# Patient Record
Sex: FEMALE | Race: WHITE | NOT HISPANIC OR LATINO | Employment: OTHER
[De-identification: names, ages, dates, MRNs, and addresses within clinical notes are randomized per-mention and may not be internally consistent; named-entity substitution may affect disease eponyms.]

---

## 2017-01-04 ENCOUNTER — CHARTING TRANS (OUTPATIENT)
Dept: OTHER | Age: 82
End: 2017-01-04

## 2017-01-05 ENCOUNTER — CHARTING TRANS (OUTPATIENT)
Dept: OTHER | Age: 82
End: 2017-01-05

## 2017-01-11 ENCOUNTER — NURSE ONLY (OUTPATIENT)
Dept: INTERNAL MEDICINE CLINIC | Facility: CLINIC | Age: 82
End: 2017-01-11

## 2017-01-11 DIAGNOSIS — I48.20 CHRONIC ATRIAL FIBRILLATION (HCC): Primary | ICD-10-CM

## 2017-01-11 LAB — INR: 2.3 (ref 0.8–1.2)

## 2017-01-11 PROCEDURE — 85610 PROTHROMBIN TIME: CPT | Performed by: INTERNAL MEDICINE

## 2017-01-24 ENCOUNTER — PRIOR ORIGINAL RECORDS (OUTPATIENT)
Dept: OTHER | Age: 82
End: 2017-01-24

## 2017-01-31 ENCOUNTER — PRIOR ORIGINAL RECORDS (OUTPATIENT)
Dept: OTHER | Age: 82
End: 2017-01-31

## 2017-02-03 ENCOUNTER — PRIOR ORIGINAL RECORDS (OUTPATIENT)
Dept: OTHER | Age: 82
End: 2017-02-03

## 2017-02-17 ENCOUNTER — PRIOR ORIGINAL RECORDS (OUTPATIENT)
Dept: OTHER | Age: 82
End: 2017-02-17

## 2017-02-21 ENCOUNTER — PRIOR ORIGINAL RECORDS (OUTPATIENT)
Dept: OTHER | Age: 82
End: 2017-02-21

## 2017-02-21 ENCOUNTER — OFFICE VISIT (OUTPATIENT)
Dept: INTERNAL MEDICINE CLINIC | Facility: CLINIC | Age: 82
End: 2017-02-21

## 2017-02-21 VITALS
BODY MASS INDEX: 29.57 KG/M2 | RESPIRATION RATE: 18 BRPM | HEART RATE: 60 BPM | WEIGHT: 177.5 LBS | HEIGHT: 65 IN | OXYGEN SATURATION: 96 % | DIASTOLIC BLOOD PRESSURE: 54 MMHG | TEMPERATURE: 98 F | SYSTOLIC BLOOD PRESSURE: 120 MMHG

## 2017-02-21 DIAGNOSIS — Z98.890 S/P LAMINECTOMY: ICD-10-CM

## 2017-02-21 DIAGNOSIS — Z95.0 PACEMAKER: ICD-10-CM

## 2017-02-21 DIAGNOSIS — R73.01 IFG (IMPAIRED FASTING GLUCOSE): ICD-10-CM

## 2017-02-21 DIAGNOSIS — Z90.49 S/P COLON RESECTION: ICD-10-CM

## 2017-02-21 DIAGNOSIS — H35.30 MACULAR DEGENERATION: ICD-10-CM

## 2017-02-21 DIAGNOSIS — M54.16 LUMBAR RADICULAR PAIN: ICD-10-CM

## 2017-02-21 DIAGNOSIS — I48.20 CHRONIC ATRIAL FIBRILLATION (HCC): Primary | ICD-10-CM

## 2017-02-21 DIAGNOSIS — I10 ESSENTIAL HYPERTENSION WITH GOAL BLOOD PRESSURE LESS THAN 140/90: ICD-10-CM

## 2017-02-21 LAB
EST. AVERAGE GLUCOSE BLD GHB EST-MCNC: 126 MG/DL (ref 68–126)
HBA1C MFR BLD HPLC: 6 % (ref ?–5.7)

## 2017-02-21 PROCEDURE — 36415 COLL VENOUS BLD VENIPUNCTURE: CPT | Performed by: INTERNAL MEDICINE

## 2017-02-21 PROCEDURE — 96160 PT-FOCUSED HLTH RISK ASSMT: CPT | Performed by: INTERNAL MEDICINE

## 2017-02-21 PROCEDURE — 83036 HEMOGLOBIN GLYCOSYLATED A1C: CPT | Performed by: INTERNAL MEDICINE

## 2017-02-21 PROCEDURE — G0439 PPPS, SUBSEQ VISIT: HCPCS | Performed by: INTERNAL MEDICINE

## 2017-02-21 PROCEDURE — 99397 PER PM REEVAL EST PAT 65+ YR: CPT | Performed by: INTERNAL MEDICINE

## 2017-02-21 NOTE — PROGRESS NOTES
Dina Presley is a  female who presents for a MA Supervisit.       Patient Care Team: Patient Care Team:  Juan Luis Govea MD as PCP - General (Internal Medicine)  Kaylin Guzman MD as Consulting Physician (Anesthesiology)  SHANNEN Vinson OIL OR) 2 tabs 1 time daily Disp:  Rfl:    SOTALOL HCL 80 MG OR TABS 1/2 tablet 3 times daily Disp:  Rfl:    OCUVITE OR 2 times daily Disp:  Rfl:    CALCIUM 500 + D OR 2 tablets daily Disp:  Rfl:    amoxicillin 500 MG Oral Cap Take 1 capsule (500 mg total) been poor?: No    Type of Diet: Vegetarian    How does the patient maintain a good energy level?: Daily Walks    How would you describe your daily physical activity?: Light    How would you describe your current health state?: Good    How do you maintain p Do you have a healthcare power of ?: Yes    Do you have a living will?: Yes     Please go to \"Cognitive Assessment\" under Medicare Assessment section in Charting, test patient and document.     Then, refresh your progress note to see your input applicable    Chlamydia  Annually if high risk No results found for: CHLAMYDIA No flowsheet data found.     Screening Mammogram      Mammogram  Annually Mammogram,1 Yr due on 08/07/2013 Update Health Maintenance if applicable   Immunizations      Influenza for this or any previous visit. No flowsheet data found.         ALLERGIES:     Bacitracin                  Comment:Red and swelling  Bactrim                 Nausea only  Cephalexin              Diarrhea  Garlic                  Diarrhea  Levaquin Rfl: 0   [DISCONTINUED] HYDROcodone-acetaminophen (NORCO)  MG Oral Tab Take 1 tablet by mouth. Every 4 - 6 hours as needed.   Disp:  Rfl:       MEDICAL INFORMATION:   Past Medical History   Diagnosis Date   • Basal cell cancer    • Hypertension    • R °C) (Oral)  Resp 18  Ht 65\"  Wt 177 lb 8 oz  BMI 29.54 kg/m2  SpO2 96%     > BP Readings from Last 3 Encounters:  02/21/17 : 120/54  10/10/16 : 132/68  09/06/16 : 140/54      GENERAL: well developed, well nourished, in no apparent distress  SKIN: no rashe radicular pain. This is being evaluated at [facility] for some sort of stimulator. Problem #7 impaired fasting glucose will check A1c problem #8 hypertension well-controlled  The patient indicates understanding of these issues and agrees to the plan.   The pa

## 2017-02-21 NOTE — PATIENT INSTRUCTIONS
Ayse Maldonado's SCREENING SCHEDULE   Tests on this list are recommended by your physician but may not be covered, or covered at this frequency, by your insurer. Please check with your insurance carrier before scheduling to verify coverage.     PREVEN Immunizations      Influenza No orders found for this or any previous visit.  Update Immunization Activity if applicable    Pneumococcal   Orders placed or performed in visit on 05/23/16  -PNEUMOCOCCAL VACC, 13 REESE IM    Update Immunization Activity if ap

## 2017-02-24 ENCOUNTER — PRIOR ORIGINAL RECORDS (OUTPATIENT)
Dept: OTHER | Age: 82
End: 2017-02-24

## 2017-02-24 ENCOUNTER — MYAURORA ACCOUNT LINK (OUTPATIENT)
Dept: OTHER | Age: 82
End: 2017-02-24

## 2017-03-01 ENCOUNTER — NURSE ONLY (OUTPATIENT)
Dept: INTERNAL MEDICINE CLINIC | Facility: CLINIC | Age: 82
End: 2017-03-01

## 2017-03-01 DIAGNOSIS — I48.20 CHRONIC ATRIAL FIBRILLATION (HCC): Primary | ICD-10-CM

## 2017-03-01 LAB — INR: 1.4 (ref 0.8–1.2)

## 2017-03-01 PROCEDURE — 85610 PROTHROMBIN TIME: CPT | Performed by: INTERNAL MEDICINE

## 2017-03-08 ENCOUNTER — NURSE ONLY (OUTPATIENT)
Dept: INTERNAL MEDICINE CLINIC | Facility: CLINIC | Age: 82
End: 2017-03-08

## 2017-03-08 DIAGNOSIS — I48.20 CHRONIC ATRIAL FIBRILLATION (HCC): Primary | ICD-10-CM

## 2017-03-08 LAB — INR: 2 (ref 0.8–1.2)

## 2017-03-08 PROCEDURE — 85610 PROTHROMBIN TIME: CPT | Performed by: INTERNAL MEDICINE

## 2017-03-20 ENCOUNTER — NURSE ONLY (OUTPATIENT)
Dept: INTERNAL MEDICINE CLINIC | Facility: CLINIC | Age: 82
End: 2017-03-20

## 2017-03-20 DIAGNOSIS — I48.20 CHRONIC ATRIAL FIBRILLATION (HCC): Primary | ICD-10-CM

## 2017-03-20 LAB — INR: 1.9 (ref 0.8–1.2)

## 2017-03-20 PROCEDURE — 85610 PROTHROMBIN TIME: CPT | Performed by: INTERNAL MEDICINE

## 2017-04-04 ENCOUNTER — NURSE ONLY (OUTPATIENT)
Dept: INTERNAL MEDICINE CLINIC | Facility: CLINIC | Age: 82
End: 2017-04-04

## 2017-04-04 DIAGNOSIS — I48.20 CHRONIC ATRIAL FIBRILLATION (HCC): Primary | ICD-10-CM

## 2017-04-04 PROCEDURE — 85610 PROTHROMBIN TIME: CPT | Performed by: INTERNAL MEDICINE

## 2017-04-12 PROBLEM — J44.89 ASTHMA WITH COPD (CHRONIC OBSTRUCTIVE PULMONARY DISEASE) (HCC): Chronic | Status: ACTIVE | Noted: 2017-04-12

## 2017-04-12 PROBLEM — J44.9 ASTHMA WITH COPD (CHRONIC OBSTRUCTIVE PULMONARY DISEASE) (HCC): Chronic | Status: ACTIVE | Noted: 2017-04-12

## 2017-04-12 PROBLEM — J44.89 ASTHMA WITH COPD (CHRONIC OBSTRUCTIVE PULMONARY DISEASE): Chronic | Status: ACTIVE | Noted: 2017-04-12

## 2017-04-12 RX ORDER — TRAMADOL HYDROCHLORIDE 50 MG/1
TABLET ORAL
Qty: 120 TABLET | Refills: 0 | Status: SHIPPED | OUTPATIENT
Start: 2017-04-12 | End: 2017-06-19

## 2017-05-02 ENCOUNTER — NURSE ONLY (OUTPATIENT)
Dept: INTERNAL MEDICINE CLINIC | Facility: CLINIC | Age: 82
End: 2017-05-02

## 2017-05-02 DIAGNOSIS — I48.20 CHRONIC ATRIAL FIBRILLATION (HCC): Primary | ICD-10-CM

## 2017-05-02 PROCEDURE — 85610 PROTHROMBIN TIME: CPT | Performed by: INTERNAL MEDICINE

## 2017-05-10 ENCOUNTER — NURSE ONLY (OUTPATIENT)
Dept: INTERNAL MEDICINE CLINIC | Facility: CLINIC | Age: 82
End: 2017-05-10

## 2017-05-10 DIAGNOSIS — I48.20 CHRONIC ATRIAL FIBRILLATION (HCC): Primary | ICD-10-CM

## 2017-05-10 PROCEDURE — 85610 PROTHROMBIN TIME: CPT | Performed by: INTERNAL MEDICINE

## 2017-05-16 ENCOUNTER — OFFICE VISIT (OUTPATIENT)
Dept: INTERNAL MEDICINE CLINIC | Facility: CLINIC | Age: 82
End: 2017-05-16

## 2017-05-16 ENCOUNTER — PRIOR ORIGINAL RECORDS (OUTPATIENT)
Dept: OTHER | Age: 82
End: 2017-05-16

## 2017-05-16 VITALS
DIASTOLIC BLOOD PRESSURE: 58 MMHG | OXYGEN SATURATION: 97 % | HEART RATE: 62 BPM | RESPIRATION RATE: 16 BRPM | TEMPERATURE: 98 F | SYSTOLIC BLOOD PRESSURE: 120 MMHG

## 2017-05-16 DIAGNOSIS — R05.9 COUGH: Primary | ICD-10-CM

## 2017-05-16 DIAGNOSIS — I48.20 CHRONIC ATRIAL FIBRILLATION (HCC): ICD-10-CM

## 2017-05-16 PROCEDURE — 99213 OFFICE O/P EST LOW 20 MIN: CPT | Performed by: NURSE PRACTITIONER

## 2017-05-16 PROCEDURE — 85610 PROTHROMBIN TIME: CPT | Performed by: NURSE PRACTITIONER

## 2017-05-16 RX ORDER — GUAIFENESIN AND CODEINE PHOSPHATE 100; 10 MG/5ML; MG/5ML
SOLUTION ORAL
Qty: 120 ML | Refills: 0 | Status: SHIPPED | OUTPATIENT
Start: 2017-05-16 | End: 2017-10-02 | Stop reason: ALTCHOICE

## 2017-05-16 NOTE — PROGRESS NOTES
CHIEF COMPLAINT:   Patient presents with:  Cough: since last night with lightheadedness and tiredness      HPI:   Levar Casas is a 80year old female who presents for cough for 1 day    Cough began as a tickle in back of throat, was worse with laying lisinopril (PRINIVIL,ZESTRIL) 40 MG Oral Tab Take 40 mg by mouth 2 (two) times daily. Indications: 40 mg in the morning and 20 mg at night Disp:  Rfl:    gabapentin (NEURONTIN) 300 MG Oral Cap Take 300 mg by mouth 3 (three) times daily.  Disp:  Rfl:    Prob SKIN: no rashes, no suspicious lesions. Well perfused with brisk cap refill and turgor. Color and texture consistent throughout. HEENT: Atraumatic, normocephalic. Pupils PERRLA, conjunctiva clear. TM's pearly gray bilaterally; no erythema or effusion. The patient is asked to seek further care if symptoms persist or worsen. The patient indicates understanding of these issues and agrees to the plan.     Meds & Refills for this Visit:    Signed Prescriptions Disp Refills    Guaifenesin-Codeine 100-10 MG/5M Side effects that usually do not require medical attention (report to your doctor or health care professional if they continue or are bothersome):  · constipation  · dry mouth  · nausea, vomiting  · tiredness  What may interact with this medicine?   Do not Keep out of the reach of children. This medicine can be abused. Keep your medicine in a safe place to protect it from theft. Do not share this medicine with anyone. Selling or giving away this medicine is dangerous and against the law.   This medicine may c If you have been taking this medicine for a long time, do not suddenly stop taking it because you may develop a severe reaction. Your body becomes used to the medicine. This does NOT mean you are addicted.  Addiction is a behavior related to getting and usi NOTE:This sheet is a summary. It may not cover all possible information. If you have questions about this medicine, talk to your doctor, pharmacist, or health care provider.  Copyright© 2016 [employer]

## 2017-05-16 NOTE — PATIENT INSTRUCTIONS
Codeine; Guaifenesin oral solution or syrup  What is this medicine? CODEINE; GUAIFENESIN (CAMERONE adolfo; shay FEN e sin) is a cough suppressant and expectorant. It helps to stop or reduce coughing due to the common cold or inhaled irritants.  It will not manuel · certain medicines for anxiety or sleep  · certain medicines for depression like amitriptyline, fluoxetine, sertraline  · certain medicines for seizures like carbamazepine, phenobarbital, phenytoin, primidone  · general anesthetics like halothane, isoflur This medicine may cause accidental overdose and death if taken by other adults, children, or pets. Mix any unused medicine with a substance like cat littler or coffee grounds.  Then throw the medicine away in a sealed container like a sealed bag or a coffee There are different types of narcotic medicines (opiates). If you take more than one type at the same time or if you are taking another medicine that also causes drowsiness, you may have more side effects.  Give your health care provider a list of all medic

## 2017-05-23 ENCOUNTER — PRIOR ORIGINAL RECORDS (OUTPATIENT)
Dept: OTHER | Age: 82
End: 2017-05-23

## 2017-05-23 ENCOUNTER — OFFICE VISIT (OUTPATIENT)
Dept: INTERNAL MEDICINE CLINIC | Facility: CLINIC | Age: 82
End: 2017-05-23

## 2017-05-23 ENCOUNTER — HOSPITAL ENCOUNTER (OUTPATIENT)
Dept: GENERAL RADIOLOGY | Facility: HOSPITAL | Age: 82
Discharge: HOME | End: 2017-05-23
Attending: INTERNAL MEDICINE
Payer: MEDICARE

## 2017-05-23 VITALS
RESPIRATION RATE: 16 BRPM | OXYGEN SATURATION: 96 % | HEART RATE: 60 BPM | TEMPERATURE: 97 F | SYSTOLIC BLOOD PRESSURE: 120 MMHG | BODY MASS INDEX: 29 KG/M2 | WEIGHT: 172 LBS | DIASTOLIC BLOOD PRESSURE: 60 MMHG

## 2017-05-23 DIAGNOSIS — R05.9 COUGH: ICD-10-CM

## 2017-05-23 DIAGNOSIS — J44.9 ASTHMA WITH COPD (CHRONIC OBSTRUCTIVE PULMONARY DISEASE) (HCC): Chronic | ICD-10-CM

## 2017-05-23 PROCEDURE — 36415 COLL VENOUS BLD VENIPUNCTURE: CPT | Performed by: INTERNAL MEDICINE

## 2017-05-23 PROCEDURE — 85025 COMPLETE CBC W/AUTO DIFF WBC: CPT | Performed by: INTERNAL MEDICINE

## 2017-05-23 PROCEDURE — 99213 OFFICE O/P EST LOW 20 MIN: CPT | Performed by: INTERNAL MEDICINE

## 2017-05-23 PROCEDURE — 71020 XR CHEST PA + LAT CHEST (CPT=71020): CPT | Performed by: INTERNAL MEDICINE

## 2017-05-23 RX ORDER — POLYETHYLENE GLYCOL 3350 17 G/17G
POWDER, FOR SOLUTION ORAL
COMMUNITY
Start: 2017-05-17 | End: 2018-02-07

## 2017-05-23 RX ORDER — AZITHROMYCIN 250 MG/1
TABLET, FILM COATED ORAL
Qty: 6 TABLET | Refills: 0 | Status: SHIPPED | OUTPATIENT
Start: 2017-05-23 | End: 2017-10-02 | Stop reason: ALTCHOICE

## 2017-05-23 NOTE — PATIENT INSTRUCTIONS
Dionna Brain during the 5 days you are taking the antibiotic I want she did take half a Coumadin tablet–2.5 mg nightly. I do want to see you on May 30 to check your INR and for me to examine your lungs.

## 2017-05-23 NOTE — PROGRESS NOTES
Patient states she has had a cough for several weeks. Does have history of COPD. States feels tired and fatigued. Denies a sore throat earache no chest pain. No fever chills or night sweats. Physical examination patient is is alert ill-appearing.   Enoch Saleem

## 2017-05-30 ENCOUNTER — OFFICE VISIT (OUTPATIENT)
Dept: INTERNAL MEDICINE CLINIC | Facility: CLINIC | Age: 82
End: 2017-05-30

## 2017-05-30 VITALS
RESPIRATION RATE: 16 BRPM | DIASTOLIC BLOOD PRESSURE: 50 MMHG | HEART RATE: 60 BPM | SYSTOLIC BLOOD PRESSURE: 130 MMHG | WEIGHT: 172.63 LBS | BODY MASS INDEX: 29 KG/M2 | OXYGEN SATURATION: 95 % | TEMPERATURE: 100 F

## 2017-05-30 DIAGNOSIS — H61.21 IMPACTED CERUMEN OF RIGHT EAR: ICD-10-CM

## 2017-05-30 DIAGNOSIS — R05.9 COUGH: Primary | ICD-10-CM

## 2017-05-30 DIAGNOSIS — J40 BRONCHITIS: ICD-10-CM

## 2017-05-30 DIAGNOSIS — J44.9 ASTHMA WITH COPD (CHRONIC OBSTRUCTIVE PULMONARY DISEASE) (HCC): Chronic | ICD-10-CM

## 2017-05-30 DIAGNOSIS — I48.20 CHRONIC ATRIAL FIBRILLATION (HCC): ICD-10-CM

## 2017-05-30 PROCEDURE — 85610 PROTHROMBIN TIME: CPT | Performed by: INTERNAL MEDICINE

## 2017-05-30 PROCEDURE — 99214 OFFICE O/P EST MOD 30 MIN: CPT | Performed by: INTERNAL MEDICINE

## 2017-05-30 NOTE — PROGRESS NOTES
Problem #1 ear wax right ear excessive. This was irrigated patient tolerated procedure well post tympanic membranes clear right and left. Problem #2 cough due to COPD. Much improved with Flovent. She is to continue taking Flovent.   On examination lungs

## 2017-06-08 ENCOUNTER — TELEPHONE (OUTPATIENT)
Dept: INTERNAL MEDICINE CLINIC | Facility: CLINIC | Age: 82
End: 2017-06-08

## 2017-06-08 NOTE — TELEPHONE ENCOUNTER
Called patient and gave her the last INR result we have in the computer which was 1.3 on 05/30/2017.  Patient is to follow up with the coumadin clinic

## 2017-06-12 ENCOUNTER — NURSE ONLY (OUTPATIENT)
Dept: INTERNAL MEDICINE CLINIC | Facility: CLINIC | Age: 82
End: 2017-06-12

## 2017-06-12 DIAGNOSIS — I48.20 CHRONIC ATRIAL FIBRILLATION (HCC): Primary | ICD-10-CM

## 2017-06-12 PROCEDURE — 85610 PROTHROMBIN TIME: CPT | Performed by: INTERNAL MEDICINE

## 2017-06-13 ENCOUNTER — PRIOR ORIGINAL RECORDS (OUTPATIENT)
Dept: OTHER | Age: 82
End: 2017-06-13

## 2017-06-19 LAB
HEMATOCRIT: 36 %
HEMOGLOBIN A1C: 6 %
HEMOGLOBIN: 11.8 G/DL
PLATELETS: 262 K/UL
RED BLOOD COUNT: 3.82 X 10-6/U
WHITE BLOOD COUNT: 5.9 X 10-3/U

## 2017-06-19 RX ORDER — TRAMADOL HYDROCHLORIDE 50 MG/1
TABLET ORAL
Qty: 120 TABLET | Refills: 0 | Status: SHIPPED | OUTPATIENT
Start: 2017-06-19 | End: 2017-09-25

## 2017-07-03 ENCOUNTER — OFFICE VISIT (OUTPATIENT)
Dept: INTERNAL MEDICINE CLINIC | Facility: CLINIC | Age: 82
End: 2017-07-03

## 2017-07-03 VITALS
RESPIRATION RATE: 16 BRPM | DIASTOLIC BLOOD PRESSURE: 50 MMHG | WEIGHT: 176.19 LBS | SYSTOLIC BLOOD PRESSURE: 110 MMHG | HEART RATE: 60 BPM | OXYGEN SATURATION: 96 % | TEMPERATURE: 99 F | BODY MASS INDEX: 29 KG/M2

## 2017-07-03 DIAGNOSIS — J44.9 ASTHMA WITH COPD (CHRONIC OBSTRUCTIVE PULMONARY DISEASE) (HCC): Primary | Chronic | ICD-10-CM

## 2017-07-03 DIAGNOSIS — M54.16 LUMBAR RADICULAR PAIN: ICD-10-CM

## 2017-07-03 PROCEDURE — 99213 OFFICE O/P EST LOW 20 MIN: CPT | Performed by: INTERNAL MEDICINE

## 2017-07-03 RX ORDER — BUPROPION HYDROCHLORIDE 150 MG/1
TABLET ORAL
Qty: 10 TABLET | Refills: 0 | Status: SHIPPED | OUTPATIENT
Start: 2017-07-03 | End: 2017-10-02 | Stop reason: ALTCHOICE

## 2017-07-03 RX ORDER — GABAPENTIN 300 MG/1
300 CAPSULE ORAL 3 TIMES DAILY
Qty: 270 CAPSULE | Refills: 3 | Status: SHIPPED | OUTPATIENT
Start: 2017-07-03 | End: 2017-10-02

## 2017-07-03 RX ORDER — GABAPENTIN 300 MG/1
300 CAPSULE ORAL 3 TIMES DAILY
Qty: 270 CAPSULE | Refills: 3 | Status: SHIPPED | OUTPATIENT
Start: 2017-07-03 | End: 2019-06-11

## 2017-07-03 NOTE — PROGRESS NOTES
Problem 1 COPD patient denies any chest pain shortness of breath. No wheezing. Problem #2 chronic lumbar radiculopathy. She needs a refill on her gabapentin for this she is also on Bupropion. She states she does know whether she needs this or not.   Aster Lat

## 2017-07-03 NOTE — PATIENT INSTRUCTIONS
Lavon Pickering take the bupropion every second day for a week then every third day for a week then finished the medication every fourth day.

## 2017-07-07 ENCOUNTER — TELEPHONE (OUTPATIENT)
Dept: INTERNAL MEDICINE CLINIC | Facility: CLINIC | Age: 82
End: 2017-07-07

## 2017-07-10 NOTE — PROGRESS NOTES
Received a note from Ayse's pain specialist Dr. Estevan Gonsalez stating that he is prescribing the pain medication. His nurse  want to inform us that we have also been prescribing medication.   I will have my staff tell patient that we will no longer be prescribin

## 2017-07-10 NOTE — TELEPHONE ENCOUNTER
Informed Mrs. Maldonado that Dr. Edyta Santamaria will no longer be prescribing Tramadol medication since she is receiving this medication through Dr. Eryn Edmonds pain specialist.

## 2017-07-12 ENCOUNTER — NURSE ONLY (OUTPATIENT)
Dept: INTERNAL MEDICINE CLINIC | Facility: CLINIC | Age: 82
End: 2017-07-12

## 2017-07-12 DIAGNOSIS — I48.20 CHRONIC ATRIAL FIBRILLATION (HCC): Primary | ICD-10-CM

## 2017-07-12 LAB — INR: 2.4 (ref 0.8–1.2)

## 2017-07-12 PROCEDURE — 85610 PROTHROMBIN TIME: CPT | Performed by: INTERNAL MEDICINE

## 2017-08-11 RX ORDER — WARFARIN SODIUM 5 MG/1
TABLET ORAL
Qty: 90 TABLET | Refills: 0 | Status: SHIPPED | OUTPATIENT
Start: 2017-08-11 | End: 2017-12-04

## 2017-08-16 ENCOUNTER — NURSE ONLY (OUTPATIENT)
Dept: INTERNAL MEDICINE CLINIC | Facility: CLINIC | Age: 82
End: 2017-08-16

## 2017-08-16 DIAGNOSIS — I48.20 CHRONIC ATRIAL FIBRILLATION (HCC): Primary | ICD-10-CM

## 2017-08-16 LAB — INR: 2.6 (ref 0.8–1.2)

## 2017-08-16 PROCEDURE — 85610 PROTHROMBIN TIME: CPT | Performed by: INTERNAL MEDICINE

## 2017-09-06 ENCOUNTER — MYAURORA ACCOUNT LINK (OUTPATIENT)
Dept: OTHER | Age: 82
End: 2017-09-06

## 2017-09-19 ENCOUNTER — HOSPITAL ENCOUNTER (OUTPATIENT)
Dept: LAB | Facility: HOSPITAL | Age: 82
Discharge: HOME | End: 2017-09-19
Attending: INTERNAL MEDICINE
Payer: MEDICARE

## 2017-09-19 ENCOUNTER — PRIOR ORIGINAL RECORDS (OUTPATIENT)
Dept: OTHER | Age: 82
End: 2017-09-19

## 2017-09-19 DIAGNOSIS — I48.0 PAROXYSMAL ATRIAL FIBRILLATION (HCC): ICD-10-CM

## 2017-09-19 LAB
BASOPHILS # BLD AUTO: 0.06 X10(3) UL (ref 0–0.1)
BASOPHILS NFR BLD AUTO: 0.8 %
BETA NATRIURETIC PEPTIDE: 301 PG/ML (ref 2–99)
BUN BLD-MCNC: 13 MG/DL (ref 8–20)
CALCIUM BLD-MCNC: 9.1 MG/DL (ref 8.3–10.3)
CHLORIDE: 101 MMOL/L (ref 101–111)
CO2: 26 MMOL/L (ref 22–32)
CREAT BLD-MCNC: 0.82 MG/DL (ref 0.55–1.02)
EOSINOPHIL # BLD AUTO: 0.14 X10(3) UL (ref 0–0.3)
EOSINOPHIL NFR BLD AUTO: 2 %
ERYTHROCYTE [DISTWIDTH] IN BLOOD BY AUTOMATED COUNT: 12.4 % (ref 11.5–16)
GLUCOSE BLD-MCNC: 103 MG/DL (ref 70–99)
HCT VFR BLD AUTO: 33.2 % (ref 34–50)
HGB BLD-MCNC: 11.5 G/DL (ref 12–16)
IMMATURE GRANULOCYTE COUNT: 0.02 X10(3) UL (ref 0–1)
IMMATURE GRANULOCYTE RATIO %: 0.3 %
LYMPHOCYTES # BLD AUTO: 1.59 X10(3) UL (ref 0.9–4)
LYMPHOCYTES NFR BLD AUTO: 22.2 %
MCH RBC QN AUTO: 32.3 PG (ref 27–33.2)
MCHC RBC AUTO-ENTMCNC: 34.6 G/DL (ref 31–37)
MCV RBC AUTO: 93.3 FL (ref 81–100)
MONOCYTES # BLD AUTO: 0.94 X10(3) UL (ref 0.1–0.6)
MONOCYTES NFR BLD AUTO: 13.1 %
NEUTROPHIL ABS PRELIM: 4.4 X10 (3) UL (ref 1.3–6.7)
NEUTROPHILS # BLD AUTO: 4.4 X10(3) UL (ref 1.3–6.7)
NEUTROPHILS NFR BLD AUTO: 61.6 %
PLATELET # BLD AUTO: 245 10(3)UL (ref 150–450)
POC INR: 2.2 (ref 0.8–1.3)
POTASSIUM SERPL-SCNC: 4.2 MMOL/L (ref 3.6–5.1)
RBC # BLD AUTO: 3.56 X10(6)UL (ref 3.8–5.1)
RED CELL DISTRIBUTION WIDTH-SD: 42.7 FL (ref 35.1–46.3)
SODIUM SERPL-SCNC: 135 MMOL/L (ref 136–144)
WBC # BLD AUTO: 7.2 X10(3) UL (ref 4–13)

## 2017-09-19 PROCEDURE — 85025 COMPLETE CBC W/AUTO DIFF WBC: CPT | Performed by: INTERNAL MEDICINE

## 2017-09-19 PROCEDURE — 83880 ASSAY OF NATRIURETIC PEPTIDE: CPT | Performed by: INTERNAL MEDICINE

## 2017-09-19 PROCEDURE — 85610 PROTHROMBIN TIME: CPT

## 2017-09-19 PROCEDURE — 36415 COLL VENOUS BLD VENIPUNCTURE: CPT | Performed by: INTERNAL MEDICINE

## 2017-09-19 PROCEDURE — 80048 BASIC METABOLIC PNL TOTAL CA: CPT | Performed by: INTERNAL MEDICINE

## 2017-09-20 ENCOUNTER — OFFICE VISIT (OUTPATIENT)
Dept: INTERNAL MEDICINE CLINIC | Facility: CLINIC | Age: 82
End: 2017-09-20

## 2017-09-20 ENCOUNTER — PRIOR ORIGINAL RECORDS (OUTPATIENT)
Dept: OTHER | Age: 82
End: 2017-09-20

## 2017-09-20 VITALS
HEART RATE: 60 BPM | TEMPERATURE: 98 F | WEIGHT: 181 LBS | DIASTOLIC BLOOD PRESSURE: 44 MMHG | SYSTOLIC BLOOD PRESSURE: 104 MMHG | BODY MASS INDEX: 30 KG/M2 | OXYGEN SATURATION: 96 %

## 2017-09-20 DIAGNOSIS — J01.00 ACUTE NON-RECURRENT MAXILLARY SINUSITIS: Primary | ICD-10-CM

## 2017-09-20 PROCEDURE — 99213 OFFICE O/P EST LOW 20 MIN: CPT | Performed by: INTERNAL MEDICINE

## 2017-09-20 RX ORDER — FUROSEMIDE 40 MG/1
TABLET ORAL
COMMUNITY
Start: 2017-09-19 | End: 2017-11-01

## 2017-09-20 RX ORDER — AMOXICILLIN 500 MG/1
500 CAPSULE ORAL 2 TIMES DAILY
Qty: 20 CAPSULE | Refills: 0 | Status: SHIPPED | OUTPATIENT
Start: 2017-09-20 | End: 2017-09-30

## 2017-09-20 NOTE — PROGRESS NOTES
For last week patient has been complaining of discomfort around the left cheek and feeling tired and fatigued. She saw her dentist yesterday. Apparently at that time was told that this appear to be a dental problem most likely sinusitis.   Patient denies

## 2017-09-25 RX ORDER — TRAMADOL HYDROCHLORIDE 50 MG/1
TABLET ORAL
Qty: 120 TABLET | Refills: 5 | Status: SHIPPED | OUTPATIENT
Start: 2017-09-25 | End: 2017-11-01

## 2017-09-27 LAB
BUN: 13 MG/DL
CALCIUM: 9.1 MG/DL
CHLORIDE: 101 MEQ/L
CREATININE, SERUM: 0.82 MG/DL
GLUCOSE: 103 MG/DL
HEMATOCRIT: 33.2 %
HEMOGLOBIN: 11.5 G/DL
PLATELETS: 245 K/UL
POTASSIUM, SERUM: 4.2 MEQ/L
RED BLOOD COUNT: 3.56 X 10-6/U
SODIUM: 135 MEQ/L
WHITE BLOOD COUNT: 7.2 X 10-3/U

## 2017-10-02 ENCOUNTER — OFFICE VISIT (OUTPATIENT)
Dept: INTERNAL MEDICINE CLINIC | Facility: CLINIC | Age: 82
End: 2017-10-02

## 2017-10-02 VITALS
HEART RATE: 60 BPM | SYSTOLIC BLOOD PRESSURE: 130 MMHG | DIASTOLIC BLOOD PRESSURE: 56 MMHG | RESPIRATION RATE: 16 BRPM | OXYGEN SATURATION: 96 %

## 2017-10-02 DIAGNOSIS — J32.9 SINUSITIS, UNSPECIFIED CHRONICITY, UNSPECIFIED LOCATION: Primary | ICD-10-CM

## 2017-10-02 PROCEDURE — 99213 OFFICE O/P EST LOW 20 MIN: CPT | Performed by: INTERNAL MEDICINE

## 2017-10-02 NOTE — PROGRESS NOTES
Patient is here for follow-up on her sinusitis she states that symptoms have resolved with medication. I informed her I received a fax from her [facility].   That she is receiving tramadol from several different physicians somewhat different area

## 2017-10-12 ENCOUNTER — HOSPITAL ENCOUNTER (OUTPATIENT)
Dept: CV DIAGNOSTICS | Facility: HOSPITAL | Age: 82
Discharge: HOME | End: 2017-10-12
Attending: INTERNAL MEDICINE

## 2017-10-12 ENCOUNTER — MYAURORA ACCOUNT LINK (OUTPATIENT)
Dept: OTHER | Age: 82
End: 2017-10-12

## 2017-10-12 DIAGNOSIS — I48.0 AF (PAROXYSMAL ATRIAL FIBRILLATION) (HCC): ICD-10-CM

## 2017-10-12 DIAGNOSIS — I10 BENIGN HYPERTENSION: ICD-10-CM

## 2017-10-12 DIAGNOSIS — R42 DIZZINESS: ICD-10-CM

## 2017-10-13 ENCOUNTER — IMMUNIZATION (OUTPATIENT)
Dept: INTERNAL MEDICINE CLINIC | Facility: CLINIC | Age: 82
End: 2017-10-13

## 2017-10-13 ENCOUNTER — MED REC SCAN ONLY (OUTPATIENT)
Dept: INTERNAL MEDICINE CLINIC | Facility: CLINIC | Age: 82
End: 2017-10-13

## 2017-10-13 PROCEDURE — 90653 IIV ADJUVANT VACCINE IM: CPT | Performed by: INTERNAL MEDICINE

## 2017-10-13 PROCEDURE — G0008 ADMIN INFLUENZA VIRUS VAC: HCPCS | Performed by: INTERNAL MEDICINE

## 2017-10-16 ENCOUNTER — HOSPITAL ENCOUNTER (OUTPATIENT)
Dept: LAB | Facility: HOSPITAL | Age: 82
Discharge: HOME | End: 2017-10-16
Attending: INTERNAL MEDICINE
Payer: MEDICARE

## 2017-10-16 ENCOUNTER — PRIOR ORIGINAL RECORDS (OUTPATIENT)
Dept: OTHER | Age: 82
End: 2017-10-16

## 2017-10-16 DIAGNOSIS — I48.0 PAROXYSMAL ATRIAL FIBRILLATION (HCC): ICD-10-CM

## 2017-10-16 PROCEDURE — 85610 PROTHROMBIN TIME: CPT

## 2017-10-27 ENCOUNTER — APPOINTMENT (OUTPATIENT)
Dept: CT IMAGING | Facility: HOSPITAL | Age: 82
DRG: 493 | End: 2017-10-27
Payer: MEDICARE

## 2017-10-27 ENCOUNTER — APPOINTMENT (OUTPATIENT)
Dept: GENERAL RADIOLOGY | Facility: HOSPITAL | Age: 82
DRG: 493 | End: 2017-10-27
Payer: MEDICARE

## 2017-10-27 ENCOUNTER — HOSPITAL ENCOUNTER (INPATIENT)
Facility: HOSPITAL | Age: 82
LOS: 4 days | Discharge: NURSING, REHAB OR LONG-TERM CARE | DRG: 493 | End: 2017-11-01
Admitting: HOSPITALIST
Payer: MEDICARE

## 2017-10-27 DIAGNOSIS — S72.001A CLOSED FRACTURE OF RIGHT HIP, INITIAL ENCOUNTER (HCC): Primary | ICD-10-CM

## 2017-10-27 DIAGNOSIS — S42.401A CLOSED FRACTURE OF DISTAL END OF RIGHT HUMERUS, UNSPECIFIED FRACTURE MORPHOLOGY, INITIAL ENCOUNTER: ICD-10-CM

## 2017-10-27 DIAGNOSIS — S32.9XXA CLOSED DISPLACED FRACTURE OF PELVIS, UNSPECIFIED PART OF PELVIS, INITIAL ENCOUNTER (HCC): ICD-10-CM

## 2017-10-27 PROCEDURE — 71010 XR CHEST AP PORTABLE  (CPT=71010): CPT

## 2017-10-27 PROCEDURE — 70450 CT HEAD/BRAIN W/O DYE: CPT

## 2017-10-27 PROCEDURE — 73030 X-RAY EXAM OF SHOULDER: CPT

## 2017-10-27 PROCEDURE — 99223 1ST HOSP IP/OBS HIGH 75: CPT | Performed by: HOSPITALIST

## 2017-10-27 PROCEDURE — 73070 X-RAY EXAM OF ELBOW: CPT

## 2017-10-27 PROCEDURE — 73502 X-RAY EXAM HIP UNI 2-3 VIEWS: CPT

## 2017-10-27 RX ORDER — HYDROMORPHONE HYDROCHLORIDE 1 MG/ML
0.5 INJECTION, SOLUTION INTRAMUSCULAR; INTRAVENOUS; SUBCUTANEOUS ONCE
Status: COMPLETED | OUTPATIENT
Start: 2017-10-27 | End: 2017-10-27

## 2017-10-27 RX ORDER — ONDANSETRON 2 MG/ML
4 INJECTION INTRAMUSCULAR; INTRAVENOUS ONCE
Status: COMPLETED | OUTPATIENT
Start: 2017-10-27 | End: 2017-10-27

## 2017-10-27 RX ORDER — HYDROMORPHONE HYDROCHLORIDE 1 MG/ML
INJECTION, SOLUTION INTRAMUSCULAR; INTRAVENOUS; SUBCUTANEOUS
Status: DISPENSED
Start: 2017-10-27 | End: 2017-10-28

## 2017-10-27 RX ORDER — ONDANSETRON 2 MG/ML
INJECTION INTRAMUSCULAR; INTRAVENOUS
Status: COMPLETED
Start: 2017-10-27 | End: 2017-10-27

## 2017-10-27 RX ORDER — HYDROMORPHONE HYDROCHLORIDE 1 MG/ML
0.5 INJECTION, SOLUTION INTRAMUSCULAR; INTRAVENOUS; SUBCUTANEOUS ONCE
Status: DISCONTINUED | OUTPATIENT
Start: 2017-10-27 | End: 2017-10-28

## 2017-10-28 ENCOUNTER — ANESTHESIA EVENT (OUTPATIENT)
Dept: SURGERY | Facility: HOSPITAL | Age: 82
End: 2017-10-28

## 2017-10-28 PROBLEM — S42.401A CLOSED FRACTURE OF RIGHT DISTAL HUMERUS: Status: ACTIVE | Noted: 2017-10-28

## 2017-10-28 PROBLEM — S32.9XXA CLOSED DISPLACED FRACTURE OF PELVIS, UNSPECIFIED PART OF PELVIS, INITIAL ENCOUNTER (HCC): Status: ACTIVE | Noted: 2017-10-28

## 2017-10-28 PROBLEM — Z99.89 OSA ON CPAP: Status: ACTIVE | Noted: 2017-10-28

## 2017-10-28 PROBLEM — G47.33 OSA ON CPAP: Status: ACTIVE | Noted: 2017-10-28

## 2017-10-28 PROBLEM — S42.401A CLOSED FRACTURE OF DISTAL END OF RIGHT HUMERUS, UNSPECIFIED FRACTURE MORPHOLOGY, INITIAL ENCOUNTER: Status: ACTIVE | Noted: 2017-10-28

## 2017-10-28 PROBLEM — S72.001A CLOSED FRACTURE OF RIGHT HIP, INITIAL ENCOUNTER (HCC): Status: ACTIVE | Noted: 2017-10-28

## 2017-10-28 PROCEDURE — 99232 SBSQ HOSP IP/OBS MODERATE 35: CPT | Performed by: HOSPITALIST

## 2017-10-28 RX ORDER — LISINOPRIL 40 MG/1
40 TABLET ORAL DAILY
Status: DISCONTINUED | OUTPATIENT
Start: 2017-10-28 | End: 2017-11-01

## 2017-10-28 RX ORDER — HYDROMORPHONE HYDROCHLORIDE 1 MG/ML
0.4 INJECTION, SOLUTION INTRAMUSCULAR; INTRAVENOUS; SUBCUTANEOUS EVERY 2 HOUR PRN
Status: DISCONTINUED | OUTPATIENT
Start: 2017-10-28 | End: 2017-10-28

## 2017-10-28 RX ORDER — HYDROMORPHONE HYDROCHLORIDE 1 MG/ML
0.8 INJECTION, SOLUTION INTRAMUSCULAR; INTRAVENOUS; SUBCUTANEOUS EVERY 2 HOUR PRN
Status: DISCONTINUED | OUTPATIENT
Start: 2017-10-28 | End: 2017-10-28

## 2017-10-28 RX ORDER — HYDROCODONE BITARTRATE AND ACETAMINOPHEN 5; 325 MG/1; MG/1
2 TABLET ORAL EVERY 4 HOURS PRN
Status: DISCONTINUED | OUTPATIENT
Start: 2017-10-28 | End: 2017-10-30

## 2017-10-28 RX ORDER — SODIUM PHOSPHATE, DIBASIC AND SODIUM PHOSPHATE, MONOBASIC 7; 19 G/133ML; G/133ML
1 ENEMA RECTAL ONCE AS NEEDED
Status: DISCONTINUED | OUTPATIENT
Start: 2017-10-28 | End: 2017-11-01

## 2017-10-28 RX ORDER — HYDROMORPHONE HYDROCHLORIDE 1 MG/ML
0.2 INJECTION, SOLUTION INTRAMUSCULAR; INTRAVENOUS; SUBCUTANEOUS EVERY 2 HOUR PRN
Status: DISCONTINUED | OUTPATIENT
Start: 2017-10-28 | End: 2017-10-28

## 2017-10-28 RX ORDER — ONDANSETRON 2 MG/ML
4 INJECTION INTRAMUSCULAR; INTRAVENOUS EVERY 6 HOURS PRN
Status: DISCONTINUED | OUTPATIENT
Start: 2017-10-28 | End: 2017-10-28

## 2017-10-28 RX ORDER — CLINDAMYCIN PHOSPHATE 900 MG/50ML
900 INJECTION INTRAVENOUS ONCE
Status: COMPLETED | OUTPATIENT
Start: 2017-10-29 | End: 2017-10-29

## 2017-10-28 RX ORDER — HYDROCODONE BITARTRATE AND ACETAMINOPHEN 5; 325 MG/1; MG/1
1 TABLET ORAL EVERY 4 HOURS PRN
Status: DISCONTINUED | OUTPATIENT
Start: 2017-10-28 | End: 2017-10-30

## 2017-10-28 RX ORDER — NALBUPHINE HCL 10 MG/ML
2.5 AMPUL (ML) INJECTION EVERY 4 HOURS PRN
Status: DISCONTINUED | OUTPATIENT
Start: 2017-10-28 | End: 2017-10-30

## 2017-10-28 RX ORDER — BISACODYL 10 MG
10 SUPPOSITORY, RECTAL RECTAL
Status: DISCONTINUED | OUTPATIENT
Start: 2017-10-28 | End: 2017-10-29

## 2017-10-28 RX ORDER — ACETAMINOPHEN 325 MG/1
650 TABLET ORAL EVERY 6 HOURS PRN
Status: DISCONTINUED | OUTPATIENT
Start: 2017-10-28 | End: 2017-10-30

## 2017-10-28 RX ORDER — SODIUM CHLORIDE 9 MG/ML
INJECTION, SOLUTION INTRAVENOUS CONTINUOUS
Status: DISCONTINUED | OUTPATIENT
Start: 2017-10-28 | End: 2017-11-01

## 2017-10-28 RX ORDER — ONDANSETRON 2 MG/ML
4 INJECTION INTRAMUSCULAR; INTRAVENOUS EVERY 4 HOURS PRN
Status: DISCONTINUED | OUTPATIENT
Start: 2017-10-28 | End: 2017-10-28

## 2017-10-28 RX ORDER — ONDANSETRON 2 MG/ML
4 INJECTION INTRAMUSCULAR; INTRAVENOUS EVERY 6 HOURS PRN
Status: DISCONTINUED | OUTPATIENT
Start: 2017-10-28 | End: 2017-10-29

## 2017-10-28 RX ORDER — DOCUSATE SODIUM 100 MG/1
100 CAPSULE, LIQUID FILLED ORAL 2 TIMES DAILY
Status: DISCONTINUED | OUTPATIENT
Start: 2017-10-28 | End: 2017-10-29

## 2017-10-28 RX ORDER — SOTALOL HYDROCHLORIDE 80 MG/1
40 TABLET ORAL
Status: DISCONTINUED | OUTPATIENT
Start: 2017-10-28 | End: 2017-11-01

## 2017-10-28 RX ORDER — HYDROMORPHONE HYDROCHLORIDE 1 MG/ML
0.5 INJECTION, SOLUTION INTRAMUSCULAR; INTRAVENOUS; SUBCUTANEOUS EVERY 30 MIN PRN
Status: ACTIVE | OUTPATIENT
Start: 2017-10-28 | End: 2017-10-28

## 2017-10-28 RX ORDER — AMLODIPINE BESYLATE 5 MG/1
5 TABLET ORAL DAILY
Status: DISCONTINUED | OUTPATIENT
Start: 2017-10-28 | End: 2017-11-01

## 2017-10-28 RX ORDER — DIPHENHYDRAMINE HYDROCHLORIDE 50 MG/ML
12.5 INJECTION INTRAMUSCULAR; INTRAVENOUS EVERY 4 HOURS PRN
Status: DISCONTINUED | OUTPATIENT
Start: 2017-10-28 | End: 2017-10-30

## 2017-10-28 RX ORDER — POLYETHYLENE GLYCOL 3350 17 G/17G
17 POWDER, FOR SOLUTION ORAL DAILY PRN
Status: DISCONTINUED | OUTPATIENT
Start: 2017-10-28 | End: 2017-11-01

## 2017-10-28 RX ORDER — HYDROMORPHONE HYDROCHLORIDE 1 MG/ML
0.4 INJECTION, SOLUTION INTRAMUSCULAR; INTRAVENOUS; SUBCUTANEOUS EVERY 30 MIN PRN
Status: DISCONTINUED | OUTPATIENT
Start: 2017-10-28 | End: 2017-10-29

## 2017-10-28 RX ORDER — GABAPENTIN 300 MG/1
300 CAPSULE ORAL 3 TIMES DAILY
Status: DISCONTINUED | OUTPATIENT
Start: 2017-10-28 | End: 2017-11-01

## 2017-10-28 RX ORDER — NALOXONE HYDROCHLORIDE 0.4 MG/ML
0.08 INJECTION, SOLUTION INTRAMUSCULAR; INTRAVENOUS; SUBCUTANEOUS
Status: DISCONTINUED | OUTPATIENT
Start: 2017-10-28 | End: 2017-10-30

## 2017-10-28 RX ORDER — SODIUM CHLORIDE 9 MG/ML
INJECTION, SOLUTION INTRAVENOUS CONTINUOUS
Status: DISCONTINUED | OUTPATIENT
Start: 2017-10-28 | End: 2017-10-28

## 2017-10-28 NOTE — ICD/PM
No change to left sided [employer] pacemaker for ORIF right humerus. Grounding pad on right side if able. Routine outpatient followup [facility].   Jared Levin NP

## 2017-10-28 NOTE — PROGRESS NOTES
[facility]  Report of Consultation    Bianca Dumont Patient Status:  Inpatient    10/25/1927 MRN [id number]   [facility] 3SW-A Attending Varsha Curran[address] Day # 0 PCP Baljinder Taylor MD     Date of Admission:  8 Nausea only    Medications:    Current Facility-Administered Medications:   •  ondansetron HCl (ZOFRAN) injection 4 mg, 4 mg, Intravenous, Q4H PRN  •  HYDROmorphone HCl PF (DILAUDID) 1 MG/ML injection 0.5 mg, 0.5 mg, Intravenous, Q30 Min PRN  • Systems:  Pertinent items are noted in HPI. Physical Exam:  Blood pressure 159/62, pulse 85, temperature 98 °F (36.7 °C), temperature source Oral, resp. rate 18, weight 87.2 kg (192 lb 3.2 oz), SpO2 94 %.     Laboratory Data:    Lab Results  Component Va

## 2017-10-28 NOTE — PAYOR COMM NOTE
--------------  ADMISSION REVIEW     Payor: [address] [place], [place] [address] #:  [id number]      Admit date: 10/28/17  Admit time: 4783       Admitting Physician: Juan Ku MD  Attending Physician:  Santos Adhikari*  Primary Care Physician Date   • Arrhythmia     atrial fib for past 8-10 years   • Basal cell cancer    • Hypertension    • Reflux    • Sleep apnea     cpap       Past Surgical History:  3/28/2014: ADJ TISS Delia Green LID,NOS,EAR <10SQCM      Comment: Procedure: DEBULKING OF NASOLABIAL conjunctiva pink and moist.   PERRL, EOMI      Mucus membranes pink and moist,   Neck: Supple with normal range of motion. No midline cervical thoracic or lumbosacral spine tenderness. No step-offs. Rib cage nontender.   Chest: clear breath sounds withou 65- 104 seconds. The therapeutic range has been validated against 0.3-0.7 heparin anti-Xa units/mL.      CBC W/ DIFFERENTIAL - Abnormal; Notable for the following:     RBC 3.56 (*)     HGB 11.4 (*)     HCT 33.4 (*)     Neutrophil Absolute Prelim 6.82 (*) Course  ------------------------------------------------------------[MS. 2]   Xr Shoulder, Complete (min 2 Views), Right (cpt=73030)    Result Date: 10/27/2017  PROCEDURE:  XR SHOULDER, COMPLETE (MIN 2 VIEWS), RIGHT (CPT=73030)     TECHNIQUE:  Multiple view frontal lobes. SINUSES:           There is a soft tissue density mass filling the left maxillary sinus. There is wall thickening there are some dystrophic calcifications. There is extension of the soft tissue into the left ethmoid sinuses.  Findings would b Nicole Berg MD            Xr Chest Ap Portable  (cpt=71010)    Result Date: 10/27/2017  PROCEDURE:  XR CHEST AP PORTABLE (CPT=71010)  TECHNIQUE:  AP chest radiograph was obtained.   COMPARISON:  [facility] XR CHEST PA + LAT CHEST (CPT=71020), 5/23/2017 discussion with patient and her family about the results. I did speak with the [facility], Dr. Milli Nava, for admission and he has already seen the patient here for admission.   The case is also discussed with the orthopedist on-call, Dr. Shaggy Kearney for Illness: Luis Cabrales is a 80year old female with past medical history of hypertension, atrial fibrillation presents to emergency department after a fall. Patient was getting out of the car, subsequently fell landing on her right hip and right arm. AS NEEDED FOR PAIN Disp: 120 tablet Rfl: 5   furosemide 40 MG Oral Tab  Disp:  Rfl:    WARFARIN SODIUM 5 MG Oral Tab TAKE ONE AND A HALF (1.5) TABLET BY MOUTH DAILY Disp: 90 tablet Rfl: 0   gabapentin (NEURONTIN) 300 MG Oral Cap Take 1 capsule (300 mg tota murmurs, rubs or gallops. Equal pulses. Chest and Back: No tenderness or deformity. Abdomen: Soft, nontender, nondistended. Positive bowel sounds. No rebound, guarding or organomegaly. Neurologic: No focal neurological deficits.  CNII-XII grossly intac 5-325 MG per tab 1 tablet     Date Action Dose Route User    10/28/2017 1416 Given 1 tablet Oral Adolfo Morales RN      HYDROmorphone (DILAUDID) 0.2 mg/ml PCA infusion     Date Action Dose Route User    10/28/2017 0316 New Bag 20 mg Intravenous BALJINDER Robertson COMMENTS:   Meets guidelines for inpatient criteria fall need for emergency planned IP surgerys with + fracture to right hip, pelvis, and right elbow, ORIF planned for hip and elbow, receiving IVF, IV Zofran, IV dilaudid

## 2017-10-28 NOTE — PLAN OF CARE
Assumed care @1500. Alert/oriented x4. Hard of hearing with bilateral hearing aides. IV K infused as ordered. Using PCA as needed, Zofran given for intermittent nausea. States would prefer to use Norco if able. Consent signed on chart.  Planning OR tomorrow

## 2017-10-28 NOTE — PROGRESS NOTES
Acute Pain Service    PCA Follow-up Note    Indication: Other: multiple fractures; left-side pelvic fx and right distal humerus fx.       SUBJECTIVE:  Pt states pain is \"better\"      OBJECTIVE:     Pain Score:    5/ at rest    7/ with movement    Patie

## 2017-10-28 NOTE — PROGRESS NOTES
10/28/17 4650   Clinical Encounter Type   Visited With Patient   Routine Visit ( responded to request for spiritual care.)   Patient's Supportive Strategies/Resources Pts huge family is her support. pt loves to [place] and loves her family.   Pt

## 2017-10-28 NOTE — PLAN OF CARE
HEMATOLOGIC - ADULT    • Free from bleeding injury Progressing        Impaired Activities of Daily Living    • Achieve highest/safest level of independence in self care Progressing        Impaired Functional Mobility    • Achieve highest/safest level of mo

## 2017-10-28 NOTE — PLAN OF CARE
Maintain proper alignment of affected body part Progressing    RUE with post mold and ACE wrap, radial pulses palpable, +CMS.   Verbalizes/displays adequate comfort level or patient's stated pain goal Progressing    Pt c/o pain to RUE, pain meds adjusted pe

## 2017-10-28 NOTE — CONSULTS
[facility]  Report of Consultation    Bakari Montez Patient Status:  Inpatient    10/25/1927 MRN [id number]   [facility] 3SW-A Attending Yasmine Lux [facility] Day # 0 PCP Azeem Evans MD     Reason for [facility] Comment:Red and swelling  Bactrim                 Nausea only  Cephalexin              Diarrhea  Garlic                  Diarrhea  Levaquin                Nausea only  Lexapro                 Nausea only    Medications:    Current Facility-Administered Med icterus. Mucous membranes are moist. Pupils are equal and round, reactive to light and accommodate. Pupils are approximately 3mm and react to 2mm with reaction to light. Oropharynx is clear. Normocephalic, atraumatic.   Neck: No tenderness to palpitatio humerus, unspecified fracture morphology, initial encounter     Closed displaced fracture of pelvis, unspecified part of pelvis, initial encounter ([facility] [address]      L sided pelvic fx    Will not require surgery  BEN appear to be intact  Mobilize as tolerated with

## 2017-10-28 NOTE — PROGRESS NOTES
10/28/17 0731   Clinical Encounter Type   Visited With Patient   Routine Visit ( responded to request for spiritual care.)   Patient's Supportive Strategies/Resources Pts huge family is her support. pt loves to [employer] and loves her logan salas.   Pt

## 2017-10-28 NOTE — CONSULTS
[facility]  [facility] Cardiology Consult Note    Belen Maldonado Patient Status:  Emergency    10/25/1927 MRN [id number]   Location [address] [facility] Attending Cameron Canavan, MD   Hosp Day # 0 PCP Margaux Bonds MD     80 y [address]  No date: CHOLECYSTECTOMY  left 3/06 right 10/06: HIP REPLACEMENT SURGERY  10-10-11: OTHER SURGICAL HISTORY      Comment: cysto-  10/16: OTHER SURGICAL HISTORY      Comment: back surgery  No date: PACEMAKER      Comment: [employer] pacema

## 2017-10-28 NOTE — HISTORICAL OFFICE NOTE
Poornima Miahde  : 10/25/1927  ACCOUNT:  [id number]  [phone]  PCP: Dr. Jena Landaverde: 2016  DICTATED BY:  Addison Martini MD]    CHIEF COMPLAINT: [Followup of Paroxysmal atrial fibrillation.]    HPI:    [On 2016, Leonardo Ainsley ALLERGIES: Bactrim - Oral, Vomiting, Catapres - Oral, Rash and Thiazide-Type Diuretics - CLASS    MEDICATIONS: Selected prescriptions see below    DECISION MAKING: Brenda Coronel has a dual-chamber pacemaker, presently maintaining sinus rhythm with normal pacema

## 2017-10-28 NOTE — ANESTHESIA PREPROCEDURE EVALUATION
PRE-OP EVALUATION    Patient Name: Rayna Maldonado    Pre-op Diagnosis: Humerus distal fracture [S42.409A]    Procedure(s):  Open reduction internal fixation right dital humerus    Surgeon(s) and Role:     Marc Staples MD - Primary    Pre-op vitals r HYDROcodone-acetaminophen (NORCO) 5-325 MG per tab 2 tablet 2 tablet Oral Q4H PRN   [COMPLETED] phytonadione (AQUA-MEPHYTON) 10 mg in sodium chloride 0.9 % 50 mL IVPB 10 mg Intravenous Once   [COMPLETED] ondansetron HCl (ZOFRAN) injection 4 mg 4 mg Intra (chronic obstructive pulmonary disease) (HCC)     Closed fracture of right hip (HCC)     Closed fracture of right hip, initial encounter ([facility])     Closed fracture of right distal humerus     Closed displaced fracture of pelvis, unspecified part of pelvis, Cardiovascular      Rhythm: regular  Rate: normal  (-) murmur   Dental    No notable dental history.          Pulmonary    Pulmonary exam normal.                 Other findings            ASA: 3   Plan: general  NPO status verified and   Patient has taken b

## 2017-10-28 NOTE — PROGRESS NOTES
Cardiology:    Received Vitamin K for INR 3.3 today. Plans for surgery tomorrow pending INR.   Will see postop    Dennie Escort, NP

## 2017-10-28 NOTE — RESPIRATORY THERAPY NOTE
JAZZ : EQUIPMENT USE: DAILY SUMMARY                                            SET MODE: AUTO CPAP WITH CFLEX                                          USAGE IN HOURS: 2:37                                          90

## 2017-10-28 NOTE — PROGRESS NOTES
[facility] Pharmacy Note:  Pain Consult    Libertad Keenan is a 80year old female started on Dilaudid PCA by Dr. Deneen Herron. Pharmacy was consulted to review medication profile and to discontinue previously ordered narcotics and sedatives.     Medication profile

## 2017-10-28 NOTE — PROGRESS NOTES
[facility]PITAL[facility]  Progress Note     Lacy Slight Patient Status:  Inpatient    10/25/1927 MRN [id number]   [facility] 3SW-A Attending [facility] Day # 0 PCP Margaux Bonds MD     Chief Complaint: Rt arm gladys • Sotalol HCl  40 mg Oral TID Beta Blocker/Cardiac   • gabapentin  300 mg Oral TID   • docusate sodium  100 mg Oral BID   • [START ON 10/29/2017] clindamycin  900 mg Intravenous Once       ASSESSMENT / PLAN:     1.  Right humerus fracture: Orthopedic surg

## 2017-10-28 NOTE — H&P
[facility]  History and Physical     [facility] Sanjay Joel Patient Status:  Emergency    10/25/1927 MRN [id number]   Location [address] [facility] Attending Natasha Walker MD   Hosp Day # 0 PCP Brittany Colbert MD     Chief Com Diarrhea  Levaquin                Nausea only  Lexapro                 Nausea only    Medications:    No current facility-administered medications on file prior to encounter.    Current Outpatient Prescriptions on File Prior to Encounter:  TRAMADOL HCL 50 M acute distress. Alert and oriented x 3. HEENT: Normocephalic atraumatic. Moist mucous membranes. EOM-I. PERRLA. Anicteric. Neck: No lymphadenopathy. No JVD. No carotid bruits. Respiratory: Clear to auscultation bilaterally. No wheezes. No rhonchi.   Card

## 2017-10-28 NOTE — ED PROVIDER NOTES
Patient Seen in: [facility] Emergency Department    History   Patient presents with:  Fall (musculoskeletal, neurologic)    Stated Complaint: Fall     HPI    Pleasant elderly female presenting to the emergency department after a mechanical fall.   Pa Comment: BCC nod, inf to left nasal tip / MMS done  10/31/16: SKIN SURGERY      Comment: MMS for BCC-nod to the R upper cut lip-AB    Family History   Problem Relation Age of Onset   • Cancer Father        Smoking status: Former Smoker flexion of the right elbow. Pain both hips right greater than left with any range of motion whatsoever at the hips. Minimal discomfort diffusely throughout the knees and lower legs bilaterally without any obvious deformity or swelling.   Calves are symmet DIFFERENTIAL[[id number]]          Abnormal            Final result                 Please view results for these tests on the individual orders. TYPE AND SCREEN    Narrative: The following orders were created for panel order TYPE AND SCREEN.   Procedur dislocation.     Dictated by: Foster Saenz MD on 10/27/2017 at 23:18     Approved by: Foster Saenz MD            Ct Brain Or Head (80393)    Result Date: 10/27/2017  PROCEDURE:  CT BRAIN OR HEAD (71057)  COMPARISON:  [facility] BRAIN W/O CONTRAST, disease within the left ethmoid and left frontal sinuses without air-fluid levels      Dictated by: Octavio Mcadams MD on 10/27/2017 at 22:43     Approved by: Octavio Mcadams MD            Xr Elbow, (2 Views), Right (cpt=73070)    Result Date: 10/27/ acute disease.     Dictated by: Lynn Abreu MD on 10/27/2017 at 23:14     Approved by: Lynn Abreu MD            Xr Hip W Or Wo Pelvis 2 Or 3 Views, Right (cpt=73502)    Result Date: 10/27/2017  PROCEDURE:  XR HIP W OR WO PELVIS 2 OR 3 VIEWS, orthopedist on-call, Dr. Joanna Kang for consultation, and with the cardiologist, Dr. Elia Bullor covering for Dr. Annika De La Paz, for consultation. Patient has also been seen already by the cardiologist Dr. Alessia Boswell at the bedside. A right arm long arm post mold is placed.

## 2017-10-29 ENCOUNTER — APPOINTMENT (OUTPATIENT)
Dept: GENERAL RADIOLOGY | Facility: HOSPITAL | Age: 82
DRG: 493 | End: 2017-10-29
Attending: ORTHOPAEDIC SURGERY
Payer: MEDICARE

## 2017-10-29 ENCOUNTER — SURGERY (OUTPATIENT)
Age: 82
End: 2017-10-29

## 2017-10-29 ENCOUNTER — ANESTHESIA (OUTPATIENT)
Dept: SURGERY | Facility: HOSPITAL | Age: 82
End: 2017-10-29

## 2017-10-29 PROCEDURE — 99232 SBSQ HOSP IP/OBS MODERATE 35: CPT | Performed by: HOSPITALIST

## 2017-10-29 PROCEDURE — 76001 XR FLUOROSCOPE EXAM >1 HR EXTENSIVE (CPT=76001): CPT | Performed by: ORTHOPAEDIC SURGERY

## 2017-10-29 PROCEDURE — 0PSF04Z REPOSITION RIGHT HUMERAL SHAFT WITH INTERNAL FIXATION DEVICE, OPEN APPROACH: ICD-10-PCS | Performed by: ORTHOPAEDIC SURGERY

## 2017-10-29 DEVICE — PLATE TACK
Type: IMPLANTABLE DEVICE | Site: HUMERUS | Status: FUNCTIONAL
Brand: ACUMED

## 2017-10-29 RX ORDER — SODIUM CHLORIDE 9 MG/ML
INJECTION, SOLUTION INTRAVENOUS ONCE
Status: COMPLETED | OUTPATIENT
Start: 2017-10-29 | End: 2017-10-29

## 2017-10-29 RX ORDER — ONDANSETRON 2 MG/ML
INJECTION INTRAMUSCULAR; INTRAVENOUS
Status: COMPLETED
Start: 2017-10-29 | End: 2017-10-29

## 2017-10-29 RX ORDER — ALBUTEROL SULFATE 2.5 MG/3ML
2.5 SOLUTION RESPIRATORY (INHALATION) AS NEEDED
Status: DISCONTINUED | OUTPATIENT
Start: 2017-10-29 | End: 2017-11-01

## 2017-10-29 RX ORDER — ENOXAPARIN SODIUM 100 MG/ML
40 INJECTION SUBCUTANEOUS NIGHTLY
Status: DISCONTINUED | OUTPATIENT
Start: 2017-10-29 | End: 2017-11-01

## 2017-10-29 RX ORDER — MIDAZOLAM HYDROCHLORIDE 1 MG/ML
1 INJECTION INTRAMUSCULAR; INTRAVENOUS EVERY 5 MIN PRN
Status: DISCONTINUED | OUTPATIENT
Start: 2017-10-29 | End: 2017-10-29 | Stop reason: HOSPADM

## 2017-10-29 RX ORDER — HYDROCODONE BITARTRATE AND ACETAMINOPHEN 5; 325 MG/1; MG/1
1 TABLET ORAL EVERY 4 HOURS PRN
Status: DISCONTINUED | OUTPATIENT
Start: 2017-10-29 | End: 2017-10-29

## 2017-10-29 RX ORDER — ONDANSETRON 2 MG/ML
4 INJECTION INTRAMUSCULAR; INTRAVENOUS EVERY 6 HOURS PRN
Status: DISCONTINUED | OUTPATIENT
Start: 2017-10-29 | End: 2017-11-01

## 2017-10-29 RX ORDER — HYDROMORPHONE HYDROCHLORIDE 1 MG/ML
0.4 INJECTION, SOLUTION INTRAMUSCULAR; INTRAVENOUS; SUBCUTANEOUS
Status: DISCONTINUED | OUTPATIENT
Start: 2017-10-29 | End: 2017-10-30

## 2017-10-29 RX ORDER — ONDANSETRON 2 MG/ML
4 INJECTION INTRAMUSCULAR; INTRAVENOUS AS NEEDED
Status: DISCONTINUED | OUTPATIENT
Start: 2017-10-29 | End: 2017-10-29 | Stop reason: HOSPADM

## 2017-10-29 RX ORDER — SODIUM CHLORIDE, SODIUM LACTATE, POTASSIUM CHLORIDE, CALCIUM CHLORIDE 600; 310; 30; 20 MG/100ML; MG/100ML; MG/100ML; MG/100ML
INJECTION, SOLUTION INTRAVENOUS CONTINUOUS
Status: DISCONTINUED | OUTPATIENT
Start: 2017-10-29 | End: 2017-11-01

## 2017-10-29 RX ORDER — BISACODYL 10 MG
10 SUPPOSITORY, RECTAL RECTAL
Status: DISCONTINUED | OUTPATIENT
Start: 2017-10-29 | End: 2017-11-01

## 2017-10-29 RX ORDER — CEFAZOLIN SODIUM 1 G/3ML
INJECTION, POWDER, FOR SOLUTION INTRAMUSCULAR; INTRAVENOUS
Status: DISCONTINUED | OUTPATIENT
Start: 2017-10-29 | End: 2017-10-29

## 2017-10-29 RX ORDER — CEFAZOLIN SODIUM 1 G/3ML
INJECTION, POWDER, FOR SOLUTION INTRAMUSCULAR; INTRAVENOUS
Status: DISCONTINUED | OUTPATIENT
Start: 2017-10-29 | End: 2017-10-29 | Stop reason: HOSPADM

## 2017-10-29 RX ORDER — HYDROMORPHONE HYDROCHLORIDE 1 MG/ML
0.4 INJECTION, SOLUTION INTRAMUSCULAR; INTRAVENOUS; SUBCUTANEOUS EVERY 5 MIN PRN
Status: DISCONTINUED | OUTPATIENT
Start: 2017-10-29 | End: 2017-10-29 | Stop reason: HOSPADM

## 2017-10-29 RX ORDER — DEXTROSE MONOHYDRATE 25 G/50ML
50 INJECTION, SOLUTION INTRAVENOUS
Status: DISCONTINUED | OUTPATIENT
Start: 2017-10-29 | End: 2017-10-29 | Stop reason: HOSPADM

## 2017-10-29 RX ORDER — HYDROMORPHONE HYDROCHLORIDE 1 MG/ML
INJECTION, SOLUTION INTRAMUSCULAR; INTRAVENOUS; SUBCUTANEOUS
Status: COMPLETED
Start: 2017-10-29 | End: 2017-10-29

## 2017-10-29 RX ORDER — DOCUSATE SODIUM 100 MG/1
100 CAPSULE, LIQUID FILLED ORAL 2 TIMES DAILY
Status: DISCONTINUED | OUTPATIENT
Start: 2017-10-29 | End: 2017-11-01

## 2017-10-29 RX ORDER — ENOXAPARIN SODIUM 100 MG/ML
40 INJECTION SUBCUTANEOUS DAILY
Status: DISCONTINUED | OUTPATIENT
Start: 2017-10-29 | End: 2017-10-29

## 2017-10-29 RX ORDER — ALBUTEROL SULFATE 2.5 MG/3ML
SOLUTION RESPIRATORY (INHALATION)
Status: COMPLETED
Start: 2017-10-29 | End: 2017-10-29

## 2017-10-29 RX ORDER — NALOXONE HYDROCHLORIDE 0.4 MG/ML
80 INJECTION, SOLUTION INTRAMUSCULAR; INTRAVENOUS; SUBCUTANEOUS AS NEEDED
Status: DISCONTINUED | OUTPATIENT
Start: 2017-10-29 | End: 2017-10-29 | Stop reason: HOSPADM

## 2017-10-29 RX ORDER — INSULIN ASPART 100 [IU]/ML
INJECTION, SOLUTION INTRAVENOUS; SUBCUTANEOUS ONCE
Status: DISCONTINUED | OUTPATIENT
Start: 2017-10-29 | End: 2017-10-29 | Stop reason: HOSPADM

## 2017-10-29 RX ORDER — SODIUM CHLORIDE, SODIUM LACTATE, POTASSIUM CHLORIDE, CALCIUM CHLORIDE 600; 310; 30; 20 MG/100ML; MG/100ML; MG/100ML; MG/100ML
INJECTION, SOLUTION INTRAVENOUS CONTINUOUS
Status: DISCONTINUED | OUTPATIENT
Start: 2017-10-29 | End: 2017-10-29

## 2017-10-29 RX ORDER — HYDROCODONE BITARTRATE AND ACETAMINOPHEN 10; 325 MG/1; MG/1
1 TABLET ORAL EVERY 4 HOURS PRN
Status: DISCONTINUED | OUTPATIENT
Start: 2017-10-29 | End: 2017-10-29

## 2017-10-29 RX ORDER — LABETALOL HYDROCHLORIDE 5 MG/ML
5 INJECTION, SOLUTION INTRAVENOUS ONCE
Status: COMPLETED | OUTPATIENT
Start: 2017-10-29 | End: 2017-10-29

## 2017-10-29 RX ORDER — LABETALOL HYDROCHLORIDE 5 MG/ML
INJECTION, SOLUTION INTRAVENOUS
Status: COMPLETED
Start: 2017-10-29 | End: 2017-10-29

## 2017-10-29 RX ORDER — CLINDAMYCIN PHOSPHATE 900 MG/50ML
900 INJECTION INTRAVENOUS EVERY 8 HOURS
Status: COMPLETED | OUTPATIENT
Start: 2017-10-29 | End: 2017-10-30

## 2017-10-29 RX ORDER — MIDAZOLAM HYDROCHLORIDE 1 MG/ML
INJECTION INTRAMUSCULAR; INTRAVENOUS
Status: COMPLETED
Start: 2017-10-29 | End: 2017-10-29

## 2017-10-29 RX ORDER — CLINDAMYCIN PHOSPHATE 900 MG/50ML
900 INJECTION INTRAVENOUS ONCE
Status: DISCONTINUED | OUTPATIENT
Start: 2017-10-29 | End: 2017-10-29 | Stop reason: HOSPADM

## 2017-10-29 NOTE — RESPIRATORY THERAPY NOTE
JAZZ Equipment Usage Summary :            Set Mode :AUTO CPAP W FLEX          Usage in Hours:6;12          90% Pressure (EPAP) : 6.7           90% Insp Pressure (IPAP);           AHI : 5.5          Supplemental Oxygen :   4   LPM

## 2017-10-29 NOTE — PROGRESS NOTES
Acute Pain Service    Attempted to see pt; in OR. Will follow tomorrow/POD1. Please page x [phone] with any concerns or questions. Aaron Sutherland RN/Dr. Leah Roper    -----------  Agree with above.   In OR

## 2017-10-29 NOTE — ANESTHESIA POSTPROCEDURE EVALUATION
[address] [place] [address] Main Patient Status:  Inpatient   Age/Gender 80year old female MRN [id number]   Location [address] [facility] Attending [facility] Day # 1 PCP MD Marisol Laird

## 2017-10-29 NOTE — PROGRESS NOTES
[facility]  Progress Note     Claudia Cameron Patient Status:  Inpatient    10/25/1927 MRN [id number]   [facility] 3SW-A Attending [facility] Day # 1 PCP Josefina Salgado MD     Chief Complaint: Rt arm gladys Imaging: Imaging data reviewed in Epic.     Medications:   • [MAR Hold] clindamycin  900 mg Intravenous Once   • AmLODIPine Besylate  5 mg Oral Daily   • lisinopril  40 mg Oral Daily   • [MAR Hold] Sotalol HCl  40 mg Oral TID Beta Blocker/Cardiac   • [M

## 2017-10-29 NOTE — PLAN OF CARE
Received call from Internal med regarding INR at 1.7, placed order for FFP. Paged Ortho for call back regarding INR results, as Pt is scheduled for surgery today. Ortho called back and asked for the FFP stat.  Ortho would like the INR at 1.2

## 2017-10-29 NOTE — BRIEF OP NOTE
Pre-Operative Diagnosis: Humerus distal fracture [S42.409A]     Post-Operative Diagnosis: Humerus distal fracture [S42.409A]     Procedure Performed:   Procedure(s):  Open reduction internal fixation right dital humerus    Surgeon(s) and Role:     * Mur

## 2017-10-29 NOTE — PLAN OF CARE
Dr. Iqbal Like here, states is ok with INR of 1.7. Informed that FFP currently infusing. Completed @ 4405. Transport here to take patient at 0830. Surgery called to notify of infusing FFP and lab to recheck.  States will still transport patient to preop and if

## 2017-10-30 PROCEDURE — 99232 SBSQ HOSP IP/OBS MODERATE 35: CPT | Performed by: HOSPITALIST

## 2017-10-30 RX ORDER — ACETAMINOPHEN 325 MG/1
650 TABLET ORAL EVERY 4 HOURS PRN
Status: DISCONTINUED | OUTPATIENT
Start: 2017-10-30 | End: 2017-11-01

## 2017-10-30 RX ORDER — TRAMADOL HYDROCHLORIDE 50 MG/1
50 TABLET ORAL EVERY 6 HOURS PRN
Status: DISCONTINUED | OUTPATIENT
Start: 2017-10-30 | End: 2017-11-01

## 2017-10-30 RX ORDER — TRAMADOL HYDROCHLORIDE 50 MG/1
100 TABLET ORAL EVERY 6 HOURS PRN
Status: DISCONTINUED | OUTPATIENT
Start: 2017-10-30 | End: 2017-11-01

## 2017-10-30 RX ORDER — WARFARIN SODIUM 5 MG/1
5 TABLET ORAL NIGHTLY
Status: DISCONTINUED | OUTPATIENT
Start: 2017-10-30 | End: 2017-11-01

## 2017-10-30 NOTE — PHYSICAL THERAPY NOTE
PHYSICAL THERAPY EVALUATION - INPATIENT     Room Number: 375/375-A  Evaluation Date: 10/30/2017  Type of Evaluation: Initial  Physician Order: PT Eval and Treat    Presenting Problem: s/p right distal humerus ORIF, left pelvic fracture  Reason for Ther BCC nod, inf to left nasal tip / MMS done  10/31/16: SKIN SURGERY      Comment: MMS for BCC-nod to the R upper cut lip-AB  No date: TOTAL HIP REPLACEMENT    HOME SITUATION  Type of Home: Independent living facility [facility]   Home Layout: [facility] pain  Right Dorsiflexion to neutral only  Left Dorsiflexion to neutral only    Lower extremity strength is within functional limits except for the following:    Right Hip flexion  1/5  Left Hip flexion  1/5  Right Knee extension  2+/5  Left Knee extension ankle pumps, NWB right ue with WBAT LE per md orders. Pt right hand edematous, worked with OT on ROM. Performed p/aarom bilat le in supine, pt unable to actively move LE's due to pain, allowed very little prom. Pt performing ankle pumps with <full rom. indicating patient is dependent in terms of safe mobility. Results of the AM-PAC \"6 clicks\" Inpatient Daily Mobility Short Form for the patient is 87% degree of basic mobility impairment.   Research supports that patients with this level of impairment of

## 2017-10-30 NOTE — PROGRESS NOTES
[facility]  Cardiology Progress Note    Emir Casey Thealuis alfredoon Patient Status:  Inpatient    10/25/1927 MRN [id number]   [facility] 3SW-A Attending keven [facility] Day # 2 PCP Jay Pack MD     Subjective:  C/o arm and plan. Stable from cardiac standpoint. Coumadin resumed by ortho and continue baseline cardiac meds, follow-up with Dr. Almas Owens and/or SHANNEN upon discharge. Will sign off but call with further questions or concerns anytime.     Sandrita Kellogg MD

## 2017-10-30 NOTE — PLAN OF CARE
Patient complain of feeling too drowsy, patient easily to arouse and oriented X4. States that pain level is moderate and declines any pain medication due to feeling \"too sleepy and out of it\".    Discussed pain management and pain medication side effects

## 2017-10-30 NOTE — CM/SW NOTE
10/30/17 1200   CM/SW Referral Data   Referral Source Physician;Social Work (self-referral)   Reason for Referral Discharge planning   Informant Patient; Children   Pertinent Medical Hx   Primary Care Physician Name [facility]   Patient Info   Patient's Ment

## 2017-10-30 NOTE — PROGRESS NOTES
[facility]  Progress Note     Westley Etienne Patient Status:  Inpatient    10/25/1927 MRN [id number]   [facility] 3SW-A Attending [address]  [place] Day # 2 PCP Amanda Clemente MD     Chief Complaint: Rt arm gladys Imaging: Imaging data reviewed in Epic.     Medications:   • docusate sodium  100 mg Oral BID   • enoxaparin  40 mg Subcutaneous Nightly   • AmLODIPine Besylate  5 mg Oral Daily   • lisinopril  40 mg Oral Daily   • Sotalol HCl  40 mg Oral TID Beta Block

## 2017-10-30 NOTE — PLAN OF CARE
Per telephone communication with Dr. Iqbal Like ok from ortho standpoint to resume coumadin at anytime when hospitalist/cardiology feel medically appropriate. No ortho objections to coumadin/lovenox. Dr. Felipe Saunders informed.

## 2017-10-30 NOTE — OCCUPATIONAL THERAPY NOTE
OCCUPATIONAL THERAPY EVALUATION - INPATIENT     Room Number: 375/375-A  Evaluation Date: 10/30/2017  Type of Evaluation: Initial  Presenting Problem: s/p R distal humerus ORIF 10/29/17, L pelvic fracture    Physician Order: IP Consult to Occupational Thera HISTORY      Comment: back surgery  No date: PACEMAKER      Comment: [employer] pacemaker  3-6-14: SKIN SURGERY      Comment: BCC nod, inf to left nasal tip / MMS done  10/31/16: SKIN SURGERY      Comment: MMS for BCC-nod to the R upper cut lip-AB  No date: T flexion limited due to pain  Right Elbow flexion limited due to bandage  Right Elbow extension limited due to bandage  Right Wrist flexion limited due to bandage  Right Wrist extension limited due to bandage  Right  limited due to edema    Upper extrem increased pain with movement > sitting EOB approximately 15 minutes with initially poor plus balance improving to fair minus and then fair balance, with intermittent cues for upright posture; patient able to perform feeding task using L hand with close SBA days. After this period of rehabilitation patient should achieve supervision to Mod I level in all BADLs and functional transfers.     The patient is functioning below her previous functional level and would benefit from skilled inpatient OT to address the

## 2017-10-30 NOTE — OPERATIVE REPORT
[facility]    PATIENT'S NAME: Liz Carrera   ATTENDING PHYSICIAN: Singh Simpson D.O.   OPERATING PHYSICIAN: Blanka Perry M.D.    PATIENT ACCOUNT#:   [de-identified]    LOCATION:  [address] [place] [address]  MEDICAL RECORD #:   [id number]       DATE OF BIRTH stiffness, chronic pain, and infection. They showed good understanding. She does wish to proceed with surgery. PROCEDURE:  On the date of operation, I saw the patient in the holding room and initialed the surgical site.   The patient was taken the oper column. There was also a small superior posterior portion of the trochlea which had broken free as well. The distal fragment was then reduced and then this was reduced against the medial epicondyle. This was provisionally held in place with K-wires.   Victorina Cohen for the nerve. This was sutured in place with a 2-0 Vicryl suture taking care to avoid any compression on the ulnar nerve. The triceps was then repaired both medially and laterally at that the distal soft tissue expansions from the olecranon.   The skin f

## 2017-10-30 NOTE — PHYSICAL THERAPY NOTE
PT evaluation orders recd. At this time per rn, Fernando Guardado, pt is lethargic and not appropriate for PT at this time, will re attempt later.

## 2017-10-30 NOTE — PROGRESS NOTES
Pain Service  POD1 s/p ORIF right humerus  Patient is slightly drowsy - arouses easily. Localizes pain to right elbow but states she feels loopy from pain medication.   PCA dcd by RN due to slight confusion and drowsiness  Son at bedside - explained the malaika

## 2017-10-30 NOTE — PROGRESS NOTES
[facility]  Progress Note    Deniece Halsted Critton Patient Status:  Inpatient    10/25/1927 MRN [id number]   [facility] 3SW-A Attending [facility] Day # 2 PCP Mauro Murray MD     Subjective:   Yordan Cui

## 2017-10-31 PROCEDURE — 99232 SBSQ HOSP IP/OBS MODERATE 35: CPT | Performed by: HOSPITALIST

## 2017-10-31 RX ORDER — TRAMADOL HYDROCHLORIDE 50 MG/1
50 TABLET ORAL EVERY 6 HOURS PRN
Qty: 30 TABLET | Refills: 1 | Status: SHIPPED | OUTPATIENT
Start: 2017-10-31 | End: 2017-11-10

## 2017-10-31 NOTE — RESPIRATORY THERAPY NOTE
JAZZ - Equipment Use Daily Summary:                  . Set Mode: AUTO CPAP WITH C-FLEX                . Usage in hours: 1:10                . 90% Pressure (EPAP) level: 6.0                . 90% Insp. Pressure (IPAP): [place]  AHI: 1.8

## 2017-10-31 NOTE — PHYSICAL THERAPY NOTE
PHYSICAL THERAPY TREATMENT NOTE - INPATIENT    Room Number: 375/375-A     Session: 1   Number of Visits to Meet Established Goals: 6    Presenting Problem: s/p right distal humerus ORIF, left pelvic fracture  History related to current admission: pt admit BCC-nod to the R upper cut lip-AB  No date: TOTAL HIP REPLACEMENT    SUBJECTIVE  \" I am very sensitive on my legs so don't touch my legs. \"    Patient’s self-stated goal is to walk.     OBJECTIVE  Precautions: None    WEIGHT BEARING RESTRICTION  Weight Torire right hand and fingers, pt was educated on frequent  and wrist movements, and elevated positioning. Pt needs cues to perform hand close/open, forgetful.  Pt performed le ex with assist, pt needs extra time to tolerate any minimal movement, assist requir hand ex and yesi BLE strengthening: S/P right distal humerus ORIF, left pelvic fracture. Results of the AM-PAC \"6 clicks\" Inpatient Daily Mobility Short Form for the patient is 72.57% degree of basic mobility impairment.   The patient is below their base

## 2017-10-31 NOTE — CM/SW NOTE
Spoke with Olivier Barger with [facility] [address] [phone]. Facility needs PT/OT notes. Sent via [employer]. Requested that facility obtain ins auth.

## 2017-10-31 NOTE — PROGRESS NOTES
[facility]  Progress Note    Valerie Maldonado Patient Status:  Inpatient    10/25/1927 MRN [id number]   [facility] 3SW-A [facility] Clark Melendrez MD     Subjective:   Shola Ramirez Freeman December  10/31/2017  12:44 PM

## 2017-10-31 NOTE — PROGRESS NOTES
[facility]  Progress Note     Alina Nicole Patient Status:  Inpatient    10/25/1927 MRN [id number]   [facility] PCP Homer Gay MD     Chief Complaint: Rt arm gladys 300 mg Oral TID       ASSESSMENT / PLAN:     1. Right humerus fracture: Cleared by cards and going for surgery today. 2. Pelvic fractures: Orthopedic eval, hold Coumadin for now. 3. Hypertension  4.  Paroxysmal atrial fibrillation- continue sotolol and co

## 2017-10-31 NOTE — PLAN OF CARE
Telephone communication with Dr. Iqbal Like as patient with strike through bloody drainage to acewrap/surgical dressing at elbow with scant amount to chux underneath. This is new from yesterday.     Also discussed with MD that patient continues to present with

## 2017-11-01 VITALS
BODY MASS INDEX: 32 KG/M2 | SYSTOLIC BLOOD PRESSURE: 136 MMHG | DIASTOLIC BLOOD PRESSURE: 52 MMHG | TEMPERATURE: 98 F | OXYGEN SATURATION: 96 % | WEIGHT: 192.19 LBS | HEART RATE: 74 BPM | RESPIRATION RATE: 17 BRPM

## 2017-11-01 PROCEDURE — 99239 HOSP IP/OBS DSCHRG MGMT >30: CPT | Performed by: HOSPITALIST

## 2017-11-01 NOTE — PROGRESS NOTES
[facility]  Progress Note     Amiel Nageotte Patient Status:  Inpatient    10/25/1927 MRN [id number]   [facility] 3SW-A Attending [facility] Day # 4 PCP Tevin Zambrano MD     Chief Complaint: Rt arm gladys TID       ASSESSMENT / PLAN:     1. Right humerus fracture, s/p ORIF, per ortho. 2. Pelvic fractures, cont pain control, ok to mobilize with PT  3. Hypertension, BP controlled  4. Paroxysmal atrial fibrillation, HR controlled on BB, coumadin resumed  5.  H

## 2017-11-01 NOTE — CM/SW NOTE
Spoke with pt re: likely d/c today to [facility] [phone]. She voices concerns about facility response time. SW discussed talking with facility about being closer to the nurse's station, which reassured her.     JESIKA did speak with Erika with above SA

## 2017-11-01 NOTE — CM/SW NOTE
Informed by RN that pt is medically cleared for d/c today. Discussed 4PM d/c time. [facility] accepted transport for 4PM to [employer] at [facility]. Pt will go rm 138 per Erika with [facility]. JESIKA updated pt and family in room.   They agree w

## 2017-11-01 NOTE — OCCUPATIONAL THERAPY NOTE
OCCUPATIONAL THERAPY TREATMENT NOTE - INPATIENT     Room Number: 375/375-A  Session: 1/5  Number of Visits to Meet Established Goals: 5    Presenting Problem: s/p R distal humerus ORIF 10/29/17, L pelvic fracture    History related to current admission: right 10/06: HIP REPLACEMENT SURGERY  10-10-11: OTHER SURGICAL HISTORY      Comment: cysto-  10/16: OTHER SURGICAL HISTORY      Comment: back surgery  No date: PACEMAKER      Comment: [employer] pacemaker  3-6-14: SKIN SURGERY      Comment: BCC nod, inf to le suport to RUE; pt takes long tome to extend spine to stand up; tolerated approx 1 minute in standing; sat on bed. After rest, pt transferred toward L side into chair with max A of two for squat pivot tfr. Pt made comfortable with elevation to RUE.  Nursing

## 2017-11-01 NOTE — PLAN OF CARE
HEMATOLOGIC - ADULT    • Maintains hematologic stability Progressing    • Free from bleeding injury Progressing        Impaired Activities of Daily Living    • Achieve highest/safest level of independence in self care Progressing        Impaired Functional check. Plan to DC to [facility] once cleared. PO food and fluids encouraged. BLE pain at baseline for patient.

## 2017-11-01 NOTE — CM/SW NOTE
11/01/17 1600   Discharge disposition   Discharged to: Skilled Nurs   Name of [address] [facility]   Patient is Discharged to a [address] [place] [address]   Discharge transportation [employer]   d/c today

## 2017-11-01 NOTE — RESPIRATORY THERAPY NOTE
JAZZ Equipment Usage Summary :            Set Mode :AUTO CPAP W FLEX          Usage in Hours:2;31          90% Pressure (EPAP) : 5.4           90% Insp Pressure (IPAP);           AHI : 2.1          Supplemental Oxygen :   3   LPM

## 2017-11-01 NOTE — PROGRESS NOTES
[facility]  Progress Note    Rj Maldonado Patient Status:  Inpatient    10/25/1927 MRN [id number]   [facility] 3SW-A Attending Luanne Mcfadden MD   Hosp Day # 4 PCP Helena Carrillo MD     Subjective:   Clayton Garland is a(n) with PT  D/C planning, ok to d/c from ortho standpoint    Time spent on counseling/coordination of care:  15 Minutes    Total time spent with patient:  15 Minutes    Bing Neri  11/1/2017  6:03 AM

## 2017-11-01 NOTE — PHYSICAL THERAPY NOTE
PHYSICAL THERAPY TREATMENT NOTE - INPATIENT    Room Number: 375/375-A     Session: 2   Number of Visits to Meet Established Goals: 6    Presenting Problem: s/p right distal humerus ORIF, left pelvic fracture     History related to current admission: pt ad done  10/31/16: SKIN SURGERY      Comment: MMS for BCC-nod to the R upper cut lip-AB  No date: TOTAL HIP REPLACEMENT    SUBJECTIVE  \" I am very sensitive on my legs so don't touch my legs. \"    Patient’s self-stated goal is to walk.     OBJECTIVE  Precauti after discussing risk factors associated with prolong immobility. Noted to have increased swelling on right hand and fingers, pt was educated on frequent  and wrist movements, and elevated positioning.  Pt needs cues to perform hand close/open, forgetfu for the patient is 72.57% degree of basic mobility impairment. The patient is below their baseline and would benefit from continued skilled PT to address the activity limitations identified by the AM-PAC \"6 clicks\".      At this time, Pt. presents with d

## 2017-11-02 ENCOUNTER — SNF ADMIT/H&P (OUTPATIENT)
Dept: INTERNAL MEDICINE CLINIC | Facility: CLINIC | Age: 82
End: 2017-11-02

## 2017-11-02 DIAGNOSIS — S42.401A CLOSED FRACTURE OF DISTAL END OF RIGHT HUMERUS, UNSPECIFIED FRACTURE MORPHOLOGY, INITIAL ENCOUNTER: ICD-10-CM

## 2017-11-02 DIAGNOSIS — K59.03 DRUG-INDUCED CONSTIPATION: ICD-10-CM

## 2017-11-02 DIAGNOSIS — S32.502A CLOSED DISPLACED FRACTURE OF LEFT PUBIS, INITIAL ENCOUNTER (HCC): Primary | ICD-10-CM

## 2017-11-02 DIAGNOSIS — R73.01 IFG (IMPAIRED FASTING GLUCOSE): ICD-10-CM

## 2017-11-02 DIAGNOSIS — J44.9 ASTHMA WITH COPD (CHRONIC OBSTRUCTIVE PULMONARY DISEASE) (HCC): Chronic | ICD-10-CM

## 2017-11-02 DIAGNOSIS — I48.20 CHRONIC ATRIAL FIBRILLATION (HCC): ICD-10-CM

## 2017-11-02 PROCEDURE — 99305 1ST NF CARE MODERATE MDM 35: CPT | Performed by: INTERNAL MEDICINE

## 2017-11-02 NOTE — PROGRESS NOTES
Patient was discharged from [facility] to the [facility] unit. Reason for admission was a fall with fracture of the right humerus and pelvis. Patient underwent open reduction internal fixation right humerus.   During hospital stay pain was adequately contr Family also wants SCDs. #4 impaired fasting glucose check A1c #5 COPD stable #6 constipation MiraLAX available patient on stool softener.

## 2017-11-02 NOTE — DISCHARGE SUMMARY
[facility]  DISCHARGE SUMMARY     Deniece Halsted Critton Patient Status:  Inpatient    10/25/1927 MRN [id number]   [facility] 3SW-A Attending No att. providers found   [facility] Day # 4 PCP Mauro Murray MD     Date of Admission: 10/27/ underwent ORIF. She tolerated procedure well. Her pelvic fractures were nonsurgical and she was advised to continue activity with weight bearing as tolerated. She was seen by PT and subacute rehab was recommended.   She was also on Lasix [facility] [place] [facility] [address] Caps      Take  by mouth.  1 capsule daily   Refills:  0     Polyethylene Glycol 3350 Pack  Commonly known as:  MIRALAX       Refills:  0     Sotalol HCl 80 MG Tabs  Commonly known as:  BETAPACE      1/2 tablet 3 times daily   Refill

## 2017-11-03 ENCOUNTER — SNF VISIT (OUTPATIENT)
Dept: INTERNAL MEDICINE CLINIC | Facility: CLINIC | Age: 82
End: 2017-11-03

## 2017-11-03 DIAGNOSIS — D50.0 IRON DEFICIENCY ANEMIA DUE TO CHRONIC BLOOD LOSS: ICD-10-CM

## 2017-11-03 DIAGNOSIS — R52 PAIN: Primary | ICD-10-CM

## 2017-11-03 NOTE — PROGRESS NOTES
Received a phone call last night from [facility]. Son wanted the tramadol given on the q. 6 hour basis. Is currently written for as needed. Every 6 hours. I spoke with patient. She states she was not given every 6 hours when asked.   I did rewrite the

## 2017-11-03 NOTE — PROGRESS NOTES
CBC reported today hemoglobin 8.5. Will check stool for occult blood ×3 and start her on iron and recheck hemoglobin in 1 week. Gave verbal order to nurse blood loop JUSTYN.   Prescription for [facility] will be faxed

## 2017-11-06 ENCOUNTER — SNF VISIT (OUTPATIENT)
Dept: INTERNAL MEDICINE CLINIC | Facility: CLINIC | Age: 82
End: 2017-11-06

## 2017-11-06 ENCOUNTER — NURSE ONLY (OUTPATIENT)
Dept: INTERNAL MEDICINE CLINIC | Facility: CLINIC | Age: 82
End: 2017-11-06

## 2017-11-06 DIAGNOSIS — J44.9 ASTHMA WITH COPD (CHRONIC OBSTRUCTIVE PULMONARY DISEASE) (HCC): Chronic | ICD-10-CM

## 2017-11-06 DIAGNOSIS — I48.20 CHRONIC ATRIAL FIBRILLATION (HCC): Primary | ICD-10-CM

## 2017-11-06 DIAGNOSIS — I48.20 CHRONIC ATRIAL FIBRILLATION (HCC): ICD-10-CM

## 2017-11-06 DIAGNOSIS — S32.502A CLOSED DISPLACED FRACTURE OF LEFT PUBIS, INITIAL ENCOUNTER (HCC): Primary | ICD-10-CM

## 2017-11-06 DIAGNOSIS — S42.401A CLOSED FRACTURE OF DISTAL END OF RIGHT HUMERUS, UNSPECIFIED FRACTURE MORPHOLOGY, INITIAL ENCOUNTER: ICD-10-CM

## 2017-11-06 PROBLEM — S32.9XXA CLOSED DISPLACED FRACTURE OF PELVIS, UNSPECIFIED PART OF PELVIS, INITIAL ENCOUNTER (HCC): Status: RESOLVED | Noted: 2017-10-28 | Resolved: 2017-11-06

## 2017-11-06 PROCEDURE — 99310 SBSQ NF CARE HIGH MDM 45: CPT | Performed by: INTERNAL MEDICINE

## 2017-11-06 PROCEDURE — 85610 PROTHROMBIN TIME: CPT | Performed by: INTERNAL MEDICINE

## 2017-11-06 NOTE — PROGRESS NOTES
Patient states pain is controlled, however staff is slowing given her medications. She does have a history of anemia and we are trying to collect stools for occult blood. Staff reports she has not had a bowel movement but patient states she has had one.

## 2017-11-07 ENCOUNTER — TELEPHONE (OUTPATIENT)
Dept: INTERNAL MEDICINE CLINIC | Facility: CLINIC | Age: 82
End: 2017-11-07

## 2017-11-07 ENCOUNTER — LAB ENCOUNTER (OUTPATIENT)
Dept: LAB | Age: 82
End: 2017-11-07
Attending: INTERNAL MEDICINE
Payer: MEDICARE

## 2017-11-07 DIAGNOSIS — R69 DIAGNOSIS UNKNOWN: Primary | ICD-10-CM

## 2017-11-07 PROCEDURE — 80053 COMPREHEN METABOLIC PANEL: CPT

## 2017-11-09 ENCOUNTER — LAB ENCOUNTER (OUTPATIENT)
Dept: LAB | Age: 82
End: 2017-11-09
Attending: INTERNAL MEDICINE
Payer: MEDICARE

## 2017-11-09 DIAGNOSIS — I48.91 A-FIB (HCC): Primary | ICD-10-CM

## 2017-11-09 PROBLEM — S42.491A CLOSED BICONDYLAR FRACTURE OF DISTAL END OF RIGHT HUMERUS: Status: ACTIVE | Noted: 2017-11-09

## 2017-11-09 PROCEDURE — 85610 PROTHROMBIN TIME: CPT

## 2017-11-10 ENCOUNTER — TELEPHONE (OUTPATIENT)
Dept: INTERNAL MEDICINE CLINIC | Facility: CLINIC | Age: 82
End: 2017-11-10

## 2017-11-13 ENCOUNTER — SNF VISIT (OUTPATIENT)
Dept: INTERNAL MEDICINE CLINIC | Facility: CLINIC | Age: 82
End: 2017-11-13

## 2017-11-13 DIAGNOSIS — S42.401A CLOSED FRACTURE OF DISTAL END OF RIGHT HUMERUS, UNSPECIFIED FRACTURE MORPHOLOGY, INITIAL ENCOUNTER: Primary | ICD-10-CM

## 2017-11-13 DIAGNOSIS — S32.502A CLOSED DISPLACED FRACTURE OF LEFT PUBIS, INITIAL ENCOUNTER (HCC): ICD-10-CM

## 2017-11-13 PROCEDURE — 99307 SBSQ NF CARE SF MDM 10: CPT | Performed by: INTERNAL MEDICINE

## 2017-11-13 NOTE — PROGRESS NOTES
Patient has no complaints. Pain is adequately controlled. Staff does not report any incidents. Appetite is good moving her bowels. No polyuria dysuria. Physical examination pleasant individual in no acute distress. Afebrile VSS.   Normocephalic ganesh

## 2017-11-14 ENCOUNTER — LAB ENCOUNTER (OUTPATIENT)
Dept: LAB | Age: 82
End: 2017-11-14
Attending: NURSE PRACTITIONER
Payer: MEDICARE

## 2017-11-14 DIAGNOSIS — R69 DIAGNOSIS UNKNOWN: Primary | ICD-10-CM

## 2017-11-14 PROCEDURE — 85610 PROTHROMBIN TIME: CPT

## 2017-11-16 ENCOUNTER — TELEPHONE (OUTPATIENT)
Dept: INTERNAL MEDICINE CLINIC | Facility: CLINIC | Age: 82
End: 2017-11-16

## 2017-11-16 NOTE — TELEPHONE ENCOUNTER
Nurse states patient had seen orthopedic yesterday and questions tramadol order. Previous order was tramadol 50mg every 6 hours as needed for pain and new order states 100mg daily.   V/O per Dr Casey Elizabeth - keep Tramadol at 50mg every 6 hours    Patient has be

## 2017-11-21 ENCOUNTER — LAB ENCOUNTER (OUTPATIENT)
Dept: LAB | Age: 82
End: 2017-11-21
Attending: INTERNAL MEDICINE
Payer: MEDICARE

## 2017-11-21 ENCOUNTER — SNF VISIT (OUTPATIENT)
Dept: INTERNAL MEDICINE CLINIC | Facility: CLINIC | Age: 82
End: 2017-11-21

## 2017-11-21 DIAGNOSIS — S32.502A CLOSED DISPLACED FRACTURE OF LEFT PUBIS, INITIAL ENCOUNTER (HCC): ICD-10-CM

## 2017-11-21 DIAGNOSIS — S42.401A CLOSED FRACTURE OF DISTAL END OF RIGHT HUMERUS, UNSPECIFIED FRACTURE MORPHOLOGY, INITIAL ENCOUNTER: Primary | ICD-10-CM

## 2017-11-21 DIAGNOSIS — I48.20 CHRONIC ATRIAL FIBRILLATION (HCC): Primary | ICD-10-CM

## 2017-11-21 PROCEDURE — 99307 SBSQ NF CARE SF MDM 10: CPT | Performed by: INTERNAL MEDICINE

## 2017-11-21 PROCEDURE — 85610 PROTHROMBIN TIME: CPT

## 2017-11-21 NOTE — PROGRESS NOTES
Patient is alert well orientated. Nurse Darrel Morris was in the room with me. She reports no problems with patient. Patient states she has a lot of concerns with development taken care of. She also states she has not see me recently.   I did inform her that

## 2017-11-28 ENCOUNTER — NURSE ONLY (OUTPATIENT)
Dept: LAB | Age: 82
End: 2017-11-28
Attending: INTERNAL MEDICINE
Payer: MEDICARE

## 2017-11-28 ENCOUNTER — TELEPHONE (OUTPATIENT)
Dept: INTERNAL MEDICINE CLINIC | Facility: CLINIC | Age: 82
End: 2017-11-28

## 2017-11-28 ENCOUNTER — SNF VISIT (OUTPATIENT)
Dept: INTERNAL MEDICINE CLINIC | Facility: CLINIC | Age: 82
End: 2017-11-28

## 2017-11-28 DIAGNOSIS — S42.491A CLOSED BICONDYLAR FRACTURE OF DISTAL END OF RIGHT HUMERUS, INITIAL ENCOUNTER: Primary | ICD-10-CM

## 2017-11-28 DIAGNOSIS — S32.502A CLOSED DISPLACED FRACTURE OF LEFT PUBIS, INITIAL ENCOUNTER (HCC): ICD-10-CM

## 2017-11-28 DIAGNOSIS — I48.91 A-FIB (HCC): Primary | ICD-10-CM

## 2017-11-28 PROCEDURE — 85610 PROTHROMBIN TIME: CPT

## 2017-11-28 PROCEDURE — 99307 SBSQ NF CARE SF MDM 10: CPT | Performed by: INTERNAL MEDICINE

## 2017-11-28 NOTE — PROGRESS NOTES
Patient has no new complaints. Pain adequately controlled she states if she use her medications on time. Pleasant alert well orientated no acute distress afebrile VSS normocephalic nonicteric moves all 4 extremities. Skin warm and dry.     Impression f

## 2017-12-02 ENCOUNTER — LAB REQUISITION (OUTPATIENT)
Dept: LAB | Facility: HOSPITAL | Age: 82
End: 2017-12-02
Attending: INTERNAL MEDICINE
Payer: MEDICARE

## 2017-12-02 ENCOUNTER — MYAURORA ACCOUNT LINK (OUTPATIENT)
Dept: OTHER | Age: 82
End: 2017-12-02

## 2017-12-02 DIAGNOSIS — R19.7 DIARRHEA: ICD-10-CM

## 2017-12-02 PROCEDURE — 87427 SHIGA-LIKE TOXIN AG IA: CPT | Performed by: INTERNAL MEDICINE

## 2017-12-02 PROCEDURE — 87045 FECES CULTURE AEROBIC BACT: CPT | Performed by: INTERNAL MEDICINE

## 2017-12-02 PROCEDURE — 87046 STOOL CULTR AEROBIC BACT EA: CPT | Performed by: INTERNAL MEDICINE

## 2017-12-04 ENCOUNTER — HOSPITAL ENCOUNTER (OUTPATIENT)
Dept: LAB | Facility: HOSPITAL | Age: 82
Discharge: HOME | End: 2017-12-04
Attending: INTERNAL MEDICINE
Payer: MEDICARE

## 2017-12-04 ENCOUNTER — PRIOR ORIGINAL RECORDS (OUTPATIENT)
Dept: OTHER | Age: 82
End: 2017-12-04

## 2017-12-04 DIAGNOSIS — I48.0 PAROXYSMAL ATRIAL FIBRILLATION (HCC): ICD-10-CM

## 2017-12-04 PROCEDURE — 85610 PROTHROMBIN TIME: CPT

## 2017-12-04 RX ORDER — WARFARIN SODIUM 5 MG/1
TABLET ORAL
Qty: 90 TABLET | Refills: 3 | Status: SHIPPED | OUTPATIENT
Start: 2017-12-04 | End: 2017-12-06

## 2017-12-06 ENCOUNTER — OFFICE VISIT (OUTPATIENT)
Dept: INTERNAL MEDICINE CLINIC | Facility: CLINIC | Age: 82
End: 2017-12-06

## 2017-12-06 ENCOUNTER — TELEPHONE (OUTPATIENT)
Dept: INTERNAL MEDICINE CLINIC | Facility: CLINIC | Age: 82
End: 2017-12-06

## 2017-12-06 VITALS
DIASTOLIC BLOOD PRESSURE: 50 MMHG | OXYGEN SATURATION: 97 % | BODY MASS INDEX: 30 KG/M2 | TEMPERATURE: 98 F | SYSTOLIC BLOOD PRESSURE: 122 MMHG | HEART RATE: 60 BPM | RESPIRATION RATE: 16 BRPM | WEIGHT: 178 LBS

## 2017-12-06 DIAGNOSIS — I48.20 CHRONIC ATRIAL FIBRILLATION (HCC): ICD-10-CM

## 2017-12-06 DIAGNOSIS — R53.82 CHRONIC FATIGUE: ICD-10-CM

## 2017-12-06 DIAGNOSIS — R73.01 IMPAIRED FASTING GLUCOSE: ICD-10-CM

## 2017-12-06 DIAGNOSIS — S42.491D CLOSED BICONDYLAR FRACTURE OF DISTAL END OF RIGHT HUMERUS WITH ROUTINE HEALING, SUBSEQUENT ENCOUNTER: Primary | ICD-10-CM

## 2017-12-06 DIAGNOSIS — I10 ESSENTIAL HYPERTENSION WITH GOAL BLOOD PRESSURE LESS THAN 140/90: ICD-10-CM

## 2017-12-06 DIAGNOSIS — E55.9 VITAMIN D DEFICIENCY: ICD-10-CM

## 2017-12-06 DIAGNOSIS — D50.9 IRON DEFICIENCY ANEMIA, UNSPECIFIED IRON DEFICIENCY ANEMIA TYPE: ICD-10-CM

## 2017-12-06 DIAGNOSIS — S32.502A CLOSED DISPLACED FRACTURE OF LEFT PUBIS, INITIAL ENCOUNTER (HCC): ICD-10-CM

## 2017-12-06 PROCEDURE — 80053 COMPREHEN METABOLIC PANEL: CPT | Performed by: INTERNAL MEDICINE

## 2017-12-06 PROCEDURE — 36415 COLL VENOUS BLD VENIPUNCTURE: CPT | Performed by: INTERNAL MEDICINE

## 2017-12-06 PROCEDURE — 83540 ASSAY OF IRON: CPT | Performed by: INTERNAL MEDICINE

## 2017-12-06 PROCEDURE — 99214 OFFICE O/P EST MOD 30 MIN: CPT | Performed by: INTERNAL MEDICINE

## 2017-12-06 PROCEDURE — 85025 COMPLETE CBC W/AUTO DIFF WBC: CPT | Performed by: INTERNAL MEDICINE

## 2017-12-06 PROCEDURE — 84443 ASSAY THYROID STIM HORMONE: CPT | Performed by: INTERNAL MEDICINE

## 2017-12-06 PROCEDURE — 83036 HEMOGLOBIN GLYCOSYLATED A1C: CPT | Performed by: INTERNAL MEDICINE

## 2017-12-06 PROCEDURE — 82607 VITAMIN B-12: CPT | Performed by: INTERNAL MEDICINE

## 2017-12-06 PROCEDURE — 82306 VITAMIN D 25 HYDROXY: CPT | Performed by: INTERNAL MEDICINE

## 2017-12-06 PROCEDURE — 83550 IRON BINDING TEST: CPT | Performed by: INTERNAL MEDICINE

## 2017-12-06 RX ORDER — AMLODIPINE BESYLATE 2.5 MG/1
2.5 TABLET ORAL DAILY
Qty: 180 TABLET | Refills: 0 | COMMUNITY
Start: 2017-12-06 | End: 2018-02-09

## 2017-12-06 RX ORDER — TRAMADOL HYDROCHLORIDE 100 MG/1
100 TABLET, FILM COATED, EXTENDED RELEASE ORAL
COMMUNITY
Start: 2016-09-26 | End: 2017-12-06

## 2017-12-06 RX ORDER — TRAMADOL HYDROCHLORIDE 100 MG/1
100 TABLET, FILM COATED, EXTENDED RELEASE ORAL EVERY MORNING
Qty: 30 TABLET | Refills: 1 | Status: ON HOLD | OUTPATIENT
Start: 2017-12-06 | End: 2018-07-07

## 2017-12-06 RX ORDER — TRAMADOL HYDROCHLORIDE 50 MG/1
50 TABLET ORAL EVERY 6 HOURS PRN
COMMUNITY
End: 2018-03-09

## 2017-12-06 RX ORDER — SIMETHICONE 125 MG
125 TABLET,CHEWABLE ORAL EVERY 6 HOURS PRN
COMMUNITY
End: 2018-01-10

## 2017-12-06 RX ORDER — WARFARIN SODIUM 5 MG/1
5 TABLET ORAL
Qty: 90 TABLET | Refills: 3 | COMMUNITY
Start: 2017-12-06 | End: 2018-12-10

## 2017-12-06 NOTE — TELEPHONE ENCOUNTER
Confirmed with the pharmacist that patient will be taking Tramadol ER 100mg daily along with Tramadol 50mg every 6 hours as needed. Dr Elsa Pathak sent in prescription today for 100mg ER.   Pharmacist confirmed that patient was not receiving this prescription fro

## 2017-12-06 NOTE — PATIENT INSTRUCTIONS
Ok for Tramadol as directed    Continue PT      Need for red meat three times a week-get Fernando's liver and onions-I'll call you.

## 2017-12-07 ENCOUNTER — TELEPHONE (OUTPATIENT)
Dept: INTERNAL MEDICINE CLINIC | Facility: CLINIC | Age: 82
End: 2017-12-07

## 2017-12-07 PROBLEM — D50.0 IRON DEFICIENCY ANEMIA DUE TO CHRONIC BLOOD LOSS: Status: RESOLVED | Noted: 2017-11-03 | Resolved: 2017-12-07

## 2017-12-07 NOTE — TELEPHONE ENCOUNTER
PC to [facility]. Pt stated she is nauseated from hydrocodone. She has ample supply and dosage of Tramadol. Would like her to continue with that.   Take Tylenol 1000 mg 1 hour before PT.

## 2017-12-08 ENCOUNTER — TELEPHONE (OUTPATIENT)
Dept: INTERNAL MEDICINE CLINIC | Facility: CLINIC | Age: 82
End: 2017-12-08

## 2017-12-08 NOTE — PROGRESS NOTES
Patient presents with:  Establish Care: here to establish care with Dr. Ganga Cassidy after being at [facility] due to a fall she had which caused a fracture on pelvis. Lab: Vit. D, Vit.  B12, CMP, TSH, Iron and TIBC, CBC and A1C for Dr. Leopoldo Carrie        HPI: humerus     JAZZ on CPAP     Closed displaced fracture of pelvis (HCC)     Drug-induced constipation     Closed bicondylar fracture of distal end of right humerus            Global exp 1-17-18 / RIGHT ELBOW / BAM      Iron deficiency anemia        Current O +  Exts: Right elbow with well healed incision lateral aspect of right elbow.      Assessment and Plan:  (A23.223P) Closed bicondylar fracture of distal end of right humerus with routine healing, subsequent encounter  (primary encounter diagnosis), healing

## 2017-12-08 NOTE — TELEPHONE ENCOUNTER
Son Braxton Wu called [facility]) 12.07.17 @ 5:33 PM   Asked for Dr Contreras Burns to increase her pain meds and to call his mother directly at [phone]

## 2017-12-08 NOTE — TELEPHONE ENCOUNTER
Pls call and advise pt that I am going to be on vacation for the next week and won't be able to monitor her side effects to the Norco.  Therefore, we will initiate a palliative care consult for stronger medication whereby the nse can monitor her response a

## 2017-12-08 NOTE — TELEPHONE ENCOUNTER
Called patient to give her test results and Dr Abdelrahman Prakash notes. Patient advised me that she is in a lot of pain and would like Norco ordered if Dr thinks it would help her.  She stated that the medication she thinks gave her an upset stomach but she has never v

## 2017-12-08 NOTE — TELEPHONE ENCOUNTER
No call received from [facility].  Detailed note, face sheet, insurance sent via fax.

## 2017-12-08 NOTE — TELEPHONE ENCOUNTER
Call placed to pt explaining palliative care consult. Pt instructed to continue Tramadol. Call placed to Yi Woodard at 9:30am [phone] requesting palliative care consult. Message left on  awaiting return call.

## 2017-12-11 ENCOUNTER — TELEPHONE (OUTPATIENT)
Dept: INTERNAL MEDICINE CLINIC | Facility: CLINIC | Age: 82
End: 2017-12-11

## 2017-12-11 RX ORDER — FERROUS SULFATE 325(65) MG
325 TABLET ORAL
Qty: 90 TABLET | Refills: 0 | Status: SHIPPED | OUTPATIENT
Start: 2017-12-11 | End: 2018-01-26

## 2017-12-11 NOTE — TELEPHONE ENCOUNTER
Karolina Magallon from [address] [place] [address] called back and she will be contacting patient today to set up visit

## 2017-12-19 ENCOUNTER — TELEPHONE (OUTPATIENT)
Dept: INTERNAL MEDICINE CLINIC | Facility: CLINIC | Age: 82
End: 2017-12-19

## 2017-12-22 ENCOUNTER — OFFICE VISIT (OUTPATIENT)
Dept: INTERNAL MEDICINE CLINIC | Facility: CLINIC | Age: 82
End: 2017-12-22

## 2017-12-22 VITALS
BODY MASS INDEX: 29 KG/M2 | TEMPERATURE: 98 F | HEART RATE: 64 BPM | DIASTOLIC BLOOD PRESSURE: 62 MMHG | RESPIRATION RATE: 16 BRPM | OXYGEN SATURATION: 96 % | SYSTOLIC BLOOD PRESSURE: 138 MMHG | WEIGHT: 176 LBS

## 2017-12-22 DIAGNOSIS — Z51.89 ENCOUNTER FOR MEDICATION ADJUSTMENT: Primary | ICD-10-CM

## 2017-12-22 DIAGNOSIS — S42.491D CLOSED BICONDYLAR FRACTURE OF DISTAL END OF RIGHT HUMERUS WITH ROUTINE HEALING, SUBSEQUENT ENCOUNTER: ICD-10-CM

## 2017-12-22 DIAGNOSIS — F32.9 DEPRESSION, REACTIVE: ICD-10-CM

## 2017-12-22 DIAGNOSIS — L98.9 SYMPTOMATIC LESION OF SKIN: ICD-10-CM

## 2017-12-22 DIAGNOSIS — Z78.0 POSTMENOPAUSAL STATUS, AGE-RELATED: ICD-10-CM

## 2017-12-22 DIAGNOSIS — S32.810D MULTIPLE CLOSED FRACTURES OF PELVIS WITH STABLE DISRUPTION OF PELVIC RING WITH ROUTINE HEALING, SUBSEQUENT ENCOUNTER: ICD-10-CM

## 2017-12-22 PROCEDURE — 99214 OFFICE O/P EST MOD 30 MIN: CPT | Performed by: INTERNAL MEDICINE

## 2017-12-22 RX ORDER — BUPROPION HYDROCHLORIDE 150 MG/1
150 TABLET ORAL DAILY
Qty: 90 TABLET | Refills: 1 | Status: SHIPPED | OUTPATIENT
Start: 2017-12-22 | End: 2018-01-10

## 2017-12-22 NOTE — PATIENT INSTRUCTIONS
Start the Wellbutrin once a day in the morning. Follow upp with me in three weeks. Get a bone density by calling [phone]. When you get the Prolia make an appt for the injection.     Call your dermatologist re: the lesion on the 4th finger of

## 2017-12-22 NOTE — PROGRESS NOTES
Patient presents with: Follow - Up: pain, states that it hasn't improved as much        HPI: Lawrence Calderon is here for follow up of right elbow and pelvic fracture, anemia and depression.       She continues to have pain in her pelvis more than elbow as it was r of right hip, initial encounter ([facility] [address]     Closed fracture of right distal humerus     JAZZ on CPAP     Closed displaced fracture of pelvis ([address]     Drug-induced constipation     Closed bicondylar fracture of distal end of right humerus            Global ex History   Problem Relation Age of Onset   • Cancer Father          Physical Exam:   /62   Pulse 64   Temp 97.6 °F (36.4 °C) (Tympanic)   Resp 16   Wt 176 lb   SpO2 96%   BMI 29.29 kg/m²   Alert, in no distress, much better affect  Lungs: Clear, no ra

## 2017-12-26 PROBLEM — F32.9 DEPRESSION, REACTIVE: Status: ACTIVE | Noted: 2017-12-26

## 2018-01-03 ENCOUNTER — NURSE ONLY (OUTPATIENT)
Dept: INTERNAL MEDICINE CLINIC | Facility: CLINIC | Age: 83
End: 2018-01-03

## 2018-01-03 DIAGNOSIS — I48.20 CHRONIC ATRIAL FIBRILLATION (HCC): Primary | ICD-10-CM

## 2018-01-03 LAB — INR: 2.6 (ref 0.8–1.2)

## 2018-01-03 PROCEDURE — 85610 PROTHROMBIN TIME: CPT | Performed by: INTERNAL MEDICINE

## 2018-01-03 PROCEDURE — 96372 THER/PROPH/DIAG INJ SC/IM: CPT | Performed by: INTERNAL MEDICINE

## 2018-01-05 DIAGNOSIS — S32.9XXD CLOSED NONDISPLACED FRACTURE OF PELVIS WITH ROUTINE HEALING, UNSPECIFIED PART OF PELVIS, SUBSEQUENT ENCOUNTER: ICD-10-CM

## 2018-01-05 DIAGNOSIS — S42.401D CLOSED FRACTURE OF RIGHT ELBOW WITH ROUTINE HEALING, SUBSEQUENT ENCOUNTER: Primary | ICD-10-CM

## 2018-01-10 ENCOUNTER — OFFICE VISIT (OUTPATIENT)
Dept: INTERNAL MEDICINE CLINIC | Facility: CLINIC | Age: 83
End: 2018-01-10

## 2018-01-10 VITALS
BODY MASS INDEX: 29.71 KG/M2 | WEIGHT: 174 LBS | OXYGEN SATURATION: 96 % | SYSTOLIC BLOOD PRESSURE: 118 MMHG | RESPIRATION RATE: 18 BRPM | TEMPERATURE: 98 F | HEART RATE: 60 BPM | DIASTOLIC BLOOD PRESSURE: 56 MMHG | HEIGHT: 64 IN

## 2018-01-10 DIAGNOSIS — D50.9 IRON DEFICIENCY ANEMIA, UNSPECIFIED IRON DEFICIENCY ANEMIA TYPE: ICD-10-CM

## 2018-01-10 DIAGNOSIS — R19.7 DIARRHEA OF PRESUMED INFECTIOUS ORIGIN: Primary | ICD-10-CM

## 2018-01-10 DIAGNOSIS — D68.9 COAGULOPATHY (HCC): ICD-10-CM

## 2018-01-10 DIAGNOSIS — I48.20 CHRONIC ATRIAL FIBRILLATION (HCC): ICD-10-CM

## 2018-01-10 LAB
ERYTHROCYTE [DISTWIDTH] IN BLOOD BY AUTOMATED COUNT: 13.5 % (ref 11.5–16)
HCT VFR BLD AUTO: 32.1 % (ref 34–50)
HGB BLD-MCNC: 10.5 G/DL (ref 12–16)
INR: 3.5 (ref 0.8–1.2)
MCH RBC QN AUTO: 31.4 PG (ref 27–33.2)
MCHC RBC AUTO-ENTMCNC: 32.7 G/DL (ref 31–37)
MCV RBC AUTO: 96.1 FL (ref 81–100)
PLATELET # BLD AUTO: 330 10(3)UL (ref 150–450)
RBC # BLD AUTO: 3.34 X10(6)UL (ref 3.8–5.1)
RED CELL DISTRIBUTION WIDTH-SD: 48.1 FL (ref 35.1–46.3)
WBC # BLD AUTO: 6.2 X10(3) UL (ref 4–13)

## 2018-01-10 PROCEDURE — 99214 OFFICE O/P EST MOD 30 MIN: CPT | Performed by: INTERNAL MEDICINE

## 2018-01-10 PROCEDURE — 85610 PROTHROMBIN TIME: CPT | Performed by: INTERNAL MEDICINE

## 2018-01-10 PROCEDURE — 85027 COMPLETE CBC AUTOMATED: CPT | Performed by: INTERNAL MEDICINE

## 2018-01-10 NOTE — PROGRESS NOTES
Patient presents with:  Rectal Problem: patient has been having dark stools she is taking iron pills  Stomach Pain: no specific location  Lab: INR coumadin clinic       HPI: Gaviota Flores is here for abdominal cramping, diarrhea x 4 days, dark stools (on iron). Iron deficiency anemia     Depression, reactive     Diarrhea of presumed infectious origin     Coagulopathy ([facility] [address]        Current Outpatient Prescriptions:  TraMADol HCl 50 MG Oral Tab Take 50 mg by mouth every 6 (six) hours as needed for Pain.  Disp:  Rfl: exam.  Rectal:  No stool in vault. Secretions heme negative. No masses, tenderness.    Exts: no edema    Assessment and Plan:  80 y.o woman with 4-5 days hx of sm amts of dark loose stools several times a day with abd cramping, no appetite, possible left

## 2018-01-10 NOTE — PATIENT INSTRUCTIONS
Ok to take Imodium 1 three times a day    Use the BRAT diet: bananas, apples with skin    So the stool for occult blood. Call us for increasing pain in the left side of your abdomen.

## 2018-01-11 ENCOUNTER — TELEPHONE (OUTPATIENT)
Dept: INTERNAL MEDICINE CLINIC | Facility: CLINIC | Age: 83
End: 2018-01-11

## 2018-01-11 NOTE — TELEPHONE ENCOUNTER
----- Message from Carly Wilcox MD sent at 1/11/2018  9:24 AM CST -----  Please let Sandra Clark know that her blood count is improving, so it's unlikely that she is losing blood from her GI tract. But she should continue to do the stool kit we gave her.

## 2018-01-12 ENCOUNTER — LAB ENCOUNTER (OUTPATIENT)
Dept: LAB | Facility: HOSPITAL | Age: 83
End: 2018-01-12
Attending: INTERNAL MEDICINE
Payer: MEDICARE

## 2018-01-12 DIAGNOSIS — Z12.11 SCREENING FOR COLON CANCER: Primary | ICD-10-CM

## 2018-01-12 PROCEDURE — 82272 OCCULT BLD FECES 1-3 TESTS: CPT

## 2018-01-17 ENCOUNTER — NURSE ONLY (OUTPATIENT)
Dept: INTERNAL MEDICINE CLINIC | Facility: CLINIC | Age: 83
End: 2018-01-17

## 2018-01-17 DIAGNOSIS — I48.20 CHRONIC ATRIAL FIBRILLATION (HCC): Primary | ICD-10-CM

## 2018-01-17 LAB — INR: 1.5 (ref 0.8–1.2)

## 2018-01-17 PROCEDURE — 85610 PROTHROMBIN TIME: CPT | Performed by: INTERNAL MEDICINE

## 2018-01-25 ENCOUNTER — TELEPHONE (OUTPATIENT)
Dept: INTERNAL MEDICINE CLINIC | Facility: CLINIC | Age: 83
End: 2018-01-25

## 2018-01-25 DIAGNOSIS — D50.9 IRON DEFICIENCY ANEMIA, UNSPECIFIED IRON DEFICIENCY ANEMIA TYPE: Primary | ICD-10-CM

## 2018-01-25 NOTE — TELEPHONE ENCOUNTER
----- Message from Carey العلي MD sent at 1/24/2018 10:02 PM CST -----  Please advise [place] that her stool for occult blood is negative. Instruct her to follow up if she sees any blood in her stools. Have her recheck her CBC in mid February.

## 2018-01-26 ENCOUNTER — OFFICE VISIT (OUTPATIENT)
Dept: INTERNAL MEDICINE CLINIC | Facility: CLINIC | Age: 83
End: 2018-01-26

## 2018-01-26 VITALS
HEART RATE: 61 BPM | WEIGHT: 173.25 LBS | HEIGHT: 64 IN | OXYGEN SATURATION: 97 % | RESPIRATION RATE: 16 BRPM | SYSTOLIC BLOOD PRESSURE: 132 MMHG | TEMPERATURE: 98 F | DIASTOLIC BLOOD PRESSURE: 50 MMHG | BODY MASS INDEX: 29.58 KG/M2

## 2018-01-26 DIAGNOSIS — S32.502A CLOSED DISPLACED FRACTURE OF LEFT PUBIS, INITIAL ENCOUNTER (HCC): ICD-10-CM

## 2018-01-26 DIAGNOSIS — S42.491D CLOSED BICONDYLAR FRACTURE OF DISTAL END OF RIGHT HUMERUS WITH ROUTINE HEALING, SUBSEQUENT ENCOUNTER: ICD-10-CM

## 2018-01-26 DIAGNOSIS — I10 ESSENTIAL HYPERTENSION WITH GOAL BLOOD PRESSURE LESS THAN 140/90: ICD-10-CM

## 2018-01-26 DIAGNOSIS — D50.9 IRON DEFICIENCY ANEMIA, UNSPECIFIED IRON DEFICIENCY ANEMIA TYPE: Primary | ICD-10-CM

## 2018-01-26 DIAGNOSIS — I48.20 CHRONIC ATRIAL FIBRILLATION (HCC): ICD-10-CM

## 2018-01-26 PROBLEM — S72.001A CLOSED FRACTURE OF RIGHT HIP, INITIAL ENCOUNTER (HCC): Status: RESOLVED | Noted: 2017-10-28 | Resolved: 2018-01-26

## 2018-01-26 PROBLEM — S72.001A CLOSED FRACTURE OF RIGHT HIP (HCC): Status: RESOLVED | Noted: 2017-10-27 | Resolved: 2018-01-26

## 2018-01-26 PROBLEM — R19.7 DIARRHEA OF PRESUMED INFECTIOUS ORIGIN: Status: RESOLVED | Noted: 2018-01-10 | Resolved: 2018-01-26

## 2018-01-26 PROBLEM — K59.03 DRUG-INDUCED CONSTIPATION: Status: RESOLVED | Noted: 2017-11-02 | Resolved: 2018-01-26

## 2018-01-26 PROBLEM — D68.9 COAGULOPATHY (HCC): Status: RESOLVED | Noted: 2018-01-10 | Resolved: 2018-01-26

## 2018-01-26 PROBLEM — S42.401A CLOSED FRACTURE OF RIGHT DISTAL HUMERUS: Status: RESOLVED | Noted: 2017-10-28 | Resolved: 2018-01-26

## 2018-01-26 LAB — INR: 1.8 (ref 0.8–1.2)

## 2018-01-26 PROCEDURE — 99214 OFFICE O/P EST MOD 30 MIN: CPT | Performed by: INTERNAL MEDICINE

## 2018-01-26 PROCEDURE — 85610 PROTHROMBIN TIME: CPT | Performed by: INTERNAL MEDICINE

## 2018-01-26 NOTE — PROGRESS NOTES
Patient presents with:  Diarrhea: follow up   Lab: INR       HPI: Here for follow up of loose stools, dark stools,  anemia, elbow and pelvis fracture,     Loose stools/dark stools: Has resolved. Thinks it was Ensure. Stool for occult blood was negative. deficiency anemia     Depression, reactive        Current Outpatient Prescriptions:  TraMADol HCl 50 MG Oral Tab Take 50 mg by mouth every 6 (six) hours as needed for Pain.  Disp:  Rfl:    AmLODIPine Besylate 2.5 MG Oral Tab One tab po bid Disp: 180 tablet encounter diagnosis), improved but not optimal, canot tolerate iron but is eating red meat three times a week  PLAN:  CBC WITH DIFFERENTIAL WITH  PLATELET, IRON AND TIBC 2 weeks    (I10) Essential hypertension with goal blood pressure less than 140/90, con

## 2018-01-26 NOTE — PATIENT INSTRUCTIONS
Continue eating steak few times a week    Get calcium from dairy products: milk, ice cream, yogurt or cheese. Continue PT. Follow up with cardiology as instructed. Get Blood count and iron studies in 3 weeks-after Feb 10.

## 2018-01-31 RX ORDER — TRAMADOL HYDROCHLORIDE 100 MG/1
TABLET, EXTENDED RELEASE ORAL
Qty: 30 TABLET | Refills: 1 | OUTPATIENT
Start: 2018-01-31 | End: 2018-02-09

## 2018-02-01 ENCOUNTER — NURSE ONLY (OUTPATIENT)
Dept: INTERNAL MEDICINE CLINIC | Facility: CLINIC | Age: 83
End: 2018-02-01

## 2018-02-01 DIAGNOSIS — I48.20 CHRONIC ATRIAL FIBRILLATION (HCC): Primary | ICD-10-CM

## 2018-02-01 LAB — INR: 2.1 (ref 0.8–1.2)

## 2018-02-01 PROCEDURE — 85610 PROTHROMBIN TIME: CPT | Performed by: INTERNAL MEDICINE

## 2018-02-07 ENCOUNTER — PRIOR ORIGINAL RECORDS (OUTPATIENT)
Dept: OTHER | Age: 83
End: 2018-02-07

## 2018-02-07 ENCOUNTER — OFFICE VISIT (OUTPATIENT)
Dept: INTERNAL MEDICINE CLINIC | Facility: CLINIC | Age: 83
End: 2018-02-07

## 2018-02-07 VITALS
SYSTOLIC BLOOD PRESSURE: 132 MMHG | RESPIRATION RATE: 18 BRPM | DIASTOLIC BLOOD PRESSURE: 64 MMHG | OXYGEN SATURATION: 97 % | BODY MASS INDEX: 29 KG/M2 | WEIGHT: 175 LBS | HEART RATE: 60 BPM | TEMPERATURE: 97 F

## 2018-02-07 DIAGNOSIS — K59.00 CONSTIPATION, UNSPECIFIED CONSTIPATION TYPE: Primary | ICD-10-CM

## 2018-02-07 DIAGNOSIS — D50.9 IRON DEFICIENCY ANEMIA, UNSPECIFIED IRON DEFICIENCY ANEMIA TYPE: ICD-10-CM

## 2018-02-07 DIAGNOSIS — R74.8 BLOOD ALKALINE PHOSPHATASE INCREASED COMPARED WITH PRIOR MEASUREMENT: ICD-10-CM

## 2018-02-07 DIAGNOSIS — S42.491D CLOSED BICONDYLAR FRACTURE OF DISTAL END OF RIGHT HUMERUS WITH ROUTINE HEALING, SUBSEQUENT ENCOUNTER: ICD-10-CM

## 2018-02-07 PROBLEM — J44.89 ASTHMA WITH COPD (CHRONIC OBSTRUCTIVE PULMONARY DISEASE): Chronic | Status: RESOLVED | Noted: 2017-04-12 | Resolved: 2018-02-07

## 2018-02-07 PROBLEM — J44.89 ASTHMA WITH COPD (CHRONIC OBSTRUCTIVE PULMONARY DISEASE) (HCC): Chronic | Status: RESOLVED | Noted: 2017-04-12 | Resolved: 2018-02-07

## 2018-02-07 PROBLEM — F32.9 DEPRESSION, REACTIVE: Status: RESOLVED | Noted: 2017-12-26 | Resolved: 2018-02-07

## 2018-02-07 PROBLEM — J44.9 ASTHMA WITH COPD (CHRONIC OBSTRUCTIVE PULMONARY DISEASE) (HCC): Chronic | Status: RESOLVED | Noted: 2017-04-12 | Resolved: 2018-02-07

## 2018-02-07 LAB
ALBUMIN SERPL-MCNC: 3.5 G/DL (ref 3.5–4.8)
ALP LIVER SERPL-CCNC: 124 U/L (ref 55–142)
ALT SERPL-CCNC: 14 U/L (ref 14–54)
AST SERPL-CCNC: 18 U/L (ref 15–41)
BILIRUB SERPL-MCNC: 0.4 MG/DL (ref 0.1–2)
BUN BLD-MCNC: 11 MG/DL (ref 8–20)
CALCIUM BLD-MCNC: 8.8 MG/DL (ref 8.3–10.3)
CHLORIDE: 103 MMOL/L (ref 101–111)
CO2: 22 MMOL/L (ref 22–32)
CREAT BLD-MCNC: 0.64 MG/DL (ref 0.55–1.02)
ERYTHROCYTE [DISTWIDTH] IN BLOOD BY AUTOMATED COUNT: 13.1 % (ref 11.5–16)
EST. AVERAGE GLUCOSE BLD GHB EST-MCNC: 126 MG/DL (ref 68–126)
GLUCOSE BLD-MCNC: 94 MG/DL (ref 70–99)
HBA1C MFR BLD HPLC: 6 % (ref ?–5.7)
HCT VFR BLD AUTO: 33.4 % (ref 34–50)
HGB BLD-MCNC: 11.1 G/DL (ref 12–16)
INR: 2 (ref 0.8–1.2)
IRON SATURATION: 13 % (ref 13–45)
IRON: 49 UG/DL (ref 28–170)
M PROTEIN MFR SERPL ELPH: 7.5 G/DL (ref 6.1–8.3)
MCH RBC QN AUTO: 31.8 PG (ref 27–33.2)
MCHC RBC AUTO-ENTMCNC: 33.2 G/DL (ref 31–37)
MCV RBC AUTO: 95.7 FL (ref 81–100)
PLATELET # BLD AUTO: 299 10(3)UL (ref 150–450)
POTASSIUM SERPL-SCNC: 4.6 MMOL/L (ref 3.6–5.1)
RBC # BLD AUTO: 3.49 X10(6)UL (ref 3.8–5.1)
RED CELL DISTRIBUTION WIDTH-SD: 46.1 FL (ref 35.1–46.3)
SODIUM SERPL-SCNC: 133 MMOL/L (ref 136–144)
TOTAL IRON BINDING CAPACITY: 365 UG/DL (ref 298–536)
TRANSFERRIN: 245 MG/DL (ref 200–360)
TSI SER-ACNC: 2.06 MIU/ML (ref 0.35–5.5)
WBC # BLD AUTO: 5.7 X10(3) UL (ref 4–13)

## 2018-02-07 PROCEDURE — 85027 COMPLETE CBC AUTOMATED: CPT | Performed by: INTERNAL MEDICINE

## 2018-02-07 PROCEDURE — 85610 PROTHROMBIN TIME: CPT | Performed by: INTERNAL MEDICINE

## 2018-02-07 PROCEDURE — 83036 HEMOGLOBIN GLYCOSYLATED A1C: CPT | Performed by: INTERNAL MEDICINE

## 2018-02-07 PROCEDURE — 99214 OFFICE O/P EST MOD 30 MIN: CPT | Performed by: INTERNAL MEDICINE

## 2018-02-07 PROCEDURE — 80053 COMPREHEN METABOLIC PANEL: CPT | Performed by: INTERNAL MEDICINE

## 2018-02-07 PROCEDURE — 36415 COLL VENOUS BLD VENIPUNCTURE: CPT | Performed by: INTERNAL MEDICINE

## 2018-02-07 PROCEDURE — 83540 ASSAY OF IRON: CPT | Performed by: INTERNAL MEDICINE

## 2018-02-07 PROCEDURE — 83550 IRON BINDING TEST: CPT | Performed by: INTERNAL MEDICINE

## 2018-02-07 PROCEDURE — 84443 ASSAY THYROID STIM HORMONE: CPT | Performed by: INTERNAL MEDICINE

## 2018-02-07 NOTE — PROGRESS NOTES
Patient presents with:  Abdominal Pain: follow up. Patient states that she feels better. She saw her gastro MD  Lab: INR coumadin clinic/ CBC/HA1C/CMP/TSH/TIBC       HPI:  Lawrence Calderon is here to follow up for constipation.   After seeing me for loose stools las radicular pain-electrical stimulator     Essential hypertension      Chronic atrial fibrillation (HCC)     JAZZ on CPAP     Closed displaced fracture of pelvis ([address]     Closed bicondylar fracture of distal end of right humerus            Global exp 1-17-18 Per Ortho            Patient Instructions   Continue same routine and meds per GI nurse to keep bowels regulated    Continue PT for your elbow. Return in about 4 months (around 6/7/2018). This visit lasted >26 minutes.     Margret Michael MD

## 2018-02-09 ENCOUNTER — HOSPITAL ENCOUNTER (OUTPATIENT)
Facility: HOSPITAL | Age: 83
Setting detail: OBSERVATION
Discharge: NURSING, REHAB OR LONG-TERM CARE | End: 2018-02-12
Attending: EMERGENCY MEDICINE | Admitting: HOSPITALIST
Payer: MEDICARE

## 2018-02-09 DIAGNOSIS — E87.6 HYPOKALEMIA: ICD-10-CM

## 2018-02-09 DIAGNOSIS — R53.1 WEAKNESS GENERALIZED: ICD-10-CM

## 2018-02-09 DIAGNOSIS — R19.7 DIARRHEA OF PRESUMED INFECTIOUS ORIGIN: Primary | ICD-10-CM

## 2018-02-09 DIAGNOSIS — N39.0 URINARY TRACT INFECTION WITHOUT HEMATURIA, SITE UNSPECIFIED: ICD-10-CM

## 2018-02-09 DIAGNOSIS — D64.9 ANEMIA, UNSPECIFIED TYPE: ICD-10-CM

## 2018-02-09 LAB
ALBUMIN SERPL-MCNC: 3.3 G/DL (ref 3.5–4.8)
ALP LIVER SERPL-CCNC: 108 U/L (ref 55–142)
ALT SERPL-CCNC: 20 U/L (ref 14–54)
AST SERPL-CCNC: 24 U/L (ref 15–41)
BASOPHILS # BLD AUTO: 0.03 X10(3) UL (ref 0–0.1)
BASOPHILS NFR BLD AUTO: 0.9 %
BILIRUB SERPL-MCNC: 0.3 MG/DL (ref 0.1–2)
BILIRUB UR QL STRIP.AUTO: NEGATIVE
BUN BLD-MCNC: 12 MG/DL (ref 8–20)
CALCIUM BLD-MCNC: 7.8 MG/DL (ref 8.3–10.3)
CHLORIDE: 104 MMOL/L (ref 101–111)
CLARITY UR REFRACT.AUTO: CLEAR
CO2: 21 MMOL/L (ref 22–32)
COLOR UR AUTO: YELLOW
CREAT BLD-MCNC: 0.64 MG/DL (ref 0.55–1.02)
EOSINOPHIL # BLD AUTO: 0.03 X10(3) UL (ref 0–0.3)
EOSINOPHIL NFR BLD AUTO: 0.9 %
ERYTHROCYTE [DISTWIDTH] IN BLOOD BY AUTOMATED COUNT: 13.1 % (ref 11.5–16)
GLUCOSE BLD-MCNC: 109 MG/DL (ref 70–99)
GLUCOSE UR STRIP.AUTO-MCNC: NEGATIVE MG/DL
HCT VFR BLD AUTO: 33.6 % (ref 34–50)
HGB BLD-MCNC: 11.1 G/DL (ref 12–16)
IMMATURE GRANULOCYTE COUNT: 0.02 X10(3) UL (ref 0–1)
IMMATURE GRANULOCYTE RATIO %: 0.6 %
INR BLD: 2.34 (ref 0.89–1.11)
KETONES UR STRIP.AUTO-MCNC: 20 MG/DL
LIPASE: 132 U/L (ref 73–393)
LYMPHOCYTES # BLD AUTO: 0.76 X10(3) UL (ref 0.9–4)
LYMPHOCYTES NFR BLD AUTO: 22.6 %
M PROTEIN MFR SERPL ELPH: 7.1 G/DL (ref 6.1–8.3)
MCH RBC QN AUTO: 31.4 PG (ref 27–33.2)
MCHC RBC AUTO-ENTMCNC: 33 G/DL (ref 31–37)
MCV RBC AUTO: 95.2 FL (ref 81–100)
MONOCYTES # BLD AUTO: 0.52 X10(3) UL (ref 0.1–1)
MONOCYTES NFR BLD AUTO: 15.4 %
NEUTROPHIL ABS PRELIM: 2.01 X10 (3) UL (ref 1.3–6.7)
NEUTROPHILS # BLD AUTO: 2.01 X10(3) UL (ref 1.3–6.7)
NEUTROPHILS NFR BLD AUTO: 59.6 %
NITRITE UR QL STRIP.AUTO: POSITIVE
PH UR STRIP.AUTO: 7 [PH] (ref 4.5–8)
PLATELET # BLD AUTO: 261 10(3)UL (ref 150–450)
POTASSIUM SERPL-SCNC: 3.5 MMOL/L (ref 3.6–5.1)
PROT UR STRIP.AUTO-MCNC: NEGATIVE MG/DL
PSA SERPL DL<=0.01 NG/ML-MCNC: 26.1 SECONDS (ref 12–14.3)
RBC # BLD AUTO: 3.53 X10(6)UL (ref 3.8–5.1)
RED CELL DISTRIBUTION WIDTH-SD: 45.6 FL (ref 35.1–46.3)
SODIUM SERPL-SCNC: 136 MMOL/L (ref 136–144)
SP GR UR STRIP.AUTO: 1 (ref 1–1.03)
UROBILINOGEN UR STRIP.AUTO-MCNC: <2 MG/DL
WBC # BLD AUTO: 3.4 X10(3) UL (ref 4–13)

## 2018-02-09 PROCEDURE — 99220 INITIAL OBSERVATION CARE,LEVL III: CPT | Performed by: HOSPITALIST

## 2018-02-09 RX ORDER — WARFARIN SODIUM 5 MG/1
5 TABLET ORAL NIGHTLY
Status: DISCONTINUED | OUTPATIENT
Start: 2018-02-09 | End: 2018-02-11

## 2018-02-09 RX ORDER — LISINOPRIL 40 MG/1
40 TABLET ORAL DAILY
Status: DISCONTINUED | OUTPATIENT
Start: 2018-02-09 | End: 2018-02-12

## 2018-02-09 RX ORDER — SOTALOL HYDROCHLORIDE 80 MG/1
40 TABLET ORAL 3 TIMES DAILY
COMMUNITY
End: 2019-06-07

## 2018-02-09 RX ORDER — POTASSIUM CHLORIDE 750 MG/1
10 TABLET, EXTENDED RELEASE ORAL ONCE
Status: COMPLETED | OUTPATIENT
Start: 2018-02-09 | End: 2018-02-09

## 2018-02-09 RX ORDER — PANTOPRAZOLE SODIUM 20 MG/1
20 TABLET, DELAYED RELEASE ORAL
Status: DISCONTINUED | OUTPATIENT
Start: 2018-02-10 | End: 2018-02-10

## 2018-02-09 RX ORDER — SOTALOL HYDROCHLORIDE 80 MG/1
40 TABLET ORAL 3 TIMES DAILY
Status: DISCONTINUED | OUTPATIENT
Start: 2018-02-09 | End: 2018-02-12

## 2018-02-09 RX ORDER — CIPROFLOXACIN 500 MG/1
500 TABLET, FILM COATED ORAL ONCE
Status: COMPLETED | OUTPATIENT
Start: 2018-02-09 | End: 2018-02-09

## 2018-02-09 RX ORDER — SODIUM CHLORIDE 9 MG/ML
INJECTION, SOLUTION INTRAVENOUS CONTINUOUS
Status: DISCONTINUED | OUTPATIENT
Start: 2018-02-09 | End: 2018-02-12

## 2018-02-09 RX ORDER — CIPROFLOXACIN 500 MG/1
500 TABLET, FILM COATED ORAL EVERY 12 HOURS SCHEDULED
Status: DISCONTINUED | OUTPATIENT
Start: 2018-02-09 | End: 2018-02-10

## 2018-02-09 RX ORDER — GABAPENTIN 300 MG/1
300 CAPSULE ORAL 3 TIMES DAILY
Status: DISCONTINUED | OUTPATIENT
Start: 2018-02-09 | End: 2018-02-12

## 2018-02-09 RX ORDER — OMEPRAZOLE 20 MG/1
40 CAPSULE, DELAYED RELEASE ORAL
COMMUNITY
End: 2018-02-12

## 2018-02-09 RX ORDER — BUPROPION HYDROCHLORIDE 150 MG/1
150 TABLET ORAL DAILY
COMMUNITY
End: 2018-02-09

## 2018-02-09 RX ORDER — ACETAMINOPHEN 325 MG/1
325 TABLET ORAL
COMMUNITY

## 2018-02-09 RX ORDER — ACETAMINOPHEN 325 MG/1
650 TABLET ORAL EVERY 6 HOURS PRN
Status: DISCONTINUED | OUTPATIENT
Start: 2018-02-09 | End: 2018-02-12

## 2018-02-09 RX ORDER — ONDANSETRON 2 MG/ML
4 INJECTION INTRAMUSCULAR; INTRAVENOUS EVERY 6 HOURS PRN
Status: DISCONTINUED | OUTPATIENT
Start: 2018-02-09 | End: 2018-02-12

## 2018-02-09 RX ORDER — VIT A/VIT C/VIT E/ZINC/COPPER 2148-113
1 TABLET ORAL DAILY
COMMUNITY
End: 2019-12-04

## 2018-02-09 RX ORDER — AMLODIPINE BESYLATE 5 MG/1
5 TABLET ORAL 2 TIMES DAILY
Status: DISCONTINUED | OUTPATIENT
Start: 2018-02-09 | End: 2018-02-12

## 2018-02-09 RX ORDER — CALCIUM CARBONATE/VITAMIN D3 600 MG-10
1 TABLET ORAL DAILY
Status: ON HOLD | COMMUNITY
End: 2020-02-21

## 2018-02-09 RX ORDER — AMLODIPINE BESYLATE 5 MG/1
5 TABLET ORAL 2 TIMES DAILY
COMMUNITY
End: 2018-06-15

## 2018-02-09 RX ORDER — TRAMADOL HYDROCHLORIDE 50 MG/1
50 TABLET ORAL EVERY 6 HOURS PRN
Status: DISCONTINUED | OUTPATIENT
Start: 2018-02-09 | End: 2018-02-12

## 2018-02-09 NOTE — ED PROVIDER NOTES
Patient Seen in: [facility] Emergency Department    History   Patient presents with:  Nausea/Vomiting/Diarrhea (gastrointestinal)    Stated Complaint: n/d    HPI    Patient had an office visit just 2 days ago complaining of constipation.   She had ha • Problems with swallowing    • Reflux    • S/P laminectomy 12/23/2013   • Sleep apnea     cpap   • Uncomfortable fullness after meals    • Visual impairment    • Wears glasses    • Wheezing        Past Surgical History:  3/28/2014: ADJ TISS Merlynn Dose LID,NOS appropriately. Eyes: sclera white, conjunctiva pink and moist.  Lids and lashes are normal.  Nose: Unremarkable   Throat: Posterior pharynx is normal.  Uvula midline nonswollen. Tongue is nonswollen.   Oromucosa is moist but lips are dry  Neck: Supple  Aimee -----------         ------                     CBC W/ DIFFERENTIAL[[id number]]          Abnormal            Final result                 Please view results for these tests on the individual orders.    [facility] hCentive Unknown

## 2018-02-09 NOTE — ED INITIAL ASSESSMENT (HPI)
Pt c/o diarrhea x2 days, fever 100.1 per EMS, was nauseated, given zofran odt pta, pt denies cough or vomiting, denies nausea at this time.

## 2018-02-09 NOTE — H&P
SANDRA HOSPITALIST  History and Physical     Cr Maldonado Patient Status:  Emergency    10/25/1927 MRN [id number]   Location [address] [facility] Attending Marito Wong MD   Hosp Day # 0 PCP Shin Yo MD     Chief Complaint: History: Past Surgical History:  3/28/2014: ADJ TISS Tigist [place] LID,NOS,EAR <10SQCM      Comment: Procedure: DEBULKING OF NASOLABIAL FLAP TO                NOSE;  Surgeon: Mj Higgins MD;  Location:                [address] [place] [address] date: APPENDECTO gabapentin (NEURONTIN) 300 MG Oral Cap Take 1 capsule (300 mg total) by mouth 3 (three) times daily. Disp: 270 capsule Rfl: 3   lisinopril (PRINIVIL,ZESTRIL) 40 MG Oral Tab Take 40 mg by mouth daily.    Disp:  Rfl:    [DISCONTINUED] HYDROcodone-acetaminop Estimated Creatinine Clearance: 50.5 mL/min (based on SCr of 0.64 mg/dL). Recent Labs   Lab  02/07/18   1547  02/09/18   1322   PTP   --   26.1*   INR  2.0*  2.34*       No results for input(s): TROP, CK in the last 72 hours.     Imaging: Imaging d

## 2018-02-10 ENCOUNTER — PRIOR ORIGINAL RECORDS (OUTPATIENT)
Dept: OTHER | Age: 83
End: 2018-02-10

## 2018-02-10 PROBLEM — J44.89 ASTHMA WITH COPD (CHRONIC OBSTRUCTIVE PULMONARY DISEASE) (HCC): Chronic | Status: RESOLVED | Noted: 2018-02-10 | Resolved: 2018-02-10

## 2018-02-10 PROBLEM — J44.89 ASTHMA WITH COPD (CHRONIC OBSTRUCTIVE PULMONARY DISEASE) (HCC): Chronic | Status: ACTIVE | Noted: 2018-02-10

## 2018-02-10 PROBLEM — J44.89 ASTHMA WITH COPD (CHRONIC OBSTRUCTIVE PULMONARY DISEASE): Chronic | Status: ACTIVE | Noted: 2018-02-10

## 2018-02-10 PROBLEM — E87.6 HYPOKALEMIA: Status: RESOLVED | Noted: 2018-02-09 | Resolved: 2018-02-10

## 2018-02-10 PROBLEM — R53.1 WEAKNESS GENERALIZED: Status: RESOLVED | Noted: 2018-02-09 | Resolved: 2018-02-10

## 2018-02-10 PROBLEM — J44.89 ASTHMA WITH COPD (CHRONIC OBSTRUCTIVE PULMONARY DISEASE): Chronic | Status: RESOLVED | Noted: 2018-02-10 | Resolved: 2018-02-10

## 2018-02-10 PROBLEM — J44.9 ASTHMA WITH COPD (CHRONIC OBSTRUCTIVE PULMONARY DISEASE) (HCC): Chronic | Status: ACTIVE | Noted: 2018-02-10

## 2018-02-10 PROBLEM — M47.816 LUMBAR FACET ARTHROPATHY: Status: ACTIVE | Noted: 2018-02-10

## 2018-02-10 PROBLEM — N39.0 URINARY TRACT INFECTION WITHOUT HEMATURIA, SITE UNSPECIFIED: Status: RESOLVED | Noted: 2018-02-09 | Resolved: 2018-02-10

## 2018-02-10 PROBLEM — J44.9 ASTHMA WITH COPD (CHRONIC OBSTRUCTIVE PULMONARY DISEASE) (HCC): Chronic | Status: RESOLVED | Noted: 2018-02-10 | Resolved: 2018-02-10

## 2018-02-10 LAB — POTASSIUM SERPL-SCNC: 3.9 MMOL/L (ref 3.6–5.1)

## 2018-02-10 PROCEDURE — 99225 SUBSEQUENT OBSERVATION CARE: CPT | Performed by: HOSPITALIST

## 2018-02-10 RX ORDER — CIPROFLOXACIN 500 MG/1
500 TABLET, FILM COATED ORAL EVERY 12 HOURS SCHEDULED
Qty: 6 TABLET | Refills: 0 | Status: SHIPPED | OUTPATIENT
Start: 2018-02-10 | End: 2018-02-14

## 2018-02-10 RX ORDER — PANTOPRAZOLE SODIUM 20 MG/1
20 TABLET, DELAYED RELEASE ORAL
Status: DISCONTINUED | OUTPATIENT
Start: 2018-02-10 | End: 2018-02-12

## 2018-02-10 RX ORDER — PANTOPRAZOLE SODIUM 20 MG/1
20 TABLET, DELAYED RELEASE ORAL
Qty: 30 TABLET | Refills: 0 | Status: SHIPPED | OUTPATIENT
Start: 2018-02-10 | End: 2018-03-01

## 2018-02-10 NOTE — PROGRESS NOTES
SANDRA HOSPITALIST  Progress Note     Juvenal Andrews Patient Status:  Observation    10/25/1927 MRN [id number]   [facility] 5NW-A Attending Elyssa Diaz MD   Hosp Day # 0 PCP Amauri Flores MD     Chief Complaint: Diarrhea    S: [place]  data reviewed in Epic.     Medications:   • AmLODIPine Besylate  5 mg Oral BID   • gabapentin  300 mg Oral TID   • lisinopril  40 mg Oral Daily   • Pantoprazole Sodium  20 mg Oral QAM AC   • Sotalol HCl  40 mg Oral TID   • Warfarin Sodium  5 mg Oral Nightly

## 2018-02-10 NOTE — PROGRESS NOTES
DX: DIARRHEA/UTI   PLAN OF CARE: IV FLUIDS, PO CIPRO, TELE, ADVANCE TO LOW RESIDUE DIET, PRN TRAMADOL  PATIENT COMPLAINING OF CRAMPING IN ABDOMEN, HAVING TWO LOOSE STOOLS IN PAST 24 HOURS. C DIFF NEGATIVE. PER MD IF TOLERATING DIET MAY DC HOME.  AFTER Rodney Groves

## 2018-02-10 NOTE — RESPIRATORY THERAPY NOTE
JAZZ - Equipment Use Daily Summary:                  . Set Mode: AUTO CPAP WITH C-FLEX                . Usage in hours: 8.0                . 90% Pressure (EPAP) level: 8.5                . 90% Insp. Pressure (IPAP): Sherrell Soto AHI: 3.1                .

## 2018-02-11 ENCOUNTER — APPOINTMENT (OUTPATIENT)
Dept: CT IMAGING | Facility: HOSPITAL | Age: 83
End: 2018-02-11
Attending: INTERNAL MEDICINE
Payer: MEDICARE

## 2018-02-11 LAB
INR BLD: 3.74 (ref 0.89–1.11)
PSA SERPL DL<=0.01 NG/ML-MCNC: 37.9 SECONDS (ref 12–14.3)

## 2018-02-11 PROCEDURE — 74176 CT ABD & PELVIS W/O CONTRAST: CPT | Performed by: INTERNAL MEDICINE

## 2018-02-11 PROCEDURE — 99225 SUBSEQUENT OBSERVATION CARE: CPT | Performed by: HOSPITALIST

## 2018-02-11 NOTE — PROGRESS NOTES
SANDRA HOSPITALIST  Progress Note     Yong Dress Patient Status:  Observation    10/25/1927 MRN [id number]   [facility] 5NW-A Attending Shruthi Nunez MD   Hosp Day # 0 PCP Humera Quinonez MD     Chief Complaint: Diarrhea    S: Mili Ac mg Oral TID   • Warfarin Sodium  5 mg Oral Nightly       ASSESSMENT / PLAN:     1. Diarrhea, likely due to UTI  1. Stool studies negative thus far  2. Pt with recurring symptoms, h/o IBS, requesting GI to eval  2.  Abdominal pain, may require imaging, GI to

## 2018-02-11 NOTE — PHYSICAL THERAPY NOTE
PHYSICAL THERAPY EVALUATION - INPATIENT     Room Number: 521/521-A  Evaluation Date: 2/11/2018  Type of Evaluation: Initial  Physician Order: PT Eval and Treat    Presenting Problem: vomitting/diarrhea/nausea  Reason for Therapy: Mobility Dysfunction piercing    • Chest pain    • Constipation    • Diarrhea, unspecified    • Eye disease    • Fatigue    • Flatulence/gas pain/belching    • Frequent urination    • Hearing impairment    • Hearing loss    • Hemorrhoids    • High blood pressure    • History o to go back to bed\". Received pt sitting up in the recliner chair.     Patient self-stated goal is feel better    OBJECTIVE     Fall Risk: Standard fall risk    WEIGHT BEARING RESTRICTION  Weight Bearing Restriction: None                PAIN ASSESSMENT  Ra RW)  Pattern:  (Decreased korey/step length/heel-toe pattern, forward flex)  Stoop/Curb Assistance: Not tested       Skilled Therapy Provided: Received sitting in the recliner chair, completed PT Evaluation.   Pt performed sit<>stand cga, pt ambulated cga moderate. These impairments and comorbidities manifest themselves as functional limitations in independent bed mobility, transfers, and gait, endurance, safety, balance.   The patient is below baseline and would benefit from skilled inpatient PT to address

## 2018-02-11 NOTE — CONSULTS
[facility]                       Gastroenterology [address] [facility]    Ritchie Maldonado Patient Status:  Observation    10/25/1927 MRN [id number]   [facility] 5NW-A Attending Sophia Fitzpatrick MD   [facility] Painful urination    • Problems with swallowing    • Reflux    • S/P laminectomy 12/23/2013   • Sleep apnea     cpap   • Uncomfortable fullness after meals    • Visual impairment    • Wears glasses    • Wheezing      PSHx: Past Surgical History:  3/28/2014 Intravenous Q6H PRN     Allergies:    Altaryl                   Bacitracin                  Comment:Red and swelling  Bactrim                 Nausea only  Cephalexin              Diarrhea  Garlic                  Diarrhea  Hydrocodone                 Commen rubs, rhonchi, or rales  Abdomen: Soft, non-tender, non-distended with the presence of bowel sounds; No hepatosplenomegaly; no rebound or guarding;  No ascites is clinically apparent; no tympany to percussion  Ext: No peripheral edema or cyanosis  Skin: War

## 2018-02-11 NOTE — PLAN OF CARE
Impaired Functional Mobility    • Achieve highest/safest level of mobility/gait Progressing        DX: DIARRHEA/UTI   PLAN OF CARE: IV FLUIDS, IV ANTBX, TELE, ADVANCE TO LOW RESIDUE DIET, PRN TRAMADOL  PATIENT COMPLAINING OF \"NOT FEELING WELL\", HAD LOOSE

## 2018-02-11 NOTE — RESPIRATORY THERAPY NOTE
JAZZ - Equipment Use Daily Summary:                  . Set Mode:AUTO CPAP WITH C-FLEX                . Usage in hours:5.3                . 90% Pressure (EPAP) level:8.0                . 90% Insp. Pressure (IPAP): [place] David AHI:6.6                .  Sup

## 2018-02-11 NOTE — PLAN OF CARE
Received patient a/ox4. Continues to report \"I just don't feel good\" but cannot describe exact symptoms. States \"I'm just so hungry but I don't want to eat. \" Reports diarrhea x2 overnight but RN did not see.  She ambulated the halls several times overni

## 2018-02-12 VITALS
RESPIRATION RATE: 18 BRPM | WEIGHT: 167.5 LBS | SYSTOLIC BLOOD PRESSURE: 153 MMHG | HEIGHT: 64 IN | DIASTOLIC BLOOD PRESSURE: 67 MMHG | OXYGEN SATURATION: 96 % | HEART RATE: 64 BPM | TEMPERATURE: 98 F | BODY MASS INDEX: 28.6 KG/M2

## 2018-02-12 PROBLEM — D64.9 ANEMIA: Status: ACTIVE | Noted: 2018-02-09

## 2018-02-12 LAB
INR BLD: 3.47 (ref 0.89–1.11)
PSA SERPL DL<=0.01 NG/ML-MCNC: 35.7 SECONDS (ref 12–14.3)

## 2018-02-12 PROCEDURE — 99217 OBSERVATION CARE DISCHARGE: CPT | Performed by: HOSPITALIST

## 2018-02-12 RX ORDER — CIPROFLOXACIN 500 MG/1
500 TABLET, FILM COATED ORAL 2 TIMES DAILY
Qty: 8 TABLET | Refills: 0 | Status: SHIPPED | OUTPATIENT
Start: 2018-02-12 | End: 2018-02-16

## 2018-02-12 RX ORDER — LOPERAMIDE HYDROCHLORIDE 2 MG/1
2 CAPSULE ORAL 4 TIMES DAILY PRN
Qty: 30 CAPSULE | Refills: 0 | Status: SHIPPED | OUTPATIENT
Start: 2018-02-12 | End: 2018-06-29

## 2018-02-12 RX ORDER — LOPERAMIDE HYDROCHLORIDE 2 MG/1
2 CAPSULE ORAL 4 TIMES DAILY PRN
Status: DISCONTINUED | OUTPATIENT
Start: 2018-02-12 | End: 2018-02-12

## 2018-02-12 NOTE — PLAN OF CARE
GASTROINTESTINAL - ADULT    • Maintains or returns to baseline bowel function Not Progressing          GASTROINTESTINAL - ADULT    • Minimal or absence of nausea and vomiting Progressing        Patient/Family Goals    • Patient/Family Long Term Goal Progre

## 2018-02-12 NOTE — PROGRESS NOTES
NURSING DISCHARGE NOTE    Discharged Home via Wheelchair. Accompanied by Family member  Belongings Taken by patient/family. Pt discharged home to [facility] landing with personal belongings and stable vital signs. Home health resumed.  Pt given dischar

## 2018-02-12 NOTE — RESPIRATORY THERAPY NOTE
JAZZ Equipment Usage Summary :            Set Mode :AUTO CPAP W FLEX          Usage in Hours:7;30          90% Pressure (EPAP) : 7           90% Insp Pressure (IPAP);           AHI : 7.8          Supplemental Oxygen :   3   LPM

## 2018-02-12 NOTE — PROGRESS NOTES
SANDRA HOSPITALIST  Progress Note     Kailey Lara Patient Status:  Observation    10/25/1927 MRN [id number]   [facility] 5NW-A Attending Thais Hidalgo MD   Hosp Day # 0 PCP Leopoldo Carrie, MD     Chief Complaint: Diarrhea    S: Román Bartholomew cefTRIAXone  1 g Intravenous Q24H   • AmLODIPine Besylate  5 mg Oral BID   • gabapentin  300 mg Oral TID   • lisinopril  40 mg Oral Daily   • Sotalol HCl  40 mg Oral TID       ASSESSMENT / PLAN:     1. Diarrhea, likely due to UTI/colace  1.  Stool studies n

## 2018-02-12 NOTE — CM/SW NOTE
Plan for home c resumption of Home Health.  Order to resume sent as requested,     02/12/18 1400   CM/SW Screening   Referral Source    Patient's Mental Status Alert;Oriented   Patient's [address] Name [facility]

## 2018-02-13 ENCOUNTER — TELEPHONE (OUTPATIENT)
Dept: INTERNAL MEDICINE CLINIC | Facility: CLINIC | Age: 83
End: 2018-02-13

## 2018-02-13 DIAGNOSIS — R19.7 DIARRHEA OF PRESUMED INFECTIOUS ORIGIN: Primary | ICD-10-CM

## 2018-02-13 DIAGNOSIS — R73.01 IFG (IMPAIRED FASTING GLUCOSE): ICD-10-CM

## 2018-02-13 DIAGNOSIS — D64.9 ANEMIA, UNSPECIFIED TYPE: ICD-10-CM

## 2018-02-13 DIAGNOSIS — M47.816 LUMBAR FACET ARTHROPATHY: ICD-10-CM

## 2018-02-13 NOTE — TELEPHONE ENCOUNTER
Scheduled for 2/16. Patient denies diarrhea or vomiting - is still somewhat nauseas but is drinking fluids.   States son is there helping her

## 2018-02-13 NOTE — DISCHARGE SUMMARY
[facility] HOSPITAL[facility]  DISCHARGE SUMMARY     Nichelle Maldonado Patient Status:  Observation    10/25/1927 MRN [id number]   [facility] 5NW-A Attending No att. providers found   Hosp Day # 0 PCP Braulio Betancourt MD     Date of Admission: 2018 denies lightheadedness or dizziness. She also c/o generalized weakness. Brief Synopsis: Patient admitted with complaints of abdominal pain and diarrhea. She chronically has constipation and recently started on MiraLAX and Colace.   She had stopped taki Tabs  Commonly known as:  TYLENOL      Take 650 mg by mouth every 6 (six) hours as needed for Pain. Refills:  0     AmLODIPine Besylate 5 MG Tabs  Commonly known as:  NORVASC      Take 5 mg by mouth 2 (two) times daily.    Refills:  0     Calcium Carbonat of these medications  · Ciprofloxacin HCl 500 MG Tabs         [place]    Follow-up appointment:   Deyanira Reynoso MD  [address] [phone]    In 2 weeks  ok to see NP    Gayatri Landeros, [facility]

## 2018-02-13 NOTE — CM/SW NOTE
Patient discharged on 02/12/2018 as previously planned.          02/13/18 0800   Discharge disposition   Discharged to: Home or Self   Outpatient services Physical therapy   Home services after discharge Patient refused services  (Pt current w/outpt PT and

## 2018-02-14 ENCOUNTER — PATIENT OUTREACH (OUTPATIENT)
Dept: CASE MANAGEMENT | Age: 83
End: 2018-02-14

## 2018-02-14 DIAGNOSIS — Z02.9 ENCOUNTERS FOR ADMINISTRATIVE PURPOSE: ICD-10-CM

## 2018-02-14 NOTE — PROGRESS NOTES
Initial Post Discharge Follow Up   Discharge Date: 2/12/18  Contact Date: 2/14/2018    Consent Verification:  Assessment Completed With: Patient  HIPAA Verified?   Yes    Discharge Dx:  Diarrhea, UTI    General:   • How have you been since your discharge HCl 500 MG Oral Tab Take 1 tablet (500 mg total) by mouth every 12 (twelve) hours. Disp: 6 tablet Rfl: 0   Sotalol HCl 80 MG Oral Tab Take 40 mg by mouth 3 (three) times daily.  Disp:  Rfl:    AmLODIPine Besylate 5 MG Oral Tab Take 5 mg by mouth 2 (two) selene Rehab, CHF, Stroke, PT, OT, Speech, Other:  Resume outpt PT. Have you scheduled these services? yes    If yes, have you started these services? no, PT through [facility] to resume this week per pt.        DX: specifics:  Please enter following SmartPhrase as a Reviewed upcoming Specialist Appt with patient     Not Applicable     Overall Rating: How would you rate the care you received while in the hospital?  good      Interventions by NCM:  All d/c instructions reviewed with pt.   Reviewed when to call MD vs martin

## 2018-02-16 ENCOUNTER — OFFICE VISIT (OUTPATIENT)
Dept: INTERNAL MEDICINE CLINIC | Facility: CLINIC | Age: 83
End: 2018-02-16

## 2018-02-16 VITALS
WEIGHT: 169.63 LBS | BODY MASS INDEX: 28.96 KG/M2 | DIASTOLIC BLOOD PRESSURE: 60 MMHG | TEMPERATURE: 97 F | HEIGHT: 64 IN | SYSTOLIC BLOOD PRESSURE: 108 MMHG | RESPIRATION RATE: 16 BRPM | OXYGEN SATURATION: 97 % | HEART RATE: 60 BPM

## 2018-02-16 DIAGNOSIS — Z09 HOSPITAL DISCHARGE FOLLOW-UP: Primary | ICD-10-CM

## 2018-02-16 DIAGNOSIS — R19.7 DIARRHEA OF PRESUMED INFECTIOUS ORIGIN: ICD-10-CM

## 2018-02-16 DIAGNOSIS — I48.20 CHRONIC ATRIAL FIBRILLATION (HCC): ICD-10-CM

## 2018-02-16 DIAGNOSIS — Z79.01 CURRENT USE OF LONG TERM ANTICOAGULATION: ICD-10-CM

## 2018-02-16 PROBLEM — E11.649 TYPE 2 DIABETES MELLITUS WITH HYPOGLYCEMIA WITHOUT COMA, WITHOUT LONG-TERM CURRENT USE OF INSULIN (HCC): Chronic | Status: RESOLVED | Noted: 2018-02-16 | Resolved: 2018-02-16

## 2018-02-16 PROBLEM — E11.9 DIABETES MELLITUS, TYPE 2 (HCC): Chronic | Status: RESOLVED | Noted: 2018-02-16 | Resolved: 2018-02-16

## 2018-02-16 PROBLEM — E11.9 DIABETES MELLITUS, TYPE 2 (HCC): Chronic | Status: ACTIVE | Noted: 2018-02-16

## 2018-02-16 PROBLEM — E11.649 TYPE 2 DIABETES MELLITUS WITH HYPOGLYCEMIA WITHOUT COMA, WITHOUT LONG-TERM CURRENT USE OF INSULIN (HCC): Chronic | Status: ACTIVE | Noted: 2018-02-16

## 2018-02-16 LAB — INR: 1.3 (ref 0.8–1.2)

## 2018-02-16 PROCEDURE — 85610 PROTHROMBIN TIME: CPT | Performed by: INTERNAL MEDICINE

## 2018-02-16 PROCEDURE — 99213 OFFICE O/P EST LOW 20 MIN: CPT | Performed by: INTERNAL MEDICINE

## 2018-02-16 NOTE — PATIENT INSTRUCTIONS
Eat a bland diet with binders, bananas and apples with skin on them. Push fluids    Talk to Coumadin clinic.

## 2018-02-16 NOTE — PROGRESS NOTES
Patient presents with:  Hospital F/U: TCM post hospital approx. 1 week ago for diarrhea and bladder infection  Fatigue  Lab: INR coumadin clinic       HPI: Here for follow up from hospital for diarrhea and UTI.   Was fine until developed sudden episodes of ([facility] [address] [address]     JAZZ on CPAP     Closed displaced fracture of pelvis ([facility]     Closed bicondylar fracture of distal end of right humerus            Global exp 1-17-18 / RIGHT ELBOW / BAM      Iron deficiency anemia     Anemia     Lumbar facet arthropathy (HCC) Physical Exam:   /60 (BP Location: Left arm, Patient Position: Sitting, Cuff Size: adult)   Pulse 60   Temp (!) 97.3 °F (36.3 °C) (Oral)   Resp 16   Ht 64\"   Wt 169 lb 9.6 oz   SpO2 97%   BMI 29.11 kg/m²   Alert, in no distress, looks a bit

## 2018-02-19 ENCOUNTER — TELEPHONE (OUTPATIENT)
Dept: INTERNAL MEDICINE CLINIC | Facility: CLINIC | Age: 83
End: 2018-02-19

## 2018-02-21 ENCOUNTER — NURSE ONLY (OUTPATIENT)
Dept: INTERNAL MEDICINE CLINIC | Facility: CLINIC | Age: 83
End: 2018-02-21

## 2018-02-21 DIAGNOSIS — I48.20 CHRONIC ATRIAL FIBRILLATION (HCC): Primary | ICD-10-CM

## 2018-02-21 LAB — INR: 2.2 (ref 0.8–1.2)

## 2018-02-21 PROCEDURE — 93793 ANTICOAG MGMT PT WARFARIN: CPT | Performed by: INTERNAL MEDICINE

## 2018-02-21 PROCEDURE — 85610 PROTHROMBIN TIME: CPT | Performed by: INTERNAL MEDICINE

## 2018-02-28 ENCOUNTER — NURSE ONLY (OUTPATIENT)
Dept: INTERNAL MEDICINE CLINIC | Facility: CLINIC | Age: 83
End: 2018-02-28

## 2018-02-28 DIAGNOSIS — I48.20 CHRONIC ATRIAL FIBRILLATION (HCC): Primary | ICD-10-CM

## 2018-02-28 LAB — INR: 2.4 (ref 0.8–1.2)

## 2018-02-28 PROCEDURE — 85610 PROTHROMBIN TIME: CPT | Performed by: INTERNAL MEDICINE

## 2018-02-28 PROCEDURE — 93793 ANTICOAG MGMT PT WARFARIN: CPT | Performed by: INTERNAL MEDICINE

## 2018-03-01 RX ORDER — PANTOPRAZOLE SODIUM 20 MG/1
20 TABLET, DELAYED RELEASE ORAL
Qty: 180 TABLET | Refills: 3 | Status: SHIPPED | OUTPATIENT
Start: 2018-03-01 | End: 2018-10-24

## 2018-03-07 ENCOUNTER — NURSE ONLY (OUTPATIENT)
Dept: INTERNAL MEDICINE CLINIC | Facility: CLINIC | Age: 83
End: 2018-03-07

## 2018-03-07 DIAGNOSIS — I48.20 CHRONIC ATRIAL FIBRILLATION (HCC): Primary | ICD-10-CM

## 2018-03-07 LAB — INR: 2 (ref 0.8–1.2)

## 2018-03-07 PROCEDURE — 85610 PROTHROMBIN TIME: CPT | Performed by: INTERNAL MEDICINE

## 2018-03-08 ENCOUNTER — TELEPHONE (OUTPATIENT)
Dept: INTERNAL MEDICINE CLINIC | Facility: CLINIC | Age: 83
End: 2018-03-08

## 2018-03-08 RX ORDER — TRAMADOL HYDROCHLORIDE 100 MG/1
TABLET, EXTENDED RELEASE ORAL
Qty: 30 TABLET | Refills: 0 | Status: SHIPPED | OUTPATIENT
Start: 2018-03-08 | End: 2018-03-09

## 2018-03-08 NOTE — TELEPHONE ENCOUNTER
Pharmacist states that patient is very insistant about Tramadol being refilled today because she is out.   Her PCP Dr Alphonso Tang is out of office today, so Dr Edel Haile authorized #30 with no refills and script was faxed to [facility] and Zahida informed via

## 2018-03-19 NOTE — PAYOR COMM NOTE
Please check with her pharmacy  She should have 2 e-prescriptions available  Yordan Cui  (MR # [id number])   Order Admit to inpatient [facility] (Order [id number])   Order Requisition   Admit to inpatient [facility] (Order #[id number]) on 10/28/17        Question Answer   Diagnosis Closed fracture of right hip, initial encounter [facility] INR at 1.7 FFP given    TO OR:  Pre-Operative Diagnosis: Humerus distal fracture [S42.409A]     Post-Operative Diagnosis: Humerus distal fracture [S42.409A]     Procedure Performed: ORIF right dital humerus    Surgical Findings: comminuted, intra-articula

## 2018-03-21 ENCOUNTER — NURSE ONLY (OUTPATIENT)
Dept: INTERNAL MEDICINE CLINIC | Facility: CLINIC | Age: 83
End: 2018-03-21

## 2018-03-21 DIAGNOSIS — I48.20 CHRONIC ATRIAL FIBRILLATION (HCC): Primary | ICD-10-CM

## 2018-03-21 LAB — INR: 2 (ref 0.8–1.2)

## 2018-03-21 PROCEDURE — 85610 PROTHROMBIN TIME: CPT | Performed by: INTERNAL MEDICINE

## 2018-04-09 RX ORDER — TRAMADOL HYDROCHLORIDE 100 MG/1
TABLET, EXTENDED RELEASE ORAL
Qty: 1 TABLET | Refills: 0 | Status: SHIPPED | OUTPATIENT
Start: 2018-04-09 | End: 2018-04-09

## 2018-04-09 NOTE — TELEPHONE ENCOUNTER
Future appt:     Your appointments     Date & Time Appointment Department [facility])    May 04, 2018  1:00 PM CDT Follow-Up OV with [facility] ([facility])    Please arrive 15 minutes prior to your schedule

## 2018-04-11 RX ORDER — TRAMADOL HYDROCHLORIDE 100 MG/1
100 TABLET, EXTENDED RELEASE ORAL DAILY
Qty: 30 TABLET | Refills: 3 | Status: SHIPPED | OUTPATIENT
Start: 2018-04-11 | End: 2018-05-09

## 2018-04-17 ENCOUNTER — APPOINTMENT (OUTPATIENT)
Dept: LAB | Facility: HOSPITAL | Age: 83
End: 2018-04-17
Attending: INTERNAL MEDICINE
Payer: MEDICARE

## 2018-04-17 ENCOUNTER — HOSPITAL ENCOUNTER (OUTPATIENT)
Dept: LAB | Facility: HOSPITAL | Age: 83
Discharge: HOME | End: 2018-04-17
Attending: INTERNAL MEDICINE
Payer: MEDICARE

## 2018-04-17 DIAGNOSIS — R19.7 DIARRHEA, UNSPECIFIED TYPE: ICD-10-CM

## 2018-04-17 DIAGNOSIS — I48.0 PAROXYSMAL ATRIAL FIBRILLATION (HCC): ICD-10-CM

## 2018-04-17 PROCEDURE — 36415 COLL VENOUS BLD VENIPUNCTURE: CPT

## 2018-04-17 PROCEDURE — 85610 PROTHROMBIN TIME: CPT

## 2018-04-17 PROCEDURE — 83516 IMMUNOASSAY NONANTIBODY: CPT

## 2018-04-17 PROCEDURE — 82784 ASSAY IGA/IGD/IGG/IGM EACH: CPT

## 2018-04-18 ENCOUNTER — HOSPITAL ENCOUNTER (OUTPATIENT)
Dept: BONE DENSITY | Age: 83
Discharge: HOME | End: 2018-04-18
Attending: INTERNAL MEDICINE
Payer: MEDICARE

## 2018-04-18 DIAGNOSIS — Z78.0 POSTMENOPAUSAL STATUS, AGE-RELATED: ICD-10-CM

## 2018-04-18 PROCEDURE — 77080 DXA BONE DENSITY AXIAL: CPT | Performed by: INTERNAL MEDICINE

## 2018-04-23 ENCOUNTER — APPOINTMENT (OUTPATIENT)
Dept: LAB | Facility: HOSPITAL | Age: 83
End: 2018-04-23
Attending: INTERNAL MEDICINE
Payer: MEDICARE

## 2018-04-23 DIAGNOSIS — R19.7 DIARRHEA, UNSPECIFIED TYPE: ICD-10-CM

## 2018-04-23 PROCEDURE — 87493 C DIFF AMPLIFIED PROBE: CPT

## 2018-04-26 ENCOUNTER — TELEPHONE (OUTPATIENT)
Dept: INTERNAL MEDICINE CLINIC | Facility: CLINIC | Age: 83
End: 2018-04-26

## 2018-04-26 NOTE — TELEPHONE ENCOUNTER
----- Message from Enedelia Coleman MD sent at 4/26/2018 10:53 AM CDT -----  Please advise Brenda Caballeros that she has osteopenia on her bone density. However, given her recent fractures she should be on Prolia is she isn't to prevent future fractures.

## 2018-05-09 ENCOUNTER — OFFICE VISIT (OUTPATIENT)
Dept: INTERNAL MEDICINE CLINIC | Facility: CLINIC | Age: 83
End: 2018-05-09

## 2018-05-09 VITALS
OXYGEN SATURATION: 95 % | SYSTOLIC BLOOD PRESSURE: 110 MMHG | HEIGHT: 64.5 IN | TEMPERATURE: 98 F | WEIGHT: 177.19 LBS | BODY MASS INDEX: 29.88 KG/M2 | DIASTOLIC BLOOD PRESSURE: 46 MMHG | HEART RATE: 60 BPM | RESPIRATION RATE: 18 BRPM

## 2018-05-09 DIAGNOSIS — R73.01 IFG (IMPAIRED FASTING GLUCOSE): Primary | ICD-10-CM

## 2018-05-09 DIAGNOSIS — S42.491D CLOSED BICONDYLAR FRACTURE OF DISTAL END OF RIGHT HUMERUS WITH ROUTINE HEALING, SUBSEQUENT ENCOUNTER: ICD-10-CM

## 2018-05-09 DIAGNOSIS — I48.20 CHRONIC ATRIAL FIBRILLATION (HCC): ICD-10-CM

## 2018-05-09 DIAGNOSIS — L29.9 EAR ITCHING: ICD-10-CM

## 2018-05-09 DIAGNOSIS — Z00.00 HEALTHCARE MAINTENANCE: ICD-10-CM

## 2018-05-09 PROBLEM — J44.9 ASTHMA WITH COPD (CHRONIC OBSTRUCTIVE PULMONARY DISEASE) (HCC): Chronic | Status: ACTIVE | Noted: 2018-05-09

## 2018-05-09 PROBLEM — J44.89 ASTHMA WITH COPD (CHRONIC OBSTRUCTIVE PULMONARY DISEASE): Chronic | Status: ACTIVE | Noted: 2018-05-09

## 2018-05-09 PROBLEM — J44.89 ASTHMA WITH COPD (CHRONIC OBSTRUCTIVE PULMONARY DISEASE) (HCC): Chronic | Status: ACTIVE | Noted: 2018-05-09

## 2018-05-09 PROCEDURE — 90670 PCV13 VACCINE IM: CPT | Performed by: INTERNAL MEDICINE

## 2018-05-09 PROCEDURE — 85610 PROTHROMBIN TIME: CPT | Performed by: INTERNAL MEDICINE

## 2018-05-09 PROCEDURE — G0009 ADMIN PNEUMOCOCCAL VACCINE: HCPCS | Performed by: INTERNAL MEDICINE

## 2018-05-09 PROCEDURE — 99213 OFFICE O/P EST LOW 20 MIN: CPT | Performed by: INTERNAL MEDICINE

## 2018-05-09 NOTE — PATIENT INSTRUCTIONS
Get Hydrocortisone cream and apply on a Q tip to ear canal twice a day as needed for itching. Get the Shingles vaccine at the pharmacy-  Called the Shingrix.

## 2018-05-09 NOTE — PROGRESS NOTES
Patient presents with:  Physical-Other: Medicare advantage supervisit  Blood Sugar: 3mo follow up IFG.  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 Disp: 270 capsule Rfl: 3   lisinopril (PRINIVIL,ZESTRIL) 40 MG Oral Tab Take 40 mg by mouth daily.    Disp:  Rfl:        PFHSH:   Family History   Problem Relation Age of Onset   • Cancer Father          Physical Exam:   /46 (BP Location: Left

## 2018-06-06 ENCOUNTER — PRIOR ORIGINAL RECORDS (OUTPATIENT)
Dept: OTHER | Age: 83
End: 2018-06-06

## 2018-06-06 ENCOUNTER — HOSPITAL ENCOUNTER (OUTPATIENT)
Dept: LAB | Facility: HOSPITAL | Age: 83
Discharge: HOME | End: 2018-06-06
Attending: RADIOLOGY
Payer: MEDICARE

## 2018-06-06 ENCOUNTER — MYAURORA ACCOUNT LINK (OUTPATIENT)
Dept: OTHER | Age: 83
End: 2018-06-06

## 2018-06-06 DIAGNOSIS — I48.0 PAROXYSMAL ATRIAL FIBRILLATION (HCC): ICD-10-CM

## 2018-06-06 PROCEDURE — 85610 PROTHROMBIN TIME: CPT

## 2018-06-13 ENCOUNTER — NURSE ONLY (OUTPATIENT)
Dept: INTERNAL MEDICINE CLINIC | Facility: CLINIC | Age: 83
End: 2018-06-13

## 2018-06-13 DIAGNOSIS — I48.20 CHRONIC ATRIAL FIBRILLATION (HCC): Primary | ICD-10-CM

## 2018-06-13 PROCEDURE — 85610 PROTHROMBIN TIME: CPT | Performed by: INTERNAL MEDICINE

## 2018-06-15 ENCOUNTER — OFFICE VISIT (OUTPATIENT)
Dept: INTERNAL MEDICINE CLINIC | Facility: CLINIC | Age: 83
End: 2018-06-15

## 2018-06-15 VITALS
RESPIRATION RATE: 16 BRPM | HEIGHT: 64.5 IN | OXYGEN SATURATION: 97 % | TEMPERATURE: 97 F | BODY MASS INDEX: 29.93 KG/M2 | HEART RATE: 56 BPM | SYSTOLIC BLOOD PRESSURE: 100 MMHG | DIASTOLIC BLOOD PRESSURE: 52 MMHG | WEIGHT: 177.5 LBS

## 2018-06-15 DIAGNOSIS — I10 ESSENTIAL HYPERTENSION WITH GOAL BLOOD PRESSURE LESS THAN 140/90: ICD-10-CM

## 2018-06-15 DIAGNOSIS — S42.491D CLOSED BICONDYLAR FRACTURE OF DISTAL END OF RIGHT HUMERUS WITH ROUTINE HEALING, SUBSEQUENT ENCOUNTER: ICD-10-CM

## 2018-06-15 DIAGNOSIS — R60.0 LOWER EXTREMITY EDEMA: ICD-10-CM

## 2018-06-15 DIAGNOSIS — M25.521 CHRONIC ELBOW PAIN, RIGHT: Primary | ICD-10-CM

## 2018-06-15 DIAGNOSIS — G89.29 CHRONIC ELBOW PAIN, RIGHT: Primary | ICD-10-CM

## 2018-06-15 PROBLEM — D50.9 IRON DEFICIENCY ANEMIA: Status: RESOLVED | Noted: 2017-12-06 | Resolved: 2018-06-15

## 2018-06-15 PROCEDURE — 99213 OFFICE O/P EST LOW 20 MIN: CPT | Performed by: INTERNAL MEDICINE

## 2018-06-15 RX ORDER — WARFARIN SODIUM 7.5 MG/1
7.5 TABLET ORAL NIGHTLY
Status: ON HOLD | COMMUNITY
End: 2019-11-05

## 2018-06-15 NOTE — PATIENT INSTRUCTIONS
Take Tylenol for arthritis 500 mg 2 tabs up to four times a day: 8 am 12 noon 4 pm and 8pm.      Stop the Amlodipine    Come down in one week for BP check with the nurse.

## 2018-06-15 NOTE — PROGRESS NOTES
Patient presents with:  Arm Pain: right arm pain follow up       HPI: Juvenal Mcneil is here for pain management for chronic right arm/elbow pain since fracture. She takes an occasional Tramadol but not often because it makes her sleepy.   She has no nausea or di Prescriptions:  Warfarin Sodium 7.5 MG Oral Tab Take 7.5 mg by mouth. Take 1 tablet on Sunday Disp:  Rfl:    Pantoprazole Sodium 20 MG Oral Tab EC Take 1 tablet (20 mg total) by mouth 2 (two) times daily before meals.  Disp: 180 tablet Rfl: 3   Loperamide H G89.29) Chronic elbow pain, right  (primary encounter diagnosis) due to    (C99.872J) Closed bicondylar fracture of distal end of right humerus with routine healing, subsequent encounter, uncontrolled  Plan:  Tylenol 500 mg 2 tabs three times a day

## 2018-06-20 ENCOUNTER — NURSE ONLY (OUTPATIENT)
Dept: INTERNAL MEDICINE CLINIC | Facility: CLINIC | Age: 83
End: 2018-06-20

## 2018-06-20 DIAGNOSIS — I48.20 CHRONIC ATRIAL FIBRILLATION (HCC): Primary | ICD-10-CM

## 2018-06-20 PROCEDURE — 85610 PROTHROMBIN TIME: CPT | Performed by: INTERNAL MEDICINE

## 2018-06-22 ENCOUNTER — NURSE ONLY (OUTPATIENT)
Dept: INTERNAL MEDICINE CLINIC | Facility: CLINIC | Age: 83
End: 2018-06-22

## 2018-06-22 ENCOUNTER — TELEPHONE (OUTPATIENT)
Dept: INTERNAL MEDICINE CLINIC | Facility: CLINIC | Age: 83
End: 2018-06-22

## 2018-06-22 VITALS — DIASTOLIC BLOOD PRESSURE: 62 MMHG | SYSTOLIC BLOOD PRESSURE: 154 MMHG

## 2018-06-22 NOTE — TELEPHONE ENCOUNTER
Brought in BP{s after stopping Amiodarone. 140/64. Legs with less swelling. Advised to stay off Amiodarone.

## 2018-06-25 LAB
ALBUMIN: 3.3 G/DL
ALKALINE PHOSPHATATE(ALK PHOS): 108 IU/L
BILIRUBIN TOTAL: 0.3 MG/DL
BUN: 12 MG/DL
CALCIUM: 7.8 MG/DL
CHLORIDE: 104 MEQ/L
CREATININE, SERUM: 0.64 MG/DL
GLUCOSE: 109 MG/DL
HEMATOCRIT: 33.6 %
HEMOGLOBIN A1C: 6 %
HEMOGLOBIN: 11.1 G/DL
PLATELETS: 261 K/UL
POTASSIUM, SERUM: 3.5 MEQ/L
POTASSIUM, SERUM: 3.9 MEQ/L
PROTEIN, TOTAL: 7.1 G/DL
RED BLOOD COUNT: 3.53 X 10-6/U
SGOT (AST): 24 IU/L
SGPT (ALT): 20 IU/L
SODIUM: 136 MEQ/L
THYROID STIMULATING HORMONE: 2.06 MLU/L
WHITE BLOOD COUNT: 3.4 X 10-3/U

## 2018-06-27 ENCOUNTER — PRIOR ORIGINAL RECORDS (OUTPATIENT)
Dept: OTHER | Age: 83
End: 2018-06-27

## 2018-06-28 ENCOUNTER — PRIOR ORIGINAL RECORDS (OUTPATIENT)
Dept: OTHER | Age: 83
End: 2018-06-28

## 2018-06-29 ENCOUNTER — OFFICE VISIT (OUTPATIENT)
Dept: INTERNAL MEDICINE CLINIC | Facility: CLINIC | Age: 83
End: 2018-06-29

## 2018-06-29 ENCOUNTER — PRIOR ORIGINAL RECORDS (OUTPATIENT)
Dept: OTHER | Age: 83
End: 2018-06-29

## 2018-06-29 VITALS
SYSTOLIC BLOOD PRESSURE: 150 MMHG | HEART RATE: 62 BPM | DIASTOLIC BLOOD PRESSURE: 70 MMHG | WEIGHT: 176.69 LBS | RESPIRATION RATE: 17 BRPM | OXYGEN SATURATION: 98 % | TEMPERATURE: 98 F | BODY MASS INDEX: 29.8 KG/M2 | HEIGHT: 64.5 IN

## 2018-06-29 DIAGNOSIS — I10 ESSENTIAL HYPERTENSION WITH GOAL BLOOD PRESSURE LESS THAN 140/90: ICD-10-CM

## 2018-06-29 DIAGNOSIS — J44.9 ASTHMA WITH COPD (CHRONIC OBSTRUCTIVE PULMONARY DISEASE) (HCC): ICD-10-CM

## 2018-06-29 DIAGNOSIS — R19.7 DIARRHEA, UNSPECIFIED TYPE: Primary | ICD-10-CM

## 2018-06-29 PROCEDURE — 99214 OFFICE O/P EST MOD 30 MIN: CPT | Performed by: INTERNAL MEDICINE

## 2018-06-29 PROCEDURE — 87086 URINE CULTURE/COLONY COUNT: CPT | Performed by: INTERNAL MEDICINE

## 2018-06-29 PROCEDURE — 81003 URINALYSIS AUTO W/O SCOPE: CPT | Performed by: INTERNAL MEDICINE

## 2018-06-29 PROCEDURE — 80048 BASIC METABOLIC PNL TOTAL CA: CPT | Performed by: INTERNAL MEDICINE

## 2018-06-29 RX ORDER — AMLODIPINE BESYLATE 5 MG/1
5 TABLET ORAL 2 TIMES DAILY
Qty: 90 TABLET | Refills: 3 | Status: ON HOLD | COMMUNITY
Start: 2018-06-29 | End: 2019-11-05

## 2018-06-29 NOTE — PATIENT INSTRUCTIONS
Eat some binders: bananas, rice and apples with skin on it. Eat a bland diet and drink 8-8oz of fluids-all kinds. Collect your stool tomorrow morning and bring it to [facility] that day. Only liquid stool can be tested.  I will call you with the resu surfaces. Use a cane or walker (indoors and out) if you are unsteady on your feet.

## 2018-06-29 NOTE — PROGRESS NOTES
Patient presents with:  Diarrhea: started approx. 1 week ago with abdomen pain about a 3 on a pain scale  Lab: [facility] urine dip for Dr. Calin Wolf       HPI: Jones Taurus is here for one week hx of loose stools. Hx of abdominal cramping, bloating, but not more flatus.   Pas term anticoagulation     Asthma with COPD (chronic obstructive pulmonary disease) (HCC)     Chronic elbow pain, right     Lower extremity edema        Current Outpatient Prescriptions:   AmLODIPine Besylate 5 MG Oral Tab Take 1 tablet (5 mg total) by mouth ectopy  GI: soft, nontender, no masses, BS +, + tympany  Exts: no edema    Assessment and Plan:  (R19.7) Diarrhea, unspecified type  (primary encounter diagnosis)  Plan:  AmLODIPine Besylate 5 MG Oral Tab, STOOL         CULTURE W/SHIGATOXIN, C.         DIFF of clutter. · Make sure carpets and area rugs have skid proof backing. · Do not use slippery wax on bare floors. · Keep furniture in its accustomed place. · If you have pets, be careful that you don’t trip over them.     OUTSIDE SAFETY TIPS  · Always w

## 2018-07-02 ENCOUNTER — PRIOR ORIGINAL RECORDS (OUTPATIENT)
Dept: OTHER | Age: 83
End: 2018-07-02

## 2018-07-03 ENCOUNTER — TELEPHONE (OUTPATIENT)
Dept: INTERNAL MEDICINE CLINIC | Facility: CLINIC | Age: 83
End: 2018-07-03

## 2018-07-03 NOTE — TELEPHONE ENCOUNTER
----- Message from Luca Guerrier MD sent at 6/30/2018  9:15 PM CDT -----  Please let Sergey Wallace know that her electrolytes are pretty normal except for sodium. .  Advise intake of broths which have salt in them. How is diarrhea?

## 2018-07-03 NOTE — TELEPHONE ENCOUNTER
Called patient to let her know that her electrolytes were normal but sodium was not. She was advised per Dr Nathaly Bautista to intake broth. She stated that the diarrhea has gone away and she believes it was the garlic in the food that she ate.   We also received an i

## 2018-07-05 ENCOUNTER — PRIOR ORIGINAL RECORDS (OUTPATIENT)
Dept: OTHER | Age: 83
End: 2018-07-05

## 2018-07-05 ENCOUNTER — TELEPHONE (OUTPATIENT)
Dept: INTERNAL MEDICINE CLINIC | Facility: CLINIC | Age: 83
End: 2018-07-05

## 2018-07-05 ENCOUNTER — HOSPITAL ENCOUNTER (OUTPATIENT)
Dept: LAB | Facility: HOSPITAL | Age: 83
Discharge: HOME | End: 2018-07-05
Attending: INTERNAL MEDICINE
Payer: MEDICARE

## 2018-07-05 ENCOUNTER — HOSPITAL ENCOUNTER (OUTPATIENT)
Facility: HOSPITAL | Age: 83
Setting detail: OBSERVATION
Discharge: HOME | End: 2018-07-08
Attending: EMERGENCY MEDICINE | Admitting: INTERNAL MEDICINE
Payer: MEDICARE

## 2018-07-05 ENCOUNTER — MYAURORA ACCOUNT LINK (OUTPATIENT)
Dept: OTHER | Age: 83
End: 2018-07-05

## 2018-07-05 DIAGNOSIS — I48.0 PAROXYSMAL ATRIAL FIBRILLATION (HCC): ICD-10-CM

## 2018-07-05 DIAGNOSIS — L03.114 CELLULITIS OF LEFT UPPER EXTREMITY: Primary | ICD-10-CM

## 2018-07-05 PROBLEM — IMO0001 REFLUX: Chronic | Status: ACTIVE | Noted: 2018-07-05

## 2018-07-05 LAB
ALBUMIN SERPL-MCNC: 3.8 G/DL (ref 3.5–4.8)
ALP LIVER SERPL-CCNC: 63 U/L (ref 55–142)
ALT SERPL-CCNC: 30 U/L (ref 14–54)
AST SERPL-CCNC: 42 U/L (ref 15–41)
BASOPHILS # BLD AUTO: 0.04 X10(3) UL (ref 0–0.1)
BASOPHILS NFR BLD AUTO: 0.5 %
BILIRUB SERPL-MCNC: 0.6 MG/DL (ref 0.1–2)
BUN BLD-MCNC: 14 MG/DL (ref 8–20)
BUN: 18 MG/DL
CALCIUM BLD-MCNC: 9.2 MG/DL (ref 8.3–10.3)
CALCIUM: 9.1 MG/DL
CHLORIDE: 100 MMOL/L (ref 101–111)
CHLORIDE: 98 MEQ/L
CO2: 25 MMOL/L (ref 22–32)
CREAT BLD-MCNC: 0.92 MG/DL (ref 0.55–1.02)
CREATININE, SERUM: 0.77 MG/DL
EOSINOPHIL # BLD AUTO: 0.11 X10(3) UL (ref 0–0.3)
EOSINOPHIL NFR BLD AUTO: 1.5 %
ERYTHROCYTE [DISTWIDTH] IN BLOOD BY AUTOMATED COUNT: 13 % (ref 11.5–16)
GLUCOSE BLD-MCNC: 91 MG/DL (ref 70–99)
GLUCOSE: 96 MG/DL
HCT VFR BLD AUTO: 34.6 % (ref 34–50)
HGB BLD-MCNC: 12 G/DL (ref 12–16)
IMMATURE GRANULOCYTE COUNT: 0.02 X10(3) UL (ref 0–1)
IMMATURE GRANULOCYTE RATIO %: 0.3 %
INR BLD: 2.12 (ref 0.9–1.1)
LYMPHOCYTES # BLD AUTO: 1.22 X10(3) UL (ref 0.9–4)
LYMPHOCYTES NFR BLD AUTO: 16.3 %
M PROTEIN MFR SERPL ELPH: 7.6 G/DL (ref 6.1–8.3)
MCH RBC QN AUTO: 33.3 PG (ref 27–33.2)
MCHC RBC AUTO-ENTMCNC: 34.7 G/DL (ref 31–37)
MCV RBC AUTO: 96.1 FL (ref 81–100)
MONOCYTES # BLD AUTO: 0.83 X10(3) UL (ref 0.1–1)
MONOCYTES NFR BLD AUTO: 11.1 %
NEUTROPHIL ABS PRELIM: 5.28 X10 (3) UL (ref 1.3–6.7)
NEUTROPHILS # BLD AUTO: 5.28 X10(3) UL (ref 1.3–6.7)
NEUTROPHILS NFR BLD AUTO: 70.3 %
PLATELET # BLD AUTO: 253 10(3)UL (ref 150–450)
POC INR: 2.1 (ref 0.8–1.3)
POTASSIUM SERPL-SCNC: 4.9 MMOL/L (ref 3.6–5.1)
POTASSIUM, SERUM: 4.6 MEQ/L
PSA SERPL DL<=0.01 NG/ML-MCNC: 24.5 SECONDS (ref 12.4–14.7)
RBC # BLD AUTO: 3.6 X10(6)UL (ref 3.8–5.1)
RED CELL DISTRIBUTION WIDTH-SD: 45.9 FL (ref 35.1–46.3)
SODIUM SERPL-SCNC: 132 MMOL/L (ref 136–144)
SODIUM: 130 MEQ/L
WBC # BLD AUTO: 7.5 X10(3) UL (ref 4–13)

## 2018-07-05 PROCEDURE — 85610 PROTHROMBIN TIME: CPT

## 2018-07-05 PROCEDURE — 99220 INITIAL OBSERVATION CARE,LEVL III: CPT | Performed by: INTERNAL MEDICINE

## 2018-07-05 RX ORDER — ONDANSETRON 2 MG/ML
4 INJECTION INTRAMUSCULAR; INTRAVENOUS EVERY 6 HOURS PRN
Status: DISCONTINUED | OUTPATIENT
Start: 2018-07-05 | End: 2018-07-08

## 2018-07-05 RX ORDER — SOTALOL HYDROCHLORIDE 80 MG/1
40 TABLET ORAL 3 TIMES DAILY
Status: DISCONTINUED | OUTPATIENT
Start: 2018-07-05 | End: 2018-07-08

## 2018-07-05 RX ORDER — SOTALOL HYDROCHLORIDE 80 MG/1
40 TABLET ORAL ONCE
Status: DISCONTINUED | OUTPATIENT
Start: 2018-07-05 | End: 2018-07-08

## 2018-07-05 RX ORDER — CLINDAMYCIN PHOSPHATE 600 MG/50ML
600 INJECTION INTRAVENOUS ONCE
Status: COMPLETED | OUTPATIENT
Start: 2018-07-05 | End: 2018-07-05

## 2018-07-05 RX ORDER — CLINDAMYCIN PHOSPHATE 600 MG/50ML
600 INJECTION INTRAVENOUS EVERY 8 HOURS
Status: DISCONTINUED | OUTPATIENT
Start: 2018-07-05 | End: 2018-07-06

## 2018-07-05 RX ORDER — LISINOPRIL 40 MG/1
40 TABLET ORAL DAILY
Status: DISCONTINUED | OUTPATIENT
Start: 2018-07-06 | End: 2018-07-08

## 2018-07-05 RX ORDER — WARFARIN SODIUM 7.5 MG/1
7.5 TABLET ORAL
Status: DISCONTINUED | OUTPATIENT
Start: 2018-07-08 | End: 2018-07-08

## 2018-07-05 RX ORDER — ACETAMINOPHEN 325 MG/1
650 TABLET ORAL EVERY 6 HOURS PRN
Status: DISCONTINUED | OUTPATIENT
Start: 2018-07-05 | End: 2018-07-08

## 2018-07-05 RX ORDER — GABAPENTIN 300 MG/1
300 CAPSULE ORAL 3 TIMES DAILY
Status: DISCONTINUED | OUTPATIENT
Start: 2018-07-05 | End: 2018-07-08

## 2018-07-05 RX ORDER — GABAPENTIN 300 MG/1
300 CAPSULE ORAL 3 TIMES DAILY
Status: DISCONTINUED | OUTPATIENT
Start: 2018-07-05 | End: 2018-07-05 | Stop reason: SDUPTHER

## 2018-07-05 RX ORDER — GABAPENTIN 300 MG/1
300 CAPSULE ORAL ONCE
Status: COMPLETED | OUTPATIENT
Start: 2018-07-05 | End: 2018-07-05

## 2018-07-05 RX ORDER — TRAMADOL HYDROCHLORIDE 50 MG/1
25 TABLET ORAL 4 TIMES DAILY
Status: DISCONTINUED | OUTPATIENT
Start: 2018-07-06 | End: 2018-07-07

## 2018-07-05 RX ORDER — ACETAMINOPHEN 325 MG/1
650 TABLET ORAL EVERY 6 HOURS PRN
Status: DISCONTINUED | OUTPATIENT
Start: 2018-07-05 | End: 2018-07-05

## 2018-07-05 RX ORDER — AMLODIPINE BESYLATE 5 MG/1
5 TABLET ORAL DAILY
Status: DISCONTINUED | OUTPATIENT
Start: 2018-07-06 | End: 2018-07-08

## 2018-07-05 RX ORDER — PANTOPRAZOLE SODIUM 20 MG/1
20 TABLET, DELAYED RELEASE ORAL
Status: DISCONTINUED | OUTPATIENT
Start: 2018-07-05 | End: 2018-07-08

## 2018-07-05 RX ORDER — WARFARIN SODIUM 5 MG/1
5 TABLET ORAL
Status: DISCONTINUED | OUTPATIENT
Start: 2018-07-05 | End: 2018-07-08

## 2018-07-05 NOTE — TELEPHONE ENCOUNTER
----- Message from Sommer Barry MD sent at 6/30/2018  9:15 PM CDT -----  Please let Yan Florez know that her electrolytes are pretty normal except for sodium. .  Advise intake of broths which have salt in them. How is diarrhea?

## 2018-07-05 NOTE — ED INITIAL ASSESSMENT (HPI)
Pt c/o left arm redness, PT was seen at [facility] today and advised to come to ER for further investiagation. Pt states left arm has been red for past week.

## 2018-07-05 NOTE — H&P
SANDRA HOSPITALIST                                                               History & Physical         Estelita Halsted Critton Patient Status:  Observation    10/25/1927 MRN [id number]   [facility] 4NW-A Attending Josué Mercado MD   [address] [facility] HEMORRHOIDECTOMY,INT/EXT,SIMPLE  left 3/06 right 10/06: HIP REPLACEMENT SURGERY  No date: OTHER ACCESSORY      Comment: electro-stimulator for spinal stenosis  10-10-11: OTHER SURGICAL HISTORY      Comment: cysto-  10/16: OTHER SURGICAL HISTORY      Commen Vitamins-Minerals (PRESERVISION AREDS) Oral Tab Take 1 tablet by mouth daily. Disp:  Rfl:  Taking   Multiple Vitamins-Minerals (TAB-A-MARA MAXIMUM) Oral Tab Take 1 tablet by mouth daily.  Disp:  Rfl:  Taking   Calcium Carbonate-Vitamin D 600-400 MG-UNIT Ora affect.       Diagnostic Data:      Laboratory Data:     Lab Results  Component Value Date   WBC 7.5 07/05/2018   HGB 12.0 07/05/2018   HCT 34.6 07/05/2018   .0 07/05/2018   CREATSERUM 0.92 07/05/2018   BUN 14 07/05/2018    07/05/2018   K 4.9 0

## 2018-07-05 NOTE — PROGRESS NOTES
07/05/18 3214   Clinical Encounter Type   Visited With Patient   Routine Visit (Rsponded to request for consult)   Continue Visiting No   Patient's Supportive Strategies/Resources  provided emotional support including active listening and acknow

## 2018-07-05 NOTE — ED PROVIDER NOTES
Patient Seen in: [facility] Emergency Department    History   Patient presents with:  Cellulitis (integumentary, infectious)    Stated Complaint: left arm redness    HPI    Patient is a 63-year-old female with a medical history of chronic A. fib ant lip-AB  No date: TONSILLECTOMY  No date: TOTAL HIP REPLACEMENT        Smoking status: Former Smoker                                                              Packs/day: 0.50      Years: 0.00         Quit date: 1/1/1972  Smokeless tobacco: Never Used AST 42 (*)     Sodium 132 (*)     Chloride 100 (*)     All other components within normal limits   PROTHROMBIN TIME (PT) - Abnormal; Notable for the following:     PT 24.5 (*)     INR 2.12 (*)     All other components within normal limits   CBC W/ DIFFEREN

## 2018-07-05 NOTE — PLAN OF CARE
NURSING ADMISSION NOTE      Patient admitted via Cart  Oriented to room. Safety precautions initiated. Bed in low position. Call light in reach. Admission navigator completed.     Patient sent to the ER from cardiology appointment to evaluate traci

## 2018-07-06 LAB
BASOPHILS # BLD AUTO: 0.04 X10(3) UL (ref 0–0.1)
BASOPHILS NFR BLD AUTO: 0.9 %
BUN BLD-MCNC: 13 MG/DL (ref 8–20)
CALCIUM BLD-MCNC: 8.7 MG/DL (ref 8.3–10.3)
CHLORIDE: 104 MMOL/L (ref 101–111)
CO2: 26 MMOL/L (ref 22–32)
CREAT BLD-MCNC: 0.7 MG/DL (ref 0.55–1.02)
EOSINOPHIL # BLD AUTO: 0.18 X10(3) UL (ref 0–0.3)
EOSINOPHIL NFR BLD AUTO: 4 %
ERYTHROCYTE [DISTWIDTH] IN BLOOD BY AUTOMATED COUNT: 13 % (ref 11.5–16)
GLUCOSE BLD-MCNC: 91 MG/DL (ref 70–99)
HAV IGM SER QL: 2 MG/DL (ref 1.8–2.5)
HCT VFR BLD AUTO: 30 % (ref 34–50)
HGB BLD-MCNC: 10.3 G/DL (ref 12–16)
IMMATURE GRANULOCYTE COUNT: 0.01 X10(3) UL (ref 0–1)
IMMATURE GRANULOCYTE RATIO %: 0.2 %
INR BLD: 2.54 (ref 0.9–1.1)
LYMPHOCYTES # BLD AUTO: 1.35 X10(3) UL (ref 0.9–4)
LYMPHOCYTES NFR BLD AUTO: 30.1 %
MCH RBC QN AUTO: 33.2 PG (ref 27–33.2)
MCHC RBC AUTO-ENTMCNC: 34.3 G/DL (ref 31–37)
MCV RBC AUTO: 96.8 FL (ref 81–100)
MONOCYTES # BLD AUTO: 0.64 X10(3) UL (ref 0.1–1)
MONOCYTES NFR BLD AUTO: 14.3 %
NEUTROPHIL ABS PRELIM: 2.27 X10 (3) UL (ref 1.3–6.7)
NEUTROPHILS # BLD AUTO: 2.27 X10(3) UL (ref 1.3–6.7)
NEUTROPHILS NFR BLD AUTO: 50.5 %
PLATELET # BLD AUTO: 210 10(3)UL (ref 150–450)
POTASSIUM SERPL-SCNC: 3.6 MMOL/L (ref 3.6–5.1)
POTASSIUM SERPL-SCNC: 4.6 MMOL/L (ref 3.6–5.1)
PSA SERPL DL<=0.01 NG/ML-MCNC: 28.2 SECONDS (ref 12.4–14.7)
RBC # BLD AUTO: 3.1 X10(6)UL (ref 3.8–5.1)
RED CELL DISTRIBUTION WIDTH-SD: 46 FL (ref 35.1–46.3)
SODIUM SERPL-SCNC: 138 MMOL/L (ref 136–144)
WBC # BLD AUTO: 4.5 X10(3) UL (ref 4–13)

## 2018-07-06 PROCEDURE — 99225 SUBSEQUENT OBSERVATION CARE: CPT | Performed by: INTERNAL MEDICINE

## 2018-07-06 RX ORDER — POTASSIUM CHLORIDE 20 MEQ/1
40 TABLET, EXTENDED RELEASE ORAL EVERY 4 HOURS
Status: COMPLETED | OUTPATIENT
Start: 2018-07-06 | End: 2018-07-06

## 2018-07-06 NOTE — RESPIRATORY THERAPY NOTE
JAZZ - Equipment Use Daily Summary:                  . Set Mode:                . Usage in hours:                . 90% Pressure (EPAP) level:                . 90% Insp. Pressure (IPAP): Ashly Santoro AHI:                .  Supplemental Oxygen:    LPM

## 2018-07-06 NOTE — PROGRESS NOTES
[facility]  Progress Note    Willow Maldonado Patient Status:  Observation    10/25/1927 MRN [id number]   [facility] 4NW-A Attending Jannie Avery MD   Hosp Day # 0 PCP Bethany Dominguez MD     CC:   Left arm and left-sided chest wall n the level of the lower end of the breast today  Neurologic: Awake,alert,nonfocal  Psych: Mood and affect appears normal      Labs:     Lab Results  Component Value Date   WBC 4.5 07/06/2018   HGB 10.3 07/06/2018   HCT 30.0 07/06/2018   .0 07/06/2018 fluids. Quality:  · DVT Prophylaxis: On Coumadin and INR therapeutic  · CODE status: full  · [place]: no  · Central line: no    Estimated date of discharge: To be decided  Discharge is dependent on: Clinical progress  At this point Ms. Maldonado  is

## 2018-07-06 NOTE — CM/SW NOTE
07/06/18 1300   CM/SW Referral Data   Referral Source Social Work (self-referral)   Reason for Referral Discharge planning   Informant Patient   Patient Info   Patient's Mental Status Alert;Oriented   Patient's Home Environment Senior Independent Housin

## 2018-07-06 NOTE — CONSULTS
INFECTIOUS DISEASE CONSULTATION    Christine Maldonado Patient Status:  Observation    10/25/1927 MRN [id number]   [facility] 4NW-A Attending Mona Paredes MD   Hosp Day # 0 PCP Helena Morton MMS done  10/31/16: SKIN SURGERY      Comment: MMS for BCC-nod to the R upper cut lip-AB  No date: TONSILLECTOMY  No date: TOTAL HIP REPLACEMENT  Family History   Problem Relation Age of Onset   • Cancer Father    • Hypertension Father       reports that s completed. Pertinent positives and negatives noted in the the HPI. Physical Exam:    General: No acute distress. Alert and oriented x 3.   Vital signs: Temp:  [97.6 °F (36.4 °C)-98.2 °F (36.8 °C)] 98 °F (36.7 °C)  Pulse:  [59-60] 60  Resp:  [18] 18  B Pacemaker - NICOLA [place]     S/P colon resection / Colonoscopy (10/12) recheck - ANJALI Garcia Bar degeneration - [facility] Ophthalmologist     -Lumbar radicular pain-electrical stimulator     Essential hypertension      Chronic atrial fibrillation (

## 2018-07-06 NOTE — PLAN OF CARE
CARDIOVASCULAR - ADULT    • Absence of cardiac arrhythmias or at baseline Progressing        PAIN - ADULT    • Verbalizes/displays adequate comfort level or patient's stated pain goal Progressing        RISK FOR INFECTION - ADULT    • Absence of fever/infe

## 2018-07-06 NOTE — PLAN OF CARE
CARDIOVASCULAR - ADULT    • Absence of cardiac arrhythmias or at baseline Progressing        PAIN - ADULT    • Verbalizes/displays adequate comfort level or patient's stated pain goal Progressing        Patient/Family Goals    • Patient/Family Long Term Go

## 2018-07-07 LAB
INR BLD: 2.76 (ref 0.9–1.1)
PSA SERPL DL<=0.01 NG/ML-MCNC: 30.1 SECONDS (ref 12.4–14.7)

## 2018-07-07 PROCEDURE — 99225 SUBSEQUENT OBSERVATION CARE: CPT | Performed by: INTERNAL MEDICINE

## 2018-07-07 RX ORDER — CEPHALEXIN 500 MG/1
500 CAPSULE ORAL 3 TIMES DAILY
Qty: 45 CAPSULE | Refills: 0 | Status: SHIPPED | OUTPATIENT
Start: 2018-07-07 | End: 2018-07-08

## 2018-07-07 NOTE — PLAN OF CARE
No acute change overnight antibiotics continued. Tylenol for complaints of headache and arm pain with effect. cpap on.  Slept

## 2018-07-07 NOTE — PLAN OF CARE
Assumed care @ 0730. Patient's left upper arm reddened and tender. Patient afebrile. Patient's vital signs stable. Patient c/o lower back pain, Tylenol given as needed.

## 2018-07-07 NOTE — PROGRESS NOTES
[facility]  Progress Note    Sandrita Maldonado Patient Status:  Observation    10/25/1927 MRN [id number]   [facility] 4NW-A Attending Sahil Irving MD   Hosp Day # 0 PCP Heladio Agarwal MD     CC:   Left arm and left-sided chest wall n today  Neurologic: Awake,alert,nonfocal  Psych: Mood and affect appears normal      Labs:     Lab Results  Component Value Date   K 4.6 07/06/2018   INR 2.76 07/07/2018   PTP 30.1 07/07/2018           Meds:     Current Facility-Administered Medications:  c follow from tomorrow    Coco Bautista MD  7/7/2018

## 2018-07-07 NOTE — RESPIRATORY THERAPY NOTE
JAZZ : EQUIPMENT USE: DAILY SUMMARY                                            SET MODE: AUTO CPAP WITH CFLEX                                          USAGE IN HOURS: 5:18                                          90

## 2018-07-07 NOTE — PROGRESS NOTES
[facility]                INFECTIOUS DISEASE PROGRESS NOTE    Aditya Maldonado Patient Status:  Observation    10/25/1927 MRN [id number]   [facility] 4NW-A Attending Addison Ash MD   Hosp Day # 0 PCP Amauri Flores MD     Anti Chronic atrial fibrillation (HCC)     JAZZ on CPAP     Closed bicondylar fracture of distal end of right humerus            Global exp 1-17-18 / RIGHT ELBOW / BAM      Anemia     Lumbar facet arthropathy (HCC)     IFG (impaired fasting glucose)     Current

## 2018-07-08 VITALS
RESPIRATION RATE: 18 BRPM | HEIGHT: 64 IN | WEIGHT: 178 LBS | DIASTOLIC BLOOD PRESSURE: 55 MMHG | TEMPERATURE: 98 F | SYSTOLIC BLOOD PRESSURE: 149 MMHG | BODY MASS INDEX: 30.39 KG/M2 | OXYGEN SATURATION: 96 % | HEART RATE: 59 BPM

## 2018-07-08 LAB
INR BLD: 2.58 (ref 0.9–1.1)
PSA SERPL DL<=0.01 NG/ML-MCNC: 28.5 SECONDS (ref 12.4–14.7)

## 2018-07-08 PROCEDURE — 99217 OBSERVATION CARE DISCHARGE: CPT | Performed by: HOSPITALIST

## 2018-07-08 RX ORDER — CEPHALEXIN 500 MG/1
500 CAPSULE ORAL 3 TIMES DAILY
Qty: 45 CAPSULE | Refills: 0 | Status: SHIPPED | OUTPATIENT
Start: 2018-07-08 | End: 2018-07-19

## 2018-07-08 NOTE — PLAN OF CARE
Patient's left arm slightly reddened, tender. Patient afebrile. Patient c/o stomach upset, had 1 sift stool this morning, MD aware. Patient's vital signs stable.

## 2018-07-08 NOTE — PROGRESS NOTES
Patient had a restless night tylenol 2 tabs given as needed for arm discomfort. No specific complaints just felt\"restless\" walked in hallway. Anxious about Norvasc schedule said she takes it twice a day at home.  BP elevated at 430 am, am dose of Norvasc

## 2018-07-08 NOTE — DISCHARGE SUMMARY
[facility] HOSPITAL[facility]  DISCHARGE SUMMARY     Rebelshelli Maldonado Patient Status:  Observation    10/25/1927 MRN [id number]   [facility] 4NW-A Attending No att. providers found   Hosp Day # 0 PCP Margret Michael MD     Date of Admission: 2018 Medication List:     Discharge Medications      START taking these medications      Instructions Prescription details   cephALEXin 500 MG Caps  Commonly known as:  KEFLEX      Take 1 capsule (500 mg total) by mouth 3 (three) times daily.    Stop taking on: location directed by your doctor or nurse    Bring a paper prescription for each of these medications  · cephALEXin 500 MG Caps         Follow-up appointment:   Manolo Haas   [phone]    In 1 week      Pin

## 2018-07-08 NOTE — PROGRESS NOTES
NURSING DISCHARGE NOTE    Discharged Other, (see nursing note) via Wheelchair. Accompanied by Family member  Belongings Taken by patient/family. Discharged to [facility].

## 2018-07-10 ENCOUNTER — PRIOR ORIGINAL RECORDS (OUTPATIENT)
Dept: OTHER | Age: 83
End: 2018-07-10

## 2018-07-10 ENCOUNTER — NURSE ONLY (OUTPATIENT)
Dept: INTERNAL MEDICINE CLINIC | Facility: CLINIC | Age: 83
End: 2018-07-10

## 2018-07-10 ENCOUNTER — PATIENT OUTREACH (OUTPATIENT)
Dept: CASE MANAGEMENT | Age: 83
End: 2018-07-10

## 2018-07-10 DIAGNOSIS — I48.20 CHRONIC ATRIAL FIBRILLATION (HCC): Primary | ICD-10-CM

## 2018-07-10 DIAGNOSIS — Z02.9 ENCOUNTERS FOR ADMINISTRATIVE PURPOSE: ICD-10-CM

## 2018-07-10 RX ORDER — FUROSEMIDE 40 MG/1
40 TABLET ORAL 2 TIMES DAILY
COMMUNITY
End: 2018-08-08

## 2018-07-10 NOTE — PROGRESS NOTES
Initial Post Discharge Follow Up   Discharge Date: 7/8/18  Contact Date: 7/10/2018    Consent Verification:  Assessment Completed With: Patient  HIPAA Verified?   Yes    Discharge Dx:     Cellulitis of LUE and L chest wall  Chronic afib, s/p previous ppm specific diet to follow at discharge? no      Medications: Reviewed medication list with the patient. Medications are up to date.        Current Outpatient Prescriptions:  cephALEXin (KEFLEX) 500 MG Oral Cap Take 1 capsule (500 mg total) by mouth 3 (three keep you from taking your medication as prescribed? No  Are you having any concerns with constipation? No    Referrals/orders at D/C:  Home Health ordered at D/C? No- Pt stated her son stops over a lot and helps her when needed.  Pt declines the need for H Specialist Appt with patient     Yes       Overall Rating: How would you rate the care you received while in the hospital?  excellent        Interventions by NCM: All d/c instructions reviewed with the pt.  Reviewed when to call MD vs when to call 911 or

## 2018-07-11 ENCOUNTER — OFFICE VISIT (OUTPATIENT)
Dept: INTERNAL MEDICINE CLINIC | Facility: CLINIC | Age: 83
End: 2018-07-11

## 2018-07-11 VITALS
OXYGEN SATURATION: 96 % | SYSTOLIC BLOOD PRESSURE: 122 MMHG | RESPIRATION RATE: 18 BRPM | HEART RATE: 60 BPM | WEIGHT: 175.81 LBS | BODY MASS INDEX: 30 KG/M2 | DIASTOLIC BLOOD PRESSURE: 50 MMHG | TEMPERATURE: 98 F

## 2018-07-11 DIAGNOSIS — I10 ESSENTIAL HYPERTENSION WITH GOAL BLOOD PRESSURE LESS THAN 140/90: ICD-10-CM

## 2018-07-11 DIAGNOSIS — L03.313 CELLULITIS OF CHEST WALL: ICD-10-CM

## 2018-07-11 DIAGNOSIS — Z09 HOSPITAL DISCHARGE FOLLOW-UP: Primary | ICD-10-CM

## 2018-07-11 DIAGNOSIS — L03.114 CELLULITIS OF LEFT UPPER EXTREMITY: ICD-10-CM

## 2018-07-11 PROBLEM — M47.816 LUMBAR FACET ARTHROPATHY: Status: RESOLVED | Noted: 2018-02-10 | Resolved: 2018-07-11

## 2018-07-11 PROBLEM — S42.491A CLOSED BICONDYLAR FRACTURE OF DISTAL END OF RIGHT HUMERUS: Status: RESOLVED | Noted: 2017-11-09 | Resolved: 2018-07-11

## 2018-07-11 PROBLEM — R73.01 IFG (IMPAIRED FASTING GLUCOSE): Status: RESOLVED | Noted: 2018-02-13 | Resolved: 2018-07-11

## 2018-07-11 PROCEDURE — 99214 OFFICE O/P EST MOD 30 MIN: CPT | Performed by: INTERNAL MEDICINE

## 2018-07-11 NOTE — PATIENT INSTRUCTIONS
Take the Keflex with food until the 18th. Call if you develop more than 5 loose watery stools a day. TOP FALL PREVENTION TIPS    INSIDE YOUR HOME   KITCHEN:  · Use non skid mats only. · Clean up spills as soon as they happen.   · Keep objec

## 2018-07-12 NOTE — PROGRESS NOTES
Patient presents with:  TCM (Transition Of Care Management): hospital follow up for cellulitis and high BP       HPI: Yesi Garg is here for follow-up from hospital for treatment of cellulitis of left arm and chest wall.     Leslie developed acute onset of redness elbow pain, right     Lower extremity edema     Cellulitis of left upper extremity     Reflux     Gastroesophageal reflux disease     Hyponatremia        Current Outpatient Prescriptions:  Lactobacillus (PROBIOTIC ACIDOPHILUS OR) Take 1 Dose by mouth daily Alert, in no distress  Lungs: Clear, no rales,rhonchi  Heart: S1S2 regular, no murmur, ectopy  Exts: Left upper arm with mild erythema and increased warmth. Left chest wall without erythema.      Assessment and Plan:    [facility]) Hospital discharge follow-up gritty weather proof paint. · Pay attention to curbs and other changes in surfaces when out in the community. · Take care when walking on gravel or grassy surfaces. · Avoid walking on snowy or icy surfaces.   · Use a cane or walker (indoors and out) if y

## 2018-07-18 RX ORDER — GABAPENTIN 300 MG/1
300 CAPSULE ORAL 3 TIMES DAILY
Qty: 270 CAPSULE | Refills: 3 | Status: SHIPPED | OUTPATIENT
Start: 2018-07-18 | End: 2018-07-19

## 2018-07-19 ENCOUNTER — OFFICE VISIT (OUTPATIENT)
Dept: INTERNAL MEDICINE CLINIC | Facility: CLINIC | Age: 83
End: 2018-07-19
Payer: COMMERCIAL

## 2018-07-19 VITALS
SYSTOLIC BLOOD PRESSURE: 116 MMHG | RESPIRATION RATE: 16 BRPM | DIASTOLIC BLOOD PRESSURE: 48 MMHG | OXYGEN SATURATION: 96 % | BODY MASS INDEX: 30 KG/M2 | HEART RATE: 60 BPM | WEIGHT: 174.88 LBS

## 2018-07-19 DIAGNOSIS — L03.114 CELLULITIS OF LEFT ARM: Primary | ICD-10-CM

## 2018-07-19 DIAGNOSIS — L03.313 CELLULITIS OF CHEST WALL: ICD-10-CM

## 2018-07-19 DIAGNOSIS — D64.9 ANEMIA, UNSPECIFIED TYPE: ICD-10-CM

## 2018-07-19 PROBLEM — J44.9 ASTHMA WITH COPD (CHRONIC OBSTRUCTIVE PULMONARY DISEASE) (HCC): Chronic | Status: RESOLVED | Noted: 2018-05-09 | Resolved: 2018-07-19

## 2018-07-19 PROBLEM — J44.89 ASTHMA WITH COPD (CHRONIC OBSTRUCTIVE PULMONARY DISEASE) (HCC): Chronic | Status: RESOLVED | Noted: 2018-05-09 | Resolved: 2018-07-19

## 2018-07-19 PROBLEM — J44.89 ASTHMA WITH COPD (CHRONIC OBSTRUCTIVE PULMONARY DISEASE): Chronic | Status: RESOLVED | Noted: 2018-05-09 | Resolved: 2018-07-19

## 2018-07-19 PROCEDURE — 83540 ASSAY OF IRON: CPT | Performed by: INTERNAL MEDICINE

## 2018-07-19 PROCEDURE — 83550 IRON BINDING TEST: CPT | Performed by: INTERNAL MEDICINE

## 2018-07-19 PROCEDURE — 36415 COLL VENOUS BLD VENIPUNCTURE: CPT | Performed by: INTERNAL MEDICINE

## 2018-07-19 PROCEDURE — 82607 VITAMIN B-12: CPT | Performed by: INTERNAL MEDICINE

## 2018-07-19 PROCEDURE — 99213 OFFICE O/P EST LOW 20 MIN: CPT | Performed by: INTERNAL MEDICINE

## 2018-07-19 RX ORDER — AMOXICILLIN AND CLAVULANATE POTASSIUM 875; 125 MG/1; MG/1
1 TABLET, FILM COATED ORAL 2 TIMES DAILY
Qty: 10 TABLET | Refills: 0 | Status: SHIPPED | OUTPATIENT
Start: 2018-07-19 | End: 2018-07-29

## 2018-07-19 NOTE — PROGRESS NOTES
Patient presents with: Follow - Up: cellulitis left arm and chest wall improved  Medication Follow-Up: Keflex causing soft stool and stomach upset, 4 days remain      HPI: Yanira Raya is here for follow up of cellulitis of left upper arm and chest wall.     Ce tablet Rfl: 0   Lactobacillus (PROBIOTIC ACIDOPHILUS OR) Take 1 Dose by mouth daily. Disp:  Rfl:    AmLODIPine Besylate 5 MG Oral Tab Take 5 mg by mouth 2 (two) times daily. Disp: 90 tablet Rfl: 3   Warfarin Sodium 7.5 MG Oral Tab Take 7.5 mg by mouth.  Marcela Correa Keflex           Augmentin 875 mg bid for 3-5 days    (L03.313) Cellulitis of chest wall, resolved  Plan: Follow    (D64.9) Anemia, unspecified type, N/N rule out Vit B 12 and or iron.    Plan: VITAMIN B12, IRON AND TIBC                          Patient Ins

## 2018-07-19 NOTE — PATIENT INSTRUCTIONS
Stop the Keflex. .    Start Augmentin 875 mg one twice a day. If it does not aggravate your abdominal pain take it for five days. We are checking to see what might be the cause of your anemia.

## 2018-07-20 ENCOUNTER — TELEPHONE (OUTPATIENT)
Dept: INTERNAL MEDICINE CLINIC | Facility: CLINIC | Age: 83
End: 2018-07-20

## 2018-07-20 LAB
HAV AB SERPL IA-ACNC: 482 PG/ML (ref 193–986)
IRON SATURATION: 24 % (ref 15–50)
IRON: 93 UG/DL (ref 28–170)
TOTAL IRON BINDING CAPACITY: 389 UG/DL (ref 240–450)
TRANSFERRIN SERPL-MCNC: 261 MG/DL (ref 200–360)

## 2018-07-20 NOTE — TELEPHONE ENCOUNTER
Patient states she would like to know when Dr. Da Estevez would like her to have her blood work done? Should she wait until October? Please advise. Female 55 years old with history of HTN came for abnormal labs. Pt went for regular check up yesterday at [facility] clinic and had blood works done. Was called today to come to ER for WBC was 83.45. Denies fever, chills, nausea and vomiting. Denies urinary symptoms. Labs obtained. Made comfortable. Family at bedside.

## 2018-07-20 NOTE — TELEPHONE ENCOUNTER
She had blood work done yesterday and it was normal: no Vit B 12 def, or iron def. It is anemia of chronic disease, \"older age. \"

## 2018-07-24 ENCOUNTER — TELEPHONE (OUTPATIENT)
Dept: INTERNAL MEDICINE CLINIC | Facility: CLINIC | Age: 83
End: 2018-07-24

## 2018-07-24 NOTE — TELEPHONE ENCOUNTER
Patient walked in office and was concerned about a red spot under her right eye. She denied bumping her head or scratching. Patient denied any itching of her eye. There were no signs of infection no redness, discharge or swelling.  Patient was advised to ke

## 2018-08-03 ENCOUNTER — OFFICE VISIT (OUTPATIENT)
Dept: INTERNAL MEDICINE CLINIC | Facility: CLINIC | Age: 83
End: 2018-08-03
Payer: COMMERCIAL

## 2018-08-03 ENCOUNTER — LAB ENCOUNTER (OUTPATIENT)
Dept: LAB | Facility: HOSPITAL | Age: 83
End: 2018-08-03
Attending: INTERNAL MEDICINE
Payer: MEDICARE

## 2018-08-03 VITALS
HEIGHT: 64.5 IN | BODY MASS INDEX: 33.09 KG/M2 | DIASTOLIC BLOOD PRESSURE: 52 MMHG | WEIGHT: 196.19 LBS | OXYGEN SATURATION: 97 % | TEMPERATURE: 98 F | SYSTOLIC BLOOD PRESSURE: 110 MMHG | RESPIRATION RATE: 17 BRPM | HEART RATE: 58 BPM

## 2018-08-03 DIAGNOSIS — L03.313 CELLULITIS OF CHEST WALL: ICD-10-CM

## 2018-08-03 DIAGNOSIS — I48.0 PAROXYSMAL ATRIAL FIBRILLATION (HCC): Primary | ICD-10-CM

## 2018-08-03 DIAGNOSIS — L03.114 CELLULITIS OF LEFT ARM: Primary | ICD-10-CM

## 2018-08-03 DIAGNOSIS — I10 ESSENTIAL HYPERTENSION WITH GOAL BLOOD PRESSURE LESS THAN 140/90: ICD-10-CM

## 2018-08-03 DIAGNOSIS — D63.8 ANEMIA OF CHRONIC DISEASE: ICD-10-CM

## 2018-08-03 DIAGNOSIS — G89.29 CHRONIC ELBOW PAIN, RIGHT: ICD-10-CM

## 2018-08-03 DIAGNOSIS — M25.521 CHRONIC ELBOW PAIN, RIGHT: ICD-10-CM

## 2018-08-03 PROBLEM — J44.89 ASTHMA WITH COPD (CHRONIC OBSTRUCTIVE PULMONARY DISEASE) (HCC): Chronic | Status: ACTIVE | Noted: 2018-08-03

## 2018-08-03 PROBLEM — J44.9 ASTHMA WITH COPD (CHRONIC OBSTRUCTIVE PULMONARY DISEASE) (HCC): Chronic | Status: ACTIVE | Noted: 2018-08-03

## 2018-08-03 PROBLEM — J44.89 ASTHMA WITH COPD (CHRONIC OBSTRUCTIVE PULMONARY DISEASE): Chronic | Status: ACTIVE | Noted: 2018-08-03

## 2018-08-03 PROBLEM — D64.9 ANEMIA: Status: RESOLVED | Noted: 2018-02-09 | Resolved: 2018-08-03

## 2018-08-03 LAB
BASOPHILS # BLD AUTO: 0.07 X10(3) UL (ref 0–0.1)
BASOPHILS NFR BLD AUTO: 1.2 %
EOSINOPHIL # BLD AUTO: 0.16 X10(3) UL (ref 0–0.3)
EOSINOPHIL NFR BLD AUTO: 2.8 %
ERYTHROCYTE [DISTWIDTH] IN BLOOD BY AUTOMATED COUNT: 12.5 % (ref 11.5–16)
HCT VFR BLD AUTO: 35.4 % (ref 34–50)
HGB BLD-MCNC: 11.3 G/DL (ref 12–16)
IMMATURE GRANULOCYTE COUNT: 0.04 X10(3) UL (ref 0–1)
IMMATURE GRANULOCYTE RATIO %: 0.7 %
LYMPHOCYTES # BLD AUTO: 1.32 X10(3) UL (ref 0.9–4)
LYMPHOCYTES NFR BLD AUTO: 22.9 %
MCH RBC QN AUTO: 32.9 PG (ref 27–33.2)
MCHC RBC AUTO-ENTMCNC: 31.9 G/DL (ref 31–37)
MCV RBC AUTO: 103.2 FL (ref 81–100)
MONOCYTES # BLD AUTO: 0.64 X10(3) UL (ref 0.1–1)
MONOCYTES NFR BLD AUTO: 11.1 %
NEUTROPHIL ABS PRELIM: 3.53 X10 (3) UL (ref 1.3–6.7)
NEUTROPHILS # BLD AUTO: 3.53 X10(3) UL (ref 1.3–6.7)
NEUTROPHILS NFR BLD AUTO: 61.3 %
PLATELET # BLD AUTO: 242 10(3)UL (ref 150–450)
RBC # BLD AUTO: 3.43 X10(6)UL (ref 3.8–5.1)
RED CELL DISTRIBUTION WIDTH-SD: 47.5 FL (ref 35.1–46.3)
WBC # BLD AUTO: 5.8 X10(3) UL (ref 4–13)

## 2018-08-03 PROCEDURE — 99214 OFFICE O/P EST MOD 30 MIN: CPT | Performed by: INTERNAL MEDICINE

## 2018-08-03 PROCEDURE — 85025 COMPLETE CBC W/AUTO DIFF WBC: CPT | Performed by: INTERNAL MEDICINE

## 2018-08-03 PROCEDURE — 36415 COLL VENOUS BLD VENIPUNCTURE: CPT | Performed by: INTERNAL MEDICINE

## 2018-08-03 NOTE — PATIENT INSTRUCTIONS
Take Tylenol 650 mg every 4 hours as needed for elbow pain/generalized pain.      We will call you with results of blood test.

## 2018-08-03 NOTE — PROGRESS NOTES
Patient presents with:  Blood Pressure: follow up  Lab: INR coumadin clinic CBC for Dr Kay Keith       HPI: Violet Delatorre is here for follow-up to cellulitis, hypertension and now has increased elbow pain.     Cellulitis: Violet Delatorre finished the last 5 days of an extende • S/P laminectomy 12/23/2013   • Sleep apnea     cpap   • Visual impairment        Patient Active Problem List:     Pacemaker - [place]     Essential hypertension      Chronic atrial fibrillation (HCC)     JAZZ on CPAP     Current use of long term antic Cancer Father    • Hypertension Father          Physical Exam:   /52 (BP Location: Left arm, Patient Position: Sitting, Cuff Size: adult)   Pulse 58   Temp 97.5 °F (36.4 °C) (Oral)   Resp 17   Ht 64.5\"   Wt 196 lb 3.2 oz   SpO2 97%   BMI 33.16 kg/m²

## 2018-08-08 ENCOUNTER — LAB ENCOUNTER (OUTPATIENT)
Dept: LAB | Age: 83
End: 2018-08-08
Attending: INTERNAL MEDICINE
Payer: MEDICARE

## 2018-08-08 ENCOUNTER — OFFICE VISIT (OUTPATIENT)
Dept: INTERNAL MEDICINE CLINIC | Facility: CLINIC | Age: 83
End: 2018-08-08
Payer: COMMERCIAL

## 2018-08-08 VITALS
TEMPERATURE: 97 F | SYSTOLIC BLOOD PRESSURE: 116 MMHG | HEART RATE: 60 BPM | BODY MASS INDEX: 30.1 KG/M2 | WEIGHT: 178.5 LBS | RESPIRATION RATE: 16 BRPM | DIASTOLIC BLOOD PRESSURE: 58 MMHG | HEIGHT: 64.6 IN | OXYGEN SATURATION: 96 %

## 2018-08-08 DIAGNOSIS — Z99.89 OSA ON CPAP: ICD-10-CM

## 2018-08-08 DIAGNOSIS — I48.20 CHRONIC ATRIAL FIBRILLATION (HCC): ICD-10-CM

## 2018-08-08 DIAGNOSIS — Z95.0 PACEMAKER: ICD-10-CM

## 2018-08-08 DIAGNOSIS — G89.29 CHRONIC ELBOW PAIN, RIGHT: ICD-10-CM

## 2018-08-08 DIAGNOSIS — Z00.00 ENCOUNTER FOR ANNUAL HEALTH EXAMINATION: Primary | ICD-10-CM

## 2018-08-08 DIAGNOSIS — Z79.01 LONG TERM (CURRENT) USE OF ANTICOAGULANTS: ICD-10-CM

## 2018-08-08 DIAGNOSIS — I10 ESSENTIAL HYPERTENSION WITH GOAL BLOOD PRESSURE LESS THAN 140/90: ICD-10-CM

## 2018-08-08 DIAGNOSIS — G47.33 OSA ON CPAP: ICD-10-CM

## 2018-08-08 DIAGNOSIS — M80.00XD AGE-RELATED OSTEOPOROSIS WITH CURRENT PATHOLOGICAL FRACTURE WITH ROUTINE HEALING: ICD-10-CM

## 2018-08-08 DIAGNOSIS — M25.521 CHRONIC ELBOW PAIN, RIGHT: ICD-10-CM

## 2018-08-08 DIAGNOSIS — I48.0 PAROXYSMAL ATRIAL FIBRILLATION (HCC): Primary | ICD-10-CM

## 2018-08-08 DIAGNOSIS — E55.9 VITAMIN D DEFICIENCY: ICD-10-CM

## 2018-08-08 DIAGNOSIS — Z00.00 HEALTHCARE MAINTENANCE: ICD-10-CM

## 2018-08-08 DIAGNOSIS — D63.8 ANEMIA OF CHRONIC DISEASE: ICD-10-CM

## 2018-08-08 DIAGNOSIS — Z79.01 CURRENT USE OF LONG TERM ANTICOAGULATION: ICD-10-CM

## 2018-08-08 PROBLEM — L03.114 CELLULITIS OF LEFT ARM: Status: RESOLVED | Noted: 2018-07-19 | Resolved: 2018-08-08

## 2018-08-08 PROBLEM — L03.313 CELLULITIS OF CHEST WALL: Status: RESOLVED | Noted: 2018-07-11 | Resolved: 2018-08-08

## 2018-08-08 PROBLEM — J44.89 ASTHMA WITH COPD (CHRONIC OBSTRUCTIVE PULMONARY DISEASE) (HCC): Chronic | Status: RESOLVED | Noted: 2018-08-08 | Resolved: 2018-08-08

## 2018-08-08 PROBLEM — IMO0001 REFLUX: Chronic | Status: RESOLVED | Noted: 2018-07-05 | Resolved: 2018-08-08

## 2018-08-08 PROBLEM — J44.89 ASTHMA WITH COPD (CHRONIC OBSTRUCTIVE PULMONARY DISEASE): Chronic | Status: ACTIVE | Noted: 2018-08-08

## 2018-08-08 PROBLEM — J44.89 ASTHMA WITH COPD (CHRONIC OBSTRUCTIVE PULMONARY DISEASE) (HCC): Chronic | Status: RESOLVED | Noted: 2018-08-03 | Resolved: 2018-08-08

## 2018-08-08 PROBLEM — M48.061 SPINAL STENOSIS OF LUMBAR REGION WITHOUT NEUROGENIC CLAUDICATION: Status: ACTIVE | Noted: 2018-08-08

## 2018-08-08 PROBLEM — J44.9 ASTHMA WITH COPD (CHRONIC OBSTRUCTIVE PULMONARY DISEASE) (HCC): Chronic | Status: RESOLVED | Noted: 2018-08-03 | Resolved: 2018-08-08

## 2018-08-08 PROBLEM — J44.89 ASTHMA WITH COPD (CHRONIC OBSTRUCTIVE PULMONARY DISEASE): Chronic | Status: RESOLVED | Noted: 2018-08-03 | Resolved: 2018-08-08

## 2018-08-08 PROBLEM — J44.89 ASTHMA WITH COPD (CHRONIC OBSTRUCTIVE PULMONARY DISEASE) (HCC): Chronic | Status: ACTIVE | Noted: 2018-08-08

## 2018-08-08 PROBLEM — J44.9 ASTHMA WITH COPD (CHRONIC OBSTRUCTIVE PULMONARY DISEASE) (HCC): Chronic | Status: ACTIVE | Noted: 2018-08-08

## 2018-08-08 PROBLEM — R60.0 LOWER EXTREMITY EDEMA: Status: RESOLVED | Noted: 2018-06-15 | Resolved: 2018-08-08

## 2018-08-08 PROBLEM — J44.89 ASTHMA WITH COPD (CHRONIC OBSTRUCTIVE PULMONARY DISEASE): Chronic | Status: RESOLVED | Noted: 2018-08-08 | Resolved: 2018-08-08

## 2018-08-08 PROBLEM — J44.9 ASTHMA WITH COPD (CHRONIC OBSTRUCTIVE PULMONARY DISEASE) (HCC): Chronic | Status: RESOLVED | Noted: 2018-08-08 | Resolved: 2018-08-08

## 2018-08-08 PROBLEM — K21.9 GASTROESOPHAGEAL REFLUX DISEASE WITHOUT ESOPHAGITIS: Status: ACTIVE | Noted: 2018-08-08

## 2018-08-08 LAB
INR BLD: 2.05 (ref 0.9–1.1)
PSA SERPL DL<=0.01 NG/ML-MCNC: 23.8 SECONDS (ref 12.4–14.7)
VIT D+METAB SERPL-MCNC: 38 NG/ML (ref 30–100)

## 2018-08-08 PROCEDURE — G0439 PPPS, SUBSEQ VISIT: HCPCS | Performed by: INTERNAL MEDICINE

## 2018-08-08 PROCEDURE — 82306 VITAMIN D 25 HYDROXY: CPT | Performed by: INTERNAL MEDICINE

## 2018-08-08 PROCEDURE — 99397 PER PM REEVAL EST PAT 65+ YR: CPT | Performed by: INTERNAL MEDICINE

## 2018-08-08 PROCEDURE — 96160 PT-FOCUSED HLTH RISK ASSMT: CPT | Performed by: INTERNAL MEDICINE

## 2018-08-08 PROCEDURE — 36415 COLL VENOUS BLD VENIPUNCTURE: CPT | Performed by: INTERNAL MEDICINE

## 2018-08-08 PROCEDURE — 85610 PROTHROMBIN TIME: CPT

## 2018-08-08 NOTE — PATIENT INSTRUCTIONS
Make an appointment with one of the sleep doctors at [facility]: Rahul Mckeon or Margo Valenzulea. Continue to eat red meat at least three times a week. Get the Shingrix vaccine at your local pharmacy. Bring us the Advanced Directives. Joel's SCREENING SCHEDULE   Tests on this list are recommended by your physician but may not be covered, or covered at this frequency, by your insurer. Please check with your insurance carrier before scheduling to verify coverage.    PREVENTATIVE SERVICE found for this or any previous visit.  Limited to patients who meet one of the following criteria:   • Men who are 73-68 years old and have smoked more than 100 cigarettes in their lifetime   • Anyone with a family history    Colorectal Cancer Screening  Co Mammogram    Recommend Annually to at least age 76, and as needed after 76 There are no preventive care reminders to display for this patient.  Please get this Mammogram regularly   Immunizations      Influenza  Covered Annually   Orders placed or perf Recommended Websites for Advanced Directives    [email or web address] org/publications/[email or web address]/personal_dec. pdf  An information packet, including necessary form from the [employer] website. http://[email or web address] [place]ph.state. [place]/public/books/a

## 2018-08-08 NOTE — PROGRESS NOTES
HPI:   Raul Del Cid is a 80year old female who presents for a MA (Medicare Advantage) [facility] (Once per calendar year). She has no new heatlh complaints. Osteopenia/fracture of elbow and pelvis:  Is not on Prolia.   Taking Calcium with Vit D Ability/Status   Angeline Lynch has some abnormal functions as listed below:  She has no issues with dressing and bathing based on screening of functional status. She has Vision problems based on screening of functional status. ([address]     JAZZ on CPAP     Current use of long term anticoagulation     Chronic elbow pain, right     Lower extremity edema     Reflux     Gastroesophageal reflux disease     Cellulitis of chest wall     Cellulitis of left arm     Asthma with COPD (chronic colon resection / Colonoscopy (10/12) recheck - ANJALI Chaudhry (5/5/2011); S/P laminectomy (12/23/2013); Sleep apnea; and Visual impairment.     She  has a past surgical history that includes hip replacement surgery (left 3/06 right 10/06); cholecystectomy; adj Throat: Lips, mucosa, and tongue normal; teeth and gums normal   Neck: Supple, symmetrical, trachea midline, no adenopathy;  thyroid: not enlarged, symmetric, no tenderness/mass/nodules; no carotid bruit or JVD   Back:   Symmetric, no curvature, ROM norm medications    (I48.2) Chronic atrial fibrillation (HCC)  (Z79.01) Current use of long term anticoagulation,   Plan: Per Coumadin clinic    (Z95.0) Pacemaker - NICOLA Grove, aly  Plan: Continue regular follow-ups    (Z00.00) Healthcare Maintenance  Bob Health Maintenance if applicable    Flex Sigmoidoscopy Screen every 10 years No results found for this or any previous visit. No flowsheet data found.      Fecal Occult Blood Annually Occult Blood Result (no units)   Date Value   02/09/2018 Negative for Occ prescription benefits, but Medicare does not cover unless Medically needed    Zoster  Not covered by Medicare Part B No vaccine history found This may be covered with your pharmacy  prescription benefits      SPECIFIC DISEASE MONITORING Internal Lab or Pro

## 2018-08-15 ENCOUNTER — TELEPHONE (OUTPATIENT)
Dept: INTERNAL MEDICINE CLINIC | Facility: CLINIC | Age: 83
End: 2018-08-15

## 2018-08-15 NOTE — TELEPHONE ENCOUNTER
Patient was calling and stated that she has not received her Prolia to come into office to get injection. She has called [facility] and they have not received a RX for this.  I looked in the patient chart and it looks like the prolia RX was ordered

## 2018-08-17 ENCOUNTER — TELEPHONE (OUTPATIENT)
Dept: INTERNAL MEDICINE CLINIC | Facility: CLINIC | Age: 83
End: 2018-08-17

## 2018-08-17 NOTE — TELEPHONE ENCOUNTER
Called patient to see if she heard from her pharmacy about her Prolia injection.  She stated that she is getting it delivered tonight and will call on Monday to set up an appointment

## 2018-08-21 ENCOUNTER — NURSE ONLY (OUTPATIENT)
Dept: INTERNAL MEDICINE CLINIC | Facility: CLINIC | Age: 83
End: 2018-08-21
Payer: COMMERCIAL

## 2018-08-21 DIAGNOSIS — M80.00XD AGE-RELATED OSTEOPOROSIS WITH CURRENT PATHOLOGICAL FRACTURE WITH ROUTINE HEALING: ICD-10-CM

## 2018-08-21 PROCEDURE — 96372 THER/PROPH/DIAG INJ SC/IM: CPT | Performed by: INTERNAL MEDICINE

## 2018-09-18 ENCOUNTER — PRIOR ORIGINAL RECORDS (OUTPATIENT)
Dept: OTHER | Age: 83
End: 2018-09-18

## 2018-09-18 ENCOUNTER — TELEPHONE (OUTPATIENT)
Dept: INTERNAL MEDICINE CLINIC | Facility: CLINIC | Age: 83
End: 2018-09-18

## 2018-09-18 LAB — INR: 0

## 2018-09-18 NOTE — TELEPHONE ENCOUNTER
Offered for patient to come in today for a urine sample but states would not be able to come down until late today so she decided to wait until tomorrow appointment.   Encouraged to push fluids

## 2018-09-18 NOTE — TELEPHONE ENCOUNTER
----- Message from Kali Kowalski MD sent at 9/18/2018 11:49 AM CDT -----  If Quinn Buckley is having urinary tract sx can she come down today and give a urine for UA and C&S. I hate to have her wait until tomorrow with dysuria? Text me when you have the UA.

## 2018-09-19 ENCOUNTER — OFFICE VISIT (OUTPATIENT)
Dept: INTERNAL MEDICINE CLINIC | Facility: CLINIC | Age: 83
End: 2018-09-19
Payer: COMMERCIAL

## 2018-09-19 VITALS
SYSTOLIC BLOOD PRESSURE: 146 MMHG | RESPIRATION RATE: 18 BRPM | TEMPERATURE: 98 F | WEIGHT: 181.13 LBS | OXYGEN SATURATION: 97 % | DIASTOLIC BLOOD PRESSURE: 56 MMHG | BODY MASS INDEX: 31 KG/M2 | HEART RATE: 61 BPM

## 2018-09-19 DIAGNOSIS — I48.20 CHRONIC ATRIAL FIBRILLATION (HCC): ICD-10-CM

## 2018-09-19 DIAGNOSIS — R30.9 PAINFUL URINATION: ICD-10-CM

## 2018-09-19 DIAGNOSIS — Z79.01 ENCOUNTER FOR CURRENT LONG-TERM USE OF ANTICOAGULANTS: ICD-10-CM

## 2018-09-19 PROBLEM — J44.89 ASTHMA WITH COPD (CHRONIC OBSTRUCTIVE PULMONARY DISEASE) (HCC): Chronic | Status: ACTIVE | Noted: 2018-09-19

## 2018-09-19 PROBLEM — J44.89 ASTHMA WITH COPD (CHRONIC OBSTRUCTIVE PULMONARY DISEASE): Chronic | Status: ACTIVE | Noted: 2018-09-19

## 2018-09-19 PROBLEM — J44.9 ASTHMA WITH COPD (CHRONIC OBSTRUCTIVE PULMONARY DISEASE) (HCC): Chronic | Status: ACTIVE | Noted: 2018-09-19

## 2018-09-19 LAB
APPEARANCE: CLEAR
INR: 1.8 (ref 0.8–1.2)
MULTISTIX LOT#: NORMAL NUMERIC
PH, URINE: 6 (ref 4.5–8)
SPECIFIC GRAVITY: 1.02 (ref 1–1.03)
URINE-COLOR: YELLOW
UROBILINOGEN,SEMI-QN: 0.2 MG/DL (ref 0–1.9)

## 2018-09-19 PROCEDURE — 85610 PROTHROMBIN TIME: CPT | Performed by: INTERNAL MEDICINE

## 2018-09-19 PROCEDURE — 99213 OFFICE O/P EST LOW 20 MIN: CPT | Performed by: INTERNAL MEDICINE

## 2018-09-19 PROCEDURE — 87186 SC STD MICRODIL/AGAR DIL: CPT | Performed by: INTERNAL MEDICINE

## 2018-09-19 PROCEDURE — 87088 URINE BACTERIA CULTURE: CPT | Performed by: INTERNAL MEDICINE

## 2018-09-19 PROCEDURE — 87086 URINE CULTURE/COLONY COUNT: CPT | Performed by: INTERNAL MEDICINE

## 2018-09-19 PROCEDURE — 81003 URINALYSIS AUTO W/O SCOPE: CPT | Performed by: INTERNAL MEDICINE

## 2018-09-19 RX ORDER — PHENAZOPYRIDINE HYDROCHLORIDE 200 MG/1
TABLET, FILM COATED ORAL
Qty: 2 TABLET | Refills: 0 | Status: SHIPPED | OUTPATIENT
Start: 2018-09-19 | End: 2018-10-24

## 2018-09-19 RX ORDER — WARFARIN SODIUM 5 MG/1
TABLET ORAL
Qty: 135 TABLET | Refills: 0 | COMMUNITY
Start: 2018-09-19 | End: 2018-12-10

## 2018-09-19 RX ORDER — NITROFURANTOIN 25; 75 MG/1; MG/1
100 CAPSULE ORAL 2 TIMES DAILY
Qty: 14 CAPSULE | Refills: 0 | Status: SHIPPED | OUTPATIENT
Start: 2018-09-19 | End: 2018-10-24

## 2018-09-19 NOTE — PROGRESS NOTES
Patient presents with:  Painful Urination: Pt presents to office with pain urination pain. Pt states pain has been present for over 2 weeks. States feels like knife when peeing and only urinating in small amounts.  Frequency in feeling but nothing comes out Perfecto     Essential hypertension      Chronic atrial fibrillation (HCC)     JAZZ on CPAP     Current use of long term anticoagulation     Chronic elbow pain, right     Anemia of chronic disease     Vitamin D deficiency     Spinal stenosis of lumbar region (PRINIVIL,ZESTRIL) 40 MG Oral Tab Take 40 mg by mouth daily.    Disp:  Rfl:        PFHSH:   Family History   Problem Relation Age of Onset   • Cancer Father    • Hypertension Father          Physical Exam:   /56 (BP Location: Left arm, Patient Positio

## 2018-09-19 NOTE — PATIENT INSTRUCTIONS
Start Pyridium one tab now and one tab at bedtime. Your urine will turn bright orange. Start the [address] one tab twice a day for 7 days. Push fluids.

## 2018-09-24 ENCOUNTER — PRIOR ORIGINAL RECORDS (OUTPATIENT)
Dept: OTHER | Age: 83
End: 2018-09-24

## 2018-09-25 ENCOUNTER — TELEPHONE (OUTPATIENT)
Dept: INTERNAL MEDICINE CLINIC | Facility: CLINIC | Age: 83
End: 2018-09-25

## 2018-09-25 NOTE — TELEPHONE ENCOUNTER
Dr Zoe Chopra -    Patient called and was requesting when you would like her to get her next INR done. We did not see anything in the notes. How is she taking the INR and when should I schedule her.     Her number is [phone], : 10/27/1927

## 2018-10-09 ENCOUNTER — NURSE ONLY (OUTPATIENT)
Dept: INTERNAL MEDICINE CLINIC | Facility: CLINIC | Age: 83
End: 2018-10-09
Payer: COMMERCIAL

## 2018-10-09 DIAGNOSIS — I48.20 CHRONIC ATRIAL FIBRILLATION (HCC): Primary | ICD-10-CM

## 2018-10-09 PROCEDURE — 85610 PROTHROMBIN TIME: CPT | Performed by: INTERNAL MEDICINE

## 2018-10-09 PROCEDURE — 90653 IIV ADJUVANT VACCINE IM: CPT | Performed by: INTERNAL MEDICINE

## 2018-10-09 PROCEDURE — G0008 ADMIN INFLUENZA VIRUS VAC: HCPCS | Performed by: INTERNAL MEDICINE

## 2018-10-23 ENCOUNTER — NURSE ONLY (OUTPATIENT)
Dept: INTERNAL MEDICINE CLINIC | Facility: CLINIC | Age: 83
End: 2018-10-23
Payer: COMMERCIAL

## 2018-10-23 DIAGNOSIS — I48.20 CHRONIC ATRIAL FIBRILLATION (HCC): Primary | ICD-10-CM

## 2018-10-23 PROCEDURE — 93793 ANTICOAG MGMT PT WARFARIN: CPT | Performed by: INTERNAL MEDICINE

## 2018-10-23 PROCEDURE — 85610 PROTHROMBIN TIME: CPT | Performed by: INTERNAL MEDICINE

## 2018-10-23 NOTE — PROGRESS NOTES
Pt instucted to continue present dosing of Coumadin and Dr. Elise Strauss will discuss result at [address] tomorrow.

## 2018-10-24 ENCOUNTER — OFFICE VISIT (OUTPATIENT)
Dept: INTERNAL MEDICINE CLINIC | Facility: CLINIC | Age: 83
End: 2018-10-24
Payer: COMMERCIAL

## 2018-10-24 VITALS
WEIGHT: 181.5 LBS | DIASTOLIC BLOOD PRESSURE: 50 MMHG | TEMPERATURE: 97 F | SYSTOLIC BLOOD PRESSURE: 120 MMHG | RESPIRATION RATE: 18 BRPM | BODY MASS INDEX: 31 KG/M2 | OXYGEN SATURATION: 97 % | HEART RATE: 60 BPM

## 2018-10-24 DIAGNOSIS — R19.7 DIARRHEA, UNSPECIFIED TYPE: ICD-10-CM

## 2018-10-24 DIAGNOSIS — Z79.01 CURRENT USE OF LONG TERM ANTICOAGULATION: ICD-10-CM

## 2018-10-24 DIAGNOSIS — R19.7 DIARRHEA OF PRESUMED INFECTIOUS ORIGIN: Primary | ICD-10-CM

## 2018-10-24 DIAGNOSIS — R30.0 DYSURIA: ICD-10-CM

## 2018-10-24 PROCEDURE — 80048 BASIC METABOLIC PNL TOTAL CA: CPT | Performed by: INTERNAL MEDICINE

## 2018-10-24 PROCEDURE — 85025 COMPLETE CBC W/AUTO DIFF WBC: CPT | Performed by: INTERNAL MEDICINE

## 2018-10-24 PROCEDURE — 99213 OFFICE O/P EST LOW 20 MIN: CPT | Performed by: INTERNAL MEDICINE

## 2018-10-24 RX ORDER — LOPERAMIDE HYDROCHLORIDE 2 MG/1
2 TABLET ORAL 4 TIMES DAILY PRN
Qty: 30 TABLET | Refills: 0 | COMMUNITY
Start: 2018-10-24 | End: 2019-06-07

## 2018-10-24 NOTE — PROGRESS NOTES
Patient presents with:  Diarrhea: pt states diarrhea in the morning x 3 days. Pt think it may be due to the medication pantoprazole  Fatigue: due to stomach ache  Slow Urine Flow: pt states urinate but only in small amounts at a time.   Lab: CBC, BMP for  The left internal mammary artery was selectively engaged and injected. A Catheter Internal Cordell Crv 6fr .051in .042in 100cm was used. Multiple views of the injected vessel were taken.  Pacemaker - NICOLA [place]     JAZZ on CPAP     Current use of long term anticoagulation     Chronic elbow pain, right     Anemia of chronic disease     Vitamin D deficiency     Spinal stenosis of lumbar region without neurogenic claudication     Gastroesophagea in no distress  Lungs: Clear, no rales,rhonchi  Heart: S1S2 regular, no murmur, ectopy  GI: soft, mildly tender diffusely, no masses, BS +      ASSESSMENT and PLAN:     (R19.7) Diarrhea of presumed infectious origin  (primary encounter diagnosis), uncontro

## 2018-10-24 NOTE — PATIENT INSTRUCTIONS
Stop the Pantoprazole and let's monitor to see  if the diarrhea improves. Bring us stool in the containers tomorrow morning. Start Imodium one tab up to three times a day as needed.

## 2018-10-25 PROCEDURE — 81001 URINALYSIS AUTO W/SCOPE: CPT | Performed by: INTERNAL MEDICINE

## 2018-11-14 ENCOUNTER — NURSE ONLY (OUTPATIENT)
Dept: INTERNAL MEDICINE CLINIC | Facility: CLINIC | Age: 83
End: 2018-11-14
Payer: COMMERCIAL

## 2018-11-14 DIAGNOSIS — Z95.0 PACEMAKER: ICD-10-CM

## 2018-11-14 DIAGNOSIS — Z79.01 CURRENT USE OF LONG TERM ANTICOAGULATION: Primary | ICD-10-CM

## 2018-11-14 PROCEDURE — 93793 ANTICOAG MGMT PT WARFARIN: CPT | Performed by: INTERNAL MEDICINE

## 2018-11-14 PROCEDURE — 85610 PROTHROMBIN TIME: CPT | Performed by: INTERNAL MEDICINE

## 2018-11-30 DIAGNOSIS — Z79.01 CURRENT USE OF LONG TERM ANTICOAGULATION: Primary | ICD-10-CM

## 2018-11-30 RX ORDER — WARFARIN SODIUM 5 MG/1
TABLET ORAL
Qty: 90 TABLET | Refills: 3 | Status: SHIPPED | OUTPATIENT
Start: 2018-11-30 | End: 2018-12-10

## 2018-12-04 ENCOUNTER — TELEPHONE (OUTPATIENT)
Dept: INTERNAL MEDICINE CLINIC | Facility: CLINIC | Age: 83
End: 2018-12-04

## 2018-12-04 ENCOUNTER — NURSE ONLY (OUTPATIENT)
Dept: INTERNAL MEDICINE CLINIC | Facility: CLINIC | Age: 83
End: 2018-12-04
Payer: COMMERCIAL

## 2018-12-04 DIAGNOSIS — Z79.01 CURRENT USE OF LONG TERM ANTICOAGULATION: Primary | ICD-10-CM

## 2018-12-04 DIAGNOSIS — Z95.0 PACEMAKER: ICD-10-CM

## 2018-12-04 PROCEDURE — 93793 ANTICOAG MGMT PT WARFARIN: CPT | Performed by: INTERNAL MEDICINE

## 2018-12-04 PROCEDURE — 85610 PROTHROMBIN TIME: CPT | Performed by: INTERNAL MEDICINE

## 2018-12-05 ENCOUNTER — PRIOR ORIGINAL RECORDS (OUTPATIENT)
Dept: OTHER | Age: 83
End: 2018-12-05

## 2018-12-05 NOTE — TELEPHONE ENCOUNTER
Left message for patient to start 5mg nightly and repeat on 12/12 at 2:15.   [facility]

## 2018-12-10 DIAGNOSIS — I48.20 CHRONIC ATRIAL FIBRILLATION (HCC): ICD-10-CM

## 2018-12-10 DIAGNOSIS — Z79.01 CURRENT USE OF LONG TERM ANTICOAGULATION: ICD-10-CM

## 2018-12-11 RX ORDER — WARFARIN SODIUM 5 MG/1
TABLET ORAL
Qty: 90 TABLET | Refills: 3 | Status: ON HOLD | OUTPATIENT
Start: 2018-12-11 | End: 2019-11-05

## 2018-12-12 ENCOUNTER — OFFICE VISIT (OUTPATIENT)
Dept: INTERNAL MEDICINE CLINIC | Facility: CLINIC | Age: 83
End: 2018-12-12
Payer: COMMERCIAL

## 2018-12-12 VITALS
DIASTOLIC BLOOD PRESSURE: 60 MMHG | WEIGHT: 175.31 LBS | HEART RATE: 66 BPM | OXYGEN SATURATION: 95 % | RESPIRATION RATE: 16 BRPM | SYSTOLIC BLOOD PRESSURE: 124 MMHG | BODY MASS INDEX: 30 KG/M2

## 2018-12-12 DIAGNOSIS — R19.7 DIARRHEA, UNSPECIFIED TYPE: Primary | ICD-10-CM

## 2018-12-12 DIAGNOSIS — Z79.01 CURRENT USE OF LONG TERM ANTICOAGULATION: ICD-10-CM

## 2018-12-12 DIAGNOSIS — Z91.018 MULTIPLE FOOD ALLERGIES: ICD-10-CM

## 2018-12-12 PROBLEM — J44.89 ASTHMA WITH COPD (CHRONIC OBSTRUCTIVE PULMONARY DISEASE) (HCC): Chronic | Status: ACTIVE | Noted: 2018-12-12

## 2018-12-12 PROBLEM — J44.89 ASTHMA WITH COPD (CHRONIC OBSTRUCTIVE PULMONARY DISEASE): Chronic | Status: ACTIVE | Noted: 2018-12-12

## 2018-12-12 PROBLEM — J44.9 ASTHMA WITH COPD (CHRONIC OBSTRUCTIVE PULMONARY DISEASE) (HCC): Chronic | Status: ACTIVE | Noted: 2018-12-12

## 2018-12-12 PROCEDURE — 85610 PROTHROMBIN TIME: CPT | Performed by: INTERNAL MEDICINE

## 2018-12-12 PROCEDURE — 99213 OFFICE O/P EST LOW 20 MIN: CPT | Performed by: INTERNAL MEDICINE

## 2018-12-12 NOTE — PROGRESS NOTES
Patient presents with:  Lab: INR today 2.2  Diarrhea: x 2 days. On and off yesterday. One episode today. Pt thinks due to overspiced food here. HPI: Taurus Pena is here with hx of diarrhea for two days. Thinks it's due to the spicy food here at [facility].   D healing     Asthma with COPD (chronic obstructive pulmonary disease) (HCC)        Current Outpatient Medications:  Warfarin Sodium 5 MG Oral Tab Currently takes 1 1/2 tab daily Disp: 90 tablet Rfl: 3   Lactobacillus (PROBIOTIC ACIDOPHILUS OR) Take 1 Dose b wisely    (Z79.01) Current use of long term anticoagulation, controlled  Plan: Continue same Coumadin           PROTHROMBIN TIME in three weeks             Patient Instructions   Try Align Probiotic for your diarrhea. Repeat INR in three weeks.       Renae Kwon

## 2018-12-12 NOTE — PATIENT INSTRUCTIONS
Try Align Probiotic for your diarrhea. Repeat INR in three weeks. Ok to take Loperamide also for diarrhea.

## 2018-12-31 ENCOUNTER — MYAURORA ACCOUNT LINK (OUTPATIENT)
Dept: OTHER | Age: 83
End: 2018-12-31

## 2019-01-01 ENCOUNTER — EXTERNAL RECORD (OUTPATIENT)
Dept: HEALTH INFORMATION MANAGEMENT | Facility: OTHER | Age: 84
End: 2019-01-01

## 2019-01-02 ENCOUNTER — NURSE ONLY (OUTPATIENT)
Dept: INTERNAL MEDICINE CLINIC | Facility: CLINIC | Age: 84
End: 2019-01-02
Payer: COMMERCIAL

## 2019-01-02 DIAGNOSIS — Z79.01 CURRENT USE OF LONG TERM ANTICOAGULATION: Primary | ICD-10-CM

## 2019-01-02 PROBLEM — M79.605 LOWER EXTREMITY PAIN, BILATERAL: Status: ACTIVE | Noted: 2019-01-02

## 2019-01-02 PROBLEM — M79.604 LOWER EXTREMITY PAIN, BILATERAL: Status: ACTIVE | Noted: 2019-01-02

## 2019-01-02 LAB — INR: 2.5 (ref 0.8–1.2)

## 2019-01-02 PROCEDURE — 93793 ANTICOAG MGMT PT WARFARIN: CPT | Performed by: INTERNAL MEDICINE

## 2019-01-02 PROCEDURE — 85610 PROTHROMBIN TIME: CPT | Performed by: INTERNAL MEDICINE

## 2019-01-08 ENCOUNTER — PRIOR ORIGINAL RECORDS (OUTPATIENT)
Dept: OTHER | Age: 84
End: 2019-01-08

## 2019-01-08 ENCOUNTER — MYAURORA ACCOUNT LINK (OUTPATIENT)
Dept: OTHER | Age: 84
End: 2019-01-08

## 2019-01-23 ENCOUNTER — NURSE ONLY (OUTPATIENT)
Dept: INTERNAL MEDICINE CLINIC | Facility: CLINIC | Age: 84
End: 2019-01-23
Payer: COMMERCIAL

## 2019-01-23 DIAGNOSIS — Z95.0 PACEMAKER: Primary | ICD-10-CM

## 2019-01-23 DIAGNOSIS — Z79.01 CURRENT USE OF LONG TERM ANTICOAGULATION: ICD-10-CM

## 2019-01-23 LAB
INR: 2.5 (ref 0.8–1.2)
TEST STRIP EXPIRATION DATE: ABNORMAL DATE

## 2019-01-23 PROCEDURE — 93793 ANTICOAG MGMT PT WARFARIN: CPT | Performed by: INTERNAL MEDICINE

## 2019-01-23 PROCEDURE — 85610 PROTHROMBIN TIME: CPT | Performed by: INTERNAL MEDICINE

## 2019-02-06 ENCOUNTER — OFFICE VISIT (OUTPATIENT)
Dept: INTERNAL MEDICINE CLINIC | Facility: CLINIC | Age: 84
End: 2019-02-06
Payer: COMMERCIAL

## 2019-02-06 VITALS
RESPIRATION RATE: 18 BRPM | TEMPERATURE: 97 F | BODY MASS INDEX: 30 KG/M2 | DIASTOLIC BLOOD PRESSURE: 64 MMHG | OXYGEN SATURATION: 95 % | HEART RATE: 60 BPM | WEIGHT: 177 LBS | SYSTOLIC BLOOD PRESSURE: 168 MMHG

## 2019-02-06 DIAGNOSIS — I48.20 CHRONIC ATRIAL FIBRILLATION (HCC): ICD-10-CM

## 2019-02-06 DIAGNOSIS — D63.8 ANEMIA OF CHRONIC DISEASE: ICD-10-CM

## 2019-02-06 DIAGNOSIS — K62.5 RECTAL BLEEDING: Primary | ICD-10-CM

## 2019-02-06 LAB
DEPRECATED RDW RBC AUTO: 46.1 FL (ref 35.1–46.3)
ERYTHROCYTE [DISTWIDTH] IN BLOOD BY AUTOMATED COUNT: 12.6 % (ref 11–15)
HCT VFR BLD AUTO: 34.3 % (ref 35–48)
HGB BLD-MCNC: 11.8 G/DL (ref 12–16)
INR: 1.8 (ref 0.8–1.2)
MCH RBC QN AUTO: 34.4 PG (ref 26–34)
MCHC RBC AUTO-ENTMCNC: 34.4 G/DL (ref 31–37)
MCV RBC AUTO: 100 FL (ref 80–100)
PLATELET # BLD AUTO: 229 10(3)UL (ref 150–450)
RBC # BLD AUTO: 3.43 X10(6)UL (ref 3.8–5.3)
WBC # BLD AUTO: 6.4 X10(3) UL (ref 4–11)

## 2019-02-06 PROCEDURE — 99214 OFFICE O/P EST MOD 30 MIN: CPT | Performed by: INTERNAL MEDICINE

## 2019-02-06 PROCEDURE — 85027 COMPLETE CBC AUTOMATED: CPT | Performed by: INTERNAL MEDICINE

## 2019-02-06 PROCEDURE — 85610 PROTHROMBIN TIME: CPT | Performed by: INTERNAL MEDICINE

## 2019-02-06 PROCEDURE — 36415 COLL VENOUS BLD VENIPUNCTURE: CPT | Performed by: INTERNAL MEDICINE

## 2019-02-06 NOTE — PROGRESS NOTES
Patient presents with:  Blood In Stool: patient states that it is off and on but was pretty bad this time. Has been for a week but none today. Denies pain.  Thinks maybbe it is a hemorrhoid  Lab: CBC/INR       HPI: Pt here for 4-5 day hx of BRBPR when movin Age-related osteoporosis with current pathological fracture with routine healing     Asthma with COPD (chronic obstructive pulmonary disease) (HCC)     Back pain     Neoplasm of uncertain behavior of skin     Contact dermatitis and other eczema, due to uns Physical Exam:   BP (!) 168/64 (BP Location: Left arm, Patient Position: Sitting, Cuff Size: adult)   Pulse 60   Temp 97.3 °F (36.3 °C) (Oral)   Resp 18   Wt 177 lb   SpO2 95%   BMI 29.91 kg/m²   Alert, in no distress  Lungs: Clear, no rales,rhonch

## 2019-02-08 ENCOUNTER — TELEPHONE (OUTPATIENT)
Dept: INTERNAL MEDICINE CLINIC | Facility: CLINIC | Age: 84
End: 2019-02-08

## 2019-02-08 NOTE — TELEPHONE ENCOUNTER
----- Message from Amauri Flores MD sent at 2/8/2019 10:03 AM CST -----  Advise Serina Melendrez that her blood count is better than it was before. She has not lost a lot of blood. But call us if she bleeds again.

## 2019-02-20 ENCOUNTER — NURSE ONLY (OUTPATIENT)
Dept: INTERNAL MEDICINE CLINIC | Facility: CLINIC | Age: 84
End: 2019-02-20
Payer: COMMERCIAL

## 2019-02-20 DIAGNOSIS — I48.20 CHRONIC ATRIAL FIBRILLATION (HCC): Primary | ICD-10-CM

## 2019-02-20 LAB — INR: 2.1 (ref 0.8–1.2)

## 2019-02-20 PROCEDURE — 85610 PROTHROMBIN TIME: CPT | Performed by: INTERNAL MEDICINE

## 2019-02-20 PROCEDURE — 93793 ANTICOAG MGMT PT WARFARIN: CPT | Performed by: INTERNAL MEDICINE

## 2019-02-21 ENCOUNTER — TELEPHONE (OUTPATIENT)
Dept: INTERNAL MEDICINE CLINIC | Facility: CLINIC | Age: 84
End: 2019-02-21

## 2019-02-21 NOTE — TELEPHONE ENCOUNTER
Patient called and stated that when she received her letter yesterday for her coumadin she realized that she was not taking her coumadin right. We changed her on 02/06 to take 2.5mg Wed and 5mg the rest of the week.  She continued taking coumadin 5mg the wh

## 2019-02-27 ENCOUNTER — NURSE ONLY (OUTPATIENT)
Dept: INTERNAL MEDICINE CLINIC | Facility: CLINIC | Age: 84
End: 2019-02-27
Payer: COMMERCIAL

## 2019-02-27 DIAGNOSIS — I48.20 CHRONIC ATRIAL FIBRILLATION (HCC): Primary | ICD-10-CM

## 2019-02-27 LAB — INR: 2.2 (ref 0.8–1.2)

## 2019-02-27 PROCEDURE — 85610 PROTHROMBIN TIME: CPT | Performed by: INTERNAL MEDICINE

## 2019-02-27 PROCEDURE — 93793 ANTICOAG MGMT PT WARFARIN: CPT | Performed by: INTERNAL MEDICINE

## 2019-02-28 VITALS
WEIGHT: 172 LBS | SYSTOLIC BLOOD PRESSURE: 130 MMHG | HEART RATE: 60 BPM | DIASTOLIC BLOOD PRESSURE: 65 MMHG | BODY MASS INDEX: 28.66 KG/M2 | HEIGHT: 65 IN

## 2019-02-28 VITALS
RESPIRATION RATE: 16 BRPM | HEIGHT: 67 IN | DIASTOLIC BLOOD PRESSURE: 90 MMHG | HEART RATE: 84 BPM | WEIGHT: 213 LBS | SYSTOLIC BLOOD PRESSURE: 144 MMHG | BODY MASS INDEX: 33.43 KG/M2

## 2019-02-28 VITALS
HEART RATE: 60 BPM | HEIGHT: 65 IN | SYSTOLIC BLOOD PRESSURE: 120 MMHG | BODY MASS INDEX: 29.32 KG/M2 | DIASTOLIC BLOOD PRESSURE: 52 MMHG | WEIGHT: 176 LBS

## 2019-02-28 VITALS
HEIGHT: 64 IN | WEIGHT: 183.8 LBS | HEART RATE: 60 BPM | BODY MASS INDEX: 31.38 KG/M2 | SYSTOLIC BLOOD PRESSURE: 168 MMHG | DIASTOLIC BLOOD PRESSURE: 58 MMHG

## 2019-02-28 VITALS
HEART RATE: 72 BPM | DIASTOLIC BLOOD PRESSURE: 62 MMHG | HEIGHT: 64 IN | SYSTOLIC BLOOD PRESSURE: 126 MMHG | BODY MASS INDEX: 31.58 KG/M2 | WEIGHT: 185 LBS

## 2019-02-28 VITALS — HEIGHT: 65 IN | HEART RATE: 60 BPM | DIASTOLIC BLOOD PRESSURE: 50 MMHG | SYSTOLIC BLOOD PRESSURE: 118 MMHG

## 2019-02-28 VITALS
WEIGHT: 179 LBS | SYSTOLIC BLOOD PRESSURE: 150 MMHG | BODY MASS INDEX: 29.82 KG/M2 | DIASTOLIC BLOOD PRESSURE: 60 MMHG | HEIGHT: 65 IN | HEART RATE: 68 BPM

## 2019-02-28 VITALS — DIASTOLIC BLOOD PRESSURE: 52 MMHG | SYSTOLIC BLOOD PRESSURE: 122 MMHG | HEART RATE: 57 BPM

## 2019-03-01 VITALS
HEART RATE: 68 BPM | BODY MASS INDEX: 29.32 KG/M2 | SYSTOLIC BLOOD PRESSURE: 138 MMHG | WEIGHT: 176 LBS | HEIGHT: 65 IN | DIASTOLIC BLOOD PRESSURE: 70 MMHG

## 2019-03-01 VITALS — SYSTOLIC BLOOD PRESSURE: 122 MMHG | DIASTOLIC BLOOD PRESSURE: 54 MMHG | HEART RATE: 60 BPM | WEIGHT: 178 LBS

## 2019-03-13 ENCOUNTER — NURSE ONLY (OUTPATIENT)
Dept: INTERNAL MEDICINE CLINIC | Facility: CLINIC | Age: 84
End: 2019-03-13
Payer: COMMERCIAL

## 2019-03-13 DIAGNOSIS — I48.20 CHRONIC ATRIAL FIBRILLATION (HCC): Primary | ICD-10-CM

## 2019-03-13 LAB — INR: 2.1 (ref 0.8–1.2)

## 2019-03-13 PROCEDURE — 85610 PROTHROMBIN TIME: CPT | Performed by: INTERNAL MEDICINE

## 2019-03-13 PROCEDURE — 93793 ANTICOAG MGMT PT WARFARIN: CPT | Performed by: INTERNAL MEDICINE

## 2019-03-25 ENCOUNTER — TELEPHONE (OUTPATIENT)
Dept: INTERNAL MEDICINE CLINIC | Facility: CLINIC | Age: 84
End: 2019-03-25

## 2019-03-27 ENCOUNTER — OFFICE VISIT (OUTPATIENT)
Dept: INTERNAL MEDICINE CLINIC | Facility: CLINIC | Age: 84
End: 2019-03-27
Payer: COMMERCIAL

## 2019-03-27 VITALS
OXYGEN SATURATION: 95 % | TEMPERATURE: 99 F | BODY MASS INDEX: 29.71 KG/M2 | DIASTOLIC BLOOD PRESSURE: 44 MMHG | HEART RATE: 55 BPM | SYSTOLIC BLOOD PRESSURE: 98 MMHG | HEIGHT: 64.2 IN | RESPIRATION RATE: 18 BRPM | WEIGHT: 174 LBS

## 2019-03-27 DIAGNOSIS — G89.29 CHRONIC ELBOW PAIN, RIGHT: ICD-10-CM

## 2019-03-27 DIAGNOSIS — Z00.00 ENCOUNTER FOR ANNUAL HEALTH EXAMINATION: ICD-10-CM

## 2019-03-27 DIAGNOSIS — K59.09 CHRONIC CONSTIPATION: ICD-10-CM

## 2019-03-27 DIAGNOSIS — Z79.899 LONG-TERM USE OF HIGH-RISK MEDICATION: ICD-10-CM

## 2019-03-27 DIAGNOSIS — M25.521 RIGHT ELBOW PAIN: ICD-10-CM

## 2019-03-27 DIAGNOSIS — Z00.01 ENCOUNTER FOR GENERAL ADULT MEDICAL EXAMINATION WITH ABNORMAL FINDINGS: Primary | ICD-10-CM

## 2019-03-27 DIAGNOSIS — I48.20 CHRONIC ATRIAL FIBRILLATION (HCC): ICD-10-CM

## 2019-03-27 DIAGNOSIS — M25.521 CHRONIC ELBOW PAIN, RIGHT: ICD-10-CM

## 2019-03-27 DIAGNOSIS — M80.00XD AGE-RELATED OSTEOPOROSIS WITH CURRENT PATHOLOGICAL FRACTURE WITH ROUTINE HEALING: ICD-10-CM

## 2019-03-27 DIAGNOSIS — G89.29 CHRONIC BILATERAL LOW BACK PAIN WITH BILATERAL SCIATICA: ICD-10-CM

## 2019-03-27 DIAGNOSIS — G47.33 OSA ON CPAP: ICD-10-CM

## 2019-03-27 DIAGNOSIS — M54.41 CHRONIC BILATERAL LOW BACK PAIN WITH BILATERAL SCIATICA: ICD-10-CM

## 2019-03-27 DIAGNOSIS — Z99.89 OSA ON CPAP: ICD-10-CM

## 2019-03-27 DIAGNOSIS — M54.42 CHRONIC BILATERAL LOW BACK PAIN WITH BILATERAL SCIATICA: ICD-10-CM

## 2019-03-27 PROBLEM — J44.89 ASTHMA WITH COPD (CHRONIC OBSTRUCTIVE PULMONARY DISEASE) (HCC): Chronic | Status: RESOLVED | Noted: 2018-12-12 | Resolved: 2019-03-27

## 2019-03-27 PROBLEM — J44.89 ASTHMA WITH COPD (CHRONIC OBSTRUCTIVE PULMONARY DISEASE): Chronic | Status: RESOLVED | Noted: 2018-12-12 | Resolved: 2019-03-27

## 2019-03-27 PROBLEM — J44.9 ASTHMA WITH COPD (CHRONIC OBSTRUCTIVE PULMONARY DISEASE) (HCC): Chronic | Status: RESOLVED | Noted: 2018-12-12 | Resolved: 2019-03-27

## 2019-03-27 LAB
ALBUMIN SERPL-MCNC: 3.4 G/DL (ref 3.4–5)
ALBUMIN/GLOB SERPL: 1 {RATIO} (ref 1–2)
ALP LIVER SERPL-CCNC: 59 U/L (ref 55–142)
ALT SERPL-CCNC: 21 U/L (ref 13–56)
ANION GAP SERPL CALC-SCNC: 7 MMOL/L (ref 0–18)
AST SERPL-CCNC: 20 U/L (ref 15–37)
BILIRUB SERPL-MCNC: 0.3 MG/DL (ref 0.1–2)
BUN BLD-MCNC: 18 MG/DL (ref 7–18)
BUN/CREAT SERPL: 20 (ref 10–20)
CALCIUM BLD-MCNC: 9.4 MG/DL (ref 8.5–10.1)
CHLORIDE SERPL-SCNC: 103 MMOL/L (ref 98–107)
CO2 SERPL-SCNC: 26 MMOL/L (ref 21–32)
CREAT BLD-MCNC: 0.9 MG/DL (ref 0.55–1.02)
GLOBULIN PLAS-MCNC: 3.5 G/DL (ref 2.8–4.4)
GLUCOSE BLD-MCNC: 119 MG/DL (ref 70–99)
M PROTEIN MFR SERPL ELPH: 6.9 G/DL (ref 6.4–8.2)
OSMOLALITY SERPL CALC.SUM OF ELEC: 285 MOSM/KG (ref 275–295)
POTASSIUM SERPL-SCNC: 4.6 MMOL/L (ref 3.5–5.1)
SODIUM SERPL-SCNC: 136 MMOL/L (ref 136–145)

## 2019-03-27 PROCEDURE — 99397 PER PM REEVAL EST PAT 65+ YR: CPT | Performed by: INTERNAL MEDICINE

## 2019-03-27 PROCEDURE — 80053 COMPREHEN METABOLIC PANEL: CPT | Performed by: INTERNAL MEDICINE

## 2019-03-27 PROCEDURE — 96160 PT-FOCUSED HLTH RISK ASSMT: CPT | Performed by: INTERNAL MEDICINE

## 2019-03-27 PROCEDURE — 36415 COLL VENOUS BLD VENIPUNCTURE: CPT | Performed by: INTERNAL MEDICINE

## 2019-03-27 PROCEDURE — G0438 PPPS, INITIAL VISIT: HCPCS | Performed by: INTERNAL MEDICINE

## 2019-03-27 NOTE — PROGRESS NOTES
HPI:   Leno Chavez is a 80year old female who presents for a MA (Medicare Advantage) [facility] (Once per calendar year). R elbow pain and decreased ROM. Had fx 2 yrs ago and last X ray shows DJD of medial compartment.   Pain is constant aching w depressed, or hopeless (over the last two weeks)?: Not at all  PHQ-2 SCORE: 0     Advanced Directive:   She does have a Living Will but we do NOT have it on file in [place].    The patient has this document but we do not have it in [facility], and patient is instruc 07/18/2014    TRIG 107 07/18/2014          Last Chemistry Labs:   Lab Results   Component Value Date    AST 42 (H) 07/05/2018    ALT 30 07/05/2018    CA 9.1 10/24/2018    ALB 3.8 07/05/2018    TSH 2.060 02/07/2018    CREATSERUM 0.79 10/24/2018     ( MEDICAL INFORMATION:   She  has a past medical history of -Lumbar radicular pain-electrical stimulator (7/29/2013), Arthritis, Atrial fibrillation (HCC), Back pain, Basal cell cancer, Body piercing, Cellulitis of chest wall (7/11/2018), Cellulitis of reports that she drinks alcohol. She reports that she does not use drugs. REVIEW OF SYSTEMS:   Pertinent items are noted in HPI. A comprehensive review of systems was negative.     EXAM:   BP 98/44 (BP Location: Left arm, Patient Position: Sitting, Cuf or lesions   Lymph nodes: Cervical, supraclavicular, and axillary nodes normal   Neurologic: Normal    and Breasts:  normal appearance, no masses or tenderness, Inspection negative, No nipple retraction or dimpling, No nipple discharge or bleeding, No axil In the past six months, have you lost more than 10 pounds without trying?: 2 - No  Has your appetite been poor?: No  How does the patient maintain a good energy level?: Daily Walks; Appropriate Exercise(exercise in bed)  How would you describe your daily flowsheet data found. Pap and Pelvic      Pap: Every 3 yrs age 21-68 or Pap+HPV every 5 yrs age 33-67, age 72 and older at high risk There are no preventive care reminders to display for this patient.  Update Health Maintenance if applicable    Chlamydia Creatinine, Serum (mg/dL)   Date Value   07/05/2012 0.80     CREATININE (mg/dL)   Date Value   07/18/2014 0.68     Creatinine (mg/dL)   Date Value   10/24/2018 0.79    No flowsheet data found.     Drug Serum Conc  Annually No results found for: DIGOXIN, DIG

## 2019-03-27 NOTE — PATIENT INSTRUCTIONS
Try coconut macaroons or coconut candy bars for periods of diarrhea. Get the Prolia injection for your bones and make a nurse appt here to have us give it to you, every six months. Get a Shingrix vaccine at your local pharmacy.      Start [facility] 194     TRIGLYCERIDES (mg/dL)   Date Value   07/18/2014 107        EKG - covered if needed at Welcome to Medicare, and non-screening if indicated for medical reasons Electrocardiogram date10/28/2017 Routine EKG is not a screening covered service except at anniversary or as ordered in After Visit Summary   Pap and Pelvic      Pap: Every 3 yrs age 21-65 or Pap+HPV every 5 yrs age 33-67, Covered every 2 yrs up to age 79 or Yearly if High Risk   There are no preventive care reminders to display for this patient Only covered with a cut with metal- TD and TDaP Not covered by Medicare Part B) No orders found for this or any previous visit.  This may be covered with your prescription benefits, but Medicare does not cover unless Medically needed    Zoster (Not covered

## 2019-03-28 ENCOUNTER — TELEPHONE (OUTPATIENT)
Dept: INTERNAL MEDICINE CLINIC | Facility: CLINIC | Age: 84
End: 2019-03-28

## 2019-03-28 NOTE — TELEPHONE ENCOUNTER
Called pt to inform her per  that her blood work is normal and that she can move forward with getting her prolia injections. Pt states she understood and that she will get prolia from pharmacy.

## 2019-04-03 ENCOUNTER — ANCILLARY PROCEDURE (OUTPATIENT)
Dept: CARDIOLOGY | Age: 84
End: 2019-04-03
Attending: INTERNAL MEDICINE

## 2019-04-03 VITALS
WEIGHT: 172 LBS | DIASTOLIC BLOOD PRESSURE: 56 MMHG | SYSTOLIC BLOOD PRESSURE: 132 MMHG | HEART RATE: 60 BPM | BODY MASS INDEX: 26.94 KG/M2

## 2019-04-03 DIAGNOSIS — Z45.02 IMPLANTABLE DEFIBRILLATOR REPROGRAMMING/CHECK: ICD-10-CM

## 2019-04-03 PROCEDURE — 93280 PM DEVICE PROGR EVAL DUAL: CPT | Performed by: INTERNAL MEDICINE

## 2019-04-03 RX ORDER — VITS A,C,E/LUTEIN/MINERALS 300MCG-200
1 TABLET ORAL 2 TIMES DAILY
COMMUNITY
Start: 2015-01-26 | End: 2019-04-03 | Stop reason: CLARIF

## 2019-04-03 RX ORDER — SOTALOL HYDROCHLORIDE 80 MG/1
0.5 TABLET ORAL 3 TIMES DAILY
COMMUNITY
Start: 2018-12-12 | End: 2019-05-06 | Stop reason: SDUPTHER

## 2019-04-03 RX ORDER — WARFARIN SODIUM 5 MG/1
TABLET ORAL
COMMUNITY
Start: 2017-02-24 | End: 2019-12-19 | Stop reason: ALTCHOICE

## 2019-04-03 RX ORDER — LISINOPRIL 40 MG/1
1 TABLET ORAL EVERY MORNING
COMMUNITY
Start: 2017-12-26 | End: 2019-06-27 | Stop reason: SDUPTHER

## 2019-04-03 RX ORDER — AMLODIPINE BESYLATE 5 MG/1
1 TABLET ORAL 2 TIMES DAILY
COMMUNITY
Start: 2018-09-10 | End: 2019-09-05 | Stop reason: SDUPTHER

## 2019-04-04 ENCOUNTER — NURSE ONLY (OUTPATIENT)
Dept: INTERNAL MEDICINE CLINIC | Facility: CLINIC | Age: 84
End: 2019-04-04
Payer: COMMERCIAL

## 2019-04-04 ENCOUNTER — APPOINTMENT (OUTPATIENT)
Dept: CARDIOLOGY | Age: 84
End: 2019-04-04
Attending: INTERNAL MEDICINE

## 2019-04-04 DIAGNOSIS — I48.20 CHRONIC ATRIAL FIBRILLATION (HCC): Primary | ICD-10-CM

## 2019-04-04 PROCEDURE — 85610 PROTHROMBIN TIME: CPT | Performed by: INTERNAL MEDICINE

## 2019-04-04 PROCEDURE — 93793 ANTICOAG MGMT PT WARFARIN: CPT | Performed by: INTERNAL MEDICINE

## 2019-04-09 ENCOUNTER — NURSE ONLY (OUTPATIENT)
Dept: INTERNAL MEDICINE CLINIC | Facility: CLINIC | Age: 84
End: 2019-04-09
Payer: COMMERCIAL

## 2019-04-09 PROCEDURE — 96372 THER/PROPH/DIAG INJ SC/IM: CPT | Performed by: INTERNAL MEDICINE

## 2019-04-17 ENCOUNTER — NURSE ONLY (OUTPATIENT)
Dept: INTERNAL MEDICINE CLINIC | Facility: CLINIC | Age: 84
End: 2019-04-17
Payer: COMMERCIAL

## 2019-04-17 DIAGNOSIS — I48.20 CHRONIC ATRIAL FIBRILLATION (HCC): Primary | ICD-10-CM

## 2019-04-17 PROCEDURE — 93793 ANTICOAG MGMT PT WARFARIN: CPT | Performed by: INTERNAL MEDICINE

## 2019-04-17 PROCEDURE — 85610 PROTHROMBIN TIME: CPT | Performed by: INTERNAL MEDICINE

## 2019-05-06 RX ORDER — SOTALOL HYDROCHLORIDE 80 MG/1
TABLET ORAL
Qty: 135 TABLET | Refills: 0 | Status: SHIPPED | OUTPATIENT
Start: 2019-05-06 | End: 2019-08-07 | Stop reason: SDUPTHER

## 2019-05-08 ENCOUNTER — NURSE ONLY (OUTPATIENT)
Dept: INTERNAL MEDICINE CLINIC | Facility: CLINIC | Age: 84
End: 2019-05-08
Payer: COMMERCIAL

## 2019-05-08 DIAGNOSIS — I48.20 CHRONIC ATRIAL FIBRILLATION (HCC): Primary | ICD-10-CM

## 2019-05-08 PROCEDURE — 93793 ANTICOAG MGMT PT WARFARIN: CPT | Performed by: INTERNAL MEDICINE

## 2019-05-08 PROCEDURE — 85610 PROTHROMBIN TIME: CPT | Performed by: INTERNAL MEDICINE

## 2019-05-22 ENCOUNTER — NURSE ONLY (OUTPATIENT)
Dept: INTERNAL MEDICINE CLINIC | Facility: CLINIC | Age: 84
End: 2019-05-22
Payer: COMMERCIAL

## 2019-05-22 DIAGNOSIS — I48.20 CHRONIC ATRIAL FIBRILLATION (HCC): Primary | ICD-10-CM

## 2019-05-22 PROCEDURE — 85610 PROTHROMBIN TIME: CPT | Performed by: INTERNAL MEDICINE

## 2019-06-05 ENCOUNTER — NURSE ONLY (OUTPATIENT)
Dept: INTERNAL MEDICINE CLINIC | Facility: CLINIC | Age: 84
End: 2019-06-05
Payer: COMMERCIAL

## 2019-06-05 DIAGNOSIS — I48.20 CHRONIC ATRIAL FIBRILLATION (HCC): Primary | ICD-10-CM

## 2019-06-05 PROCEDURE — 93793 ANTICOAG MGMT PT WARFARIN: CPT | Performed by: INTERNAL MEDICINE

## 2019-06-05 PROCEDURE — 85610 PROTHROMBIN TIME: CPT | Performed by: INTERNAL MEDICINE

## 2019-06-11 RX ORDER — GABAPENTIN 300 MG/1
CAPSULE ORAL
Qty: 630 CAPSULE | Refills: 3 | Status: SHIPPED | OUTPATIENT
Start: 2019-06-11 | End: 2019-09-09

## 2019-06-19 ENCOUNTER — OFFICE VISIT (OUTPATIENT)
Dept: CARDIOLOGY | Age: 84
End: 2019-06-19

## 2019-06-19 VITALS
SYSTOLIC BLOOD PRESSURE: 138 MMHG | HEART RATE: 62 BPM | BODY MASS INDEX: 26.8 KG/M2 | WEIGHT: 157 LBS | HEIGHT: 64 IN | DIASTOLIC BLOOD PRESSURE: 60 MMHG

## 2019-06-19 DIAGNOSIS — Z95.0 PACEMAKER: Primary | ICD-10-CM

## 2019-06-19 DIAGNOSIS — Z86.79 HISTORY OF ATRIAL FIBRILLATION: ICD-10-CM

## 2019-06-19 PROCEDURE — 93000 ELECTROCARDIOGRAM COMPLETE: CPT | Performed by: INTERNAL MEDICINE

## 2019-06-19 PROCEDURE — 99213 OFFICE O/P EST LOW 20 MIN: CPT | Performed by: INTERNAL MEDICINE

## 2019-06-26 ENCOUNTER — NURSE ONLY (OUTPATIENT)
Dept: INTERNAL MEDICINE CLINIC | Facility: CLINIC | Age: 84
End: 2019-06-26
Payer: COMMERCIAL

## 2019-06-26 DIAGNOSIS — I48.20 CHRONIC ATRIAL FIBRILLATION (HCC): Primary | ICD-10-CM

## 2019-06-26 PROCEDURE — 93793 ANTICOAG MGMT PT WARFARIN: CPT | Performed by: INTERNAL MEDICINE

## 2019-06-26 PROCEDURE — 85610 PROTHROMBIN TIME: CPT | Performed by: INTERNAL MEDICINE

## 2019-06-27 RX ORDER — LISINOPRIL 40 MG/1
TABLET ORAL EVERY MORNING
Qty: 90 TABLET | Refills: 3 | Status: SHIPPED | OUTPATIENT
Start: 2019-06-27 | End: 2019-11-11 | Stop reason: SDUPTHER

## 2019-07-08 ENCOUNTER — OFFICE VISIT (OUTPATIENT)
Dept: INTERNAL MEDICINE CLINIC | Facility: CLINIC | Age: 84
End: 2019-07-08
Payer: COMMERCIAL

## 2019-07-08 VITALS
BODY MASS INDEX: 28.56 KG/M2 | HEIGHT: 64.2 IN | OXYGEN SATURATION: 96 % | DIASTOLIC BLOOD PRESSURE: 60 MMHG | SYSTOLIC BLOOD PRESSURE: 124 MMHG | TEMPERATURE: 97 F | RESPIRATION RATE: 17 BRPM | HEART RATE: 60 BPM | WEIGHT: 167.31 LBS

## 2019-07-08 DIAGNOSIS — K59.09 CHRONIC CONSTIPATION: ICD-10-CM

## 2019-07-08 DIAGNOSIS — R53.83 FATIGUE, UNSPECIFIED TYPE: Primary | ICD-10-CM

## 2019-07-08 PROBLEM — Z86.79 HISTORY OF ATRIAL FIBRILLATION: Status: ACTIVE | Noted: 2019-06-19

## 2019-07-08 LAB
BASOPHILS # BLD AUTO: 0.03 X10(3) UL (ref 0–0.2)
BASOPHILS NFR BLD AUTO: 0.5 %
DEPRECATED RDW RBC AUTO: 44.9 FL (ref 35.1–46.3)
EOSINOPHIL # BLD AUTO: 0.07 X10(3) UL (ref 0–0.7)
EOSINOPHIL NFR BLD AUTO: 1.2 %
ERYTHROCYTE [DISTWIDTH] IN BLOOD BY AUTOMATED COUNT: 12 % (ref 11–15)
HCT VFR BLD AUTO: 35.6 % (ref 35–48)
HGB BLD-MCNC: 12.2 G/DL (ref 12–16)
IMM GRANULOCYTES # BLD AUTO: 0.02 X10(3) UL (ref 0–1)
IMM GRANULOCYTES NFR BLD: 0.3 %
LYMPHOCYTES # BLD AUTO: 1.05 X10(3) UL (ref 1–4)
LYMPHOCYTES NFR BLD AUTO: 17.5 %
MCH RBC QN AUTO: 34.5 PG (ref 26–34)
MCHC RBC AUTO-ENTMCNC: 34.3 G/DL (ref 31–37)
MCV RBC AUTO: 100.6 FL (ref 80–100)
MONOCYTES # BLD AUTO: 0.64 X10(3) UL (ref 0.1–1)
MONOCYTES NFR BLD AUTO: 10.6 %
NEUTROPHILS # BLD AUTO: 4.2 X10 (3) UL (ref 1.5–7.7)
NEUTROPHILS # BLD AUTO: 4.2 X10(3) UL (ref 1.5–7.7)
NEUTROPHILS NFR BLD AUTO: 69.9 %
PLATELET # BLD AUTO: 270 10(3)UL (ref 150–450)
RBC # BLD AUTO: 3.54 X10(6)UL (ref 3.8–5.3)
WBC # BLD AUTO: 6 X10(3) UL (ref 4–11)

## 2019-07-08 PROCEDURE — 85025 COMPLETE CBC W/AUTO DIFF WBC: CPT | Performed by: INTERNAL MEDICINE

## 2019-07-08 PROCEDURE — 99213 OFFICE O/P EST LOW 20 MIN: CPT | Performed by: INTERNAL MEDICINE

## 2019-07-08 NOTE — PATIENT INSTRUCTIONS
Permanent your heart to choose either MiraLAX or Metamucil. I think Metamucil is not as intense. You can have to determine how often you take these medications. I would recommend if you have had a bowel more than 1 day start 1 of these medications.   As

## 2019-07-17 ENCOUNTER — NURSE ONLY (OUTPATIENT)
Dept: INTERNAL MEDICINE CLINIC | Facility: CLINIC | Age: 84
End: 2019-07-17
Payer: COMMERCIAL

## 2019-07-17 DIAGNOSIS — I48.20 CHRONIC ATRIAL FIBRILLATION (HCC): Primary | ICD-10-CM

## 2019-07-17 LAB — INR: 2.8 (ref 0.8–1.2)

## 2019-07-17 PROCEDURE — 85610 PROTHROMBIN TIME: CPT | Performed by: INTERNAL MEDICINE

## 2019-07-17 PROCEDURE — 93793 ANTICOAG MGMT PT WARFARIN: CPT | Performed by: INTERNAL MEDICINE

## 2019-07-21 ENCOUNTER — HOSPITAL ENCOUNTER (EMERGENCY)
Facility: HOSPITAL | Age: 84
Discharge: HOME | End: 2019-07-21
Attending: EMERGENCY MEDICINE
Payer: MEDICARE

## 2019-07-21 VITALS
DIASTOLIC BLOOD PRESSURE: 67 MMHG | SYSTOLIC BLOOD PRESSURE: 167 MMHG | RESPIRATION RATE: 17 BRPM | HEART RATE: 58 BPM | WEIGHT: 157 LBS | OXYGEN SATURATION: 98 % | TEMPERATURE: 98 F | BODY MASS INDEX: 27 KG/M2

## 2019-07-21 DIAGNOSIS — K04.7 DENTAL ABSCESS: Primary | ICD-10-CM

## 2019-07-21 PROCEDURE — 96365 THER/PROPH/DIAG IV INF INIT: CPT

## 2019-07-21 PROCEDURE — 99284 EMERGENCY DEPT VISIT MOD MDM: CPT

## 2019-07-21 RX ORDER — AMOXICILLIN AND CLAVULANATE POTASSIUM 875; 125 MG/1; MG/1
1 TABLET, FILM COATED ORAL 2 TIMES DAILY
Qty: 14 TABLET | Refills: 0 | Status: SHIPPED | OUTPATIENT
Start: 2019-07-21 | End: 2019-07-28

## 2019-07-21 RX ORDER — AMOXICILLIN 500 MG/1
500 CAPSULE ORAL 3 TIMES DAILY
COMMUNITY
End: 2019-10-30

## 2019-07-21 NOTE — ED PROVIDER NOTES
Patient Seen in: [facility] Emergency Department    History   Patient presents with:  Cellulitis (integumentary, infectious)    Stated Complaint: facial cellulitis from Hermel[facility] Garcia 12    HPI    61-year-old female presents with a dental abscess.   She rep COLONOSCOPY     • DEBULKING OF NASOLABIAL FLAP TO NOSE Left 3/28/2014    Performed by Petra Villanueva MD at [facility]   • EGD     • 2011 [facility] Right 10/29/2017    Performed by Pierre Monte MD at [facility] MAIN OR   • EX Triage Vitals [07/21/19 1324]   /60   Pulse 60   Resp 19   Temp 98.4 °F (36.9 °C)   Temp src Oral   SpO2 97 %   O2 Device None (Room air)       Current:BP (!) 167/67   Pulse 58   Temp 98.4 °F (36.9 °C) (Oral)   Resp 17   Wt 71.2 kg   SpO2 98%   BMI 2 by mouth 2 (two) times daily for 7 days. , Print Script, Disp-14 tablet, R-0

## 2019-07-21 NOTE — ED INITIAL ASSESSMENT (HPI)
Patient here with diagnosed dental abscess. She is supposed to follow up with an endodontist, but is having increased pain. Edema to the right cheek. No airway involvement.  AAO X 4.

## 2019-08-01 ENCOUNTER — ANCILLARY ORDERS (OUTPATIENT)
Dept: CARDIOLOGY | Age: 84
End: 2019-08-01

## 2019-08-01 ENCOUNTER — ANCILLARY PROCEDURE (OUTPATIENT)
Dept: CARDIOLOGY | Age: 84
End: 2019-08-01
Attending: INTERNAL MEDICINE

## 2019-08-01 DIAGNOSIS — Z95.0 CARDIAC PACEMAKER: ICD-10-CM

## 2019-08-04 PROCEDURE — 93294 REM INTERROG EVL PM/LDLS PM: CPT | Performed by: INTERNAL MEDICINE

## 2019-08-07 ENCOUNTER — TELEPHONE (OUTPATIENT)
Dept: CARDIOLOGY | Age: 84
End: 2019-08-07

## 2019-08-07 ENCOUNTER — NURSE ONLY (OUTPATIENT)
Dept: INTERNAL MEDICINE CLINIC | Facility: CLINIC | Age: 84
End: 2019-08-07
Payer: COMMERCIAL

## 2019-08-07 DIAGNOSIS — I48.20 CHRONIC ATRIAL FIBRILLATION (HCC): Primary | ICD-10-CM

## 2019-08-07 LAB — INR: 3 (ref 0.8–1.2)

## 2019-08-07 PROCEDURE — 93793 ANTICOAG MGMT PT WARFARIN: CPT | Performed by: INTERNAL MEDICINE

## 2019-08-07 PROCEDURE — 85610 PROTHROMBIN TIME: CPT | Performed by: INTERNAL MEDICINE

## 2019-08-07 RX ORDER — SOTALOL HYDROCHLORIDE 80 MG/1
40 TABLET ORAL 3 TIMES DAILY
Qty: 135 TABLET | Refills: 1 | Status: SHIPPED | OUTPATIENT
Start: 2019-08-07 | End: 2019-10-29 | Stop reason: DRUGHIGH

## 2019-08-12 ENCOUNTER — NURSE ONLY (OUTPATIENT)
Dept: INTERNAL MEDICINE CLINIC | Facility: CLINIC | Age: 84
End: 2019-08-12
Payer: COMMERCIAL

## 2019-08-12 DIAGNOSIS — I48.20 CHRONIC ATRIAL FIBRILLATION (HCC): Primary | ICD-10-CM

## 2019-08-12 LAB — INR: 2.6 (ref 0.8–1.2)

## 2019-08-12 PROCEDURE — 85610 PROTHROMBIN TIME: CPT | Performed by: INTERNAL MEDICINE

## 2019-08-12 PROCEDURE — 93793 ANTICOAG MGMT PT WARFARIN: CPT | Performed by: INTERNAL MEDICINE

## 2019-09-04 ENCOUNTER — NURSE ONLY (OUTPATIENT)
Dept: INTERNAL MEDICINE CLINIC | Facility: CLINIC | Age: 84
End: 2019-09-04
Payer: COMMERCIAL

## 2019-09-04 DIAGNOSIS — I48.20 CHRONIC ATRIAL FIBRILLATION (HCC): Primary | ICD-10-CM

## 2019-09-04 LAB — INR: 2.6 (ref 0.8–1.2)

## 2019-09-04 PROCEDURE — 85610 PROTHROMBIN TIME: CPT | Performed by: INTERNAL MEDICINE

## 2019-09-04 PROCEDURE — 93793 ANTICOAG MGMT PT WARFARIN: CPT | Performed by: INTERNAL MEDICINE

## 2019-09-05 RX ORDER — AMLODIPINE BESYLATE 5 MG/1
TABLET ORAL
Qty: 180 TABLET | Refills: 3 | Status: SHIPPED | OUTPATIENT
Start: 2019-09-05 | End: 2020-01-08 | Stop reason: ALTCHOICE

## 2019-09-18 ENCOUNTER — TELEPHONE (OUTPATIENT)
Dept: INTERNAL MEDICINE CLINIC | Facility: CLINIC | Age: 84
End: 2019-09-18

## 2019-09-18 NOTE — TELEPHONE ENCOUNTER
Patient called regarding her Prolia injection. Looking in the chart it looks like the patient last received in April and she states that she would be due again.  Can you put the order in to her pharmacy if you want her to have the injection

## 2019-09-25 ENCOUNTER — NURSE ONLY (OUTPATIENT)
Dept: INTERNAL MEDICINE CLINIC | Facility: CLINIC | Age: 84
End: 2019-09-25
Payer: COMMERCIAL

## 2019-09-25 DIAGNOSIS — I48.20 CHRONIC ATRIAL FIBRILLATION (HCC): Primary | ICD-10-CM

## 2019-09-25 LAB — INR: 2.3 (ref 0.8–1.2)

## 2019-09-25 PROCEDURE — 96372 THER/PROPH/DIAG INJ SC/IM: CPT | Performed by: INTERNAL MEDICINE

## 2019-09-25 PROCEDURE — 93793 ANTICOAG MGMT PT WARFARIN: CPT | Performed by: INTERNAL MEDICINE

## 2019-09-25 PROCEDURE — 85610 PROTHROMBIN TIME: CPT | Performed by: INTERNAL MEDICINE

## 2019-10-10 ENCOUNTER — OCC HEALTH (OUTPATIENT)
Dept: OTHER | Facility: HOSPITAL | Age: 84
End: 2019-10-10
Attending: PREVENTIVE MEDICINE

## 2019-10-10 ENCOUNTER — IMMUNIZATION (OUTPATIENT)
Dept: INTERNAL MEDICINE CLINIC | Facility: CLINIC | Age: 84
End: 2019-10-10
Payer: COMMERCIAL

## 2019-10-10 DIAGNOSIS — Z23 NEED FOR VACCINATION: ICD-10-CM

## 2019-10-10 DIAGNOSIS — Z77.21 EXPOSURE TO BLOOD-BORNE PATHOGEN: Primary | ICD-10-CM

## 2019-10-10 PROCEDURE — 90662 IIV NO PRSV INCREASED AG IM: CPT | Performed by: INTERNAL MEDICINE

## 2019-10-10 PROCEDURE — G0008 ADMIN INFLUENZA VIRUS VAC: HCPCS | Performed by: INTERNAL MEDICINE

## 2019-10-11 ENCOUNTER — NURSE ONLY (OUTPATIENT)
Dept: INTERNAL MEDICINE CLINIC | Facility: CLINIC | Age: 84
End: 2019-10-11
Payer: COMMERCIAL

## 2019-10-11 DIAGNOSIS — Z77.21 EXPOSURE TO BLOOD-BORNE PATHOGEN: ICD-10-CM

## 2019-10-11 PROCEDURE — 86701 HIV-1ANTIBODY: CPT | Performed by: PREVENTIVE MEDICINE

## 2019-10-11 PROCEDURE — 87340 HEPATITIS B SURFACE AG IA: CPT | Performed by: PREVENTIVE MEDICINE

## 2019-10-11 PROCEDURE — 86803 HEPATITIS C AB TEST: CPT | Performed by: PREVENTIVE MEDICINE

## 2019-10-15 ENCOUNTER — NURSE ONLY (OUTPATIENT)
Dept: INTERNAL MEDICINE CLINIC | Facility: CLINIC | Age: 84
End: 2019-10-15
Payer: COMMERCIAL

## 2019-10-15 DIAGNOSIS — I48.20 CHRONIC ATRIAL FIBRILLATION (HCC): Primary | ICD-10-CM

## 2019-10-15 PROCEDURE — 85610 PROTHROMBIN TIME: CPT | Performed by: INTERNAL MEDICINE

## 2019-10-15 PROCEDURE — 93793 ANTICOAG MGMT PT WARFARIN: CPT | Performed by: INTERNAL MEDICINE

## 2019-10-27 ENCOUNTER — APPOINTMENT (OUTPATIENT)
Dept: CT IMAGING | Facility: HOSPITAL | Age: 84
End: 2019-10-27
Attending: EMERGENCY MEDICINE
Payer: MEDICARE

## 2019-10-27 ENCOUNTER — HOSPITAL ENCOUNTER (EMERGENCY)
Facility: HOSPITAL | Age: 84
Discharge: HOME | End: 2019-10-27
Attending: EMERGENCY MEDICINE
Payer: MEDICARE

## 2019-10-27 VITALS
OXYGEN SATURATION: 97 % | TEMPERATURE: 99 F | BODY MASS INDEX: 26.66 KG/M2 | HEIGHT: 65 IN | WEIGHT: 160 LBS | DIASTOLIC BLOOD PRESSURE: 86 MMHG | SYSTOLIC BLOOD PRESSURE: 157 MMHG | RESPIRATION RATE: 16 BRPM | HEART RATE: 74 BPM

## 2019-10-27 DIAGNOSIS — R10.9 ABDOMINAL PAIN, ACUTE: Primary | ICD-10-CM

## 2019-10-27 PROCEDURE — 81001 URINALYSIS AUTO W/SCOPE: CPT | Performed by: EMERGENCY MEDICINE

## 2019-10-27 PROCEDURE — 83605 ASSAY OF LACTIC ACID: CPT | Performed by: EMERGENCY MEDICINE

## 2019-10-27 PROCEDURE — 87086 URINE CULTURE/COLONY COUNT: CPT | Performed by: EMERGENCY MEDICINE

## 2019-10-27 PROCEDURE — 85730 THROMBOPLASTIN TIME PARTIAL: CPT | Performed by: EMERGENCY MEDICINE

## 2019-10-27 PROCEDURE — 93010 ELECTROCARDIOGRAM REPORT: CPT

## 2019-10-27 PROCEDURE — 87186 SC STD MICRODIL/AGAR DIL: CPT | Performed by: EMERGENCY MEDICINE

## 2019-10-27 PROCEDURE — 87077 CULTURE AEROBIC IDENTIFY: CPT | Performed by: EMERGENCY MEDICINE

## 2019-10-27 PROCEDURE — 74175 CTA ABDOMEN W/CONTRAST: CPT | Performed by: EMERGENCY MEDICINE

## 2019-10-27 PROCEDURE — 80053 COMPREHEN METABOLIC PANEL: CPT | Performed by: EMERGENCY MEDICINE

## 2019-10-27 PROCEDURE — 99284 EMERGENCY DEPT VISIT MOD MDM: CPT

## 2019-10-27 PROCEDURE — 85610 PROTHROMBIN TIME: CPT | Performed by: EMERGENCY MEDICINE

## 2019-10-27 PROCEDURE — 93005 ELECTROCARDIOGRAM TRACING: CPT

## 2019-10-27 PROCEDURE — 96360 HYDRATION IV INFUSION INIT: CPT

## 2019-10-27 PROCEDURE — 96361 HYDRATE IV INFUSION ADD-ON: CPT

## 2019-10-27 PROCEDURE — 99285 EMERGENCY DEPT VISIT HI MDM: CPT

## 2019-10-27 PROCEDURE — 85025 COMPLETE CBC W/AUTO DIFF WBC: CPT | Performed by: EMERGENCY MEDICINE

## 2019-10-27 RX ORDER — DICYCLOMINE HCL 20 MG
20 TABLET ORAL 4 TIMES DAILY PRN
Qty: 30 TABLET | Refills: 0 | Status: SHIPPED | OUTPATIENT
Start: 2019-10-27 | End: 2020-04-25

## 2019-10-27 RX ORDER — DICYCLOMINE HYDROCHLORIDE 10 MG/ML
20 INJECTION INTRAMUSCULAR ONCE
Status: COMPLETED | OUTPATIENT
Start: 2019-10-27 | End: 2019-10-27

## 2019-10-27 RX ORDER — SODIUM CHLORIDE 9 MG/ML
1000 INJECTION, SOLUTION INTRAVENOUS ONCE
Status: COMPLETED | OUTPATIENT
Start: 2019-10-27 | End: 2019-10-27

## 2019-10-28 ENCOUNTER — TELEPHONE (OUTPATIENT)
Dept: INTERNAL MEDICINE CLINIC | Facility: CLINIC | Age: 84
End: 2019-10-28

## 2019-10-28 ENCOUNTER — ANCILLARY PROCEDURE (OUTPATIENT)
Dept: CARDIOLOGY | Age: 84
End: 2019-10-28
Attending: INTERNAL MEDICINE

## 2019-10-28 ENCOUNTER — TELEPHONE (OUTPATIENT)
Dept: CARDIOLOGY | Age: 84
End: 2019-10-28

## 2019-10-28 DIAGNOSIS — Z95.0 CARDIAC PACEMAKER: ICD-10-CM

## 2019-10-28 NOTE — ED PROVIDER NOTES
Patient Seen in: [facility] Emergency Department      History   Patient presents with:  Abdomen/Flank Pain (GI/)    Stated Complaint: abd pain, no n/v/d, does not feel well    HPI    Patient is a 59-year-old female who presents emergency room with sweats    • Pacemaker    • Reflux    • S/P colon resection / Colonoscopy (10/12) recheck - ANJALI Spencer 5/5/2011   • S/P laminectomy 12/23/2013   • Sleep apnea     cpap   • Visual impairment               Past Surgical History:   Procedure Laterality Date   • Alcohol/week: 2.0 standard drinks      Types: 2 Glasses of wine per week      Comment: wine occasionally    Drug use:  No             Review of Systems    Positive for stated complaint: abd pain, no n/v/d, does not feel well  Other systems are as noted in H appropriately.            ED Course     Labs Reviewed   COMP METABOLIC PANEL (14) - Abnormal; Notable for the following components:       Result Value    Glucose 112 (*)     Sodium 131 (*)     Calculated Osmolality 274 (*)     Alkaline Phosphatase 53 (*) vessels. Dictated by: Selina Alicea MD on 10/27/2019 at 20:20     Approved by: Selina Alicea MD on 10/27/2019 at 20:30                [facility]   21:05 patient feeling better at this time. Patient with no other new complaints this time.   Patient feeling bett Tab  Take 1 tablet (20 mg total) by mouth 4 (four) times daily as needed. , Print, Disp-30 tablet, R-0

## 2019-10-29 RX ORDER — AMOXICILLIN AND CLAVULANATE POTASSIUM 875; 125 MG/1; MG/1
1 TABLET, FILM COATED ORAL 2 TIMES DAILY
Qty: 14 TABLET | Refills: 0 | Status: ON HOLD | OUTPATIENT
Start: 2019-10-29 | End: 2019-11-04

## 2019-10-29 RX ORDER — SOTALOL HYDROCHLORIDE 80 MG/1
80 TABLET ORAL 2 TIMES DAILY
Qty: 60 TABLET | Refills: 1 | Status: SHIPPED | OUTPATIENT
Start: 2019-10-29 | End: 2019-10-31 | Stop reason: ALTCHOICE

## 2019-10-30 ENCOUNTER — OFFICE VISIT (OUTPATIENT)
Dept: INTERNAL MEDICINE CLINIC | Facility: CLINIC | Age: 84
End: 2019-10-30
Payer: COMMERCIAL

## 2019-10-30 VITALS
HEART RATE: 80 BPM | DIASTOLIC BLOOD PRESSURE: 50 MMHG | TEMPERATURE: 98 F | OXYGEN SATURATION: 97 % | SYSTOLIC BLOOD PRESSURE: 100 MMHG | RESPIRATION RATE: 20 BRPM

## 2019-10-30 DIAGNOSIS — Z95.0 PACEMAKER: ICD-10-CM

## 2019-10-30 DIAGNOSIS — I48.20 CHRONIC ATRIAL FIBRILLATION (HCC): ICD-10-CM

## 2019-10-30 DIAGNOSIS — Z79.01 CURRENT USE OF LONG TERM ANTICOAGULATION: ICD-10-CM

## 2019-10-30 DIAGNOSIS — R53.83 FATIGUE, UNSPECIFIED TYPE: ICD-10-CM

## 2019-10-30 DIAGNOSIS — I48.91 ATRIAL FIBRILLATION WITH RAPID VENTRICULAR RESPONSE (HCC): Primary | ICD-10-CM

## 2019-10-30 DIAGNOSIS — R00.2 PALPITATIONS: ICD-10-CM

## 2019-10-30 PROBLEM — J44.89 ASTHMA WITH COPD (CHRONIC OBSTRUCTIVE PULMONARY DISEASE) (HCC): Chronic | Status: ACTIVE | Noted: 2019-10-30

## 2019-10-30 PROBLEM — J44.89 ASTHMA WITH COPD (CHRONIC OBSTRUCTIVE PULMONARY DISEASE): Chronic | Status: ACTIVE | Noted: 2019-10-30

## 2019-10-30 PROBLEM — J44.9 ASTHMA WITH COPD (CHRONIC OBSTRUCTIVE PULMONARY DISEASE) (HCC): Chronic | Status: ACTIVE | Noted: 2019-10-30

## 2019-10-30 PROCEDURE — 85610 PROTHROMBIN TIME: CPT | Performed by: INTERNAL MEDICINE

## 2019-10-30 PROCEDURE — 99214 OFFICE O/P EST MOD 30 MIN: CPT | Performed by: INTERNAL MEDICINE

## 2019-10-30 PROCEDURE — 1111F DSCHRG MED/CURRENT MED MERGE: CPT | Performed by: INTERNAL MEDICINE

## 2019-10-30 NOTE — PATIENT INSTRUCTIONS
Hold the Dicyclomine (for abd crapms) unless you get them    Increase the coumadin to 8.5 mg tonight and on Thurs and 5 mg other nights. Recheck in one week.

## 2019-10-30 NOTE — PROGRESS NOTES
Patient presents with:  Hospital F/U: follow up from the ER on 10/27 for stomach pain.  patient states she does not feel good all over and she gets winded fairly easy  Lab: INR       HPI: Rd Santoro is here with her son Robin Hayes in follow up from ER for abdominal gladys Hearing impairment    • Hearing loss    • Hemorrhoids    • History of depression    • Hypertension    • Irregular bowel habits    • Leg swelling    • Lumbar facet arthropathy 2/10/2018   • Macular degeneration - Germaine [facility] Medical Ophthalmologist 5/5/2011   • Sunday  , Disp: , Rfl:   Multiple Vitamins-Minerals (PRESERVISION AREDS) Oral Tab, Take 1 tablet by mouth daily. , Disp: , Rfl:   Multiple Vitamins-Minerals (TAB-A-MARA MAXIMUM) Oral Tab, Take 1 tablet by mouth daily. , Disp: , Rfl:   Calcium Carbonate-Vitam she stop the Sotolol and start amiodarone. They are planning a cardioversion but need her to have therpeutic INR's x 3 weeks  Leslie already advised to check INR in one week.       Margret Michael MD

## 2019-10-31 RX ORDER — AMIODARONE HYDROCHLORIDE 200 MG/1
400 TABLET ORAL DAILY
Qty: 60 TABLET | Refills: 0 | Status: SHIPPED | OUTPATIENT
Start: 2019-11-03 | End: 2019-11-04 | Stop reason: CLARIF

## 2019-11-01 ENCOUNTER — TELEPHONE (OUTPATIENT)
Dept: CARDIOLOGY | Age: 84
End: 2019-11-01

## 2019-11-02 ENCOUNTER — HOSPITAL ENCOUNTER (OUTPATIENT)
Facility: HOSPITAL | Age: 84
Setting detail: OBSERVATION
Discharge: HOME | End: 2019-11-05
Attending: EMERGENCY MEDICINE | Admitting: STUDENT IN AN ORGANIZED HEALTH CARE EDUCATION/TRAINING PROGRAM
Payer: MEDICARE

## 2019-11-02 ENCOUNTER — APPOINTMENT (OUTPATIENT)
Dept: GENERAL RADIOLOGY | Facility: HOSPITAL | Age: 84
End: 2019-11-02
Attending: EMERGENCY MEDICINE
Payer: MEDICARE

## 2019-11-02 DIAGNOSIS — I48.20 CHRONIC ATRIAL FIBRILLATION (HCC): ICD-10-CM

## 2019-11-02 DIAGNOSIS — I48.91 ATRIAL FIBRILLATION WITH RAPID VENTRICULAR RESPONSE (HCC): Primary | ICD-10-CM

## 2019-11-02 PROBLEM — N39.0 URINARY TRACT INFECTION WITHOUT HEMATURIA: Status: ACTIVE | Noted: 2018-02-09

## 2019-11-02 PROCEDURE — 99220 INITIAL OBSERVATION CARE,LEVL III: CPT | Performed by: STUDENT IN AN ORGANIZED HEALTH CARE EDUCATION/TRAINING PROGRAM

## 2019-11-02 PROCEDURE — 71045 X-RAY EXAM CHEST 1 VIEW: CPT | Performed by: EMERGENCY MEDICINE

## 2019-11-02 RX ORDER — GABAPENTIN 300 MG/1
300 CAPSULE ORAL 3 TIMES DAILY
COMMUNITY
End: 2020-01-08

## 2019-11-02 RX ORDER — WARFARIN SODIUM 5 MG/1
5 TABLET ORAL
Status: COMPLETED | OUTPATIENT
Start: 2019-11-02 | End: 2019-11-02

## 2019-11-02 RX ORDER — DILTIAZEM HYDROCHLORIDE 5 MG/ML
10 INJECTION INTRAVENOUS ONCE
Status: COMPLETED | OUTPATIENT
Start: 2019-11-02 | End: 2019-11-02

## 2019-11-02 RX ORDER — AMLODIPINE BESYLATE 5 MG/1
5 TABLET ORAL 2 TIMES DAILY
Status: DISCONTINUED | OUTPATIENT
Start: 2019-11-02 | End: 2019-11-04

## 2019-11-02 RX ORDER — ACETAMINOPHEN 325 MG/1
650 TABLET ORAL EVERY 6 HOURS PRN
Status: DISCONTINUED | OUTPATIENT
Start: 2019-11-02 | End: 2019-11-05

## 2019-11-02 RX ORDER — SODIUM CHLORIDE 9 MG/ML
INJECTION, SOLUTION INTRAVENOUS CONTINUOUS
Status: ACTIVE | OUTPATIENT
Start: 2019-11-02 | End: 2019-11-02

## 2019-11-02 RX ORDER — ACETAMINOPHEN 325 MG/1
650 TABLET ORAL ONCE
Status: COMPLETED | OUTPATIENT
Start: 2019-11-02 | End: 2019-11-02

## 2019-11-02 RX ORDER — ACETAMINOPHEN 325 MG/1
TABLET ORAL
Status: DISPENSED
Start: 2019-11-02 | End: 2019-11-03

## 2019-11-02 RX ORDER — LISINOPRIL 40 MG/1
40 TABLET ORAL DAILY
Status: DISCONTINUED | OUTPATIENT
Start: 2019-11-03 | End: 2019-11-05

## 2019-11-02 RX ORDER — GABAPENTIN 300 MG/1
300 CAPSULE ORAL 3 TIMES DAILY
Status: DISCONTINUED | OUTPATIENT
Start: 2019-11-02 | End: 2019-11-05

## 2019-11-02 RX ORDER — METOCLOPRAMIDE HYDROCHLORIDE 5 MG/ML
10 INJECTION INTRAMUSCULAR; INTRAVENOUS EVERY 8 HOURS PRN
Status: DISCONTINUED | OUTPATIENT
Start: 2019-11-02 | End: 2019-11-05

## 2019-11-02 RX ORDER — DICYCLOMINE HCL 20 MG
20 TABLET ORAL 4 TIMES DAILY PRN
Status: DISCONTINUED | OUTPATIENT
Start: 2019-11-02 | End: 2019-11-05

## 2019-11-02 RX ORDER — SODIUM CHLORIDE 9 MG/ML
INJECTION, SOLUTION INTRAVENOUS CONTINUOUS
Status: DISCONTINUED | OUTPATIENT
Start: 2019-11-02 | End: 2019-11-03

## 2019-11-02 RX ORDER — CALCIUM CARBONATE/VITAMIN D3 250-3.125
1 TABLET ORAL DAILY
Status: DISCONTINUED | OUTPATIENT
Start: 2019-11-03 | End: 2019-11-05

## 2019-11-02 RX ORDER — ONDANSETRON 2 MG/ML
4 INJECTION INTRAMUSCULAR; INTRAVENOUS EVERY 6 HOURS PRN
Status: DISCONTINUED | OUTPATIENT
Start: 2019-11-02 | End: 2019-11-05

## 2019-11-02 NOTE — ED NOTES
Attempted to given report to [place] [id number]; RN with critical pt at this time. Unable to take report.

## 2019-11-02 NOTE — ED PROVIDER NOTES
Patient Seen in: [facility] Emergency Department      History   Patient presents with:  Dyspnea BOBBY SOB (respiratory)  Arrythmia/Palpitations (cardiovascular)    Stated Complaint: bobby, afib    HPI    80-year-old female presents to the emergency depa Procedure Laterality Date   • APPENDECTOMY     • BACK SURGERY     • BOWEL RESECTION     • CHOLECYSTECTOMY     • COLECTOMY     • COLONOSCOPY     • COLONOSCOPY     • DEBULKING OF NASOLABIAL FLAP TO NOSE Left 3/28/2014    Performed by Ana Kaur MD at  HPI.  Constitutional and vital signs reviewed. All other systems reviewed and negative except as noted above.     Physical Exam     ED Triage Vitals [11/02/19 1458]   BP (!) 88/62   Pulse (!) 122   Resp 18   Temp 98.6 °F (37 °C)   Temp src    SpO2 96 % PRO BETA NATRIURETIC PEPTIDE   RAINBOW DRAW BLUE   RAINBOW DRAW LAVENDER   RAINBOW DRAW LIGHT GREEN   RAINBOW DRAW GOLD   C. DIFFICILE(TOXIGENIC)PCR   OCCULT BLOOD, STOOL   STOOL CULTURE W/SHIGATOXIN    Narrative:      The following orders were created fo family, and clinical staff. MDM     #1. Fatigue secondary to atrial fibrillation with RVR. The patient has been in A. fib for a week and a half and without the atrial kick is most likely experiencing fatigue. Started on Cardizem for rate control.

## 2019-11-02 NOTE — H&P
[facility]ITAL[facility]  History and Physical     Willow Maldonado Patient Status:  Emergency    10/25/1927 MRN [id number]   Location [facility] Attending Terance Barthel, MD   Hosp Day # 0 PCP Bethany Dominguez MD     Chief Comp • COLONOSCOPY     • DEBULKING OF NASOLABIAL FLAP TO NOSE Left 3/28/2014    Performed by Sanjuanita Orourke MD at [facility]   • EGD     • 2011 [facility] Right 10/29/2017    Performed by Edward Morales MD at [facility] reaction(s): VOMITING             Oral  Altaryl                   Bacitracin                  Comment:Red and swelling  Cephalexin              DIARRHEA  Escitalopram              Garlic                  DIARRHEA  Hydrocodone                 Comment:nausea MG Oral Tab, Take 1 tablet by mouth. Every 4 - 6 hours as needed. , Disp: , Rfl:         Review of Systems:   A comprehensive 14 point review of systems was completed. Pertinent positives and negatives noted in the HPI.     Physical Exam:    BP (!) 88/62 INR    Quality:  · DVT Prophylaxis: Coumadin   · CODE status: full  · [place]: no    Plan of care discussed with pt, pt son    Sukhdev Greenwood MD  11/2/2019

## 2019-11-03 PROCEDURE — 99226 SUBSEQUENT OBSERVATION CARE: CPT | Performed by: STUDENT IN AN ORGANIZED HEALTH CARE EDUCATION/TRAINING PROGRAM

## 2019-11-03 PROCEDURE — 99214 OFFICE O/P EST MOD 30 MIN: CPT | Performed by: INTERNAL MEDICINE

## 2019-11-03 RX ORDER — AMIODARONE HYDROCHLORIDE 200 MG/1
400 TABLET ORAL 2 TIMES DAILY WITH MEALS
Status: DISCONTINUED | OUTPATIENT
Start: 2019-11-03 | End: 2019-11-05

## 2019-11-03 RX ORDER — WARFARIN SODIUM 7.5 MG/1
7.5 TABLET ORAL
Status: COMPLETED | OUTPATIENT
Start: 2019-11-03 | End: 2019-11-03

## 2019-11-03 NOTE — PLAN OF CARE
Assumed care @ 0700. Pt a/o x4, VSS. Tele A-fib. HR  this AM.  Cardizem gtt increase to 10 mg/hr this AM for HR 90-110s. HR 70-80's since 1 PM.    Will notify [facility], if sustain.   SBP 96/61's this AM.   AM BP meds held after notifying Dr. Dasia Gomez appropriate  - Consider OT/PT consult to assist with strengthening/mobility  - Encourage toileting schedule  Outcome: Progressing     Problem: DISCHARGE PLANNING  Goal: Discharge to home or other facility with appropriate resources  Description  INTERVENTI threatening arrhythmias  - Monitor electrolytes and administer replacement therapy as ordered  Outcome: Progressing     Problem: GASTROINTESTINAL - ADULT  Goal: Maintains or returns to baseline bowel function  Description  INTERVENTIONS:  - Assess bowel fu

## 2019-11-03 NOTE — PROGRESS NOTES
[facility]  Progress Note     Willow Maldonado Patient Status:  Observation    10/25/1927 MRN [id number]   [facility] 7NE-A Attending Ene Condon MD   Hosp Day # 0 PCP Bethany Dominguez MD     Chief Complaint: Fatigue     S: Patien 10/27/19  1906 10/30/19  1345 11/02/19  1633 11/03/19  0557   PTP 21.2*  --  21.1* 21.3*   INR 1.73* 1.6* 1.72* 1.74*       Recent Labs   Lab 11/02/19  1633   TROP <0.045       Lab Results   Component Value Date    TSH 3.030 11/03/2019       Imaging: Imagi

## 2019-11-03 NOTE — PROGRESS NOTES
[facility] Dosing Service  Warfarin (Coumadin) Subsequent Dosing    Yong Gibson is a 80year old female for whom pharmacy has been dosing warfarin (Coumadin).  Goal INR is 2-3    Recent Labs   Lab 10/27/19  1906 10/30/19  1345 11/02/19  1633 11/03/1

## 2019-11-03 NOTE — PLAN OF CARE
ADMISSION NOTE    Assumed care of Juvenal Andrews  at 1900 from ED to room 9839  Patient oriented to room  Safety precautions in place  Admission navigator completed with patient and son  Dr. Trang Salguero notified of patient status    Patient AOx4  O2 sats >

## 2019-11-03 NOTE — PROGRESS NOTES
[facility] Dosing Service  Warfarin (Coumadin) Initial Dosing    Leoncio Guajardo is a 80year old female for whom pharmacy has been consulted to dose warfarin (COUMADIN) for atrial fibrillation/flutter  by Dr. Memo Toribio.   Based on this indication, goal IN

## 2019-11-03 NOTE — CONSULTS
[facility]  Report of Consultation    Willow Maldonado Patient Status:  Observation    10/25/1927 MRN [id number]   [facility] 7NE-A Attending Ene Condon MD   Hosp Day # 0 PCP Bethany Dominguez MD     Reason for Consultation:  Nicky Hightower Procedure Laterality Date   • APPENDECTOMY     • BACK SURGERY     • BOWEL RESECTION     • CHOLECYSTECTOMY     • COLECTOMY     • COLONOSCOPY     • COLONOSCOPY     • DEBULKING OF NASOLABIAL FLAP TO NOSE Left 3/28/2014    Performed by Ramakrishna Ortega MD at  OTHER (SEE COMMENTS)  Sulfamethoxazole W/*    OTHER (SEE COMMENTS)    Comment:Other reaction(s): VOMITING             Oral  Altaryl                   Bacitracin                  Comment:Red and swelling  Cephalexin              DIARRHEA  Escital moist  Neck: Supple, carotids 2+   Cardiac: Irregular   Lungs: Clear  Abdomen: Soft, non-tender. Extremities: Without clubbing, cyanosis or edema  Neurologic: Alert and oriented, normal affect. Skin: Warm and dry.      Laboratories and Data:  Diagnostics

## 2019-11-03 NOTE — RESPIRATORY THERAPY NOTE
JAZZ - Equipment Use Daily Summary:  · Set Mode   · Usage in hours:   · 90% Pressure (EPAP) level:   · 90% Insp Pressure (IPAP):   · AHI:   · Supplemental Oxygen:   · Comments: PT DID NOT WEAR

## 2019-11-03 NOTE — RESPIRATORY THERAPY NOTE
Patient refused CPAP tonight because she cold not tolerate hospital mask. Patient will bring her own mask from home. RN at bedside.

## 2019-11-04 PROCEDURE — 99226 SUBSEQUENT OBSERVATION CARE: CPT | Performed by: STUDENT IN AN ORGANIZED HEALTH CARE EDUCATION/TRAINING PROGRAM

## 2019-11-04 PROCEDURE — 99214 OFFICE O/P EST MOD 30 MIN: CPT | Performed by: INTERNAL MEDICINE

## 2019-11-04 RX ORDER — WARFARIN SODIUM 5 MG/1
5 TABLET ORAL
Status: DISCONTINUED | OUTPATIENT
Start: 2019-11-04 | End: 2019-11-04

## 2019-11-04 RX ORDER — GARLIC EXTRACT 500 MG
1 CAPSULE ORAL DAILY
Status: DISCONTINUED | OUTPATIENT
Start: 2019-11-04 | End: 2019-11-05

## 2019-11-04 RX ORDER — WARFARIN SODIUM 7.5 MG/1
7.5 TABLET ORAL
Status: COMPLETED | OUTPATIENT
Start: 2019-11-04 | End: 2019-11-04

## 2019-11-04 NOTE — PHYSICAL THERAPY NOTE
PHYSICAL THERAPY QUICK EVALUATION - INPATIENT    Room Number: 9120/4207-Q  Evaluation Date: 11/4/2019  Presenting Problem: Afib  Physician Order: PT Eval and Treat    Problem List  Principal Problem:    Atrial fibrillation with rapid ventricular response • HEMORRHOIDECTOMY,INT/EXT,SIMPLE     • HIP REPLACEMENT SURGERY  left 3/06 right 10/06   • OTHER ACCESSORY      electro-stimulator for spinal stenosis   • OTHER SURGICAL HISTORY  10-10-11    cysto-   • OTHER SURGICAL HISTORY  10/16    back surgery   • PA FORM - BASIC MOBILITY  How much difficulty does the patient currently have. ..  -   Turning over in bed (including adjusting bedclothes, sheets and blankets)?: None   -   Sitting down on and standing up from a chair with arms (e.g., wheelchair, bedside comm further skilled IP PT at this time. Will D/C PT.   PT Discharge Recommendations: Home    PLAN  Patient has been evaluated and presents with no skilled Physical Therapy needs at this time. Patient discharged from Physical Therapy services.   Please re-order

## 2019-11-04 NOTE — CONSULTS
[facility] Dosing Service  Warfarin (Coumadin) Subsequent Dosing    Yordan Cui is a 80year old female for whom pharmacy has been dosing warfarin (Coumadin).  Goal INR is 2-3    Recent Labs   Lab 10/30/19  1345 11/02/19  1633 11/03/19  0557 11/04/1

## 2019-11-04 NOTE — PLAN OF CARE
Assumed care at 1900  AOx4, anxious at times  RA, CPAP at night  Afib, cardizem gtt still infusing at 5mg/hr  HR 70's-90's  Denies nausea and pain overnight  Up with one and a walker  Slept well overnight  IV abx  Will continue to monitor

## 2019-11-04 NOTE — PROGRESS NOTES
[facility]  Progress Note    Megan Maldonado Patient Status:  Observation    10/25/1927 MRN [id number]   [facility] 7NE-A Attending Lobo Gomez MD   Hosp Day # 0 PCP Veronica Almendarez MD     Assessment:  1.  AF- persistent, had been o mg Oral TID   • ceFAZolin  1 g Intravenous Q8H     • diltiazem Stopped (11/04/19 0900)       Sonny Spivey MD  11/4/2019  12:42 PM

## 2019-11-04 NOTE — HISTORICAL OFFICE NOTE
Arun La MD - 2019 2:30 PM CDT  Formatting of this note might be different from the original.  [facility]  Clinic Note    Leoncio Guajardo  : 10/25/1927  PCP: George Meyer MD    Reason for Consultation:  Chief C Sulfamethoxazole-Trimethoprim VOMITING   Oral   • Thiazide-Type Diuretics Other (See Comments)   CLASS     Medications:  Current Outpatient Medications   Medication Sig Dispense Refill   • sotalol (BETAPACE) 80 MG tablet TAKE 1/2 TABLET BY MOUTH 3 TIMES A

## 2019-11-04 NOTE — RESPIRATORY THERAPY NOTE
JAZZ Equipment Usage Summary :            Set Mode :AUTO CPAP W FLEX          Usage in Hours:7;15          90% Pressure (EPAP) : 7.1           90% Insp Pressure (IPAP);           AHI : 3.2          Supplemental Oxygen :   2   LPM

## 2019-11-04 NOTE — PROGRESS NOTES
[facility]  Progress Note     Jayla Maldonado Patient Status:  Observation    10/25/1927 MRN [id number]   [facility] [place] 7NE-A Attending Kathryn Ponce MD   Hosp Day # 0 PCP Luca Guerrier MD     Chief Complaint: Fatigue     S: Patien 11/02/19  1633   TROP <0.045            Imaging: Imaging data reviewed in Epic.     Medications:   • Acidophilus/Pectin  1 capsule Oral Daily   • metoprolol Tartrate  25 mg Oral 2x Daily(Beta Blocker)   • amiodarone HCl  400 mg Oral BID with meals   • amLOD

## 2019-11-04 NOTE — PROGRESS NOTES
[facility]  Cardiology Progress Note    Subjective:  No chest pain or shortness of breath.     Objective:  /56 (BP Location: Left arm)   Pulse 87   Temp 98.3 °F (36.8 °C) (Oral)   Resp 21   Wt 160 lb (72.6 kg)   SpO2 96%   BMI 26.63 kg/m²     Lab admit.  · Afib, persistent, possibly symptomatic related to #1 and #2 - TSH normal.  Follows routinely with Dr. Almas Owens. · Chronic systemic anticoagulation w/ Warfarin - INR 2.00 today.   · Hx [facility] PPM   · UTI - abx  · Diarrhea - C.diff stool pending

## 2019-11-05 ENCOUNTER — APPOINTMENT (OUTPATIENT)
Dept: CV DIAGNOSTICS | Facility: HOSPITAL | Age: 84
End: 2019-11-05
Attending: NURSE PRACTITIONER
Payer: MEDICARE

## 2019-11-05 VITALS
WEIGHT: 160 LBS | DIASTOLIC BLOOD PRESSURE: 90 MMHG | BODY MASS INDEX: 27 KG/M2 | TEMPERATURE: 98 F | SYSTOLIC BLOOD PRESSURE: 152 MMHG | RESPIRATION RATE: 14 BRPM | HEART RATE: 79 BPM | OXYGEN SATURATION: 98 %

## 2019-11-05 PROCEDURE — 99214 OFFICE O/P EST MOD 30 MIN: CPT | Performed by: INTERNAL MEDICINE

## 2019-11-05 PROCEDURE — 93306 TTE W/DOPPLER COMPLETE: CPT | Performed by: NURSE PRACTITIONER

## 2019-11-05 PROCEDURE — 99217 OBSERVATION CARE DISCHARGE: CPT | Performed by: STUDENT IN AN ORGANIZED HEALTH CARE EDUCATION/TRAINING PROGRAM

## 2019-11-05 RX ORDER — AMIODARONE HYDROCHLORIDE 400 MG/1
400 TABLET ORAL DAILY
Qty: 30 TABLET | Refills: 3 | Status: SHIPPED | OUTPATIENT
Start: 2019-11-05 | End: 2019-11-22

## 2019-11-05 RX ORDER — WARFARIN SODIUM 5 MG/1
5 TABLET ORAL NIGHTLY
Refills: 0 | Status: ON HOLD | COMMUNITY
Start: 2019-11-05 | End: 2019-12-17

## 2019-11-05 RX ORDER — CEPHALEXIN 250 MG/1
250 CAPSULE ORAL EVERY 8 HOURS
Qty: 15 CAPSULE | Refills: 0 | Status: SHIPPED | OUTPATIENT
Start: 2019-11-05 | End: 2019-11-10

## 2019-11-05 RX ORDER — METOPROLOL TARTRATE 50 MG/1
50 TABLET, FILM COATED ORAL
Qty: 60 TABLET | Refills: 3 | Status: SHIPPED | OUTPATIENT
Start: 2019-11-05 | End: 2020-01-30

## 2019-11-05 RX ORDER — CEPHALEXIN 250 MG/1
250 CAPSULE ORAL 4 TIMES DAILY
Qty: 20 CAPSULE | Refills: 0 | Status: SHIPPED | OUTPATIENT
Start: 2019-11-05 | End: 2019-11-05

## 2019-11-05 RX ORDER — METOPROLOL TARTRATE 50 MG/1
50 TABLET, FILM COATED ORAL
Status: DISCONTINUED | OUTPATIENT
Start: 2019-11-05 | End: 2019-11-05

## 2019-11-05 RX ORDER — GARLIC EXTRACT 500 MG
1 CAPSULE ORAL DAILY
Qty: 5 CAPSULE | Refills: 0 | Status: SHIPPED | OUTPATIENT
Start: 2019-11-06 | End: 2021-01-08

## 2019-11-05 RX ORDER — WARFARIN SODIUM 2.5 MG/1
2.5 TABLET ORAL
Status: DISCONTINUED | OUTPATIENT
Start: 2019-11-05 | End: 2019-11-05

## 2019-11-05 RX ORDER — WARFARIN SODIUM 5 MG/1
5 TABLET ORAL NIGHTLY
Status: DISCONTINUED | OUTPATIENT
Start: 2019-11-05 | End: 2019-11-05

## 2019-11-05 NOTE — PLAN OF CARE
Assumed care @ 1900. Patient alert oriented x4. On telemetry monitoring. Denies SOB, Denies any pain. IV not working and leaking. IV changed. CPAP @ hs  Updated patient with plan of care, verbalizes understanding.   Ensures patient is safe at all ti stability  Description  INTERVENTIONS:  - Monitor vital signs, rhythm, and trends  - Monitor for bleeding, hypotension and signs of decreased cardiac output  - Evaluate effectiveness of vasoactive medications to optimize hemodynamic stability  - Monitor ar

## 2019-11-05 NOTE — PROGRESS NOTES
[facility]  Cardiology Progress Note    Subjective:  No chest pain or shortness of breath. Feels tired today - states she did not sleep well. Hopes to go home. Feels better. Denies any further palpitations or shortness of breath.       Objective: infection without hematuria     Current use of long term anticoagulation     Chronic elbow pain, right     Anemia of chronic disease     Spinal stenosis of lumbar region without neurogenic claudication     Gastroesophageal reflux disease without esophagiti today pending EP review. Discussed with patient and her son in detail. Chrystal Kingston, VON  11/5/2019  7:24 AM    Patient seen and examined. Agree with above note and assessment. HEENT- NCAT,   CVS- normal S1, S2  Lungs- clear bilaterally.    · Wou

## 2019-11-05 NOTE — PLAN OF CARE
NURSING DISCHARGE NOTE    Discharged Home via Wheelchair. Accompanied by Support staff  Belongings Taken by patient/family.       Patient and son educated on d/c medications, follow ups, and instructions  She verbalized understanding  All questions ans resources and transportation as appropriate  - Identify discharge learning needs (meds, wound care, etc)  - Arrange for interpreters to assist at discharge as needed  - Consider post-discharge preferences of patient/family/discharge partner  - Complete ROBERT Encourage mobilization and activity  - Obtain nutritional consult as needed  - Establish a toileting routine/schedule  - Consider collaborating with pharmacy to review patient's medication profile  Outcome: Adequate for Discharge     Problem: Impaired Func

## 2019-11-05 NOTE — PROGRESS NOTES
Pharmacy signing off warfarin dosing to cardiology for [facility]. Please re-consult pharmacy if further dosing services are needed.  Thanks, Nhan Lord Pharm D [id number]

## 2019-11-05 NOTE — RESPIRATORY THERAPY NOTE
JAZZ : EQUIPMENT USE: DAILY SUMMARY                                            SET MODE: AUTO CPAP WITH CFLEX                                          USAGE IN HOURS:3:12                                          90%

## 2019-11-05 NOTE — PROGRESS NOTES
[facility]  Progress Note     Christine Maldonado Patient Status:  Observation    10/25/1927 MRN [id number]   [facility] 7NE-A Attending Mikayla Queen MD   Hosp Day # 0 PCP Ag Wells MD     Chief Complaint: Fatigue     S: Patien 11/04/19  0612 11/05/19  0600   PTP 21.3* 23.9* 30.8*   INR 1.74* 2.00* 2.73*       Recent Labs   Lab 11/02/19  1633   TROP <0.045            Imaging: Imaging data reviewed in Epic.     Medications:   • metoprolol Tartrate  50 mg Oral 2x Daily(Beta Blocker)

## 2019-11-06 ENCOUNTER — PATIENT OUTREACH (OUTPATIENT)
Dept: CASE MANAGEMENT | Age: 84
End: 2019-11-06

## 2019-11-06 ENCOUNTER — NURSE ONLY (OUTPATIENT)
Dept: INTERNAL MEDICINE CLINIC | Facility: CLINIC | Age: 84
End: 2019-11-06
Payer: COMMERCIAL

## 2019-11-06 DIAGNOSIS — Z79.01 CURRENT USE OF LONG TERM ANTICOAGULATION: ICD-10-CM

## 2019-11-06 DIAGNOSIS — Z02.9 ENCOUNTERS FOR UNSPECIFIED ADMINISTRATIVE PURPOSE: ICD-10-CM

## 2019-11-06 DIAGNOSIS — N39.0 URINARY TRACT INFECTION WITHOUT HEMATURIA, SITE UNSPECIFIED: ICD-10-CM

## 2019-11-06 DIAGNOSIS — I48.91 ATRIAL FIBRILLATION WITH RAPID VENTRICULAR RESPONSE (HCC): ICD-10-CM

## 2019-11-06 DIAGNOSIS — R19.7 DIARRHEA, UNSPECIFIED TYPE: ICD-10-CM

## 2019-11-06 DIAGNOSIS — I48.20 CHRONIC ATRIAL FIBRILLATION (HCC): ICD-10-CM

## 2019-11-06 DIAGNOSIS — I48.20 CHRONIC ATRIAL FIBRILLATION (HCC): Primary | ICD-10-CM

## 2019-11-06 PROCEDURE — G0009 ADMIN PNEUMOCOCCAL VACCINE: HCPCS | Performed by: INTERNAL MEDICINE

## 2019-11-06 PROCEDURE — 85610 PROTHROMBIN TIME: CPT

## 2019-11-06 PROCEDURE — 1111F DSCHRG MED/CURRENT MED MERGE: CPT

## 2019-11-06 PROCEDURE — 90732 PPSV23 VACC 2 YRS+ SUBQ/IM: CPT | Performed by: INTERNAL MEDICINE

## 2019-11-06 NOTE — PROGRESS NOTES
Initial Post Discharge Follow Up   Discharge Date: 11/5/19  Contact Date: 11/6/2019    Consent Verification:  Assessment Completed With: Patient  HIPAA Verified?   Yes    Discharge Dx:    A-fib w/RVR, diarrhea  HX: chronic A-fib, patient currently being cephALEXin 250 MG Oral Cap Take 1 capsule (250 mg total) by mouth every 8 (eight) hours for 5 days. 15 capsule 0   • Warfarin Sodium 5 MG Oral Tab Take 1 tablet (5 mg total) by mouth nightly.   0   • amiodarone HCl 400 MG Oral Tab Take 1 tablet (400 mg tota need addressed before your next visit with your PCP?  (DME, meds, disease concerns, Etc): No     Follow up appointments:      Your appointments     Date & Time Appointment Department [facility])    Nov 08, 2019  1:00 PM [facility] Follow Up with Rachel Alberts NCM instructed patient to call PCP with any questions or needs, she states she will.      BOOK BY DATE: 11/19/19    [x]  Discharge Summary, Discharge medications reviewed/discussed/and reconciled against outpatient medications with patient,  and orders revi

## 2019-11-06 NOTE — PROGRESS NOTES
Tried to call the pt for TCM, phone rang three times and then disconnected. Tried to call the pt back and call did not go through. NCM to try again at a later time.

## 2019-11-07 ENCOUNTER — ANTI-COAG (OUTPATIENT)
Dept: CARDIOLOGY | Age: 84
End: 2019-11-07

## 2019-11-07 ENCOUNTER — TELEPHONE (OUTPATIENT)
Dept: CARDIOLOGY | Age: 84
End: 2019-11-07

## 2019-11-07 DIAGNOSIS — Z79.01 LONG TERM (CURRENT) USE OF ANTICOAGULANTS: ICD-10-CM

## 2019-11-07 DIAGNOSIS — I48.0 PAROXYSMAL ATRIAL FIBRILLATION (CMD): ICD-10-CM

## 2019-11-07 LAB — INR PPP: 3.1

## 2019-11-07 NOTE — DISCHARGE SUMMARY
[facility]  DISCHARGE SUMMARY     Belen Maldonado Patient Status:  Observation    10/25/1927 MRN [id number]   [facility] 7NE-A Attending Serg Garcia MD   Hosp Day # 0 PCP Sommer Barry MD     Date of Admission: 2019  Harley 400 MG Tabs  Commonly known as:  PACERONE      Take 1 tablet (400 mg total) by mouth daily.    Quantity:  30 tablet  Refills:  3     cephALEXin 250 MG Caps  Commonly known as:  KEFLEX      Take 1 capsule (250 mg total) by mouth every 8 (eight) hours for 5 d 0        STOP taking these medications    amLODIPine Besylate 5 MG Tabs  Commonly known as:  NORVASC        Amoxicillin-Pot Clavulanate 875-125 MG Tabs  Commonly known as:  AUGMENTIN        lisinopril 40 MG Tabs        Sotalol HCl 80 MG Tabs  Commonly know edema.  -----------------------------------------------------------------------------------------------  PATIENT DISCHARGE INSTRUCTIONS: See electronic chart    Mert Sawyer MD    Time spent:  > 30 minutes

## 2019-11-08 ENCOUNTER — TELEPHONE (OUTPATIENT)
Dept: CARDIOLOGY | Age: 84
End: 2019-11-08

## 2019-11-08 ENCOUNTER — OFFICE VISIT (OUTPATIENT)
Dept: INTERNAL MEDICINE CLINIC | Facility: CLINIC | Age: 84
End: 2019-11-08
Payer: COMMERCIAL

## 2019-11-08 VITALS
HEART RATE: 97 BPM | SYSTOLIC BLOOD PRESSURE: 138 MMHG | HEIGHT: 65 IN | RESPIRATION RATE: 17 BRPM | TEMPERATURE: 98 F | DIASTOLIC BLOOD PRESSURE: 72 MMHG | WEIGHT: 168.19 LBS | OXYGEN SATURATION: 95 % | BODY MASS INDEX: 28.02 KG/M2

## 2019-11-08 DIAGNOSIS — J44.9 ASTHMA WITH COPD (CHRONIC OBSTRUCTIVE PULMONARY DISEASE) (HCC): ICD-10-CM

## 2019-11-08 DIAGNOSIS — I48.91 ATRIAL FIBRILLATION WITH RAPID VENTRICULAR RESPONSE (HCC): Primary | ICD-10-CM

## 2019-11-08 DIAGNOSIS — Z79.01 CURRENT USE OF LONG TERM ANTICOAGULATION: ICD-10-CM

## 2019-11-08 DIAGNOSIS — N39.0 URINARY TRACT INFECTION WITHOUT HEMATURIA, SITE UNSPECIFIED: ICD-10-CM

## 2019-11-08 DIAGNOSIS — Z95.0 PACEMAKER: ICD-10-CM

## 2019-11-08 PROCEDURE — 99214 OFFICE O/P EST MOD 30 MIN: CPT | Performed by: INTERNAL MEDICINE

## 2019-11-08 RX ORDER — AMIODARONE HYDROCHLORIDE 200 MG/1
TABLET ORAL
Refills: 0 | COMMUNITY
Start: 2019-10-31 | End: 2019-11-19 | Stop reason: DRUGHIGH

## 2019-11-08 NOTE — PROGRESS NOTES
Patient presents with:  TCM (Transition Of Care Management): follow up       HPI: Beatrice Adair is here for follow up of RVR atrial fib, fatigue probably due to #1, UTI. She was hospitalized and taken off Sotolal and put on Amiodarone 400 mg bid.   However, AVS s CPAP     Urinary tract infection without hematuria     Current use of long term anticoagulation     Chronic elbow pain, right     Anemia of chronic disease     Spinal stenosis of lumbar region without neurogenic claudication     Gastroesophageal reflux dis [address]:   Family History   Problem Relation Age of Onset   • Cancer Father    • Hypertension Father          Physical Exam:   /72 (BP Location: Left arm, Patient Position: Sitting, Cuff Size: adult)   Pulse 97   Temp 97.5 °F (36.4 °C) (Oral)   R

## 2019-11-08 NOTE — PATIENT INSTRUCTIONS
Take amiodarone 400 mg ONCE A DAY. KEEP YOUR APPOINTMENT WITH CARDIOLOGY ON November 18 AT 1:30 [address]. Go to urgent Care or call us Monday if you have 6-7 diarrheal (WATERY) bowel movements a day.

## 2019-11-11 ENCOUNTER — TELEPHONE (OUTPATIENT)
Dept: CARDIOLOGY | Age: 84
End: 2019-11-11

## 2019-11-11 ENCOUNTER — ANTI-COAG (OUTPATIENT)
Dept: CARDIOLOGY | Age: 84
End: 2019-11-11

## 2019-11-11 ENCOUNTER — NURSE ONLY (OUTPATIENT)
Dept: INTERNAL MEDICINE CLINIC | Facility: CLINIC | Age: 84
End: 2019-11-11
Payer: COMMERCIAL

## 2019-11-11 DIAGNOSIS — Z79.01 LONG TERM (CURRENT) USE OF ANTICOAGULANTS: ICD-10-CM

## 2019-11-11 DIAGNOSIS — I48.0 PAROXYSMAL ATRIAL FIBRILLATION (CMD): ICD-10-CM

## 2019-11-11 DIAGNOSIS — I48.20 CHRONIC ATRIAL FIBRILLATION (HCC): Primary | ICD-10-CM

## 2019-11-11 LAB — INR PPP: 2.3

## 2019-11-11 PROCEDURE — 85610 PROTHROMBIN TIME: CPT

## 2019-11-11 RX ORDER — METOPROLOL SUCCINATE 50 MG/1
50 TABLET, EXTENDED RELEASE ORAL 2 TIMES DAILY
COMMUNITY
End: 2019-11-11 | Stop reason: ALTCHOICE

## 2019-11-11 RX ORDER — LISINOPRIL 40 MG/1
40 TABLET ORAL EVERY MORNING
Qty: 30 TABLET | Refills: 1 | Status: SHIPPED | OUTPATIENT
Start: 2019-11-11 | End: 2020-06-05 | Stop reason: DRUGHIGH

## 2019-11-11 RX ORDER — METOPROLOL TARTRATE 50 MG/1
50 TABLET, FILM COATED ORAL 2 TIMES DAILY
COMMUNITY
End: 2020-01-27 | Stop reason: DRUGHIGH

## 2019-11-12 ENCOUNTER — OFFICE VISIT (OUTPATIENT)
Dept: INTERNAL MEDICINE CLINIC | Facility: CLINIC | Age: 84
End: 2019-11-12
Payer: COMMERCIAL

## 2019-11-12 ENCOUNTER — TELEPHONE (OUTPATIENT)
Dept: INFECTIOUS DISEASE | Facility: HOSPITAL | Age: 84
End: 2019-11-12

## 2019-11-12 VITALS
RESPIRATION RATE: 16 BRPM | OXYGEN SATURATION: 97 % | WEIGHT: 165.69 LBS | HEART RATE: 75 BPM | SYSTOLIC BLOOD PRESSURE: 114 MMHG | DIASTOLIC BLOOD PRESSURE: 54 MMHG | BODY MASS INDEX: 28 KG/M2

## 2019-11-12 DIAGNOSIS — R06.02 SOB (SHORTNESS OF BREATH): ICD-10-CM

## 2019-11-12 DIAGNOSIS — R06.00 DOE (DYSPNEA ON EXERTION): Primary | ICD-10-CM

## 2019-11-12 DIAGNOSIS — R53.83 FATIGUE, UNSPECIFIED TYPE: ICD-10-CM

## 2019-11-12 DIAGNOSIS — I48.20 CHRONIC ATRIAL FIBRILLATION (HCC): ICD-10-CM

## 2019-11-12 PROCEDURE — 99214 OFFICE O/P EST MOD 30 MIN: CPT | Performed by: INTERNAL MEDICINE

## 2019-11-12 NOTE — PROGRESS NOTES
Patient comes in today complaining of fatigue and dyspnea on exertion also shortness of breath at rest.  No orthopnea PND. No chest pain. She was recently admitted to [facility] with A. fib with rapid ventricular response.   Her sotalol was discontin

## 2019-11-14 ENCOUNTER — ANCILLARY ORDERS (OUTPATIENT)
Dept: CARDIOLOGY | Age: 84
End: 2019-11-14

## 2019-11-14 ENCOUNTER — ANCILLARY PROCEDURE (OUTPATIENT)
Dept: CARDIOLOGY | Age: 84
End: 2019-11-14
Attending: INTERNAL MEDICINE

## 2019-11-14 DIAGNOSIS — Z95.0 CARDIAC PACEMAKER IN SITU: ICD-10-CM

## 2019-11-14 PROCEDURE — 93296 REM INTERROG EVL PM/IDS: CPT | Performed by: INTERNAL MEDICINE

## 2019-11-14 PROCEDURE — X1114 CARDIAC DEVICE HOME CHECK - REMOTE UNSCHEDULED: HCPCS | Performed by: INTERNAL MEDICINE

## 2019-11-14 PROCEDURE — 93294 REM INTERROG EVL PM/LDLS PM: CPT | Performed by: INTERNAL MEDICINE

## 2019-11-18 ENCOUNTER — HOSPITAL ENCOUNTER (OUTPATIENT)
Dept: LAB | Facility: HOSPITAL | Age: 84
Discharge: HOME | End: 2019-11-18
Attending: NURSE PRACTITIONER
Payer: MEDICARE

## 2019-11-18 ENCOUNTER — ANTI-COAG (OUTPATIENT)
Dept: CARDIOLOGY | Age: 84
End: 2019-11-18

## 2019-11-18 ENCOUNTER — OFFICE VISIT (OUTPATIENT)
Dept: CARDIOLOGY | Age: 84
End: 2019-11-18

## 2019-11-18 VITALS
SYSTOLIC BLOOD PRESSURE: 110 MMHG | DIASTOLIC BLOOD PRESSURE: 58 MMHG | BODY MASS INDEX: 27.99 KG/M2 | HEIGHT: 65 IN | WEIGHT: 168 LBS | HEART RATE: 82 BPM

## 2019-11-18 DIAGNOSIS — Z79.01 LONG TERM (CURRENT) USE OF ANTICOAGULANTS: ICD-10-CM

## 2019-11-18 DIAGNOSIS — Z95.0 PACEMAKER: Primary | ICD-10-CM

## 2019-11-18 DIAGNOSIS — I48.0 PAROXYSMAL ATRIAL FIBRILLATION (CMD): ICD-10-CM

## 2019-11-18 DIAGNOSIS — I48.91 ATRIAL FIBRILLATION WITH RAPID VENTRICULAR RESPONSE (HCC): ICD-10-CM

## 2019-11-18 LAB — INR PPP: 4.81

## 2019-11-18 PROCEDURE — 99213 OFFICE O/P EST LOW 20 MIN: CPT | Performed by: NURSE PRACTITIONER

## 2019-11-18 PROCEDURE — 93000 ELECTROCARDIOGRAM COMPLETE: CPT | Performed by: NURSE PRACTITIONER

## 2019-11-18 PROCEDURE — 36415 COLL VENOUS BLD VENIPUNCTURE: CPT | Performed by: NURSE PRACTITIONER

## 2019-11-18 PROCEDURE — 85610 PROTHROMBIN TIME: CPT | Performed by: NURSE PRACTITIONER

## 2019-11-18 RX ORDER — DIGOXIN 125 MCG
125 TABLET ORAL DAILY
Qty: 30 TABLET | Refills: 6 | Status: SHIPPED | OUTPATIENT
Start: 2019-11-18 | End: 2020-09-11

## 2019-11-18 RX ORDER — AMIODARONE HYDROCHLORIDE 400 MG/1
400 TABLET ORAL DAILY
COMMUNITY
End: 2019-11-18 | Stop reason: ALTCHOICE

## 2019-11-18 RX ORDER — L. ACIDOPHILUS/PECTIN, CITRUS 25MM-100MG
1 TABLET ORAL DAILY
COMMUNITY

## 2019-11-18 RX ORDER — GABAPENTIN 300 MG/1
300 CAPSULE ORAL 2 TIMES DAILY
COMMUNITY

## 2019-11-18 RX ORDER — DIGOXIN 125 MCG
125 TABLET ORAL DAILY
Qty: 30 TABLET | Refills: 6 | Status: SHIPPED | OUTPATIENT
Start: 2019-11-18 | End: 2019-11-18 | Stop reason: SDUPTHER

## 2019-11-18 ASSESSMENT — PATIENT HEALTH QUESTIONNAIRE - PHQ9
SUM OF ALL RESPONSES TO PHQ9 QUESTIONS 1 AND 2: 0
SUM OF ALL RESPONSES TO PHQ9 QUESTIONS 1 AND 2: 0
1. LITTLE INTEREST OR PLEASURE IN DOING THINGS: NOT AT ALL
2. FEELING DOWN, DEPRESSED OR HOPELESS: NOT AT ALL

## 2019-11-18 ASSESSMENT — ENCOUNTER SYMPTOMS
GASTROINTESTINAL NEGATIVE: 1
DIAPHORESIS: 0
DECREASED APPETITE: 0
NEUROLOGICAL NEGATIVE: 1
WEIGHT GAIN: 0
SYNCOPE: 0
SHORTNESS OF BREATH: 0
WEIGHT LOSS: 0

## 2019-11-19 ENCOUNTER — OFFICE VISIT (OUTPATIENT)
Dept: INTERNAL MEDICINE CLINIC | Facility: CLINIC | Age: 84
End: 2019-11-19
Payer: COMMERCIAL

## 2019-11-19 VITALS
WEIGHT: 169.81 LBS | TEMPERATURE: 98 F | DIASTOLIC BLOOD PRESSURE: 80 MMHG | OXYGEN SATURATION: 97 % | BODY MASS INDEX: 28 KG/M2 | HEART RATE: 87 BPM | SYSTOLIC BLOOD PRESSURE: 135 MMHG | RESPIRATION RATE: 18 BRPM

## 2019-11-19 DIAGNOSIS — T78.40XD ALLERGIC DISORDER, SUBSEQUENT ENCOUNTER: Primary | ICD-10-CM

## 2019-11-19 PROCEDURE — 99213 OFFICE O/P EST LOW 20 MIN: CPT | Performed by: INTERNAL MEDICINE

## 2019-11-19 RX ORDER — CETIRIZINE HYDROCHLORIDE 10 MG/1
10 TABLET ORAL DAILY
Qty: 100 TABLET | Refills: 0 | Status: SHIPPED | OUTPATIENT
Start: 2019-11-19 | End: 2020-01-03

## 2019-11-19 RX ORDER — LISINOPRIL 40 MG/1
40 TABLET ORAL DAILY
Status: ON HOLD | COMMUNITY
Start: 2019-11-11 | End: 2020-06-03

## 2019-11-19 RX ORDER — DIGOXIN 125 MCG
125 TABLET ORAL EVERY MORNING
COMMUNITY
Start: 2019-11-18

## 2019-11-19 NOTE — PROGRESS NOTES
Patient complains of a rash present for 24 hours. Did have medication change with her cardiologist.  If there is a stopped her amlodipine but started digoxin. She denies symptoms of infection. No sore throat earaches facial discomfort.   No cough shortne

## 2019-11-22 ENCOUNTER — OFFICE VISIT (OUTPATIENT)
Dept: INTERNAL MEDICINE CLINIC | Facility: CLINIC | Age: 84
End: 2019-11-22
Payer: COMMERCIAL

## 2019-11-22 ENCOUNTER — ANTI-COAG (OUTPATIENT)
Dept: CARDIOLOGY | Age: 84
End: 2019-11-22

## 2019-11-22 VITALS
SYSTOLIC BLOOD PRESSURE: 156 MMHG | RESPIRATION RATE: 18 BRPM | TEMPERATURE: 98 F | BODY MASS INDEX: 27.99 KG/M2 | HEART RATE: 68 BPM | WEIGHT: 168 LBS | HEIGHT: 65 IN | DIASTOLIC BLOOD PRESSURE: 68 MMHG | OXYGEN SATURATION: 98 %

## 2019-11-22 DIAGNOSIS — N39.0 URINARY TRACT INFECTION WITHOUT HEMATURIA, SITE UNSPECIFIED: ICD-10-CM

## 2019-11-22 DIAGNOSIS — I48.0 PAROXYSMAL ATRIAL FIBRILLATION (CMD): ICD-10-CM

## 2019-11-22 DIAGNOSIS — L27.0 DRUG RASH: Primary | ICD-10-CM

## 2019-11-22 DIAGNOSIS — Z79.01 LONG TERM (CURRENT) USE OF ANTICOAGULANTS: ICD-10-CM

## 2019-11-22 DIAGNOSIS — I48.91 ATRIAL FIBRILLATION WITH RAPID VENTRICULAR RESPONSE (HCC): ICD-10-CM

## 2019-11-22 LAB — INR PPP: 3.9

## 2019-11-22 PROCEDURE — 85610 PROTHROMBIN TIME: CPT | Performed by: INTERNAL MEDICINE

## 2019-11-22 PROCEDURE — 99214 OFFICE O/P EST MOD 30 MIN: CPT | Performed by: INTERNAL MEDICINE

## 2019-11-22 RX ORDER — FAMOTIDINE 20 MG/1
20 TABLET ORAL 2 TIMES DAILY
Qty: 30 TABLET | Refills: 0 | Status: SHIPPED | OUTPATIENT
Start: 2019-11-22 | End: 2019-12-04

## 2019-11-22 NOTE — PROGRESS NOTES
Patient presents with:  Rash: abdomen and down both legs that started on 11/06/19  Lab: INR      HPI: Luis has a rash on her torso and down the front of her legs. \"It comes and goes. \"  It is not pruritic.  She has stopped Keflex and amiodarone by cardiol without esophagitis     Age-related osteoporosis with current pathological fracture with routine healing     Back pain     PCO (posterior capsular opacification)     Asthma with COPD (chronic obstructive pulmonary disease) (HCC)     Atrial fibrillation wit 65\"   Wt 168 lb (76.2 kg)   SpO2 98%   BMI 27.96 kg/m²   Alert, in no distress  Lungs: Clear, no rales,rhonchi  Heart: S1S2 regular, no murmur, ectopy  GI: soft, nontender, no masses, BS +  Exts: no edema  Skin: erythematous macular/papular rash c/w drug-

## 2019-11-25 ENCOUNTER — NURSE ONLY (OUTPATIENT)
Dept: INTERNAL MEDICINE CLINIC | Facility: CLINIC | Age: 84
End: 2019-11-25
Payer: COMMERCIAL

## 2019-11-25 ENCOUNTER — ANTI-COAG (OUTPATIENT)
Dept: CARDIOLOGY | Age: 84
End: 2019-11-25

## 2019-11-25 DIAGNOSIS — I48.20 CHRONIC ATRIAL FIBRILLATION (HCC): Primary | ICD-10-CM

## 2019-11-25 DIAGNOSIS — Z79.01 LONG TERM (CURRENT) USE OF ANTICOAGULANTS: ICD-10-CM

## 2019-11-25 DIAGNOSIS — I48.0 PAROXYSMAL ATRIAL FIBRILLATION (CMD): ICD-10-CM

## 2019-11-25 LAB — INR PPP: 2.6

## 2019-11-25 PROCEDURE — 85610 PROTHROMBIN TIME: CPT

## 2019-11-29 LAB — INR PPP: 2.7

## 2019-12-02 ENCOUNTER — ANTI-COAG (OUTPATIENT)
Dept: CARDIOLOGY | Age: 84
End: 2019-12-02

## 2019-12-02 DIAGNOSIS — Z79.01 LONG TERM (CURRENT) USE OF ANTICOAGULANTS: ICD-10-CM

## 2019-12-02 DIAGNOSIS — I48.0 PAROXYSMAL ATRIAL FIBRILLATION (CMD): ICD-10-CM

## 2019-12-04 ENCOUNTER — EXTERNAL RECORD (OUTPATIENT)
Dept: HEALTH INFORMATION MANAGEMENT | Facility: OTHER | Age: 84
End: 2019-12-04

## 2019-12-04 ENCOUNTER — ANTI-COAG (OUTPATIENT)
Dept: CARDIOLOGY | Age: 84
End: 2019-12-04

## 2019-12-04 ENCOUNTER — OFFICE VISIT (OUTPATIENT)
Dept: INTERNAL MEDICINE CLINIC | Facility: CLINIC | Age: 84
End: 2019-12-04
Payer: COMMERCIAL

## 2019-12-04 VITALS
TEMPERATURE: 98 F | WEIGHT: 164.38 LBS | RESPIRATION RATE: 18 BRPM | OXYGEN SATURATION: 94 % | DIASTOLIC BLOOD PRESSURE: 64 MMHG | BODY MASS INDEX: 28 KG/M2 | SYSTOLIC BLOOD PRESSURE: 130 MMHG | HEART RATE: 80 BPM

## 2019-12-04 DIAGNOSIS — I48.91 ATRIAL FIBRILLATION WITH RAPID VENTRICULAR RESPONSE (HCC): ICD-10-CM

## 2019-12-04 DIAGNOSIS — Z79.01 CURRENT USE OF LONG TERM ANTICOAGULATION: ICD-10-CM

## 2019-12-04 DIAGNOSIS — I48.0 PAROXYSMAL ATRIAL FIBRILLATION (CMD): ICD-10-CM

## 2019-12-04 DIAGNOSIS — L27.0 DRUG RASH: Primary | ICD-10-CM

## 2019-12-04 DIAGNOSIS — Z79.01 LONG TERM (CURRENT) USE OF ANTICOAGULANTS: ICD-10-CM

## 2019-12-04 LAB
INR PPP: 3.3
RETINOPATHY PRESENT (RTP): NORMAL

## 2019-12-04 PROCEDURE — 85610 PROTHROMBIN TIME: CPT | Performed by: INTERNAL MEDICINE

## 2019-12-04 PROCEDURE — 99214 OFFICE O/P EST MOD 30 MIN: CPT | Performed by: INTERNAL MEDICINE

## 2019-12-04 NOTE — PATIENT INSTRUCTIONS
Call the cardiologist and find out if you are due to have cardioversion. Try to see if you can tell that your heart is racing. We stopped the Famotidine.

## 2019-12-07 NOTE — PROGRESS NOTES
Patient presents with:  Rash: follow up for body rash. rash is gone per patient  Lab: INR coumadin clinic Dr Juan Rod       HPI: [facility] is here for f/u of rash thought to be due to amiodarone or cephalosporin. The rash is completely resolved.   She ma impairment        Patient Active Problem List:     Pacemaker - M.  Perfecto     Diarrhea     JAZZ on CPAP     Current use of long term anticoagulation     Chronic elbow pain, right     Anemia of chronic disease     Spinal stenosis of lumbar region without neur Resp 18   Wt 164 lb 6.4 oz (74.6 kg)   SpO2 94%   BMI 28.22 kg/m²   Alert, in no distress, looks good for a 80year old.     Lungs: Clear, no rales,rhonchi  Heart: S1S2 irregularly-irregular, no murmur, ectopy  GI: soft, nontender, no masses, BS +  Exts: n

## 2019-12-10 ENCOUNTER — ANTI-COAG (OUTPATIENT)
Dept: CARDIOLOGY | Age: 84
End: 2019-12-10

## 2019-12-10 ENCOUNTER — TELEPHONE (OUTPATIENT)
Dept: OPHTHALMOLOGY | Age: 84
End: 2019-12-10

## 2019-12-10 ENCOUNTER — NURSE ONLY (OUTPATIENT)
Dept: INTERNAL MEDICINE CLINIC | Facility: CLINIC | Age: 84
End: 2019-12-10
Payer: COMMERCIAL

## 2019-12-10 DIAGNOSIS — I48.20 CHRONIC ATRIAL FIBRILLATION (HCC): Primary | ICD-10-CM

## 2019-12-10 DIAGNOSIS — Z79.01 LONG TERM (CURRENT) USE OF ANTICOAGULANTS: ICD-10-CM

## 2019-12-10 DIAGNOSIS — I48.0 PAROXYSMAL ATRIAL FIBRILLATION (CMD): ICD-10-CM

## 2019-12-10 LAB — INR PPP: 3.6

## 2019-12-10 PROCEDURE — 85610 PROTHROMBIN TIME: CPT | Performed by: INTERNAL MEDICINE

## 2019-12-11 ENCOUNTER — OFFICE VISIT (OUTPATIENT)
Dept: INTERNAL MEDICINE CLINIC | Facility: CLINIC | Age: 84
End: 2019-12-11
Payer: COMMERCIAL

## 2019-12-11 ENCOUNTER — TELEPHONE (OUTPATIENT)
Dept: CARDIOLOGY | Age: 84
End: 2019-12-11

## 2019-12-11 VITALS
BODY MASS INDEX: 28 KG/M2 | SYSTOLIC BLOOD PRESSURE: 138 MMHG | HEART RATE: 72 BPM | WEIGHT: 163 LBS | TEMPERATURE: 97 F | DIASTOLIC BLOOD PRESSURE: 68 MMHG | RESPIRATION RATE: 18 BRPM | OXYGEN SATURATION: 95 %

## 2019-12-11 DIAGNOSIS — D64.9 ANEMIA, UNSPECIFIED TYPE: ICD-10-CM

## 2019-12-11 DIAGNOSIS — R53.83 OTHER FATIGUE: Primary | ICD-10-CM

## 2019-12-11 DIAGNOSIS — Z79.01 CURRENT USE OF LONG TERM ANTICOAGULATION: ICD-10-CM

## 2019-12-11 DIAGNOSIS — I48.91 ATRIAL FIBRILLATION WITH RAPID VENTRICULAR RESPONSE (HCC): ICD-10-CM

## 2019-12-11 DIAGNOSIS — I48.0 PAROXYSMAL ATRIAL FIBRILLATION (CMD): Primary | ICD-10-CM

## 2019-12-11 DIAGNOSIS — R74.8 ELEVATED LIVER ENZYMES: ICD-10-CM

## 2019-12-11 PROCEDURE — 80162 ASSAY OF DIGOXIN TOTAL: CPT | Performed by: INTERNAL MEDICINE

## 2019-12-11 PROCEDURE — 99214 OFFICE O/P EST MOD 30 MIN: CPT | Performed by: INTERNAL MEDICINE

## 2019-12-11 PROCEDURE — 80053 COMPREHEN METABOLIC PANEL: CPT | Performed by: INTERNAL MEDICINE

## 2019-12-11 PROCEDURE — 83550 IRON BINDING TEST: CPT | Performed by: INTERNAL MEDICINE

## 2019-12-11 PROCEDURE — 85025 COMPLETE CBC W/AUTO DIFF WBC: CPT | Performed by: INTERNAL MEDICINE

## 2019-12-11 PROCEDURE — 82607 VITAMIN B-12: CPT | Performed by: INTERNAL MEDICINE

## 2019-12-11 PROCEDURE — 83540 ASSAY OF IRON: CPT | Performed by: INTERNAL MEDICINE

## 2019-12-11 NOTE — PATIENT INSTRUCTIONS
Check to see if you are taking Dicyclomine 4 times a day for stomach cramping. If so, that can be sedating. If not stop that.       If you are taking Certrizine change to Claritin generic 10 mg or Fexofenadine (generic Allegra) 160 mg once a day in the mor

## 2019-12-11 NOTE — PROGRESS NOTES
Patient presents with:  Fatigue: patient is complainning of sever fatigue. she states that she is tired all the time and just does not feel good  Lab: CBC/CMP/IRON/TIBC/VitB12   Patient presents with:  Fatigue: patient is complainning of sever fatigue.  she unspecified    • Fatigue    • Food intolerance    • Frequent urination    • Hearing impairment    • Hearing loss    • Hemorrhoids    • History of depression    • Hypertension    • Irregular bowel habits    • Leg swelling    • Lumbar facet arthropathy 2/10/ Tab Take 1 tablet by mouth daily. • Calcium Carbonate-Vitamin D 600-400 MG-UNIT Oral Tab Take 1 tablet by mouth daily. • acetaminophen 325 MG Oral Tab Take 650 mg by mouth every 6 (six) hours as needed for Pain.          [address]:   Family History   Pr the morning. If you don't know why you are taking it, stop it. Await call from Dr. Dora De La Fuente. Return in about 1 week (around 12/18/2019). This visit lasted >26 minutes.     Leopoldo Carrie, MD

## 2019-12-12 ENCOUNTER — TELEPHONE (OUTPATIENT)
Dept: INTERNAL MEDICINE CLINIC | Facility: CLINIC | Age: 84
End: 2019-12-12

## 2019-12-12 NOTE — TELEPHONE ENCOUNTER
----- Message from Stephanie Ndiaye MD sent at 12/11/2019 10:57 PM CST -----  Please let pt know she is not anemic, she has a normal vit B12 level =, her electrolytes are normal but she now has elevated liver enzymes.   We are doing more blood work to check t

## 2019-12-13 ENCOUNTER — TELEPHONE (OUTPATIENT)
Dept: INTERNAL MEDICINE CLINIC | Facility: CLINIC | Age: 84
End: 2019-12-13

## 2019-12-13 NOTE — TELEPHONE ENCOUNTER
[facility] and she was given her blood test results. She was also told that Dr Zulma Beach wanted to have more blood work done which was a hepatitis B profile.  This was scheduled on Monday 12/16 at 2:45pm    Patient also stated that Dr Rosie Bird told her she did

## 2019-12-13 NOTE — TELEPHONE ENCOUNTER
----- Message from Margret Michael MD sent at 12/12/2019  7:26 PM CST -----  Ordered hepatitis B profile and digoxin level. Lab informed that the digoxin level can be run but she needs to be drawn again for the Hepatitis B profile.   The order is in the [place]

## 2019-12-16 ENCOUNTER — TELEPHONE (OUTPATIENT)
Dept: CARDIOLOGY | Age: 84
End: 2019-12-16

## 2019-12-16 ENCOUNTER — NURSE ONLY (OUTPATIENT)
Dept: INTERNAL MEDICINE CLINIC | Facility: CLINIC | Age: 84
End: 2019-12-16
Payer: COMMERCIAL

## 2019-12-16 DIAGNOSIS — R74.8 ELEVATED LIVER ENZYMES: Primary | ICD-10-CM

## 2019-12-16 PROCEDURE — 87340 HEPATITIS B SURFACE AG IA: CPT | Performed by: INTERNAL MEDICINE

## 2019-12-16 PROCEDURE — 86706 HEP B SURFACE ANTIBODY: CPT | Performed by: INTERNAL MEDICINE

## 2019-12-16 PROCEDURE — 86704 HEP B CORE ANTIBODY TOTAL: CPT | Performed by: INTERNAL MEDICINE

## 2019-12-16 NOTE — HISTORICAL OFFICE NOTE
ED Consult to Cardiology [[id number]] ordered by Sara Gould MD at 11/02/19 8196          Signed             Show:Clear all  [x]Manual[x]Template[]Copied    Added by:  John Bull MD    []Jean for details  [facility]  Report of • Macular degeneration - [facility] 5/5/2011   • Night sweats     • Pacemaker     • Reflux     • S/P colon resection / Colonoscopy (10/12) recheck - ANJALI Spencer 5/5/2011   • S/P laminectomy 12/23/2013   • Sleep apnea       cpap   • Visual reports that she quit smoking about 47 years ago. She smoked 0.50 packs per day. She has never used smokeless tobacco. She reports current alcohol use of about 2.0 standard drinks of alcohol per week.  She reports that she does not use drugs.     Allergies Blood pressure 96/61, pulse 113, temperature 98.3 °F (36.8 °C), temperature source Oral, resp. rate 25, weight 160 lb, SpO2 96 %.   Temp (24hrs), Av.3 °F (36.8 °C), Min:98 °F (36.7 °C), Max:98.6 °F (37 °C)     Wt Readings from Last 3 Encounters:  /

## 2019-12-17 ENCOUNTER — HOSPITAL ENCOUNTER (OUTPATIENT)
Dept: INTERVENTIONAL RADIOLOGY/VASCULAR | Facility: HOSPITAL | Age: 84
Discharge: HOME | End: 2019-12-17
Attending: INTERNAL MEDICINE | Admitting: INTERNAL MEDICINE
Payer: MEDICARE

## 2019-12-17 VITALS
OXYGEN SATURATION: 95 % | HEART RATE: 76 BPM | DIASTOLIC BLOOD PRESSURE: 90 MMHG | BODY MASS INDEX: 27.83 KG/M2 | SYSTOLIC BLOOD PRESSURE: 173 MMHG | RESPIRATION RATE: 15 BRPM | HEIGHT: 64 IN | WEIGHT: 163 LBS | TEMPERATURE: 98 F

## 2019-12-17 DIAGNOSIS — I48.91 ATRIAL FIBRILLATION, UNSPECIFIED TYPE (HCC): ICD-10-CM

## 2019-12-17 LAB — INR PPP: 1.67

## 2019-12-17 PROCEDURE — 85610 PROTHROMBIN TIME: CPT | Performed by: INTERNAL MEDICINE

## 2019-12-17 PROCEDURE — 36415 COLL VENOUS BLD VENIPUNCTURE: CPT

## 2019-12-17 PROCEDURE — 85610 PROTHROMBIN TIME: CPT

## 2019-12-17 RX ORDER — SODIUM CHLORIDE 9 MG/ML
INJECTION, SOLUTION INTRAVENOUS CONTINUOUS
Status: DISCONTINUED | OUTPATIENT
Start: 2019-12-17 | End: 2019-12-17

## 2019-12-17 NOTE — PROGRESS NOTES
Patient here @ [phone] for cardioversion with Dr. Carissa Post. Family @ bedside. Preop checklist completed. POC INR 1.7. Result texted to Dr. Carissa Post. Blood sent to recheck INR in lab after establishing PIV. Dr. Carissa Post @ bedside to talk with patient and family.

## 2019-12-18 ENCOUNTER — APPOINTMENT (OUTPATIENT)
Dept: CARDIOLOGY | Age: 84
End: 2019-12-18

## 2019-12-18 ENCOUNTER — ANTI-COAG (OUTPATIENT)
Dept: CARDIOLOGY | Age: 84
End: 2019-12-18

## 2019-12-18 DIAGNOSIS — I48.0 PAROXYSMAL ATRIAL FIBRILLATION (CMD): ICD-10-CM

## 2019-12-18 DIAGNOSIS — Z79.01 LONG TERM (CURRENT) USE OF ANTICOAGULANTS: ICD-10-CM

## 2019-12-19 ENCOUNTER — ANTI-COAG (OUTPATIENT)
Dept: CARDIOLOGY | Age: 84
End: 2019-12-19

## 2019-12-19 ENCOUNTER — TELEPHONE (OUTPATIENT)
Dept: CARDIOLOGY | Age: 84
End: 2019-12-19

## 2019-12-19 DIAGNOSIS — I48.0 PAROXYSMAL ATRIAL FIBRILLATION (CMD): ICD-10-CM

## 2019-12-19 DIAGNOSIS — Z86.79 HISTORY OF ATRIAL FIBRILLATION: ICD-10-CM

## 2019-12-19 DIAGNOSIS — Z79.01 LONG TERM (CURRENT) USE OF ANTICOAGULANTS: ICD-10-CM

## 2019-12-19 DIAGNOSIS — I48.0 PAROXYSMAL ATRIAL FIBRILLATION (CMD): Primary | ICD-10-CM

## 2019-12-27 ENCOUNTER — TELEPHONE (OUTPATIENT)
Dept: INTERNAL MEDICINE CLINIC | Facility: CLINIC | Age: 84
End: 2019-12-27

## 2020-01-01 ENCOUNTER — EXTERNAL RECORD (OUTPATIENT)
Dept: HEALTH INFORMATION MANAGEMENT | Facility: OTHER | Age: 85
End: 2020-01-01

## 2020-01-02 ENCOUNTER — OFFICE VISIT (OUTPATIENT)
Dept: INTERNAL MEDICINE CLINIC | Facility: CLINIC | Age: 85
End: 2020-01-02
Payer: COMMERCIAL

## 2020-01-02 VITALS
TEMPERATURE: 98 F | RESPIRATION RATE: 18 BRPM | DIASTOLIC BLOOD PRESSURE: 60 MMHG | BODY MASS INDEX: 27 KG/M2 | WEIGHT: 159 LBS | OXYGEN SATURATION: 97 % | SYSTOLIC BLOOD PRESSURE: 136 MMHG | HEART RATE: 78 BPM

## 2020-01-02 DIAGNOSIS — R53.83 FATIGUE, UNSPECIFIED TYPE: Primary | ICD-10-CM

## 2020-01-02 DIAGNOSIS — R06.02 SOB (SHORTNESS OF BREATH): ICD-10-CM

## 2020-01-02 DIAGNOSIS — D17.21 LIPOMA OF RIGHT UPPER EXTREMITY: ICD-10-CM

## 2020-01-02 PROCEDURE — 99214 OFFICE O/P EST MOD 30 MIN: CPT | Performed by: INTERNAL MEDICINE

## 2020-01-02 RX ORDER — GABAPENTIN 300 MG/1
CAPSULE ORAL
Qty: 630 CAPSULE | OUTPATIENT
Start: 2020-01-02

## 2020-01-02 NOTE — PROGRESS NOTES
Problems been complaining of fatigue states is been present for a couple of months. She did have a recent evaluation including hepatitis profile CBC CMP all unremarkable. Patient also complains of shortness of breath.   No orthopnea PND or pedal edema no

## 2020-01-03 ENCOUNTER — OFFICE VISIT (OUTPATIENT)
Dept: INTERNAL MEDICINE CLINIC | Facility: CLINIC | Age: 85
End: 2020-01-03
Payer: COMMERCIAL

## 2020-01-03 VITALS
HEIGHT: 64 IN | WEIGHT: 159.69 LBS | OXYGEN SATURATION: 96 % | TEMPERATURE: 97 F | SYSTOLIC BLOOD PRESSURE: 136 MMHG | RESPIRATION RATE: 18 BRPM | HEART RATE: 74 BPM | BODY MASS INDEX: 27.26 KG/M2 | DIASTOLIC BLOOD PRESSURE: 74 MMHG

## 2020-01-03 DIAGNOSIS — Z79.01 CURRENT USE OF LONG TERM ANTICOAGULATION: ICD-10-CM

## 2020-01-03 DIAGNOSIS — R74.8 ELEVATED LIVER ENZYMES: ICD-10-CM

## 2020-01-03 DIAGNOSIS — R03.0 ELEVATED BLOOD PRESSURE READING: ICD-10-CM

## 2020-01-03 DIAGNOSIS — R53.83 OTHER FATIGUE: ICD-10-CM

## 2020-01-03 DIAGNOSIS — I48.91 ATRIAL FIBRILLATION WITH RAPID VENTRICULAR RESPONSE (HCC): ICD-10-CM

## 2020-01-03 DIAGNOSIS — Z09 ENCOUNTER FOR EXAMINATION FOLLOWING TREATMENT AT HOSPITAL: Primary | ICD-10-CM

## 2020-01-03 DIAGNOSIS — I27.20 PULMONARY HYPERTENSION (HCC): ICD-10-CM

## 2020-01-03 DIAGNOSIS — I48.91 ATRIAL FIBRILLATION, UNSPECIFIED TYPE (HCC): ICD-10-CM

## 2020-01-03 PROBLEM — J42 CHRONIC BRONCHITIS (HCC): Status: ACTIVE | Noted: 2020-01-03

## 2020-01-03 PROBLEM — R19.8 ALTERNATING CONSTIPATION AND DIARRHEA: Status: ACTIVE | Noted: 2020-01-03

## 2020-01-03 PROCEDURE — 99214 OFFICE O/P EST MOD 30 MIN: CPT | Performed by: INTERNAL MEDICINE

## 2020-01-03 NOTE — PROGRESS NOTES
Patient presents with:  TCM (Transition Of Care Management)       HPI: [facility] is here for TCM visit. She was seen by Dr. Uriel Dial yesterday at the request of her son for fatigue. She was reassured given normal blood work done in [facility] in [facility].       She was seen in [facility] Ophthalmologist 5/5/2011   • Night sweats    • Pacemaker    • Reflux    • S/P colon resection / Colonoscopy (10/12) recheck - ANJALI Young 5/5/2011   • S/P laminectomy 12/23/2013   • Sleep apnea     cpap   • Spinal stenosis of lumbar region wit Father          Physical Exam:   /74 (BP Location: Left arm, Patient Position: Sitting, Cuff Size: adult)   Pulse 74   Temp 97 °F (36.1 °C) (Oral)   Resp 18   Ht 64\"   Wt 159 lb 11.2 oz (72.4 kg)   SpO2 96%   BMI 27.41 kg/m²   Alert, in no distress,

## 2020-01-03 NOTE — PATIENT INSTRUCTIONS
Drink 3-4 glasses of coffee, tea, juice, water, etc before your blood test on Monday afternoon. We will call you with the results. Follow up with Dr. Richard Horta on the 17th.

## 2020-01-06 ENCOUNTER — NURSE ONLY (OUTPATIENT)
Dept: INTERNAL MEDICINE CLINIC | Facility: CLINIC | Age: 85
End: 2020-01-06
Payer: COMMERCIAL

## 2020-01-06 DIAGNOSIS — R74.8 ELEVATED LIVER ENZYMES: ICD-10-CM

## 2020-01-06 LAB
ALBUMIN SERPL-MCNC: 3.4 G/DL (ref 3.4–5)
ALBUMIN/GLOB SERPL: 1 {RATIO} (ref 1–2)
ALP LIVER SERPL-CCNC: 62 U/L (ref 55–142)
ALT SERPL-CCNC: 34 U/L (ref 13–56)
ANION GAP SERPL CALC-SCNC: 7 MMOL/L (ref 0–18)
AST SERPL-CCNC: 35 U/L (ref 15–37)
BILIRUB SERPL-MCNC: 0.5 MG/DL (ref 0.1–2)
BUN BLD-MCNC: 14 MG/DL (ref 7–18)
BUN/CREAT SERPL: 14.7 (ref 10–20)
CALCIUM BLD-MCNC: 8.9 MG/DL (ref 8.5–10.1)
CHLORIDE SERPL-SCNC: 101 MMOL/L (ref 98–112)
CO2 SERPL-SCNC: 25 MMOL/L (ref 21–32)
CREAT BLD-MCNC: 0.95 MG/DL (ref 0.55–1.02)
GLOBULIN PLAS-MCNC: 3.5 G/DL (ref 2.8–4.4)
GLUCOSE BLD-MCNC: 120 MG/DL (ref 70–99)
M PROTEIN MFR SERPL ELPH: 6.9 G/DL (ref 6.4–8.2)
OSMOLALITY SERPL CALC.SUM OF ELEC: 278 MOSM/KG (ref 275–295)
PATIENT FASTING Y/N/NP: NO
POTASSIUM SERPL-SCNC: 4.3 MMOL/L (ref 3.5–5.1)
SODIUM SERPL-SCNC: 133 MMOL/L (ref 136–145)

## 2020-01-06 PROCEDURE — 80053 COMPREHEN METABOLIC PANEL: CPT | Performed by: INTERNAL MEDICINE

## 2020-01-08 RX ORDER — GABAPENTIN 300 MG/1
CAPSULE ORAL
Qty: 90 CAPSULE | Refills: 5 | Status: ON HOLD | OUTPATIENT
Start: 2020-01-08 | End: 2020-02-03

## 2020-01-09 NOTE — TELEPHONE ENCOUNTER
I think Sergey Wallace is on too much Gabapentin for her age. Can you ask her to decrease it to two caps per day-8 am and 4 pm?  If she does not get more pain after one week decrease to one cap per day.     It may help with her feeling of fatigue if she can decre

## 2020-01-16 NOTE — HISTORICAL OFFICE NOTE
Progress Notes  - documented in this encounter  Karen Daniels CNP - 2019 1:30 PM CST  Formatting of this note might be different from the original.  [facility]  Cardiovascular Clinic Note    Maya Jaffe  : 10/25/1927  PCP: Jose Barrios surgical history        Family Hx:  Family History   Problem Relation Age of Onset   • Coronary Artery Disease Neg Hx   Negative for premature CAD   • Aneurysm Neg Hx   Negative for AAA     Social Hx:  she reports that she has never smoked.  She has Negative. Gastrointestinal: Negative. Neurological: Negative.      Physical Exam:  Visit Vitals  /58   Pulse 82   Ht 5' 4.5\" (1.638 m)   Wt 76.2 kg (168 lb)   BMI 28.39 kg/m²     Physical Exam   Constitutional: She is oriented to person, place, a

## 2020-01-17 ENCOUNTER — HOSPITAL ENCOUNTER (OUTPATIENT)
Dept: INTERVENTIONAL RADIOLOGY/VASCULAR | Facility: HOSPITAL | Age: 85
Discharge: HOME | End: 2020-01-17
Attending: INTERNAL MEDICINE | Admitting: INTERNAL MEDICINE
Payer: MEDICARE

## 2020-01-17 VITALS
RESPIRATION RATE: 14 BRPM | SYSTOLIC BLOOD PRESSURE: 177 MMHG | OXYGEN SATURATION: 100 % | TEMPERATURE: 100 F | DIASTOLIC BLOOD PRESSURE: 76 MMHG | HEART RATE: 63 BPM

## 2020-01-17 DIAGNOSIS — I48.91 ATRIAL FIBRILLATION, UNSPECIFIED TYPE (HCC): ICD-10-CM

## 2020-01-17 PROCEDURE — 92960 CARDIOVERSION ELECTRIC EXT: CPT | Performed by: INTERNAL MEDICINE

## 2020-01-17 PROCEDURE — 93010 ELECTROCARDIOGRAM REPORT: CPT | Performed by: INTERNAL MEDICINE

## 2020-01-17 PROCEDURE — 5A2204Z RESTORATION OF CARDIAC RHYTHM, SINGLE: ICD-10-PCS | Performed by: INTERNAL MEDICINE

## 2020-01-17 PROCEDURE — 93005 ELECTROCARDIOGRAM TRACING: CPT

## 2020-01-17 PROCEDURE — 92960 CARDIOVERSION ELECTRIC EXT: CPT

## 2020-01-17 RX ORDER — SODIUM CHLORIDE 9 MG/ML
INJECTION, SOLUTION INTRAVENOUS CONTINUOUS
Status: DISCONTINUED | OUTPATIENT
Start: 2020-01-17 | End: 2020-01-17

## 2020-01-17 RX ADMIN — SODIUM CHLORIDE: 9 INJECTION, SOLUTION INTRAVENOUS at 14:01:00

## 2020-01-17 RX ADMIN — Medication 40 MG: at 13:50:00

## 2020-01-17 RX ADMIN — SODIUM CHLORIDE: 9 INJECTION, SOLUTION INTRAVENOUS at 14:00:00

## 2020-01-17 NOTE — PROGRESS NOTES
Pt s/p successful cardioversion in holding with Dr. Carissa Post. Pt arrived afib and after cardioversion is in NSR. EKG done. Pacemaker interrogated by Dr. Carissa Post. Discharge instructions reviewed with pt and pt family. Copy given. IV removed.  Pt discharged home

## 2020-01-20 LAB
ATRIAL RATE: 72 BPM
P-R INTERVAL: 114 MS
Q-T INTERVAL: 358 MS
QRS DURATION: 88 MS
QTC CALCULATION (BEZET): 412 MS
R AXIS: 5 DEGREES
T AXIS: -45 DEGREES
VENTRICULAR RATE: 80 BPM

## 2020-01-27 ENCOUNTER — OFFICE VISIT (OUTPATIENT)
Dept: CARDIOLOGY | Age: 85
End: 2020-01-27

## 2020-01-27 DIAGNOSIS — Z95.0 PACEMAKER: Primary | ICD-10-CM

## 2020-01-27 DIAGNOSIS — I48.0 PAROXYSMAL ATRIAL FIBRILLATION (CMD): ICD-10-CM

## 2020-01-27 PROCEDURE — 99214 OFFICE O/P EST MOD 30 MIN: CPT | Performed by: INTERNAL MEDICINE

## 2020-01-27 RX ORDER — FLECAINIDE ACETATE 50 MG/1
50 TABLET ORAL 2 TIMES DAILY
Qty: 60 TABLET | Refills: 3 | Status: SHIPPED | OUTPATIENT
Start: 2020-01-27 | End: 2020-01-06 | Stop reason: DRUGHIGH

## 2020-01-28 PROCEDURE — 93294 REM INTERROG EVL PM/LDLS PM: CPT | Performed by: INTERNAL MEDICINE

## 2020-01-28 PROCEDURE — 93296 REM INTERROG EVL PM/IDS: CPT | Performed by: INTERNAL MEDICINE

## 2020-01-29 NOTE — PROCEDURES
PROCEDURE(S) PERFORMED:    1. Cardioversion. 2.     Sedation     :  Omayra Frias MD     ANESTHESIA:  IV sedation. INDICATION:  Persistent atrial fibrillation. COMPLICATIONS:  None.      METHODS:  The patient was brought to the outpatien

## 2020-01-29 NOTE — H&P
[facility]  H&P    Dina Presley Patient Status:  Outpatient in a Bed    10/25/1927 MRN [id number]   Location [address] [facility] Attending No att. providers found   Hosp Day # 0 PCP Fabián Ornelas swelling    • Lumbar facet arthropathy 2/10/2018   • Macular degeneration - [facility] Ophthalmologist 5/5/2011   • Night sweats    • Pacemaker    • Reflux    • S/P colon resection / Colonoscopy (10/12) recheck - ANJALI Lopez 5/5/2011   • S/P laminectomy Age of Onset   • Cancer Father    • Hypertension Father       reports that she quit smoking about 48 years ago. She has a 2.00 pack-year smoking history.  She has never used smokeless tobacco. She reports current alcohol use of about 2.0 standard drinks of affect. Skin: Warm and dry.      Laboratories and Data:  Diagnostics:    Labs:        Lab Results   Component Value Date    PT 30.8 (H) 11/08/2013    PT 16.1 (H) 06/28/2011    PT 15.3 06/27/2011    INR 1.67 (H) 12/17/2019    INR 1.7 (A) 12/17/2019    INR 5

## 2020-01-30 RX ORDER — FLECAINIDE ACETATE 50 MG/1
50 TABLET ORAL 2 TIMES DAILY
Status: ON HOLD | COMMUNITY
Start: 2020-01-27 | End: 2020-02-04

## 2020-01-31 ENCOUNTER — TELEPHONE (OUTPATIENT)
Dept: INTERNAL MEDICINE CLINIC | Facility: CLINIC | Age: 85
End: 2020-01-31

## 2020-01-31 ENCOUNTER — OFFICE VISIT (OUTPATIENT)
Dept: INTERNAL MEDICINE CLINIC | Facility: CLINIC | Age: 85
End: 2020-01-31
Payer: COMMERCIAL

## 2020-01-31 VITALS
RESPIRATION RATE: 16 BRPM | HEART RATE: 68 BPM | TEMPERATURE: 98 F | BODY MASS INDEX: 26.03 KG/M2 | OXYGEN SATURATION: 95 % | SYSTOLIC BLOOD PRESSURE: 130 MMHG | WEIGHT: 152.5 LBS | HEIGHT: 64 IN | DIASTOLIC BLOOD PRESSURE: 60 MMHG

## 2020-01-31 DIAGNOSIS — M25.552 LEFT HIP PAIN: ICD-10-CM

## 2020-01-31 DIAGNOSIS — I48.91 ATRIAL FIBRILLATION WITH RAPID VENTRICULAR RESPONSE (HCC): Primary | ICD-10-CM

## 2020-01-31 DIAGNOSIS — J44.9 ASTHMA WITH COPD (CHRONIC OBSTRUCTIVE PULMONARY DISEASE) (HCC): ICD-10-CM

## 2020-01-31 PROBLEM — N18.30 CKD (CHRONIC KIDNEY DISEASE) STAGE 3, GFR 30-59 ML/MIN (HCC): Chronic | Status: ACTIVE | Noted: 2020-01-31

## 2020-01-31 PROCEDURE — 99214 OFFICE O/P EST MOD 30 MIN: CPT | Performed by: INTERNAL MEDICINE

## 2020-01-31 RX ORDER — LIDOCAINE 50 MG/G
6 PATCH TOPICAL EVERY 24 HOURS
Qty: 6 PATCH | Refills: 3 | Status: SHIPPED | OUTPATIENT
Start: 2020-01-31 | End: 2020-01-31

## 2020-01-31 RX ORDER — LIDOCAINE 50 MG/G
1 PATCH TOPICAL EVERY 12 HOURS
Qty: 6 PATCH | Refills: 3 | Status: ON HOLD | OUTPATIENT
Start: 2020-01-31 | End: 2020-02-04 | Stop reason: SDUPTHER

## 2020-01-31 NOTE — PATIENT INSTRUCTIONS
Take Tylenol 650-1000 mg three times a day for left hip and thigh pain. Also, get Lidoderm patches and apply in the mornig for 12 hours. Remove and reapply for another 12 hrs.       Make appt with Dr. Howard Weber, [facility],, on campus of [facility]:

## 2020-01-31 NOTE — PROGRESS NOTES
Patient presents with:  Hip Pain: left hip pain for several months       HPI: Here with one week hx of pain in left hip/trochanteric area radiating down lat aspect of thing. Pain very severe when rising from lying or sitting position.   Not taking anything esophagitis     Age-related osteoporosis with current pathological fracture with routine healing     Atrial fibrillation with rapid ventricular response (HCC)     Pulmonary hypertension (HCC)-52 mg Hg     Alternating constipation and diarrhea     Other fat rales,rhonchi  Heart: S1S2 regular, no murmur, ectop  Left hip area with healed BEN scar, FROM ext and int rotation of left hip, no particular tenderness over trochanteric bursa; legs very tender to touch. Motor 5/5.       Assessment and Plan:  80year old

## 2020-01-31 NOTE — TELEPHONE ENCOUNTER
Called [employer] to get prior authorization for Lidocaine 5% patches.    Patients ID # [id number]  REF# [id number]    They will contact us by fax on whether or not this is authorized

## 2020-01-31 NOTE — TELEPHONE ENCOUNTER
Nan from [place] called to verify lidocaine RX that was written for patient. The directions stated that patient was to apply all 6 patches to left hip every 12 hours. Dr Laurent Hoyt was asked and it is suppose to be 1 patch every 12 hours.  Pharmacy wanted to DR REMBERTO ALAMO [facility]

## 2020-02-02 ENCOUNTER — APPOINTMENT (OUTPATIENT)
Dept: GENERAL RADIOLOGY | Facility: HOSPITAL | Age: 85
End: 2020-02-02
Payer: MEDICARE

## 2020-02-02 ENCOUNTER — HOSPITAL ENCOUNTER (OUTPATIENT)
Facility: HOSPITAL | Age: 85
Setting detail: OBSERVATION
Discharge: HOME HEALTH | End: 2020-02-04
Attending: EMERGENCY MEDICINE | Admitting: HOSPITALIST
Payer: MEDICARE

## 2020-02-02 DIAGNOSIS — R55 SYNCOPE, NEAR: Primary | ICD-10-CM

## 2020-02-02 LAB
ALBUMIN SERPL-MCNC: 3.5 G/DL (ref 3.4–5)
ALBUMIN/GLOB SERPL: 0.9 {RATIO} (ref 1–2)
ALP LIVER SERPL-CCNC: 51 U/L (ref 55–142)
ALT SERPL-CCNC: 24 U/L (ref 13–56)
ANION GAP SERPL CALC-SCNC: 8 MMOL/L (ref 0–18)
AST SERPL-CCNC: 31 U/L (ref 15–37)
BASOPHILS # BLD AUTO: 0.03 X10(3) UL (ref 0–0.2)
BASOPHILS NFR BLD AUTO: 0.6 %
BILIRUB SERPL-MCNC: 0.4 MG/DL (ref 0.1–2)
BILIRUB UR QL STRIP.AUTO: NEGATIVE
BUN BLD-MCNC: 15 MG/DL (ref 7–18)
BUN/CREAT SERPL: 15.8 (ref 10–20)
CALCIUM BLD-MCNC: 8.8 MG/DL (ref 8.5–10.1)
CHLORIDE SERPL-SCNC: 101 MMOL/L (ref 98–112)
CLARITY UR REFRACT.AUTO: CLEAR
CO2 SERPL-SCNC: 27 MMOL/L (ref 21–32)
COLOR UR AUTO: YELLOW
CREAT BLD-MCNC: 0.95 MG/DL (ref 0.55–1.02)
DEPRECATED RDW RBC AUTO: 48.8 FL (ref 35.1–46.3)
DIGOXIN SERPL-MCNC: 1.18 NG/ML (ref 0.8–2)
EOSINOPHIL # BLD AUTO: 0.1 X10(3) UL (ref 0–0.7)
EOSINOPHIL NFR BLD AUTO: 2 %
ERYTHROCYTE [DISTWIDTH] IN BLOOD BY AUTOMATED COUNT: 13 % (ref 11–15)
GLOBULIN PLAS-MCNC: 3.7 G/DL (ref 2.8–4.4)
GLUCOSE BLD-MCNC: 128 MG/DL (ref 70–99)
GLUCOSE UR STRIP.AUTO-MCNC: NEGATIVE MG/DL
HCT VFR BLD AUTO: 39.7 % (ref 35–48)
HGB BLD-MCNC: 13.1 G/DL (ref 12–16)
IMM GRANULOCYTES # BLD AUTO: 0.02 X10(3) UL (ref 0–1)
IMM GRANULOCYTES NFR BLD: 0.4 %
INR BLD: 1.45 (ref 0.9–1.1)
KETONES UR STRIP.AUTO-MCNC: NEGATIVE MG/DL
LEUKOCYTE ESTERASE UR QL STRIP.AUTO: NEGATIVE
LYMPHOCYTES # BLD AUTO: 0.8 X10(3) UL (ref 1–4)
LYMPHOCYTES NFR BLD AUTO: 16.1 %
M PROTEIN MFR SERPL ELPH: 7.2 G/DL (ref 6.4–8.2)
MCH RBC QN AUTO: 33.3 PG (ref 26–34)
MCHC RBC AUTO-ENTMCNC: 33 G/DL (ref 31–37)
MCV RBC AUTO: 101 FL (ref 80–100)
MONOCYTES # BLD AUTO: 0.69 X10(3) UL (ref 0.1–1)
MONOCYTES NFR BLD AUTO: 13.9 %
NEUTROPHILS # BLD AUTO: 3.32 X10 (3) UL (ref 1.5–7.7)
NEUTROPHILS # BLD AUTO: 3.32 X10(3) UL (ref 1.5–7.7)
NEUTROPHILS NFR BLD AUTO: 67 %
NITRITE UR QL STRIP.AUTO: NEGATIVE
OSMOLALITY SERPL CALC.SUM OF ELEC: 284 MOSM/KG (ref 275–295)
PH UR STRIP.AUTO: 7 [PH] (ref 4.5–8)
PLATELET # BLD AUTO: 240 10(3)UL (ref 150–450)
POTASSIUM SERPL-SCNC: 4 MMOL/L (ref 3.5–5.1)
PROT UR STRIP.AUTO-MCNC: NEGATIVE MG/DL
PSA SERPL DL<=0.01 NG/ML-MCNC: 18.4 SECONDS (ref 12.5–14.7)
RBC # BLD AUTO: 3.93 X10(6)UL (ref 3.8–5.3)
RBC UR QL AUTO: NEGATIVE
SODIUM SERPL-SCNC: 136 MMOL/L (ref 136–145)
SP GR UR STRIP.AUTO: 1.01 (ref 1–1.03)
TROPONIN I SERPL-MCNC: <0.045 NG/ML (ref ?–0.04)
UROBILINOGEN UR STRIP.AUTO-MCNC: <2 MG/DL
WBC # BLD AUTO: 5 X10(3) UL (ref 4–11)

## 2020-02-02 PROCEDURE — 71045 X-RAY EXAM CHEST 1 VIEW: CPT

## 2020-02-02 PROCEDURE — 99214 OFFICE O/P EST MOD 30 MIN: CPT | Performed by: INTERNAL MEDICINE

## 2020-02-02 PROCEDURE — 99220 INITIAL OBSERVATION CARE,LEVL III: CPT | Performed by: INTERNAL MEDICINE

## 2020-02-02 RX ORDER — TRAMADOL HYDROCHLORIDE 50 MG/1
50 TABLET ORAL EVERY 6 HOURS PRN
Status: DISCONTINUED | OUTPATIENT
Start: 2020-02-02 | End: 2020-02-04

## 2020-02-02 RX ORDER — FLECAINIDE ACETATE 100 MG/1
50 TABLET ORAL 2 TIMES DAILY
Status: DISCONTINUED | OUTPATIENT
Start: 2020-02-02 | End: 2020-02-03

## 2020-02-02 RX ORDER — DIGOXIN 125 MCG
125 TABLET ORAL DAILY
Status: DISCONTINUED | OUTPATIENT
Start: 2020-02-03 | End: 2020-02-04

## 2020-02-02 RX ORDER — ACETAMINOPHEN 500 MG
500 TABLET ORAL EVERY 6 HOURS PRN
Status: DISCONTINUED | OUTPATIENT
Start: 2020-02-02 | End: 2020-02-04

## 2020-02-02 RX ORDER — GABAPENTIN 100 MG/1
100 CAPSULE ORAL 3 TIMES DAILY
Status: DISCONTINUED | OUTPATIENT
Start: 2020-02-02 | End: 2020-02-04

## 2020-02-02 RX ORDER — ACETAMINOPHEN 325 MG/1
650 TABLET ORAL EVERY 6 HOURS PRN
Status: DISCONTINUED | OUTPATIENT
Start: 2020-02-02 | End: 2020-02-04

## 2020-02-02 RX ORDER — LISINOPRIL 40 MG/1
40 TABLET ORAL DAILY
Status: DISCONTINUED | OUTPATIENT
Start: 2020-02-03 | End: 2020-02-04

## 2020-02-02 RX ORDER — DILTIAZEM HYDROCHLORIDE 5 MG/ML
10 INJECTION INTRAVENOUS EVERY 2 HOUR PRN
Status: DISCONTINUED | OUTPATIENT
Start: 2020-02-02 | End: 2020-02-04

## 2020-02-02 NOTE — CONSULTS
[facility]  Report of Consultation    Carmelo Maldonado Patient Status:  Observation    10/25/1927 MRN [id number]   [facility] 7NE-A Attending Marybel Henderson MD   Hosp Day # 0 PCP Rizwana Stanton MD     Reason for Consultation:  Shruthi Rear sweats    • Pacemaker    • Reflux    • S/P colon resection / Colonoscopy (10/12) recheck - SLinus Bruce 5/5/2011   • S/P laminectomy 12/23/2013   • Sleep apnea     cpap   • Spinal stenosis of lumbar region without neurogenic claudication 8/8/2018   • Visual i quit smoking about 48 years ago. She has a 2.00 pack-year smoking history. She has never used smokeless tobacco. She reports current alcohol use of about 2.0 standard drinks of alcohol per week. She reports that she does not use drugs.     Allergies:    Cip Min:97.9 °F (36.6 °C), Max:98.4 °F (36.9 °C)    Wt Readings from Last 3 Encounters:  02/02/20 : 152 lb 8.9 oz  01/31/20 : 152 lb 8 oz  01/03/20 : 159 lb 11.2 oz      Telemetry: A. fib  General: Alert and oriented in no apparent distress.   HEENT: EOMI, no s

## 2020-02-02 NOTE — PLAN OF CARE
NURSING ADMISSION NOTE      Patient admitted via Cart  Oriented to room. Safety precautions initiated. Bed in low position. Call light in reach. Received pt from ER  afib on tele with occasional v paced beats.  HR up to 110s at rest, not sustained

## 2020-02-02 NOTE — H&P
[facility]  History and Physical     Justyna Maldonado Patient Status:  Emergency    10/25/1927 MRN [id number]   Location [address] [facility] Attending Serafin Xiao MD   Hosp Day # 0 PCP Carey العلي MD     Chief Complaint: BACK SURGERY     • BOWEL RESECTION     • CHOLECYSTECTOMY     • COLECTOMY     • COLONOSCOPY     • COLONOSCOPY     • DEBULKING OF NASOLABIAL FLAP TO NOSE Left 3/28/2014    Performed by Coreen Pickett MD at [facility]   • EGD     • ELBOW OPEN RE OTHER (SEE COMMENTS)  Sulfamethoxazole W/*    OTHER (SEE COMMENTS)    Comment:Other reaction(s): VOMITING             Oral  Altaryl                   Amiodarone              OTHER (SEE COMMENTS)    Comment:Elevated liver enzymes  Bacitracin 3  Acidophilus/Pectin Oral Cap, Take 1 capsule by mouth daily. , Disp: 5 capsule, Rfl: 0  [DISCONTINUED] HYDROcodone-acetaminophen (NORCO)  MG Oral Tab, Take 1 tablet by mouth. Every 4 - 6 hours as needed.  , Disp: , Rfl:         Review of Systems:   A fall. Exam benign  2. Lidocaine patch, pain control   3. PT OT   3. Chronic afib- as per #1   4. Recent fall   5. HTN    Quality:  · DVT Prophylaxis: SCD  · CODE status: Full  · [place]: none    Plan of care discussed with pt and ER.     Bo Rosado MD  2/2

## 2020-02-02 NOTE — ED PROVIDER NOTES
Patient Seen in: [facility] Emergency Department      History   Patient presents with:  Fatigue    Stated Complaint: Weakness    HPI    Patient has a history of atrial fibrillation status post cardioversion. She is on Eliquis and flecainide.   [place] Macular degeneration [facility] Ophthalmologist 5/5/2011   • Night sweats    • Pacemaker    • Reflux    • S/P colon resection / Colonoscopy (10/12) recheck - ANJALI Spencer 5/5/2011   • S/P laminectomy 12/23/2013   • Sleep apnea     cpap   • Spinal stenos Smoker        Packs/day: 0.10        Years: 20.00        Pack years: 2        Quit date: 1972        Years since quittin.1      Smokeless tobacco: Never Used    Alcohol use:  Yes      Alcohol/week: 2.0 standard drinks      Types: 2 Glasses of wine Alkaline Phosphatase 51 (*)     A/G Ratio 0.9 (*)     All other components within normal limits   PROTHROMBIN TIME (PT) - Abnormal; Notable for the following components:    PT 18.4 (*)     INR 1.45 (*)     All other components within normal limits   CBC W/ scarring/atelectasis in the lower lungs with trace left pleural effusion.     I discussed case with Dr. Korin Sanchez, cardiology.   He agrees with plan for observation on telemetry  I discussed case with Dr. Chen Pritchett, hospitalist.  She will admit    Disposition

## 2020-02-03 LAB
ATRIAL RATE: 97 BPM
Q-T INTERVAL: 362 MS
QRS DURATION: 88 MS
QTC CALCULATION (BEZET): 459 MS
R AXIS: -37 DEGREES
T AXIS: -38 DEGREES
VENTRICULAR RATE: 97 BPM

## 2020-02-03 PROCEDURE — 99225 SUBSEQUENT OBSERVATION CARE: CPT | Performed by: INTERNAL MEDICINE

## 2020-02-03 PROCEDURE — 99214 OFFICE O/P EST MOD 30 MIN: CPT | Performed by: INTERNAL MEDICINE

## 2020-02-03 RX ORDER — FLECAINIDE ACETATE 100 MG/1
100 TABLET ORAL 2 TIMES DAILY
Status: DISCONTINUED | OUTPATIENT
Start: 2020-02-03 | End: 2020-02-04

## 2020-02-03 RX ORDER — SODIUM CHLORIDE 9 MG/ML
INJECTION, SOLUTION INTRAVENOUS
Status: COMPLETED | OUTPATIENT
Start: 2020-02-04 | End: 2020-02-04

## 2020-02-03 RX ORDER — GABAPENTIN 300 MG/1
300 CAPSULE ORAL 2 TIMES DAILY
COMMUNITY
End: 2020-06-26

## 2020-02-03 NOTE — PROGRESS NOTES
Orthostatic BP       02/02/20 1755 02/02/20 1805 02/02/20 1811   Vital Signs   Pulse 94 97 119   Resp 16 17 22   Respiratory Quality Normal Normal Normal   BP (!) 179/105  (RN Notified) (!) 191/106  (RN Notified) (!) 178/94  (RN Notified)   BP Location Lef

## 2020-02-03 NOTE — OCCUPATIONAL THERAPY NOTE
OCCUPATIONAL THERAPY EVALUATION - INPATIENT     Room Number: 3739/6165-E  Evaluation Date: 2/3/2020  Type of Evaluation: Initial  Presenting Problem: A-fib, fall    Physician Order: IP Consult to Occupational Therapy  Reason for Therapy: ADL/IADL Dysfuncti without neurogenic claudication 8/8/2018   • Visual impairment        Past Surgical History  Past Surgical History:   Procedure Laterality Date   • APPENDECTOMY     • BACK SURGERY     • BOWEL RESECTION     • CHOLECYSTECTOMY     • COLECTOMY     • COLONOSCOP glasses(RW)    Prior Level of Function: lives at [facility]. Pt has been a resident there for about 11 yrs. Independent with all ADL. Son sorts out her pills. Uses rollator to ambulate to dining room. Enjoys bingo.  Stopped going to other activities bedpan or urinal? : A Little(SBA)  -   Putting on and taking off regular upper body clothing?: None  -   Taking care of personal grooming such as brushing teeth?: None  -   Eating meals?: None    AM-PAC Score:  Score: 21  Approx Degree of Impairment: 32.79 is functioning below her previous functional level and would benefit from skilled inpatient OT to address the above deficits, maximizing patient’s ability to return safely to her prior level of function.     Patient Complexity  Occupational Profile/Medical

## 2020-02-03 NOTE — PLAN OF CARE
Pacemaker [employer] interrogation done  Spoke with [employer] Stuart rep      Addendum    Reviewed with [place]. AFib persistent since October 28th.     Jovani Lala NP

## 2020-02-03 NOTE — PLAN OF CARE
Pt AOx4. Family at bedside. RA.  . AFib controlled rate. Pt c/o pain to mid back. Lidocaine ptch and tylenol relief. Will continue to monitor.

## 2020-02-03 NOTE — PROGRESS NOTES
[facility]  Progress Note     Rayna Maldonado Patient Status:  Observation    10/25/1927 MRN [id number]   [facility] 7NE-A Attending Brissa Busby MD   Hosp Day # 0 PCP Jean-Claude Brock MD     Chief Complaint: forgetful     S: Muna Paredes Oral Daily   • gabapentin  100 mg Oral TID   • lidocaine-menthol  1 patch Transdermal Daily   • lisinopril  40 mg Oral Daily   • metoprolol Tartrate  25 mg Oral BID       ASSESSMENT / PLAN:     1. Presyncope due to persistent afib  2. Back pain   1.  Likely

## 2020-02-03 NOTE — RESPIRATORY THERAPY NOTE
JAZZ Equipment Usage Summary :            Set Mode : CPAP W FLEX          Usage in Hours:4;42          90% Pressure (EPAP) : 6           90% Insp Pressure (IPAP);           AHI : 23.8          Supplemental Oxygen :      LPM

## 2020-02-03 NOTE — PLAN OF CARE
Assumed care at 0700. Pt A/O x4, forgetful. RA. CPAP at night. Apaced with underlying Afib. Pacer interrogated. PT worked with the pt. PT is recommending home with HH. Up x1 with the walker. Pt complaining of mild back pain.  Lidocaine patch given with reli

## 2020-02-03 NOTE — PHYSICAL THERAPY NOTE
PHYSICAL THERAPY EVALUATION - INPATIENT     Room Number: 6279/4687-Q  Evaluation Date: 2/3/2020  Type of Evaluation: Initial  Physician Order: PT Eval and Treat    Presenting Problem: Near syncope, A-fib  Reason for Therapy: Mobility Dysfunction and Maksim Cipro History  Past Surgical History:   Procedure Laterality Date   • APPENDECTOMY     • BACK SURGERY     • BOWEL RESECTION     • CHOLECYSTECTOMY     • COLECTOMY     • COLONOSCOPY     • COLONOSCOPY     • DEBULKING OF NASOLABIAL FLAP TO NOSE Left 3/28/2014    Per additional services. Pt was walking independently to dining akbar for meals. SUBJECTIVE  Pt states she was doing okay today. Pt says they are tired as they didn't sleep well last night because the uncomfortable CPAP machine.      Patient self-stated Zelda Youngblood sitting on the side of the bed?: None   How much help from another person does the patient currently need. ..   -   Moving to and from a bed to a chair (including a wheelchair)?: None   -   Need to walk in hospital room?: A Little   -   Climbing 3-5 steps w tolerated  Gait training  Strengthening  Transfer training    Patient End of Session: Up in chair;Needs met;Call light within reach;RN aware of session/findings; All patient questions and concerns addressed; Alarm set; Family present    ASSESSMENT   Patient i without excessive fatigue or pain   Goal #5    Goal #6    Goal Comments: Goals established on 2/3/2020

## 2020-02-03 NOTE — PROGRESS NOTES
[facility]  Progress Note    Sarah Maldonado Patient Status:  Observation    10/25/1927 MRN [id number]   [facility] 7NE-A Attending Chandni Dhaliwal MD   Hosp Day # 0 PCP Josh Tucker MD     Assessment:  1.  Recurrent AF- rates mildly Lab Results   Component Value Date     02/02/2020    K 4.0 02/02/2020     02/02/2020    CO2 27.0 02/02/2020    BUN 15 02/02/2020    CREATSERUM 0.95 02/02/2020     02/02/2020    CA 8.8 02/02/2020    ALT 24 02/02/2020    AST 31 02/02/2

## 2020-02-04 ENCOUNTER — APPOINTMENT (OUTPATIENT)
Dept: GENERAL RADIOLOGY | Facility: HOSPITAL | Age: 85
End: 2020-02-04
Attending: INTERNAL MEDICINE
Payer: MEDICARE

## 2020-02-04 ENCOUNTER — APPOINTMENT (OUTPATIENT)
Dept: INTERVENTIONAL RADIOLOGY/VASCULAR | Facility: HOSPITAL | Age: 85
End: 2020-02-04
Attending: INTERNAL MEDICINE
Payer: MEDICARE

## 2020-02-04 ENCOUNTER — TELEPHONE (OUTPATIENT)
Dept: INTERNAL MEDICINE CLINIC | Facility: CLINIC | Age: 85
End: 2020-02-04

## 2020-02-04 VITALS
BODY MASS INDEX: 25 KG/M2 | RESPIRATION RATE: 20 BRPM | HEART RATE: 72 BPM | TEMPERATURE: 98 F | OXYGEN SATURATION: 97 % | DIASTOLIC BLOOD PRESSURE: 65 MMHG | SYSTOLIC BLOOD PRESSURE: 161 MMHG | WEIGHT: 148.19 LBS

## 2020-02-04 LAB
ATRIAL RATE: 70 BPM
P-R INTERVAL: 126 MS
Q-T INTERVAL: 408 MS
QRS DURATION: 94 MS
QTC CALCULATION (BEZET): 440 MS
R AXIS: -34 DEGREES
T AXIS: -48 DEGREES
VENTRICULAR RATE: 70 BPM

## 2020-02-04 PROCEDURE — 75820 VEIN X-RAY ARM/LEG: CPT | Performed by: INTERNAL MEDICINE

## 2020-02-04 PROCEDURE — 92960 CARDIOVERSION ELECTRIC EXT: CPT | Performed by: INTERNAL MEDICINE

## 2020-02-04 PROCEDURE — 72190 X-RAY EXAM OF PELVIS: CPT | Performed by: INTERNAL MEDICINE

## 2020-02-04 PROCEDURE — 5A2204Z RESTORATION OF CARDIAC RHYTHM, SINGLE: ICD-10-PCS | Performed by: INTERNAL MEDICINE

## 2020-02-04 PROCEDURE — B517YZZ FLUOROSCOPY OF LEFT SUBCLAVIAN VEIN USING OTHER CONTRAST: ICD-10-PCS | Performed by: INTERNAL MEDICINE

## 2020-02-04 PROCEDURE — 99217 OBSERVATION CARE DISCHARGE: CPT | Performed by: INTERNAL MEDICINE

## 2020-02-04 RX ORDER — SODIUM CHLORIDE 9 MG/ML
INJECTION, SOLUTION INTRAVENOUS CONTINUOUS
Status: DISCONTINUED | OUTPATIENT
Start: 2020-02-04 | End: 2020-02-04

## 2020-02-04 RX ORDER — LIDOCAINE 50 MG/G
1 PATCH TOPICAL EVERY 24 HOURS
COMMUNITY
End: 2020-02-10

## 2020-02-04 RX ORDER — FLECAINIDE ACETATE 100 MG/1
100 TABLET ORAL 2 TIMES DAILY
Qty: 60 TABLET | Refills: 1 | Status: SHIPPED | OUTPATIENT
Start: 2020-02-04 | End: 2020-03-05

## 2020-02-04 NOTE — PROCEDURES
Procedure: Venogram    : Juan Rod MD    Indication: rule out venous occlusion. Sedation: none    Methods: The patient was brought to the EP lab after providing informed consent.  IV contrast was injected in a peripheral IV in the patients l

## 2020-02-04 NOTE — TELEPHONE ENCOUNTER
Bautista Boggs from [place] [address] called to ask if you could sign Home Health orders for PT/Skilled nurse for pt who is being discharge 2/4/20.

## 2020-02-04 NOTE — PLAN OF CARE
Assumed care @ 1900. Patient alert oriented x2-3 forgetful  On telemetry monitoring. NPO after midnight  Denies SOB, Denies any pain. Updated patient with plan of care, verbalizes understanding. Ensures patient is safe at all times.   Maintained a calm ADULT - FALL  Goal: Free from fall injury  Description  INTERVENTIONS:  - Assess pt frequently for physical needs  - Identify cognitive and physical deficits and behaviors that affect risk of falls. - [place] fall precautions as indicated by assessment.

## 2020-02-04 NOTE — CM/SW NOTE
Pt is a 79 yo female admitted for syncope. Pt is  and lives at [facility]. Pt has 4 children - one son Kareem Soto lives locally. Pt has been independent for her adls at home. Pt walks with a walker. PT is recommending HHPT.   Pt is agreeable to

## 2020-02-04 NOTE — RESPIRATORY THERAPY NOTE
JAZZ - Equipment Use Daily Summary:                  . Set Mode: CPAP                . Usage in hours: 5:14                . 90% Pressure (EPAP) level: 6.0                . 90% Insp. Pressure (IPAP): Nancy Rodriguez AHI: 14.9                .  Supplemental O

## 2020-02-04 NOTE — PROGRESS NOTES
Pt arrived from the floor to holding for a cardioversion and after a venogram in the cath lab. Dr. Ok Mercado arrived and spoke to pt and pt son. Dr. Ok Mercado aware that pt blood pressure is elevated upon arrival to holding 161/114. Pt in afib.  Dr. Ok Mercado gave p

## 2020-02-04 NOTE — HOME CARE LIAISON
MET WITH PTNT AND OFFERED CHOICE  OF AGENCIES. PTNT AGREEABLE TO [facility]. MET WITH PTNT TO DISCUSS HOME HEALTH SERVICES AND COVERAGE CRITERIA. PTNT AGREEABLE TO [facility]. PTNT GIVEN RESIDENTIAL BROCHURE.  RESIDENTIAL WITH PROVIDE SN/PT ON DISC

## 2020-02-04 NOTE — PROGRESS NOTES
[facility]  Progress Note    Versa Ana Maldonado Patient Status:  Observation    10/25/1927 MRN [id number]   [facility] 7NE-A Attending Shahana Thayer MD   Hosp Day # 0 PCP Adrianne Scott MD     Assessment:  1.  Recurrent AF- rates mildly apixaban  5 mg Oral BID   • digoxin  125 mcg Oral Daily   • gabapentin  100 mg Oral TID   • lidocaine-menthol  1 patch Transdermal Daily   • lisinopril  40 mg Oral Daily   • metoprolol Tartrate  25 mg Oral BID         Bonnell Boeck, MD  2/3/2020  7:35 AM

## 2020-02-04 NOTE — DISCHARGE SUMMARY
[facility] HOSPITALIST  DISCHARGE SUMMARY     Yina Maldonado Patient Status:  Observation    10/25/1927 MRN [id number]   [facility] 7NE-A Attending Kayla Sterling MD   Hosp Day # 0 PCP Onesimo Red MD     Date of Admission: 2020  Date Refills:  0     Acidophilus/Pectin Caps  Commonly known as:  PROBIOTIC      Take 1 capsule by mouth daily. Quantity:  5 capsule  Refills:  0     apixaban 5 MG Tabs  Commonly known as:  ELIQUIS      Take 1 tablet (5 mg total) by mouth 2 (two) times daily. HOSPITAL FOLLOW UP [5060] 30 min. Carin Schuler MD    Patient Instructions:                     2/27/2020  1:00 PM  EXAM-ESTABLISHED PATIENT [193] 40 min.  [facility] -- [facility] MURCIA

## 2020-02-04 NOTE — PLAN OF CARE
NURSING DISCHARGE NOTE    Discharged Home via Wheelchair. Accompanied by Support staff  Belongings Taken by patient/family. Patient and both sons at bedside educated on d/c medications, follow ups, and instructions.   They all verbalized under

## 2020-02-05 ENCOUNTER — ANCILLARY PROCEDURE (OUTPATIENT)
Dept: CARDIOLOGY | Age: 85
End: 2020-02-05
Attending: INTERNAL MEDICINE

## 2020-02-05 ENCOUNTER — ANCILLARY ORDERS (OUTPATIENT)
Dept: CARDIOLOGY | Age: 85
End: 2020-02-05

## 2020-02-05 ENCOUNTER — PATIENT OUTREACH (OUTPATIENT)
Dept: CASE MANAGEMENT | Age: 85
End: 2020-02-05

## 2020-02-05 ENCOUNTER — TELEPHONE (OUTPATIENT)
Dept: INTERNAL MEDICINE CLINIC | Facility: CLINIC | Age: 85
End: 2020-02-05

## 2020-02-05 DIAGNOSIS — Z95.0 CARDIAC PACEMAKER: ICD-10-CM

## 2020-02-05 DIAGNOSIS — Z02.9 ENCOUNTERS FOR UNSPECIFIED ADMINISTRATIVE PURPOSE: ICD-10-CM

## 2020-02-05 PROCEDURE — X1114 CARDIAC DEVICE HOME CHECK - REMOTE UNSCHEDULED: HCPCS | Performed by: INTERNAL MEDICINE

## 2020-02-05 PROCEDURE — X1094 NO CHARGE VISIT: HCPCS | Performed by: INTERNAL MEDICINE

## 2020-02-05 NOTE — CM/SW NOTE
02/05/20 0900   Discharge disposition   Expected discharge disposition Home-Health   Name of Facillity/Home Care/Hospice Residential   Home services after discharge Skilled home care   Discharge transportation Private car

## 2020-02-05 NOTE — TELEPHONE ENCOUNTER
Phone call made to [facility] and she is aware that Dr Floresita Jason will sign the home health orders

## 2020-02-06 NOTE — PROGRESS NOTES
Initial Post Discharge Follow Up   Discharge Date: 2/4/20  Contact Date: 2/5/2020    Consent Verification:  Assessment Completed With: Patient  HIPAA Verified? Yes    Discharge Dx:     1. Presyncope   2.  Back pain - chronic in nature with DJD of spine since discharge?  yes   If Yes:  o Where: In my legs   Rating on pain scale 1-10, 10 being the worst pain you have ever experienced: 7/10  o Alleviating factors: Tylenol  o Aggravating factors:   o Is the pain manageable at home?  yes  • When you were kimber Carbonate-Vitamin D 600-400 MG-UNIT Oral Tab Take 1 tablet by mouth daily. • acetaminophen 325 MG Oral Tab Take 650 mg by mouth every 6 (six) hours as needed for Pain.        • (NCM)  Were there any medication changes noted on AVS?  yes  • May I go over Drive, [place]  [address] [address]  [phone] [facility] PULMONARY MEDICINE  [place] [facility] [place] [address] [address] [place] [address]          PCP TCM/HFU

## 2020-02-07 ENCOUNTER — TELEPHONE (OUTPATIENT)
Dept: INTERNAL MEDICINE CLINIC | Facility: CLINIC | Age: 85
End: 2020-02-07

## 2020-02-07 NOTE — TELEPHONE ENCOUNTER
Advise that no insurance will cover this as it is over the counter. Can be purchased at the pharmacy.

## 2020-02-07 NOTE — TELEPHONE ENCOUNTER
Called [facility] and they stated that Lidocaine patches 5% were denied.  They stated that a fax was sent to our office however have not seen this

## 2020-02-07 NOTE — TELEPHONE ENCOUNTER
They will be seeing patient 1 time weekly for one week and then 2x weekly for 3 weeks.  If this is not okay please call

## 2020-02-12 ENCOUNTER — OFFICE VISIT (OUTPATIENT)
Dept: CARDIOLOGY | Age: 85
End: 2020-02-12

## 2020-02-12 ENCOUNTER — ANCILLARY PROCEDURE (OUTPATIENT)
Dept: CARDIOLOGY | Age: 85
End: 2020-02-12
Attending: INTERNAL MEDICINE

## 2020-02-12 ENCOUNTER — ANCILLARY ORDERS (OUTPATIENT)
Dept: CARDIOLOGY | Age: 85
End: 2020-02-12

## 2020-02-12 ENCOUNTER — OFFICE VISIT (OUTPATIENT)
Dept: INTERNAL MEDICINE CLINIC | Facility: CLINIC | Age: 85
End: 2020-02-12
Payer: COMMERCIAL

## 2020-02-12 VITALS
SYSTOLIC BLOOD PRESSURE: 158 MMHG | OXYGEN SATURATION: 99 % | DIASTOLIC BLOOD PRESSURE: 88 MMHG | RESPIRATION RATE: 18 BRPM | HEIGHT: 64 IN | TEMPERATURE: 98 F | BODY MASS INDEX: 25.3 KG/M2 | WEIGHT: 148.19 LBS | HEART RATE: 78 BPM

## 2020-02-12 VITALS
HEIGHT: 65 IN | BODY MASS INDEX: 26.33 KG/M2 | WEIGHT: 158 LBS | DIASTOLIC BLOOD PRESSURE: 60 MMHG | HEART RATE: 72 BPM | SYSTOLIC BLOOD PRESSURE: 110 MMHG

## 2020-02-12 DIAGNOSIS — I48.19 OTHER PERSISTENT ATRIAL FIBRILLATION (HCC): Primary | ICD-10-CM

## 2020-02-12 DIAGNOSIS — I48.91 ATRIAL FIBRILLATION, UNSPECIFIED TYPE (CMD): ICD-10-CM

## 2020-02-12 DIAGNOSIS — I48.0 PAROXYSMAL ATRIAL FIBRILLATION (CMD): Primary | ICD-10-CM

## 2020-02-12 DIAGNOSIS — Z79.01 LONG TERM (CURRENT) USE OF ANTICOAGULANTS: ICD-10-CM

## 2020-02-12 DIAGNOSIS — Z95.0 PACEMAKER: ICD-10-CM

## 2020-02-12 PROBLEM — Z96.1 PSEUDOPHAKIA: Status: ACTIVE | Noted: 2019-12-01

## 2020-02-12 PROBLEM — H40.002 GLAUCOMA SUSPECT OF LEFT EYE: Status: ACTIVE | Noted: 2019-12-05

## 2020-02-12 PROCEDURE — 93288 INTERROG EVL PM/LDLS PM IP: CPT | Performed by: INTERNAL MEDICINE

## 2020-02-12 PROCEDURE — 1111F DSCHRG MED/CURRENT MED MERGE: CPT | Performed by: INTERNAL MEDICINE

## 2020-02-12 PROCEDURE — 99214 OFFICE O/P EST MOD 30 MIN: CPT | Performed by: INTERNAL MEDICINE

## 2020-02-12 RX ORDER — CHLORAL HYDRATE 500 MG
1000 CAPSULE ORAL DAILY
COMMUNITY
End: 2021-04-27 | Stop reason: ALTCHOICE

## 2020-02-12 RX ORDER — CHLORAL HYDRATE 500 MG
CAPSULE ORAL DAILY
COMMUNITY
End: 2020-06-12

## 2020-02-12 RX ORDER — FLECAINIDE ACETATE 100 MG/1
100 TABLET ORAL 2 TIMES DAILY
COMMUNITY
End: 2020-06-01 | Stop reason: ALTCHOICE

## 2020-02-12 NOTE — PATIENT INSTRUCTIONS
Buy the Lidocaine patches over the counter and apply at 8 am and remove 12 hours later. Follow up with Dr. Autumn Davis.

## 2020-02-12 NOTE — PROGRESS NOTES
Patient presents with:  TCM (Transition Of Care Management): syncope  Other: pt states couldn't walk this morning, but was able to make it to appt by walking  Medication Follow-Up: Pt stopped metoprolol today per Dr. Hannah Arias       HPI: Debbie Thompson is here fo Leg swelling    • Lumbar facet arthropathy 2/10/2018   • Macular degeneration - [facility] Ophthalmologist 5/5/2011   • Night sweats    • Pacemaker    • Reflux    • S/P colon resection / Colonoscopy (10/12) recheck - ANJALI Downing 5/5/2011   • S/P laminect needed for Pain.          PFHSH:   Family History   Problem Relation Age of Onset   • Cancer Father    • Hypertension Father    • Other (Other) Sister         Janelle Rushing         Physical Exam:   /88 (BP Location: Left arm, Patient Position: Sitting,

## 2020-02-19 ENCOUNTER — TELEPHONE (OUTPATIENT)
Dept: INTERNAL MEDICINE CLINIC | Facility: CLINIC | Age: 85
End: 2020-02-19

## 2020-02-19 ENCOUNTER — OFFICE VISIT (OUTPATIENT)
Dept: INTERNAL MEDICINE CLINIC | Facility: CLINIC | Age: 85
End: 2020-02-19
Payer: COMMERCIAL

## 2020-02-19 VITALS
HEART RATE: 67 BPM | WEIGHT: 148.19 LBS | DIASTOLIC BLOOD PRESSURE: 62 MMHG | TEMPERATURE: 97 F | BODY MASS INDEX: 25.3 KG/M2 | HEIGHT: 64 IN | OXYGEN SATURATION: 95 % | RESPIRATION RATE: 17 BRPM | SYSTOLIC BLOOD PRESSURE: 122 MMHG

## 2020-02-19 DIAGNOSIS — M25.552 LEFT HIP PAIN: Primary | ICD-10-CM

## 2020-02-19 DIAGNOSIS — I48.19 OTHER PERSISTENT ATRIAL FIBRILLATION (HCC): ICD-10-CM

## 2020-02-19 DIAGNOSIS — M70.62 TROCHANTERIC BURSITIS OF LEFT HIP: Chronic | ICD-10-CM

## 2020-02-19 DIAGNOSIS — Z95.0 PACEMAKER: ICD-10-CM

## 2020-02-19 PROCEDURE — 99214 OFFICE O/P EST MOD 30 MIN: CPT | Performed by: INTERNAL MEDICINE

## 2020-02-19 RX ORDER — TRAMADOL HYDROCHLORIDE 50 MG/1
TABLET ORAL 2 TIMES DAILY
Qty: 60 TABLET | Refills: 0 | Status: ON HOLD | COMMUNITY
Start: 2020-02-19 | End: 2020-05-10

## 2020-02-19 NOTE — PATIENT INSTRUCTIONS
Start tramadol 1-2 tabs once to twice a day as needed for pain. Also can take tylenol 650 mg up to three times a day every 4 hours. Put an ice pack on your hip three times a day for 20 minutes.       You have an appointment with Dr. Jeff Bansal Thursday,

## 2020-02-19 NOTE — TELEPHONE ENCOUNTER
Patient is C/O pain in her left hip to her back area and she was wondering about Cortizone injections and if she could get these done and where?     Patient would also like to know if she can have a pain medication (tramadol) because all she is currently ta

## 2020-02-19 NOTE — PROGRESS NOTES
Patient presents with:  Hip Pain: left hip. Pt states almost fall  Lab: urine culture for Dr.Jean Zaman       HPI: Nader Jiménez is here with c/o of severe left hip and left low back pain going down her thigh to her ankle.   She almost feel today in the elevator due t laminectomy 12/23/2013   • Sleep apnea     cpap   • Spinal stenosis of lumbar region without neurogenic claudication 8/8/2018   • Visual impairment        Patient Active Problem List:     [place] - NICOLA Grove     JAZZ on CPAP     Current use of long term a Location: Right arm, Patient Position: Sitting, Cuff Size: large)   Pulse 67   Temp 96.8 °F (36 °C) (Tympanic)   Resp 17   Ht 64\"   Wt 148 lb 3.2 oz (67.2 kg)   SpO2 95%   BMI 25.44 kg/m²   Alert, in mild distress with movement of her left leg  Lungs: Catalino

## 2020-02-20 ENCOUNTER — TELEPHONE (OUTPATIENT)
Dept: INTERNAL MEDICINE CLINIC | Facility: CLINIC | Age: 85
End: 2020-02-20

## 2020-02-20 ENCOUNTER — NURSE ONLY (OUTPATIENT)
Dept: LAB | Age: 85
End: 2020-02-20
Attending: INTERNAL MEDICINE
Payer: MEDICARE

## 2020-02-20 ENCOUNTER — HOSPITAL ENCOUNTER (OUTPATIENT)
Facility: HOSPITAL | Age: 85
Setting detail: OBSERVATION
Discharge: NURSING, REHAB OR LONG-TERM CARE | End: 2020-02-22
Attending: EMERGENCY MEDICINE | Admitting: HOSPITALIST
Payer: MEDICARE

## 2020-02-20 ENCOUNTER — PATIENT OUTREACH (OUTPATIENT)
Dept: CASE MANAGEMENT | Age: 85
End: 2020-02-20
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
## 2020-02-20 DIAGNOSIS — S00.83XA CONTUSION OF FACE, INITIAL ENCOUNTER: ICD-10-CM

## 2020-02-20 DIAGNOSIS — G89.29 CHRONIC LEFT HIP PAIN: ICD-10-CM

## 2020-02-20 DIAGNOSIS — S09.90XA CLOSED HEAD INJURY, INITIAL ENCOUNTER: ICD-10-CM

## 2020-02-20 DIAGNOSIS — R26.2 UNABLE TO AMBULATE: ICD-10-CM

## 2020-02-20 DIAGNOSIS — E56.9 VITAMIN DISEASE: Primary | ICD-10-CM

## 2020-02-20 DIAGNOSIS — M25.552 CHRONIC LEFT HIP PAIN: ICD-10-CM

## 2020-02-20 LAB
ALBUMIN SERPL-MCNC: 3.3 G/DL (ref 3.4–5)
ALBUMIN/GLOB SERPL: 0.9 {RATIO} (ref 1–2)
ALP LIVER SERPL-CCNC: 50 U/L (ref 55–142)
ALT SERPL-CCNC: 28 U/L (ref 13–56)
ANION GAP SERPL CALC-SCNC: 7 MMOL/L (ref 0–18)
AST SERPL-CCNC: 29 U/L (ref 15–37)
BASOPHILS # BLD AUTO: 0.04 X10(3) UL (ref 0–0.2)
BASOPHILS NFR BLD AUTO: 0.7 %
BILIRUB SERPL-MCNC: 0.5 MG/DL (ref 0.1–2)
BUN BLD-MCNC: 11 MG/DL (ref 7–18)
BUN/CREAT SERPL: 13.9 (ref 10–20)
CALCIUM BLD-MCNC: 8.6 MG/DL (ref 8.5–10.1)
CHLORIDE SERPL-SCNC: 103 MMOL/L (ref 98–112)
CO2 SERPL-SCNC: 27 MMOL/L (ref 21–32)
CREAT BLD-MCNC: 0.79 MG/DL (ref 0.55–1.02)
DEPRECATED RDW RBC AUTO: 50.7 FL (ref 35.1–46.3)
EOSINOPHIL # BLD AUTO: 0.13 X10(3) UL (ref 0–0.7)
EOSINOPHIL NFR BLD AUTO: 2.4 %
ERYTHROCYTE [DISTWIDTH] IN BLOOD BY AUTOMATED COUNT: 13.3 % (ref 11–15)
GLOBULIN PLAS-MCNC: 3.5 G/DL (ref 2.8–4.4)
GLUCOSE BLD-MCNC: 84 MG/DL (ref 70–99)
HAV IGM SER QL: 2 MG/DL (ref 1.6–2.6)
HCT VFR BLD AUTO: 38.3 % (ref 35–48)
HGB BLD-MCNC: 12.5 G/DL (ref 12–16)
IMM GRANULOCYTES # BLD AUTO: 0.01 X10(3) UL (ref 0–1)
IMM GRANULOCYTES NFR BLD: 0.2 %
LYMPHOCYTES # BLD AUTO: 0.99 X10(3) UL (ref 1–4)
LYMPHOCYTES NFR BLD AUTO: 18.2 %
M PROTEIN MFR SERPL ELPH: 6.8 G/DL (ref 6.4–8.2)
MCH RBC QN AUTO: 33.9 PG (ref 26–34)
MCHC RBC AUTO-ENTMCNC: 32.6 G/DL (ref 31–37)
MCV RBC AUTO: 103.8 FL (ref 80–100)
MONOCYTES # BLD AUTO: 0.56 X10(3) UL (ref 0.1–1)
MONOCYTES NFR BLD AUTO: 10.3 %
NEUTROPHILS # BLD AUTO: 3.71 X10 (3) UL (ref 1.5–7.7)
NEUTROPHILS # BLD AUTO: 3.71 X10(3) UL (ref 1.5–7.7)
NEUTROPHILS NFR BLD AUTO: 68.2 %
OSMOLALITY SERPL CALC.SUM OF ELEC: 283 MOSM/KG (ref 275–295)
PATIENT FASTING Y/N/NP: YES
PLATELET # BLD AUTO: 248 10(3)UL (ref 150–450)
POTASSIUM SERPL-SCNC: 4 MMOL/L (ref 3.5–5.1)
RBC # BLD AUTO: 3.69 X10(6)UL (ref 3.8–5.3)
SODIUM SERPL-SCNC: 137 MMOL/L (ref 136–145)
VIT D+METAB SERPL-MCNC: 51.6 NG/ML (ref 30–100)
WBC # BLD AUTO: 5.4 X10(3) UL (ref 4–11)

## 2020-02-20 PROCEDURE — 82306 VITAMIN D 25 HYDROXY: CPT

## 2020-02-20 PROCEDURE — 83735 ASSAY OF MAGNESIUM: CPT

## 2020-02-20 PROCEDURE — 85025 COMPLETE CBC W/AUTO DIFF WBC: CPT

## 2020-02-20 PROCEDURE — 80053 COMPREHEN METABOLIC PANEL: CPT

## 2020-02-20 NOTE — TELEPHONE ENCOUNTER
Laura Nino Physical Therapist reports that she worked with patient on Tuesday 2/18/20 and did c/o hip and knee pain 6/10 and that patient had not picked up OTC Lidocaine patches yet.     She was planning on seeing her again on 2/20/20    Therapist reports patti

## 2020-02-21 ENCOUNTER — APPOINTMENT (OUTPATIENT)
Dept: GENERAL RADIOLOGY | Facility: HOSPITAL | Age: 85
End: 2020-02-21
Attending: ORTHOPAEDIC SURGERY
Payer: MEDICARE

## 2020-02-21 ENCOUNTER — APPOINTMENT (OUTPATIENT)
Dept: CT IMAGING | Facility: HOSPITAL | Age: 85
End: 2020-02-21
Attending: EMERGENCY MEDICINE
Payer: MEDICARE

## 2020-02-21 ENCOUNTER — APPOINTMENT (OUTPATIENT)
Dept: GENERAL RADIOLOGY | Facility: HOSPITAL | Age: 85
End: 2020-02-21
Attending: EMERGENCY MEDICINE
Payer: MEDICARE

## 2020-02-21 PROBLEM — G89.29 CHRONIC LEFT HIP PAIN: Status: ACTIVE | Noted: 2020-02-21

## 2020-02-21 PROBLEM — M25.552 CHRONIC LEFT HIP PAIN: Status: ACTIVE | Noted: 2020-02-21

## 2020-02-21 PROBLEM — R26.2 UNABLE TO AMBULATE: Status: ACTIVE | Noted: 2020-02-21
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
## 2020-02-21 PROBLEM — S09.90XA CLOSED HEAD INJURY, INITIAL ENCOUNTER: Status: ACTIVE | Noted: 2020-02-21

## 2020-02-21 PROBLEM — S00.83XA CONTUSION OF FACE, INITIAL ENCOUNTER: Status: ACTIVE | Noted: 2020-02-21

## 2020-02-21 LAB
ANION GAP SERPL CALC-SCNC: 6 MMOL/L (ref 0–18)
ANION GAP SERPL CALC-SCNC: 6 MMOL/L (ref 0–18)
APTT PPP: 33.3 SECONDS (ref 25.4–36.1)
ATRIAL RATE: 70 BPM
BASOPHILS # BLD AUTO: 0.04 X10(3) UL (ref 0–0.2)
BASOPHILS # BLD AUTO: 0.04 X10(3) UL (ref 0–0.2)
BASOPHILS NFR BLD AUTO: 0.5 %
BASOPHILS NFR BLD AUTO: 0.7 %
BILIRUB UR QL STRIP.AUTO: NEGATIVE
BUN BLD-MCNC: 12 MG/DL (ref 7–18)
BUN BLD-MCNC: 17 MG/DL (ref 7–18)
BUN/CREAT SERPL: 16.9 (ref 10–20)
BUN/CREAT SERPL: 21.3 (ref 10–20)
CALCIUM BLD-MCNC: 8.5 MG/DL (ref 8.5–10.1)
CALCIUM BLD-MCNC: 8.7 MG/DL (ref 8.5–10.1)
CHLORIDE SERPL-SCNC: 100 MMOL/L (ref 98–112)
CHLORIDE SERPL-SCNC: 104 MMOL/L (ref 98–112)
CLARITY UR REFRACT.AUTO: CLEAR
CO2 SERPL-SCNC: 23 MMOL/L (ref 21–32)
CO2 SERPL-SCNC: 25 MMOL/L (ref 21–32)
COLOR UR AUTO: YELLOW
CREAT BLD-MCNC: 0.71 MG/DL (ref 0.55–1.02)
CREAT BLD-MCNC: 0.8 MG/DL (ref 0.55–1.02)
DEPRECATED RDW RBC AUTO: 49.2 FL (ref 35.1–46.3)
DEPRECATED RDW RBC AUTO: 50.9 FL (ref 35.1–46.3)
EOSINOPHIL # BLD AUTO: 0.09 X10(3) UL (ref 0–0.7)
EOSINOPHIL # BLD AUTO: 0.13 X10(3) UL (ref 0–0.7)
EOSINOPHIL NFR BLD AUTO: 1.2 %
EOSINOPHIL NFR BLD AUTO: 2.3 %
ERYTHROCYTE [DISTWIDTH] IN BLOOD BY AUTOMATED COUNT: 13.2 % (ref 11–15)
ERYTHROCYTE [DISTWIDTH] IN BLOOD BY AUTOMATED COUNT: 13.3 % (ref 11–15)
GLUCOSE BLD-MCNC: 109 MG/DL (ref 70–99)
GLUCOSE BLD-MCNC: 89 MG/DL (ref 70–99)
GLUCOSE UR STRIP.AUTO-MCNC: NEGATIVE MG/DL
HCT VFR BLD AUTO: 35.8 % (ref 35–48)
HCT VFR BLD AUTO: 37.5 % (ref 35–48)
HGB BLD-MCNC: 11.6 G/DL (ref 12–16)
HGB BLD-MCNC: 12.6 G/DL (ref 12–16)
IMM GRANULOCYTES # BLD AUTO: 0.01 X10(3) UL (ref 0–1)
IMM GRANULOCYTES # BLD AUTO: 0.03 X10(3) UL (ref 0–1)
IMM GRANULOCYTES NFR BLD: 0.1 %
IMM GRANULOCYTES NFR BLD: 0.5 %
INR BLD: 1.38 (ref 0.9–1.1)
LEUKOCYTE ESTERASE UR QL STRIP.AUTO: NEGATIVE
LYMPHOCYTES # BLD AUTO: 0.87 X10(3) UL (ref 1–4)
LYMPHOCYTES # BLD AUTO: 1 X10(3) UL (ref 1–4)
LYMPHOCYTES NFR BLD AUTO: 11.6 %
LYMPHOCYTES NFR BLD AUTO: 17.9 %
MCH RBC QN AUTO: 33.8 PG (ref 26–34)
MCH RBC QN AUTO: 34.1 PG (ref 26–34)
MCHC RBC AUTO-ENTMCNC: 32.4 G/DL (ref 31–37)
MCHC RBC AUTO-ENTMCNC: 33.6 G/DL (ref 31–37)
MCV RBC AUTO: 101.4 FL (ref 80–100)
MCV RBC AUTO: 104.4 FL (ref 80–100)
MONOCYTES # BLD AUTO: 0.7 X10(3) UL (ref 0.1–1)
MONOCYTES # BLD AUTO: 0.75 X10(3) UL (ref 0.1–1)
MONOCYTES NFR BLD AUTO: 10 %
MONOCYTES NFR BLD AUTO: 12.5 %
NEUTROPHILS # BLD AUTO: 3.7 X10 (3) UL (ref 1.5–7.7)
NEUTROPHILS # BLD AUTO: 3.7 X10(3) UL (ref 1.5–7.7)
NEUTROPHILS # BLD AUTO: 5.77 X10 (3) UL (ref 1.5–7.7)
NEUTROPHILS # BLD AUTO: 5.77 X10(3) UL (ref 1.5–7.7)
NEUTROPHILS NFR BLD AUTO: 66.1 %
NEUTROPHILS NFR BLD AUTO: 76.6 %
NITRITE UR QL STRIP.AUTO: NEGATIVE
OSMOLALITY SERPL CALC.SUM OF ELEC: 274 MOSM/KG (ref 275–295)
OSMOLALITY SERPL CALC.SUM OF ELEC: 275 MOSM/KG (ref 275–295)
P AXIS: 32 DEGREES
P-R INTERVAL: 346 MS
PH UR STRIP.AUTO: 7 [PH] (ref 4.5–8)
PLATELET # BLD AUTO: 214 10(3)UL (ref 150–450)
PLATELET # BLD AUTO: 236 10(3)UL (ref 150–450)
POTASSIUM SERPL-SCNC: 4 MMOL/L (ref 3.5–5.1)
POTASSIUM SERPL-SCNC: 4.1 MMOL/L (ref 3.5–5.1)
PROT UR STRIP.AUTO-MCNC: NEGATIVE MG/DL
PSA SERPL DL<=0.01 NG/ML-MCNC: 17.7 SECONDS (ref 12.5–14.7)
Q-T INTERVAL: 398 MS
QRS DURATION: 104 MS
QTC CALCULATION (BEZET): 429 MS
R AXIS: -46 DEGREES
RBC # BLD AUTO: 3.43 X10(6)UL (ref 3.8–5.3)
RBC # BLD AUTO: 3.7 X10(6)UL (ref 3.8–5.3)
RBC UR QL AUTO: NEGATIVE
SODIUM SERPL-SCNC: 131 MMOL/L (ref 136–145)
SODIUM SERPL-SCNC: 133 MMOL/L (ref 136–145)
SP GR UR STRIP.AUTO: 1.01 (ref 1–1.03)
T AXIS: -54 DEGREES
TROPONIN I SERPL-MCNC: <0.045 NG/ML (ref ?–0.04)
UROBILINOGEN UR STRIP.AUTO-MCNC: <2 MG/DL
VENTRICULAR RATE: 70 BPM
WBC # BLD AUTO: 5.6 X10(3) UL (ref 4–11)
WBC # BLD AUTO: 7.5 X10(3) UL (ref 4–11)

## 2020-02-21 PROCEDURE — 99219 INITIAL OBSERVATION CARE,LEVL II: CPT | Performed by: HOSPITALIST

## 2020-02-21 PROCEDURE — 70450 CT HEAD/BRAIN W/O DYE: CPT | Performed by: EMERGENCY MEDICINE

## 2020-02-21 PROCEDURE — 72170 X-RAY EXAM OF PELVIS: CPT | Performed by: EMERGENCY MEDICINE

## 2020-02-21 PROCEDURE — 73552 X-RAY EXAM OF FEMUR 2/>: CPT | Performed by: ORTHOPAEDIC SURGERY

## 2020-02-21 PROCEDURE — 72125 CT NECK SPINE W/O DYE: CPT | Performed by: EMERGENCY MEDICINE

## 2020-02-21 RX ORDER — MORPHINE SULFATE 4 MG/ML
2 INJECTION, SOLUTION INTRAMUSCULAR; INTRAVENOUS ONCE
Status: COMPLETED | OUTPATIENT
Start: 2020-02-21 | End: 2020-02-21

## 2020-02-21 RX ORDER — GARLIC EXTRACT 500 MG
1 CAPSULE ORAL DAILY
Status: DISCONTINUED | OUTPATIENT
Start: 2020-02-21 | End: 2020-02-22

## 2020-02-21 RX ORDER — HYDRALAZINE HYDROCHLORIDE 10 MG/1
10 TABLET, FILM COATED ORAL ONCE
Status: COMPLETED | OUTPATIENT
Start: 2020-02-21 | End: 2020-02-21

## 2020-02-21 RX ORDER — LISINOPRIL 40 MG/1
40 TABLET ORAL DAILY
Status: DISCONTINUED | OUTPATIENT
Start: 2020-02-21 | End: 2020-02-22

## 2020-02-21 RX ORDER — GABAPENTIN 300 MG/1
300 CAPSULE ORAL 3 TIMES DAILY
Status: DISCONTINUED | OUTPATIENT
Start: 2020-02-21 | End: 2020-02-22

## 2020-02-21 RX ORDER — DICYCLOMINE HCL 20 MG
20 TABLET ORAL 4 TIMES DAILY PRN
Status: DISCONTINUED | OUTPATIENT
Start: 2020-02-21 | End: 2020-02-22

## 2020-02-21 RX ORDER — TRAMADOL HYDROCHLORIDE 50 MG/1
50 TABLET ORAL EVERY 6 HOURS PRN
Status: DISCONTINUED | OUTPATIENT
Start: 2020-02-21 | End: 2020-02-22

## 2020-02-21 RX ORDER — TRAMADOL HYDROCHLORIDE 50 MG/1
TABLET ORAL 2 TIMES DAILY PRN
Status: ON HOLD | COMMUNITY
End: 2020-05-10

## 2020-02-21 RX ORDER — ONDANSETRON 2 MG/ML
4 INJECTION INTRAMUSCULAR; INTRAVENOUS ONCE
Status: COMPLETED | OUTPATIENT
Start: 2020-02-21 | End: 2020-02-21

## 2020-02-21 RX ORDER — SENNOSIDES 8.6 MG
8.6 TABLET ORAL DAILY
Status: DISCONTINUED | OUTPATIENT
Start: 2020-02-21 | End: 2020-02-22

## 2020-02-21 RX ORDER — HYDRALAZINE HYDROCHLORIDE 20 MG/ML
5 INJECTION INTRAMUSCULAR; INTRAVENOUS EVERY 6 HOURS PRN
Status: DISCONTINUED | OUTPATIENT
Start: 2020-02-21 | End: 2020-02-22

## 2020-02-21 RX ORDER — ACETAMINOPHEN 325 MG/1
650 TABLET ORAL EVERY 6 HOURS PRN
Status: DISCONTINUED | OUTPATIENT
Start: 2020-02-21 | End: 2020-02-22

## 2020-02-21 RX ORDER — METOCLOPRAMIDE HYDROCHLORIDE 5 MG/ML
10 INJECTION INTRAMUSCULAR; INTRAVENOUS EVERY 8 HOURS PRN
Status: DISCONTINUED | OUTPATIENT
Start: 2020-02-21 | End: 2020-02-22

## 2020-02-21 RX ORDER — ONDANSETRON 2 MG/ML
4 INJECTION INTRAMUSCULAR; INTRAVENOUS EVERY 6 HOURS PRN
Status: DISCONTINUED | OUTPATIENT
Start: 2020-02-21 | End: 2020-02-22

## 2020-02-21 RX ORDER — FLECAINIDE ACETATE 100 MG/1
100 TABLET ORAL 2 TIMES DAILY
Status: DISCONTINUED | OUTPATIENT
Start: 2020-02-21 | End: 2020-02-22

## 2020-02-21 RX ORDER — POLYETHYLENE GLYCOL 3350 17 G/17G
17 POWDER, FOR SOLUTION ORAL ONCE
Status: COMPLETED | OUTPATIENT
Start: 2020-02-21 | End: 2020-02-21

## 2020-02-21 NOTE — PLAN OF CARE
Patient is A&Ox3 with occasional disorientation to situation. Calm, cooperative, follows commands. [place], bilateral hearing aids worn. Atrial paced on telemetry, VSS, afebrile. On RA. C/o chronic left hip pain that is worse with movement, rated 3/10.

## 2020-02-21 NOTE — ED PROVIDER NOTES
Patient Seen in: [facility] 3ne-a      History   Patient presents with:  Fall    Stated Complaint: Fall    HPI  26-year-old female presents ED with complaints of fall. Patient has a history of A. fib and is on Eliquis.   Fall was unwitnessed at the cpap   • Spinal stenosis of lumbar region without neurogenic claudication 8/8/2018   • Visual impairment               Past Surgical History:   Procedure Laterality Date   • APPENDECTOMY     • BACK SURGERY     • BOWEL RESECTION     • CHOLECYSTECTOMY     • tobacco: Never Used    Alcohol use: Yes      Alcohol/week: 2.0 standard drinks      Types: 2 Glasses of wine per week      Comment: wine occasionally    Drug use:  No             Review of Systems    Positive for stated complaint: Fall  Other systems are as (*)     All other components within normal limits   URINALYSIS WITH CULTURE REFLEX - Abnormal; Notable for the following components:    Ketones Urine Trace (*)     All other components within normal limits   PROTHROMBIN TIME (PT) - Abnormal; Notable for th Report.   Rate: 70  Rhythm: Sinus Rhythm  Reading: No gross ST elevations or depressions T wave in the lateral leads as seen in previous EKG from February 4, 2020              Ct Brain Or Head (76790)    Result Date: 2/21/2020  CONCLUSION:  No acute intracr List                   Present on Admission  Date Reviewed: 2/19/2020          ICD-10-CM Noted POA    * (Principal) Fall, initial encounter [address] XXXA 2/21/2020     Chronic left hip pain M25.552, G89.29 2/21/2020     Closed head injury, initial encounter S09

## 2020-02-21 NOTE — PHYSICAL THERAPY NOTE
PHYSICAL THERAPY EVALUATION - INPATIENT     Room Number: 3771/4018-X  Evaluation Date: 2/21/2020  Type of Evaluation: Initial  Physician Order: PT Eval and Treat    Presenting Problem: s/p fall, chronic L hip pn and contusion of face  Reason for Ther Essential hypertension    • Fatigue    • Food intolerance    • Frequent urination    • Hearing impairment    • Hearing loss    • Hemorrhoids    • History of depression    • Hypertension    • Irregular bowel habits    • Leg swelling    • Lumbar facet arthro STEROID INJECTION MULTIPLE LEVEL Left 6/29/2015    Performed by Kaylin Guzman MD at [facility] MAIN OR   • TRANSFORAMINAL LUMBAR EPIDURAL STEROID INJECTION MULTIPLE LEVEL Left 5/5/2015    Performed by Kaylin Guzman MD at [address] [place] [address] Sensation: BLE symmetrical/intact to light touch       ACTIVITY TOLERANCE                         O2 WALK                  AM-PAC '6-Clicks' INPATIENT SHORT FORM - BASIC MOBILITY  How much difficulty does the patient currently have. ..  -   Turning questions and concerns addressed; Family present; Alarm set    ASSESSMENT   Patient is a 80year old female admitted on 2/20/2020 for s/p fall, chronic L hip pn and contusion of face.   Pertinent comorbidities and personal factors impacting therapy include os assistance level: minimum assistance     Goal #4    Goal #5    Goal #6    Goal Comments: Goals established on 2/21/2020

## 2020-02-21 NOTE — ED NOTES
Patient unable to complete ambulation testing. Patient able to get into a standing position with walker in front of her but legs began to tremble and hurt. Patient returned to bed. Dr. Sofia Vega notified.

## 2020-02-21 NOTE — OCCUPATIONAL THERAPY NOTE
OCCUPATIONAL THERAPY EVALUATION - INPATIENT     Room Number: 5393/4677-E  Evaluation Date: 2/21/2020  Type of Evaluation: Initial  Presenting Problem: s/p fall, L hip pain     Physician Order: IP Consult to Occupational Therapy  Reason for Therapy: ADL/IAD [facility])    • Back pain    • Basal cell cancer    • Bloating    • Body piercing    • Cellulitis of chest wall 7/11/2018   • Cellulitis of left arm 7/19/2018   • Cellulitis of right upper extremity    • Closed bicondylar fracture of distal end of right humerus 2/2/2015    Performed by Pura Hillman MD at [facility] MAIN OR   • SKIN SURGERY  3-6-14    [facility] nod, inf to left nasal tip / MMS done   • SKIN SURGERY  10/31/16    MMS for BCC-nod to the R upper cut lip-AB   • TONSILLECTOMY     • TOTAL HIP REPLACEMENT     • TR promotion;Repositioning    COGNITION  Overall Cognitive Status:  Impaired  Orientation Level:  oriented to time, oriented to person, disoriented to place and disoriented to situation  Memory:  decreased recall of recent events and decreased short term isreal Pt performed sit>stand with RW and min assistance with min verbal/visual/tactile cues for safety with RW use and hand placement. Pt performed functional mobility with mod assistance and RW x approx 20ft>chair.  Pt performed standing>sit in her chair with mi profile, detailed assessments, several treatment options    Overall Complexity MODERATE     OT Discharge Recommendations: Sub-acute rehabilitation(ELOS 12-14 days )  OT Device Recommendations: TBD    PLAN  OT Treatment Plan: Balance activities; Energy conse

## 2020-02-21 NOTE — TELEPHONE ENCOUNTER
CANDELARIO from [facility] at [address] [facility] got out of bed to go to the  (shortly after being toileted by aides and put back to bed). She had an unwitnessed fall. She has an abrasion on her forehead.   She does not complain of any increased pain in her left hip or any

## 2020-02-21 NOTE — ED INITIAL ASSESSMENT (HPI)
Pt arrived via ambulance for unwitnessed fall. Pt states was walking to bathroom in her room and legs gave out and \"fell forward and backwards\". Patient on blood thinners. Complained of headache.

## 2020-02-21 NOTE — CM/SW NOTE
Pt is staying at [facility]. The RN caring for her at the [facility], Julio Wagner, [phone] reports patient is in the rehab section. Pt has 24/7 care there are is getting PT. Patient can be discharged back.  Informed patient and her son, Nhan Carvajal

## 2020-02-21 NOTE — PROGRESS NOTES
[facility] HOSPITALIST  Progress Note     Alivia Maldonado Patient Status:  Observation    10/25/1927 MRN [id number]   [facility] 3NE-A Attending Fred Vega MD   Hosp Day # 0 PCP Asya Johnson MD     Chief Complaint: Hip pain     S: Patie of 0.71 mg/dL). Recent Labs   Lab 02/21/20 0034   PTP 17.7*   INR 1.38*       Recent Labs   Lab 02/21/20 0034   TROP <0.045            Imaging: Imaging data reviewed in Epic.     Medications:   • Acidophilus/Pectin  1 capsule Oral Daily   • apixaban  5

## 2020-02-21 NOTE — H&P
[facility] HOSPITALIST  History and Physical     Sarah Maldonado Patient Status:  Emergency    10/25/1927 MRN [id number]   Location [address] [facility] Attending Geoffrey Davis DO   Hosp Day # 0 PCP Josh Tucker MD     Chief Com • Hearing impairment    • Hearing loss    • Hemorrhoids    • History of depression    • Hypertension    • Irregular bowel habits    • Leg swelling    • Lumbar facet arthropathy 2/10/2018   • Macular degeneration - Aydin Madrig[facility] Medical Ophthalmologist 5/5/2011 TRANSFORAMINAL LUMBAR EPIDURAL STEROID INJECTION MULTIPLE LEVEL Left 5/5/2015    Performed by Lyndsey Chapman MD at [facility] MAIN OR       Social History:  reports that she quit smoking about 48 years ago. She has a 2.00 pack-year smoking history.  She has neve mouth daily. , Disp: , Rfl:   digoxin 0.125 MG Oral Tab, Take 125 mcg by mouth daily. , Disp: , Rfl:   Acidophilus/Pectin Oral Cap, Take 1 capsule by mouth daily. , Disp: 5 capsule, Rfl: 0  Dicyclomine HCl 20 MG Oral Tab, Take 1 tablet (20 mg total) by mo Labs:  Recent Labs   Lab 02/20/20  0743 02/21/20  0034   WBC 5.4 7.5   HGB 12.5 12.6   .8* 101.4*   .0 236.0   INR  --  1.38*       Recent Labs   Lab 02/20/20  0743 02/21/20 0034   GLU 84 109*   BUN 11 17   CREATSERUM 0.79 0.80   GFRAA 7

## 2020-02-21 NOTE — ED NOTES
Received patient from [address] [place] [address]. Patient here with c/o fall from NH. Patient reports she was walking to the restroom and her legs gave out. Patient fell forwards then backwards. Patient initially has headache but denies headache now.   Patient is only havin

## 2020-02-22 VITALS
HEIGHT: 65 IN | DIASTOLIC BLOOD PRESSURE: 91 MMHG | HEART RATE: 70 BPM | RESPIRATION RATE: 14 BRPM | TEMPERATURE: 98 F | OXYGEN SATURATION: 97 % | SYSTOLIC BLOOD PRESSURE: 120 MMHG | WEIGHT: 148.13 LBS | BODY MASS INDEX: 24.68 KG/M2

## 2020-02-22 PROCEDURE — 99217 OBSERVATION CARE DISCHARGE: CPT | Performed by: STUDENT IN AN ORGANIZED HEALTH CARE EDUCATION/TRAINING PROGRAM

## 2020-02-22 NOTE — CM/SW NOTE
Per RN, the pt is clear to dc today. She came to [facility] from the [place] ([facility]?). Call placed to the pts son, Paul Silva. He is feeling frustrated with the care/decisions made with regard to the pt prior to her admission.  He stated he was not even made aware of

## 2020-02-22 NOTE — DISCHARGE SUMMARY
[facility] HOSPITALIST  DISCHARGE SUMMARY     Emir Maldonado Patient Status:  Observation    10/25/1927 MRN [id number]   [facility] 3NE-A Attending Heide Moralez MD   Hosp Day # 0 PCP Mp Jain MD     Date of Admission: 2020  Date hip, Xray pelvis    Incidental or significant findings and recommendations (brief descriptions):  • no    Lab/Test results pending at Discharge:   · no    Consultants:  • Ortho    Discharge Medication List:     Discharge Medications      START taking these pain 1 tablet   5-10 pain 2 tablets   Refills:  0     traMADol HCl 50 MG Tabs  Commonly known as:  ULTRAM      Take  mg by mouth 2 (two) times daily.    Quantity:  60 tablet  Refills:  0        ASK your doctor about these medications      Instructions

## 2020-02-22 NOTE — PLAN OF CARE
Received patient awake sitting in the chair. Son at bedside. [place], with hearing aid. A paced, underlying afib, on Eliquis. On cpap at night, room air during the day. Used the [facility], [facility]. PT/OT recommends [facility].      Problem: Patient/Family Goals  Goal: Jesica Menendez

## 2020-02-22 NOTE — PROGRESS NOTES
[facility] HOSPITAL[facility]  Progress Note     Valerie Derrick Maldonado Patient Status:  Observation    10/25/1927 MRN [id number]   [facility] 3NE-A Attending Martín Tenorio MD   Hosp Day # 0 PCP Gabriele Wilkerson MD     Chief Complaint: Hip pain     S: Patie Estimated Creatinine Clearance: 45.5 mL/min (based on SCr of 0.71 mg/dL). Recent Labs   Lab 02/21/20 0034   PTP 17.7*   INR 1.38*       Recent Labs   Lab 02/21/20 0034   TROP <0.045            Imaging: Imaging data reviewed in Epic.     [facility]

## 2020-02-22 NOTE — PROGRESS NOTES
NURSING DISCHARGE NOTE    Discharged Rehab facility via Ambulance. Accompanied by Family member and Support staff  Belongings Taken by patient/family. Reviewed med rec and d/c planning with pt and family- verbalized understanding.  Removed PIV and c

## 2020-02-22 NOTE — PLAN OF CARE
Problem: Patient/Family Goals  Goal: Patient/Family Long Term Goal  Description  Patient's Long Term Goal: discharge    Interventions:  - See additional Care Plan goals for specific interventions  Outcome: Progressing  Goal: Patient/Family [address]

## 2020-02-22 NOTE — RESPIRATORY THERAPY NOTE
JAZZ - Equipment Use Daily Summary:                  . Set Mode: AUTO CPAP WITH C-FLEX                . Usage in hours: 8:57                . 90% Pressure (EPAP) level: 9.5                . 90% Insp. Pressure (IPAP): Geno Tian  AHI: 3.9

## 2020-02-23 ENCOUNTER — TELEPHONE (OUTPATIENT)
Dept: INTERNAL MEDICINE CLINIC | Facility: CLINIC | Age: 85
End: 2020-02-23

## 2020-02-24 ENCOUNTER — EXTERNAL FACILITY (OUTPATIENT)
Dept: INTERNAL MEDICINE CLINIC | Facility: CLINIC | Age: 85
End: 2020-02-24
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
## 2020-02-24 DIAGNOSIS — S09.90XA CLOSED HEAD INJURY, INITIAL ENCOUNTER: ICD-10-CM

## 2020-02-24 DIAGNOSIS — I10 ESSENTIAL HYPERTENSION: ICD-10-CM

## 2020-02-24 DIAGNOSIS — G89.29 CHRONIC LEFT HIP PAIN: Primary | ICD-10-CM

## 2020-02-24 DIAGNOSIS — Z79.01 CURRENT USE OF LONG TERM ANTICOAGULATION: ICD-10-CM

## 2020-02-24 DIAGNOSIS — M25.552 CHRONIC LEFT HIP PAIN: Primary | ICD-10-CM

## 2020-02-24 PROCEDURE — 99306 1ST NF CARE HIGH MDM 50: CPT | Performed by: INTERNAL MEDICINE

## 2020-02-24 RX ORDER — METOPROLOL TARTRATE 50 MG/1
TABLET, FILM COATED ORAL
Qty: 60 TABLET | Refills: 3 | OUTPATIENT
Start: 2020-02-24

## 2020-02-24 NOTE — PROGRESS NOTES
CC: Admitting H&P after hospitalization at 9:15 am     HPI: Joyce Tovar is here for subacute rehab for c/o pain in left hip area and hospitalization for closed head injury after fall. Imaging at [facility] was all negative.   She was treated with Tylenol and L she did not appear to be in severe or even moderate pain with prn Tylenol. Pt's recollection of her pain control with Tylenol yesterday was not clear. She states that she told son she did not want Tramadol because \"it is a narcotic. \"  But now she w Problem List:     Pacemaker - NICOLA Grove     JAZZ on CPAP     Current use of long term anticoagulation     Gastroesophageal reflux disease without esophagitis     Pulmonary hypertension (HCC)-52 mg Hg     Alternating constipation and diarrhea     Other fatig daily     • Multiple Vitamins-Minerals (TAB-A-MARA MAXIMUM) Oral Tab Take 1 tablet by mouth daily. • acetaminophen 325 MG Oral Tab Take 650 mg by mouth every 6 (six) hours as needed for Pain.          PFHSH:   Family History   Problem Relation Age of On
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
## 2020-02-25 ENCOUNTER — EXTERNAL FACILITY (OUTPATIENT)
Dept: INTERNAL MEDICINE CLINIC | Facility: CLINIC | Age: 85
End: 2020-02-25

## 2020-02-25 DIAGNOSIS — M25.552 CHRONIC LEFT HIP PAIN: Primary | ICD-10-CM

## 2020-02-25 DIAGNOSIS — Z99.89 OSA ON CPAP: ICD-10-CM

## 2020-02-25 DIAGNOSIS — G47.33 OSA ON CPAP: ICD-10-CM

## 2020-02-25 DIAGNOSIS — S09.90XD CHI (CLOSED HEAD INJURY), SUBSEQUENT ENCOUNTER: ICD-10-CM

## 2020-02-25 DIAGNOSIS — M70.62 TROCHANTERIC BURSITIS OF LEFT HIP: Chronic | ICD-10-CM

## 2020-02-25 DIAGNOSIS — G89.29 CHRONIC LEFT HIP PAIN: Primary | ICD-10-CM

## 2020-02-25 DIAGNOSIS — Z79.01 CURRENT USE OF LONG TERM ANTICOAGULATION: ICD-10-CM

## 2020-02-25 DIAGNOSIS — I48.0 PAF (PAROXYSMAL ATRIAL FIBRILLATION) (HCC): ICD-10-CM

## 2020-02-25 PROCEDURE — 99309 SBSQ NF CARE MODERATE MDM 30: CPT | Performed by: INTERNAL MEDICINE

## 2020-02-26 ENCOUNTER — MOBILE ENCOUNTER (OUTPATIENT)
Dept: INTERNAL MEDICINE CLINIC | Facility: CLINIC | Age: 85
End: 2020-02-26

## 2020-02-26 NOTE — PROGRESS NOTES
CC: F/U for left hip pain     HPI: Here to see Veverly Ewa for left hip pain. She has been on Tylenol 650 mg q 4 hrs while awake scheduled and prn at night and Lidocaine patch. Her dtr-in-law is in town and assisting with her care here at the [facility].   She, hypertension    • Fatigue    • Food intolerance    • Frequent urination    • Hearing impairment    • Hearing loss    • Hemorrhoids    • History of depression    • Hypertension    • Irregular bowel habits    • Leg swelling    • Lumbar facet arthropathy 2/10 gabapentin 300 MG Oral Cap Take 300 mg by mouth 3 (three) times daily. • apixaban 5 MG Oral Tab Take 1 tablet (5 mg total) by mouth 2 (two) times daily. 60 tablet 3   • lisinopril 40 MG Oral Tab Take 40 mg by mouth daily.        • digoxin 0.125 MG Oral 40 mg a day and Lopressor 25 mg prn bid    Return in about 1 day (around 2/26/2020). This visit lasted >26 minutes.     Ag Wells MD

## 2020-02-27 ENCOUNTER — TELEPHONE (OUTPATIENT)
Dept: CARDIOLOGY | Age: 85
End: 2020-02-27

## 2020-02-27 ENCOUNTER — TELEPHONE (OUTPATIENT)
Dept: INTERNAL MEDICINE CLINIC | Facility: CLINIC | Age: 85
End: 2020-02-27

## 2020-02-27 ENCOUNTER — NURSE ONLY (OUTPATIENT)
Dept: LAB | Age: 85
End: 2020-02-27
Attending: INTERNAL MEDICINE
Payer: MEDICARE

## 2020-02-27 DIAGNOSIS — N18.30 CHRONIC KIDNEY DISEASE, STAGE III (MODERATE) (HCC): Primary | ICD-10-CM

## 2020-02-27 DIAGNOSIS — I48.0 PAROXYSMAL ATRIAL FIBRILLATION (HCC): ICD-10-CM

## 2020-02-27 DIAGNOSIS — K58.0 IRRITABLE BOWEL SYNDROME WITH DIARRHEA: ICD-10-CM

## 2020-02-27 LAB
ALBUMIN SERPL-MCNC: 3 G/DL (ref 3.4–5)
ALBUMIN/GLOB SERPL: 0.9 {RATIO} (ref 1–2)
ALP LIVER SERPL-CCNC: 51 U/L (ref 55–142)
ALT SERPL-CCNC: 25 U/L (ref 13–56)
ANION GAP SERPL CALC-SCNC: 9 MMOL/L (ref 0–18)
AST SERPL-CCNC: 25 U/L (ref 15–37)
BASOPHILS # BLD AUTO: 0.04 X10(3) UL (ref 0–0.2)
BASOPHILS NFR BLD AUTO: 0.8 %
BILIRUB SERPL-MCNC: 0.4 MG/DL (ref 0.1–2)
BUN BLD-MCNC: 18 MG/DL (ref 7–18)
BUN/CREAT SERPL: 23.7 (ref 10–20)
CALCIUM BLD-MCNC: 8.8 MG/DL (ref 8.5–10.1)
CHLORIDE SERPL-SCNC: 102 MMOL/L (ref 98–112)
CO2 SERPL-SCNC: 25 MMOL/L (ref 21–32)
CREAT BLD-MCNC: 0.76 MG/DL (ref 0.55–1.02)
DEPRECATED RDW RBC AUTO: 49.3 FL (ref 35.1–46.3)
EOSINOPHIL # BLD AUTO: 0.2 X10(3) UL (ref 0–0.7)
EOSINOPHIL NFR BLD AUTO: 4 %
ERYTHROCYTE [DISTWIDTH] IN BLOOD BY AUTOMATED COUNT: 13.2 % (ref 11–15)
GLOBULIN PLAS-MCNC: 3.4 G/DL (ref 2.8–4.4)
GLUCOSE BLD-MCNC: 87 MG/DL (ref 70–99)
HCT VFR BLD AUTO: 36.3 % (ref 35–48)
HGB BLD-MCNC: 12.2 G/DL (ref 12–16)
IMM GRANULOCYTES # BLD AUTO: 0.02 X10(3) UL (ref 0–1)
IMM GRANULOCYTES NFR BLD: 0.4 %
LYMPHOCYTES # BLD AUTO: 1.35 X10(3) UL (ref 1–4)
LYMPHOCYTES NFR BLD AUTO: 26.9 %
M PROTEIN MFR SERPL ELPH: 6.4 G/DL (ref 6.4–8.2)
MCH RBC QN AUTO: 33.9 PG (ref 26–34)
MCHC RBC AUTO-ENTMCNC: 33.6 G/DL (ref 31–37)
MCV RBC AUTO: 100.8 FL (ref 80–100)
MONOCYTES # BLD AUTO: 0.72 X10(3) UL (ref 0.1–1)
MONOCYTES NFR BLD AUTO: 14.3 %
NEUTROPHILS # BLD AUTO: 2.69 X10 (3) UL (ref 1.5–7.7)
NEUTROPHILS # BLD AUTO: 2.69 X10(3) UL (ref 1.5–7.7)
NEUTROPHILS NFR BLD AUTO: 53.6 %
OSMOLALITY SERPL CALC.SUM OF ELEC: 283 MOSM/KG (ref 275–295)
PATIENT FASTING Y/N/NP: YES
PLATELET # BLD AUTO: 244 10(3)UL (ref 150–450)
POTASSIUM SERPL-SCNC: 3.7 MMOL/L (ref 3.5–5.1)
RBC # BLD AUTO: 3.6 X10(6)UL (ref 3.8–5.3)
SODIUM SERPL-SCNC: 136 MMOL/L (ref 136–145)
WBC # BLD AUTO: 5 X10(3) UL (ref 4–11)

## 2020-02-27 PROCEDURE — 85025 COMPLETE CBC W/AUTO DIFF WBC: CPT

## 2020-02-27 PROCEDURE — 80053 COMPREHEN METABOLIC PANEL: CPT

## 2020-02-27 NOTE — TELEPHONE ENCOUNTER
Lakisha Pacheco called stating she spoke with Quinn Ruiz, daughter in law, and addressed the family's concerns. She requests when EKG is done it be faxed to Dr. Rebeca Mackay at [phone] BALJINDER Quentin. Nicole at [facility] notified by fax.
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
## 2020-02-27 NOTE — TELEPHONE ENCOUNTER
Dr. Lindsey Humphries office received a call from pts son who is concerned about medication changes made by Dr. Da Estevez. He's asking for concurrence by Dr. Dann Davila. I spoke with Stephanie Ignacio at Dr. Lindsey Humphries office who asked to review cardio meds.   Chart reviewed and ca

## 2020-02-28 ENCOUNTER — EXTERNAL FACILITY (OUTPATIENT)
Dept: INTERNAL MEDICINE CLINIC | Facility: CLINIC | Age: 85
End: 2020-02-28

## 2020-02-28 DIAGNOSIS — I48.0 PAF (PAROXYSMAL ATRIAL FIBRILLATION) (HCC): ICD-10-CM

## 2020-02-28 DIAGNOSIS — M70.62 TROCHANTERIC BURSITIS OF LEFT HIP: Primary | ICD-10-CM

## 2020-02-28 DIAGNOSIS — I10 ESSENTIAL HYPERTENSION: ICD-10-CM

## 2020-02-28 DIAGNOSIS — I27.20 PULMONARY HYPERTENSION (HCC): ICD-10-CM

## 2020-02-28 RX ORDER — HYDRALAZINE HYDROCHLORIDE 25 MG/1
25 TABLET, FILM COATED ORAL 2 TIMES DAILY
COMMUNITY
End: 2020-06-01 | Stop reason: DRUGHIGH
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
## 2020-03-02 ENCOUNTER — NURSE ONLY (OUTPATIENT)
Dept: LAB | Age: 85
End: 2020-03-02
Attending: INTERNAL MEDICINE
Payer: MEDICARE

## 2020-03-02 DIAGNOSIS — M70.62 TROCHANTERIC BURSITIS OF LEFT HIP: Primary | ICD-10-CM

## 2020-03-02 LAB
ALBUMIN SERPL-MCNC: 2.8 G/DL (ref 3.4–5)
ALBUMIN/GLOB SERPL: 0.7 {RATIO} (ref 1–2)
ALP LIVER SERPL-CCNC: 101 U/L (ref 55–142)
ALT SERPL-CCNC: 103 U/L (ref 13–56)
ANION GAP SERPL CALC-SCNC: 8 MMOL/L (ref 0–18)
AST SERPL-CCNC: 58 U/L (ref 15–37)
BASOPHILS # BLD AUTO: 0.03 X10(3) UL (ref 0–0.2)
BASOPHILS NFR BLD AUTO: 0.9 %
BILIRUB SERPL-MCNC: 0.4 MG/DL (ref 0.1–2)
BUN BLD-MCNC: 16 MG/DL (ref 7–18)
BUN/CREAT SERPL: 19.3 (ref 10–20)
CALCIUM BLD-MCNC: 8.7 MG/DL (ref 8.5–10.1)
CHLORIDE SERPL-SCNC: 96 MMOL/L (ref 98–112)
CO2 SERPL-SCNC: 24 MMOL/L (ref 21–32)
CREAT BLD-MCNC: 0.83 MG/DL (ref 0.55–1.02)
DEPRECATED RDW RBC AUTO: 51.1 FL (ref 35.1–46.3)
EOSINOPHIL # BLD AUTO: 0.24 X10(3) UL (ref 0–0.7)
EOSINOPHIL NFR BLD AUTO: 7.2 %
ERYTHROCYTE [DISTWIDTH] IN BLOOD BY AUTOMATED COUNT: 13.8 % (ref 11–15)
GLOBULIN PLAS-MCNC: 3.8 G/DL (ref 2.8–4.4)
GLUCOSE BLD-MCNC: 88 MG/DL (ref 70–99)
HCT VFR BLD AUTO: 36.6 % (ref 35–48)
HGB BLD-MCNC: 12.2 G/DL (ref 12–16)
IMM GRANULOCYTES # BLD AUTO: 0.04 X10(3) UL (ref 0–1)
IMM GRANULOCYTES NFR BLD: 1.2 %
LYMPHOCYTES # BLD AUTO: 0.54 X10(3) UL (ref 1–4)
LYMPHOCYTES NFR BLD AUTO: 16.3 %
M PROTEIN MFR SERPL ELPH: 6.6 G/DL (ref 6.4–8.2)
MCH RBC QN AUTO: 33.7 PG (ref 26–34)
MCHC RBC AUTO-ENTMCNC: 33.3 G/DL (ref 31–37)
MCV RBC AUTO: 101.1 FL (ref 80–100)
MONOCYTES # BLD AUTO: 0.29 X10(3) UL (ref 0.1–1)
MONOCYTES NFR BLD AUTO: 8.7 %
NEUTROPHILS # BLD AUTO: 2.18 X10 (3) UL (ref 1.5–7.7)
NEUTROPHILS # BLD AUTO: 2.18 X10(3) UL (ref 1.5–7.7)
NEUTROPHILS NFR BLD AUTO: 65.7 %
OSMOLALITY SERPL CALC.SUM OF ELEC: 267 MOSM/KG (ref 275–295)
PATIENT FASTING Y/N/NP: YES
PLATELET # BLD AUTO: 248 10(3)UL (ref 150–450)
POTASSIUM SERPL-SCNC: 4.3 MMOL/L (ref 3.5–5.1)
RBC # BLD AUTO: 3.62 X10(6)UL (ref 3.8–5.3)
SODIUM SERPL-SCNC: 128 MMOL/L (ref 136–145)
WBC # BLD AUTO: 3.3 X10(3) UL (ref 4–11)

## 2020-03-02 PROCEDURE — 85025 COMPLETE CBC W/AUTO DIFF WBC: CPT

## 2020-03-02 PROCEDURE — 80053 COMPREHEN METABOLIC PANEL: CPT

## 2020-03-04 ENCOUNTER — APPOINTMENT (OUTPATIENT)
Dept: CARDIOLOGY | Age: 85
End: 2020-03-04

## 2020-03-04 ENCOUNTER — NURSE ONLY (OUTPATIENT)
Dept: LAB | Age: 85
End: 2020-03-04
Attending: INTERNAL MEDICINE
Payer: MEDICARE

## 2020-03-04 DIAGNOSIS — M70.62 TROCHANTERIC BURSITIS OF LEFT HIP: Primary | ICD-10-CM

## 2020-03-04 LAB
ALBUMIN SERPL-MCNC: 2.7 G/DL (ref 3.4–5)
ALBUMIN/GLOB SERPL: 0.8 {RATIO} (ref 1–2)
ALP LIVER SERPL-CCNC: 88 U/L (ref 55–142)
ALT SERPL-CCNC: 71 U/L (ref 13–56)
ANION GAP SERPL CALC-SCNC: 6 MMOL/L (ref 0–18)
AST SERPL-CCNC: 37 U/L (ref 15–37)
BILIRUB SERPL-MCNC: 0.4 MG/DL (ref 0.1–2)
BUN BLD-MCNC: 15 MG/DL (ref 7–18)
BUN/CREAT SERPL: 20.8 (ref 10–20)
CALCIUM BLD-MCNC: 8.6 MG/DL (ref 8.5–10.1)
CHLORIDE SERPL-SCNC: 96 MMOL/L (ref 98–112)
CO2 SERPL-SCNC: 26 MMOL/L (ref 21–32)
CREAT BLD-MCNC: 0.72 MG/DL (ref 0.55–1.02)
GLOBULIN PLAS-MCNC: 3.4 G/DL (ref 2.8–4.4)
GLUCOSE BLD-MCNC: 95 MG/DL (ref 70–99)
M PROTEIN MFR SERPL ELPH: 6.1 G/DL (ref 6.4–8.2)
OSMOLALITY SERPL CALC.SUM OF ELEC: 267 MOSM/KG (ref 275–295)
PATIENT FASTING Y/N/NP: YES
POTASSIUM SERPL-SCNC: 4.3 MMOL/L (ref 3.5–5.1)
SODIUM SERPL-SCNC: 128 MMOL/L (ref 136–145)

## 2020-03-04 PROCEDURE — 80053 COMPREHEN METABOLIC PANEL: CPT

## 2020-03-06 ENCOUNTER — TELEPHONE (OUTPATIENT)
Dept: CARDIOLOGY | Age: 85
End: 2020-03-06

## 2020-03-07 ENCOUNTER — NURSE ONLY (OUTPATIENT)
Dept: LAB | Age: 85
End: 2020-03-07
Attending: INTERNAL MEDICINE
Payer: MEDICARE

## 2020-03-07 DIAGNOSIS — I13.10 HYPERTENSIVE HEART AND RENAL DISEASE: ICD-10-CM

## 2020-03-07 DIAGNOSIS — I48.0 PAROXYSMAL ATRIAL FIBRILLATION (HCC): Primary | ICD-10-CM

## 2020-03-07 LAB
ADENOVIRUS PCR:: NEGATIVE
B PERT DNA SPEC QL NAA+PROBE: NEGATIVE
C PNEUM DNA SPEC QL NAA+PROBE: NEGATIVE
CORONAVIRUS 229E PCR:: NEGATIVE
CORONAVIRUS HKU1 PCR:: NEGATIVE
CORONAVIRUS NL63 PCR:: NEGATIVE
CORONAVIRUS OC43 PCR:: NEGATIVE
FLUAV H1 2009 PAND RNA SPEC QL NAA+PROBE: POSITIVE
FLUBV RNA SPEC QL NAA+PROBE: NEGATIVE
METAPNEUMOVIRUS PCR:: NEGATIVE
MYCOPLASMA PNEUMONIA PCR:: NEGATIVE
PARAINFLUENZA 1 PCR:: NEGATIVE
PARAINFLUENZA 2 PCR:: NEGATIVE
PARAINFLUENZA 3 PCR:: NEGATIVE
PARAINFLUENZA 4 PCR:: NEGATIVE
RHINOVIRUS/ENTERO PCR:: NEGATIVE
RSV RNA SPEC QL NAA+PROBE: NEGATIVE

## 2020-03-07 PROCEDURE — 87798 DETECT AGENT NOS DNA AMP: CPT

## 2020-03-07 PROCEDURE — 87486 CHLMYD PNEUM DNA AMP PROBE: CPT

## 2020-03-07 PROCEDURE — 87581 M.PNEUMON DNA AMP PROBE: CPT

## 2020-03-07 PROCEDURE — 87633 RESP VIRUS 12-25 TARGETS: CPT

## 2020-03-09 ENCOUNTER — NURSE ONLY (OUTPATIENT)
Dept: LAB | Age: 85
End: 2020-03-09
Attending: INTERNAL MEDICINE
Payer: MEDICARE

## 2020-03-09 ENCOUNTER — TELEPHONE (OUTPATIENT)
Dept: INTERNAL MEDICINE CLINIC | Facility: CLINIC | Age: 85
End: 2020-03-09

## 2020-03-09 DIAGNOSIS — M70.62 TROCHANTERIC BURSITIS OF LEFT HIP: Primary | ICD-10-CM

## 2020-03-09 LAB
ALBUMIN SERPL-MCNC: 2.7 G/DL (ref 3.4–5)
ALBUMIN/GLOB SERPL: 0.8 {RATIO} (ref 1–2)
ALP LIVER SERPL-CCNC: 83 U/L (ref 55–142)
ALT SERPL-CCNC: 39 U/L (ref 13–56)
ANION GAP SERPL CALC-SCNC: 7 MMOL/L (ref 0–18)
AST SERPL-CCNC: 28 U/L (ref 15–37)
BILIRUB SERPL-MCNC: 0.4 MG/DL (ref 0.1–2)
BUN BLD-MCNC: 19 MG/DL (ref 7–18)
BUN/CREAT SERPL: 19.2 (ref 10–20)
CALCIUM BLD-MCNC: 8.4 MG/DL (ref 8.5–10.1)
CHLORIDE SERPL-SCNC: 95 MMOL/L (ref 98–112)
CO2 SERPL-SCNC: 26 MMOL/L (ref 21–32)
CREAT BLD-MCNC: 0.99 MG/DL (ref 0.55–1.02)
GLOBULIN PLAS-MCNC: 3.5 G/DL (ref 2.8–4.4)
GLUCOSE BLD-MCNC: 88 MG/DL (ref 70–99)
M PROTEIN MFR SERPL ELPH: 6.2 G/DL (ref 6.4–8.2)
OSMOLALITY SERPL CALC.SUM OF ELEC: 268 MOSM/KG (ref 275–295)
PATIENT FASTING Y/N/NP: YES
POTASSIUM SERPL-SCNC: 4.3 MMOL/L (ref 3.5–5.1)
SODIUM SERPL-SCNC: 128 MMOL/L (ref 136–145)

## 2020-03-09 PROCEDURE — 80053 COMPREHEN METABOLIC PANEL: CPT
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
## 2020-03-18 ENCOUNTER — NURSE ONLY (OUTPATIENT)
Dept: LAB | Age: 85
End: 2020-03-18
Attending: INTERNAL MEDICINE
Payer: MEDICARE

## 2020-03-18 DIAGNOSIS — E87.70 HYPERVOLEMIA: Primary | ICD-10-CM

## 2020-03-18 DIAGNOSIS — E56.9 VITAMIN DISEASE: ICD-10-CM

## 2020-03-18 LAB
ALBUMIN SERPL-MCNC: 2.6 G/DL (ref 3.4–5)
ALBUMIN/GLOB SERPL: 0.7 {RATIO} (ref 1–2)
ALP LIVER SERPL-CCNC: 66 U/L (ref 55–142)
ALT SERPL-CCNC: 26 U/L (ref 13–56)
ANION GAP SERPL CALC-SCNC: 7 MMOL/L (ref 0–18)
AST SERPL-CCNC: 23 U/L (ref 15–37)
BASOPHILS # BLD AUTO: 0.07 X10(3) UL (ref 0–0.2)
BASOPHILS NFR BLD AUTO: 1.2 %
BILIRUB SERPL-MCNC: 0.4 MG/DL (ref 0.1–2)
BUN BLD-MCNC: 14 MG/DL (ref 7–18)
BUN/CREAT SERPL: 18.9 (ref 10–20)
CALCIUM BLD-MCNC: 8.8 MG/DL (ref 8.5–10.1)
CHLORIDE SERPL-SCNC: 97 MMOL/L (ref 98–112)
CO2 SERPL-SCNC: 25 MMOL/L (ref 21–32)
CREAT BLD-MCNC: 0.74 MG/DL (ref 0.55–1.02)
DEPRECATED RDW RBC AUTO: 49.2 FL (ref 35.1–46.3)
EOSINOPHIL # BLD AUTO: 0.31 X10(3) UL (ref 0–0.7)
EOSINOPHIL NFR BLD AUTO: 5.4 %
ERYTHROCYTE [DISTWIDTH] IN BLOOD BY AUTOMATED COUNT: 13.6 % (ref 11–15)
GLOBULIN PLAS-MCNC: 3.6 G/DL (ref 2.8–4.4)
GLUCOSE BLD-MCNC: 86 MG/DL (ref 70–99)
HCT VFR BLD AUTO: 27.7 % (ref 35–48)
HGB BLD-MCNC: 9.1 G/DL (ref 12–16)
IMM GRANULOCYTES # BLD AUTO: 0.07 X10(3) UL (ref 0–1)
IMM GRANULOCYTES NFR BLD: 1.2 %
LYMPHOCYTES # BLD AUTO: 0.85 X10(3) UL (ref 1–4)
LYMPHOCYTES NFR BLD AUTO: 14.9 %
M PROTEIN MFR SERPL ELPH: 6.2 G/DL (ref 6.4–8.2)
MCH RBC QN AUTO: 32.9 PG (ref 26–34)
MCHC RBC AUTO-ENTMCNC: 32.9 G/DL (ref 31–37)
MCV RBC AUTO: 100 FL (ref 80–100)
MONOCYTES # BLD AUTO: 0.83 X10(3) UL (ref 0.1–1)
MONOCYTES NFR BLD AUTO: 14.6 %
NEUTROPHILS # BLD AUTO: 3.56 X10 (3) UL (ref 1.5–7.7)
NEUTROPHILS # BLD AUTO: 3.56 X10(3) UL (ref 1.5–7.7)
NEUTROPHILS NFR BLD AUTO: 62.7 %
OSMOLALITY SERPL CALC.SUM OF ELEC: 268 MOSM/KG (ref 275–295)
PATIENT FASTING Y/N/NP: YES
PLATELET # BLD AUTO: 324 10(3)UL (ref 150–450)
POTASSIUM SERPL-SCNC: 4.5 MMOL/L (ref 3.5–5.1)
RBC # BLD AUTO: 2.77 X10(6)UL (ref 3.8–5.3)
SODIUM SERPL-SCNC: 129 MMOL/L (ref 136–145)
WBC # BLD AUTO: 5.7 X10(3) UL (ref 4–11)

## 2020-03-18 PROCEDURE — 80053 COMPREHEN METABOLIC PANEL: CPT

## 2020-03-18 PROCEDURE — 85025 COMPLETE CBC W/AUTO DIFF WBC: CPT

## 2020-03-23 ENCOUNTER — NURSE ONLY (OUTPATIENT)
Dept: LAB | Age: 85
End: 2020-03-23
Attending: INTERNAL MEDICINE
Payer: MEDICARE

## 2020-03-23 DIAGNOSIS — E56.9 VITAMIN DISEASE: ICD-10-CM

## 2020-03-23 DIAGNOSIS — I13.10 HYPERTENSIVE HEART AND RENAL DISEASE: Primary | ICD-10-CM

## 2020-03-23 LAB
ALBUMIN SERPL-MCNC: 2.6 G/DL (ref 3.4–5)
ALBUMIN/GLOB SERPL: 0.7 {RATIO} (ref 1–2)
ALP LIVER SERPL-CCNC: 62 U/L (ref 55–142)
ALT SERPL-CCNC: 24 U/L (ref 13–56)
ANION GAP SERPL CALC-SCNC: 5 MMOL/L (ref 0–18)
AST SERPL-CCNC: 22 U/L (ref 15–37)
BASOPHILS # BLD AUTO: 0.05 X10(3) UL (ref 0–0.2)
BASOPHILS NFR BLD AUTO: 0.6 %
BILIRUB SERPL-MCNC: 0.4 MG/DL (ref 0.1–2)
BUN BLD-MCNC: 16 MG/DL (ref 7–18)
BUN/CREAT SERPL: 20.8 (ref 10–20)
CALCIUM BLD-MCNC: 8.5 MG/DL (ref 8.5–10.1)
CHLORIDE SERPL-SCNC: 95 MMOL/L (ref 98–112)
CO2 SERPL-SCNC: 28 MMOL/L (ref 21–32)
CREAT BLD-MCNC: 0.77 MG/DL (ref 0.55–1.02)
DEPRECATED RDW RBC AUTO: 50.3 FL (ref 35.1–46.3)
EOSINOPHIL # BLD AUTO: 0.07 X10(3) UL (ref 0–0.7)
EOSINOPHIL NFR BLD AUTO: 0.9 %
ERYTHROCYTE [DISTWIDTH] IN BLOOD BY AUTOMATED COUNT: 13.5 % (ref 11–15)
GLOBULIN PLAS-MCNC: 3.5 G/DL (ref 2.8–4.4)
GLUCOSE BLD-MCNC: 93 MG/DL (ref 70–99)
HCT VFR BLD AUTO: 30.8 % (ref 35–48)
HGB BLD-MCNC: 10.3 G/DL (ref 12–16)
IMM GRANULOCYTES # BLD AUTO: 0.14 X10(3) UL (ref 0–1)
IMM GRANULOCYTES NFR BLD: 1.8 %
LYMPHOCYTES # BLD AUTO: 1.12 X10(3) UL (ref 1–4)
LYMPHOCYTES NFR BLD AUTO: 14 %
M PROTEIN MFR SERPL ELPH: 6.1 G/DL (ref 6.4–8.2)
MCH RBC QN AUTO: 33.9 PG (ref 26–34)
MCHC RBC AUTO-ENTMCNC: 33.4 G/DL (ref 31–37)
MCV RBC AUTO: 101.3 FL (ref 80–100)
MONOCYTES # BLD AUTO: 0.98 X10(3) UL (ref 0.1–1)
MONOCYTES NFR BLD AUTO: 12.3 %
NEUTROPHILS # BLD AUTO: 5.62 X10 (3) UL (ref 1.5–7.7)
NEUTROPHILS # BLD AUTO: 5.62 X10(3) UL (ref 1.5–7.7)
NEUTROPHILS NFR BLD AUTO: 70.4 %
OSMOLALITY SERPL CALC.SUM OF ELEC: 267 MOSM/KG (ref 275–295)
PATIENT FASTING Y/N/NP: YES
PLATELET # BLD AUTO: 364 10(3)UL (ref 150–450)
POTASSIUM SERPL-SCNC: 3.6 MMOL/L (ref 3.5–5.1)
RBC # BLD AUTO: 3.04 X10(6)UL (ref 3.8–5.3)
SODIUM SERPL-SCNC: 128 MMOL/L (ref 136–145)
WBC # BLD AUTO: 8 X10(3) UL (ref 4–11)

## 2020-03-23 PROCEDURE — 85025 COMPLETE CBC W/AUTO DIFF WBC: CPT

## 2020-03-23 PROCEDURE — 36415 COLL VENOUS BLD VENIPUNCTURE: CPT

## 2020-03-23 PROCEDURE — 80053 COMPREHEN METABOLIC PANEL: CPT

## 2020-03-25 ENCOUNTER — TELEPHONE (OUTPATIENT)
Dept: CARDIOLOGY | Age: 85
End: 2020-03-25

## 2020-03-31 ENCOUNTER — TELEPHONE (OUTPATIENT)
Dept: CARDIOLOGY | Age: 85
End: 2020-03-31

## 2020-04-01 ENCOUNTER — APPOINTMENT (OUTPATIENT)
Dept: CARDIOLOGY | Age: 85
End: 2020-04-01
Attending: INTERNAL MEDICINE

## 2020-04-02 ENCOUNTER — ANCILLARY PROCEDURE (OUTPATIENT)
Dept: CARDIOLOGY | Age: 85
End: 2020-04-02
Attending: INTERNAL MEDICINE

## 2020-04-02 DIAGNOSIS — Z95.0 CARDIAC PACEMAKER: ICD-10-CM

## 2020-04-16 ENCOUNTER — NURSE ONLY (OUTPATIENT)
Dept: LAB | Age: 85
End: 2020-04-16
Attending: INTERNAL MEDICINE
Payer: MEDICARE

## 2020-04-16 DIAGNOSIS — N18.30 CHRONIC KIDNEY DISEASE, STAGE III (MODERATE) (HCC): Primary | ICD-10-CM

## 2020-04-16 PROCEDURE — 80053 COMPREHEN METABOLIC PANEL: CPT

## 2020-04-16 PROCEDURE — 85025 COMPLETE CBC W/AUTO DIFF WBC: CPT

## 2020-04-24 ENCOUNTER — HOSPITAL ENCOUNTER (EMERGENCY)
Facility: HOSPITAL | Age: 85
Discharge: HOME | End: 2020-04-25
Attending: EMERGENCY MEDICINE
Payer: MEDICARE

## 2020-04-24 ENCOUNTER — NURSE ONLY (OUTPATIENT)
Dept: LAB | Age: 85
End: 2020-04-24
Attending: INTERNAL MEDICINE
Payer: MEDICARE

## 2020-04-24 DIAGNOSIS — R10.9 ABDOMINAL PAIN OF UNKNOWN ETIOLOGY: Primary | ICD-10-CM

## 2020-04-24 DIAGNOSIS — R10.9 ABDOMINAL PAIN: Primary | ICD-10-CM

## 2020-04-24 DIAGNOSIS — E87.1 HYPONATREMIA: ICD-10-CM

## 2020-04-24 PROCEDURE — 85610 PROTHROMBIN TIME: CPT | Performed by: EMERGENCY MEDICINE

## 2020-04-24 PROCEDURE — 83605 ASSAY OF LACTIC ACID: CPT | Performed by: EMERGENCY MEDICINE

## 2020-04-24 PROCEDURE — 85730 THROMBOPLASTIN TIME PARTIAL: CPT | Performed by: EMERGENCY MEDICINE

## 2020-04-24 PROCEDURE — 83690 ASSAY OF LIPASE: CPT | Performed by: EMERGENCY MEDICINE

## 2020-04-24 PROCEDURE — 85025 COMPLETE CBC W/AUTO DIFF WBC: CPT | Performed by: EMERGENCY MEDICINE

## 2020-04-24 PROCEDURE — 99285 EMERGENCY DEPT VISIT HI MDM: CPT

## 2020-04-24 PROCEDURE — 80053 COMPREHEN METABOLIC PANEL: CPT

## 2020-04-24 PROCEDURE — 80053 COMPREHEN METABOLIC PANEL: CPT | Performed by: EMERGENCY MEDICINE

## 2020-04-24 PROCEDURE — 99284 EMERGENCY DEPT VISIT MOD MDM: CPT

## 2020-04-24 PROCEDURE — 96360 HYDRATION IV INFUSION INIT: CPT

## 2020-04-24 PROCEDURE — 85025 COMPLETE CBC W/AUTO DIFF WBC: CPT

## 2020-04-24 PROCEDURE — 96361 HYDRATE IV INFUSION ADD-ON: CPT

## 2020-04-24 RX ORDER — SODIUM CHLORIDE 9 MG/ML
125 INJECTION, SOLUTION INTRAVENOUS CONTINUOUS
Status: DISCONTINUED | OUTPATIENT
Start: 2020-04-24 | End: 2020-04-25

## 2020-04-25 ENCOUNTER — APPOINTMENT (OUTPATIENT)
Dept: CT IMAGING | Facility: HOSPITAL | Age: 85
End: 2020-04-25
Attending: EMERGENCY MEDICINE
Payer: MEDICARE

## 2020-04-25 VITALS
HEIGHT: 66 IN | SYSTOLIC BLOOD PRESSURE: 205 MMHG | HEART RATE: 70 BPM | DIASTOLIC BLOOD PRESSURE: 82 MMHG | TEMPERATURE: 99 F | RESPIRATION RATE: 18 BRPM | BODY MASS INDEX: 23.77 KG/M2 | WEIGHT: 147.94 LBS | OXYGEN SATURATION: 98 %

## 2020-04-25 PROCEDURE — 81001 URINALYSIS AUTO W/SCOPE: CPT | Performed by: EMERGENCY MEDICINE

## 2020-04-25 PROCEDURE — 74177 CT ABD & PELVIS W/CONTRAST: CPT | Performed by: EMERGENCY MEDICINE

## 2020-04-25 NOTE — ED NOTES
Report given to [facility].  Pt for discharge @ this time

## 2020-04-25 NOTE — ED PROVIDER NOTES
Patient Seen in: [facility] Emergency Department      History   Patient presents with:  Abdomen/Flank Pain    Stated Complaint: abdominal discomfort. HPI    20-year-old female presents emergency room for evaluation of abdominal pain.   Patient sta reshma Rogers Maty 5/5/2011   • S/P laminectomy 12/23/2013   • Sleep apnea     cpap   • Spinal stenosis of lumbar region without neurogenic claudication 8/8/2018   • Visual impairment               Past Surgical History:   Procedure Laterality Date   • AP Never Used    Alcohol use: Not Currently      Alcohol/week: 2.0 standard drinks      Types: 2 Glasses of wine per week      Comment: wine occasionally    Drug use: No             Review of Systems    Positive for stated complaint: abdominal discomfort.   Ot Notable for the following components:    PTT 37.9 (*)     All other components within normal limits   CBC W/ DIFFERENTIAL - Abnormal; Notable for the following components:    RBC 2.91 (*)     HGB 10.0 (*)     HCT 29.9 (*)     .7 (*)     MCH 34.4 (*) Cardiomegaly with pacemaker. Cholecystectomy. Multiple hepatic and renal cysts. Severe atherosclerosis. No AAA. Degenerative change in the spine and pelvis. Bilateral total hip arthroplasties.   Old fractures of the bilateral superior and inferior pub

## 2020-04-25 NOTE — ED INITIAL ASSESSMENT (HPI)
Abdominal discomfort (ache) for 4 days. KUB and lab work done at nursing home this am negative per nursing home. last BM this morning pt states it was hard.  Denies N/V/D

## 2020-04-27 ENCOUNTER — TELEPHONE (OUTPATIENT)
Dept: CARDIOLOGY | Age: 85
End: 2020-04-27

## 2020-04-29 ENCOUNTER — NURSE ONLY (OUTPATIENT)
Dept: LAB | Age: 85
End: 2020-04-29
Attending: INTERNAL MEDICINE
Payer: MEDICARE

## 2020-04-29 DIAGNOSIS — M62.81 MUSCLE WEAKNESS (GENERALIZED): ICD-10-CM

## 2020-04-29 DIAGNOSIS — I48.0 PAROXYSMAL ATRIAL FIBRILLATION (HCC): ICD-10-CM

## 2020-04-29 DIAGNOSIS — E87.70 HYPERVOLEMIA: Primary | ICD-10-CM

## 2020-04-29 DIAGNOSIS — R53.1 WEAKNESS: ICD-10-CM

## 2020-04-29 DIAGNOSIS — I48.91 ATRIAL FIBRILLATION (HCC): ICD-10-CM

## 2020-04-29 PROCEDURE — 36415 COLL VENOUS BLD VENIPUNCTURE: CPT

## 2020-04-29 PROCEDURE — 80053 COMPREHEN METABOLIC PANEL: CPT

## 2020-04-29 PROCEDURE — 85027 COMPLETE CBC AUTOMATED: CPT

## 2020-05-02 ENCOUNTER — APPOINTMENT (OUTPATIENT)
Dept: LAB | Age: 85
End: 2020-05-02
Attending: INTERNAL MEDICINE
Payer: MEDICARE

## 2020-05-02 DIAGNOSIS — E87.70 HYPERVOLEMIA: ICD-10-CM

## 2020-05-02 LAB
ANION GAP SERPL CALC-SCNC: 6 MMOL/L (ref 0–18)
BUN BLD-MCNC: 17 MG/DL (ref 7–18)
BUN/CREAT SERPL: 21.3 (ref 10–20)
CALCIUM BLD-MCNC: 8.8 MG/DL (ref 8.5–10.1)
CHLORIDE SERPL-SCNC: 93 MMOL/L (ref 98–112)
CO2 SERPL-SCNC: 27 MMOL/L (ref 21–32)
CREAT BLD-MCNC: 0.8 MG/DL (ref 0.55–1.02)
GLUCOSE BLD-MCNC: 91 MG/DL (ref 70–99)
OSMOLALITY SERPL CALC.SUM OF ELEC: 263 MOSM/KG (ref 275–295)
PATIENT FASTING Y/N/NP: YES
POTASSIUM SERPL-SCNC: 3.9 MMOL/L (ref 3.5–5.1)
SODIUM SERPL-SCNC: 126 MMOL/L (ref 136–145)

## 2020-05-02 PROCEDURE — 80048 BASIC METABOLIC PNL TOTAL CA: CPT

## 2020-05-04 ENCOUNTER — OFFICE VISIT (OUTPATIENT)
Dept: CARDIOLOGY | Age: 85
End: 2020-05-04

## 2020-05-04 ENCOUNTER — NURSE ONLY (OUTPATIENT)
Dept: LAB | Age: 85
DRG: 078 | End: 2020-05-04
Attending: INTERNAL MEDICINE
Payer: MEDICARE

## 2020-05-04 ENCOUNTER — TELEPHONE (OUTPATIENT)
Dept: CARDIOLOGY | Age: 85
End: 2020-05-04

## 2020-05-04 DIAGNOSIS — Z79.01 LONG TERM (CURRENT) USE OF ANTICOAGULANTS: ICD-10-CM

## 2020-05-04 DIAGNOSIS — I48.20 CHRONIC ATRIAL FIBRILLATION (HCC): Primary | ICD-10-CM

## 2020-05-04 DIAGNOSIS — Z95.0 PACEMAKER: Primary | ICD-10-CM

## 2020-05-04 DIAGNOSIS — N18.30 CHRONIC KIDNEY DISEASE, STAGE III (MODERATE) (HCC): ICD-10-CM

## 2020-05-04 DIAGNOSIS — I48.0 PAROXYSMAL ATRIAL FIBRILLATION (CMD): ICD-10-CM

## 2020-05-04 PROCEDURE — 85025 COMPLETE CBC W/AUTO DIFF WBC: CPT

## 2020-05-04 PROCEDURE — 80053 COMPREHEN METABOLIC PANEL: CPT

## 2020-05-04 PROCEDURE — 80162 ASSAY OF DIGOXIN TOTAL: CPT

## 2020-05-04 PROCEDURE — 99442 TELEPHONE E&M BY PHYSICIAN EST PT NOT ORIG PREV 7 DAYS 11-20 MIN: CPT | Performed by: INTERNAL MEDICINE

## 2020-05-04 RX ORDER — PANTOPRAZOLE SODIUM 40 MG/1
TABLET, DELAYED RELEASE ORAL
COMMUNITY
Start: 2020-04-30 | End: 2020-06-12

## 2020-05-05 ENCOUNTER — ANCILLARY ORDERS (OUTPATIENT)
Dept: CARDIOLOGY | Age: 85
End: 2020-05-05

## 2020-05-05 ENCOUNTER — ANCILLARY PROCEDURE (OUTPATIENT)
Dept: CARDIOLOGY | Age: 85
End: 2020-05-05
Attending: INTERNAL MEDICINE

## 2020-05-05 ENCOUNTER — TELEPHONE (OUTPATIENT)
Dept: CARDIOLOGY | Age: 85
End: 2020-05-05

## 2020-05-05 DIAGNOSIS — Z95.0 CARDIAC PACEMAKER: ICD-10-CM

## 2020-05-05 PROCEDURE — X1114 CARDIAC DEVICE HOME CHECK - REMOTE UNSCHEDULED: HCPCS | Performed by: INTERNAL MEDICINE

## 2020-05-05 PROCEDURE — 93296 REM INTERROG EVL PM/IDS: CPT | Performed by: INTERNAL MEDICINE

## 2020-05-05 PROCEDURE — 93294 REM INTERROG EVL PM/LDLS PM: CPT | Performed by: INTERNAL MEDICINE

## 2020-05-07 ENCOUNTER — APPOINTMENT (OUTPATIENT)
Dept: CT IMAGING | Facility: HOSPITAL | Age: 85
DRG: 078 | End: 2020-05-07
Attending: EMERGENCY MEDICINE
Payer: MEDICARE

## 2020-05-07 ENCOUNTER — HOSPITAL ENCOUNTER (INPATIENT)
Facility: HOSPITAL | Age: 85
LOS: 3 days | Discharge: NURSING, REHAB OR LONG-TERM CARE | DRG: 078 | End: 2020-05-10
Attending: EMERGENCY MEDICINE | Admitting: HOSPITALIST
Payer: MEDICARE

## 2020-05-07 ENCOUNTER — APPOINTMENT (OUTPATIENT)
Dept: GENERAL RADIOLOGY | Facility: HOSPITAL | Age: 85
DRG: 078 | End: 2020-05-07
Attending: EMERGENCY MEDICINE
Payer: MEDICARE

## 2020-05-07 DIAGNOSIS — G40.909 SEIZURE DISORDER (HCC): ICD-10-CM

## 2020-05-07 DIAGNOSIS — I10 HYPERTENSION, UNSPECIFIED TYPE: ICD-10-CM

## 2020-05-07 DIAGNOSIS — E87.1 HYPONATREMIA: ICD-10-CM

## 2020-05-07 DIAGNOSIS — R41.82 ALTERED MENTAL STATUS, UNSPECIFIED ALTERED MENTAL STATUS TYPE: Primary | ICD-10-CM

## 2020-05-07 PROCEDURE — 99221 1ST HOSP IP/OBS SF/LOW 40: CPT | Performed by: INTERNAL MEDICINE

## 2020-05-07 PROCEDURE — 71045 X-RAY EXAM CHEST 1 VIEW: CPT | Performed by: EMERGENCY MEDICINE

## 2020-05-07 PROCEDURE — 99223 1ST HOSP IP/OBS HIGH 75: CPT | Performed by: HOSPITALIST

## 2020-05-07 PROCEDURE — 70450 CT HEAD/BRAIN W/O DYE: CPT | Performed by: EMERGENCY MEDICINE

## 2020-05-07 PROCEDURE — 99223 1ST HOSP IP/OBS HIGH 75: CPT | Performed by: OTHER

## 2020-05-07 RX ORDER — ACETAMINOPHEN 325 MG/1
650 TABLET ORAL EVERY 6 HOURS PRN
Status: DISCONTINUED | OUTPATIENT
Start: 2020-05-07 | End: 2020-05-10

## 2020-05-07 RX ORDER — HYDRALAZINE HYDROCHLORIDE 50 MG/1
50 TABLET, FILM COATED ORAL 3 TIMES DAILY
Status: DISCONTINUED | OUTPATIENT
Start: 2020-05-07 | End: 2020-05-08

## 2020-05-07 RX ORDER — HYDRALAZINE HYDROCHLORIDE 20 MG/ML
INJECTION INTRAMUSCULAR; INTRAVENOUS
Status: COMPLETED
Start: 2020-05-07 | End: 2020-05-07

## 2020-05-07 RX ORDER — DIGOXIN 125 MCG
125 TABLET ORAL DAILY
Status: DISCONTINUED | OUTPATIENT
Start: 2020-05-07 | End: 2020-05-10

## 2020-05-07 RX ORDER — DOCUSATE SODIUM 100 MG/1
100 CAPSULE, LIQUID FILLED ORAL DAILY
COMMUNITY
End: 2021-01-08

## 2020-05-07 RX ORDER — SODIUM CHLORIDE 9 MG/ML
INJECTION, SOLUTION INTRAVENOUS CONTINUOUS
Status: DISCONTINUED | OUTPATIENT
Start: 2020-05-07 | End: 2020-05-08

## 2020-05-07 RX ORDER — LISINOPRIL 40 MG/1
40 TABLET ORAL DAILY
Status: DISCONTINUED | OUTPATIENT
Start: 2020-05-07 | End: 2020-05-10

## 2020-05-07 RX ORDER — HYDRALAZINE HYDROCHLORIDE 20 MG/ML
5 INJECTION INTRAMUSCULAR; INTRAVENOUS ONCE
Status: COMPLETED | OUTPATIENT
Start: 2020-05-07 | End: 2020-05-07

## 2020-05-07 RX ORDER — SODIUM CHLORIDE 9 MG/ML
125 INJECTION, SOLUTION INTRAVENOUS CONTINUOUS
Status: DISCONTINUED | OUTPATIENT
Start: 2020-05-07 | End: 2020-05-08

## 2020-05-07 RX ORDER — FLECAINIDE ACETATE 100 MG/1
100 TABLET ORAL 2 TIMES DAILY
Status: ON HOLD | COMMUNITY
End: 2020-05-10

## 2020-05-07 NOTE — ED PROVIDER NOTES
Patient Seen in: [facility] 5nw-a      History   Patient presents with:  Altered Mental Status    Stated Complaint: ams    HPI    42-year-old female with history of atrial fibrillation, pacemaker, hypertension, presents emergency room for evaluation • Night sweats    • Pacemaker    • Reflux    • S/P colon resection / Colonoscopy (10/12) recheck - ANJALI Martino 5/5/2011   • S/P laminectomy 12/23/2013   • Sleep apnea     cpap   • Spinal stenosis of lumbar region without neurogenic claudication 8/8/2018 Smoking status: Former Smoker        Packs/day: 0.10        Years: 20.00        Pack years: 2        Quit date: 1972        Years since quittin.3      Smokeless tobacco: Never Used    Alcohol use: Not Currently      Alcohol/week: 2.0 standard drin no rashes.   NEUROLOGIC EXAM: Tongue midline, no facial drooping, no ptosis, moves all 4 extremities to command      ED Course     Labs Reviewed   URINALYSIS WITH CULTURE REFLEX - Abnormal; Notable for the following components:       Result Value    Clarity PROCALCITONIN   RAINBOW DRAW BLUE   RAINBOW DRAW LAVENDER   RAINBOW DRAW LIGHT GREEN   RAINBOW DRAW GOLD   BLOOD CULTURE   BLOOD CULTURE     EKG    Rate, intervals and axes as noted on EKG Report.   Rate: 102  Rhythm: Atrial Fibrillation  Reading: Atrial unspecified type    Disposition:  Admit  5/7/2020  1:25 pm    Follow-up:  No follow-up provider specified.         Medications Prescribed:  Current Discharge Medication List                       Present on Admission  Date Reviewed: 2/28/2020          ICD-1

## 2020-05-07 NOTE — ED INITIAL ASSESSMENT (HPI)
Pt from [address] [facility] home found her in wc than out her back to bed pt is normally alert x3 , now nonverbal, had seizure activity in ambulance

## 2020-05-07 NOTE — CONSULTS
[facility]  Cardiology Consultation    Sarah Maldonado Patient Status:  Emergency    10/25/1927 MRN [id number]   Location [facility] [facility] Attending Clark Velasquez, [address] [facility] Day # 0 PCP Nadia Rodriguez MD     Reason for [facility] 50%).    History:  Past Medical History:   Diagnosis Date   • -Lumbar radicular pain-electrical stimulator 7/29/2013   • Abdominal pain    • Age-related osteoporosis with current pathological fracture with routine healing 8/8/2018   • Arthritis    • Atrial electro-stimulator for spinal stenosis   • OTHER SURGICAL HISTORY  10-10-11    cysto-   • OTHER SURGICAL HISTORY  10/16    back surgery   • PACEMAKER      St Ankit pacemaker   • SACROILIAC JOINT INJECTION AND LUMBAR FACET INJECTION Left 2/2/2015    Perfo Comment:May have also caused drug rash  Clonidine               RASH    Comment:Oral  Garlic                  DIARRHEA    Medications:    Current Facility-Administered Medications:   •  hydrALAzine HCl (APRESOLINE) injection 5 mg, 5 mg, Intravenous, Once Impression:  Hypertensive urgency  Paroxysmal atrial fib  S/p PPM    Recommendations:  - admit to floor with tele  - continue prn use of IV hydralazine for SBP >170 mmHg  - no additional cardiac testing needed   resume metoprolol tartrate 25 mg BID, hy

## 2020-05-07 NOTE — PLAN OF CARE
5/7/2020    Spoke to [address] at [facility] to fax over pt paperwork to complete admission and med rec    Pt alert but unable to make sound when speaking. Pt does answer questions by mouthing her responses.    RA  Pt complains of pain to R

## 2020-05-07 NOTE — CONSULTS
[address]  Neurology Consultation    Date of consult: 5/7/2020    Reason for consult: altered mental status    HPI: Ramiro Vega is a 80year old female with past medical history of atrial fibrillation, pacemaker, hypertension, presents dasha • Age-related osteoporosis with current pathological fracture with routine healing 8/8/2018   • Arthritis    • Atrial fibrillation (HCC)    • Back pain    • Basal cell cancer    • Bloating    • Body piercing    • Cellulitis of chest wall 7/11/2018   • Ce surgery   • PACEMAKER      St Ankit pacemaker   • SACROILIAC JOINT INJECTION AND LUMBAR FACET INJECTION Left 2/2/2015    Performed by Venus Membreno MD at [facility] MAIN OR   • SKIN SURGERY  3-6-14    BCC nod, inf to left nasal tip / MMS done   • SKIN SURGERY palate elevates symmetric   XII tongue midline, normal motility, no atrophy  Motor strength: move all extremities, unable to assess, generalized weakness +  Tone: normal  DTRs: 2+ symmetric  Plantar response: bilateral flexor  Coordination: deferred  Senso

## 2020-05-07 NOTE — CONSULTS
INFECTIOUS DISEASE CONSULTATION    Angelica Maldonado Patient Status:  Inpatient    10/25/1927 MRN [id number]   [facility] 5NW-A Attending Aline Wright MD   [facility] Day # 0 PCP GILES Melendez neurogenic claudication 8/8/2018   • Visual impairment      Past Surgical History:   Procedure Laterality Date   • APPENDECTOMY     • BACK SURGERY     • BOWEL RESECTION     • CHOLECYSTECTOMY     • COLECTOMY     • COLONOSCOPY     • COLONOSCOPY     • Eda Sacks she does not use drugs.       Allergies:    Ciprofloxacin           OTHER (SEE COMMENTS)    Comment:Nausea and abdominal cramping  Levofloxacin            OTHER (SEE COMMENTS)  Sulfamethoxazole W/*    OTHER (SEE COMMENTS)    Comment:Other reaction(s): VOMIT Disp: , Rfl:   traMADol HCl 50 MG Oral Tab, Take  mg by mouth 2 (two) times daily as needed for Pain. 1-4 pain 1 tablet   5-10 pain 2 tablets, Disp: , Rfl:   traMADol HCl 50 MG Oral Tab, Take  mg by mouth 2 (two) times daily.   , Disp: 60 tablet 95 95   BUN 17 19* 13   CREATSERUM 0.80 0.80 0.75   GFRAA 74 74 80   GFRNAA 64 64 69   CA 8.8 8.5 9.2   ALB  --  3.0* 3.3*   * 126* 126*   K 3.9 4.0 4.0   CL 93* 93* 92*   CO2 27.0 27.0 25.0   ALKPHO  --  59 69   AST  --  23 22   ALT  --  27 27   CECILIA or neurology consult  PCT was negative, and wbc normal no left shift, no fever  -blood cultures sent, UA negative, CXR some congestion  Ceftiraxone for now --- \"allergy \"to cephalexin -diarrhea not at true allergy    From [facility], no reported COV

## 2020-05-07 NOTE — H&P
[facility] HOSPITALIST  History and Physical     Kiki Maldonado Patient Status:  Inpatient    10/25/1927 MRN [id number]   [facility] 5NW-A Attending Johan Bundy MD   Hosp Day # 0 PCP Pj Antonio MD     Chief Complaint: Increased confusi -Lumbar radicular pain-electrical stimulator 7/29/2013   • Abdominal pain    • Age-related osteoporosis with current pathological fracture with routine healing 8/8/2018   • Arthritis    • Atrial fibrillation (HCC)    • Back pain    • Basal cell cancer    • stenosis   • OTHER SURGICAL HISTORY  10-10-11    cysto-   • OTHER SURGICAL HISTORY  10/16    back surgery   • PACEMAKER      St Ankit pacemaker   • SACROILIAC JOINT INJECTION AND LUMBAR FACET INJECTION Left 2/2/2015    Performed by Amanda Alvarez MD at [facility]    Comment:May have also caused drug rash  Clonidine               RASH    Comment:Oral  Garlic                  DIARRHEA    Medications:  No current facility-administered medications on file prior to encounter.    metoprolol Tartrate 25 MG Oral Ta Pertinent positives and negatives noted in the HPI.     Physical Exam:    /59 (BP Location: Left arm)   Pulse 83   Temp 98.2 °F (36.8 °C) (Axillary)   Resp 18   Ht 5' 8\" (1.727 m)   Wt 147 lb 14.9 oz (67.1 kg)   SpO2 96%   BMI 22.49 kg/m²   General precautions and EEG to be done in the morning  2. Hypertension with urgency continue home medicines and IV hydralazine PRN  3.   Rule out CO VID 19    Quality:  · DVT Prophylaxis:Eliquis  · CODE status: full  · [place]: no    Plan of care discussed with traci

## 2020-05-08 ENCOUNTER — APPOINTMENT (OUTPATIENT)
Dept: GENERAL RADIOLOGY | Facility: HOSPITAL | Age: 85
DRG: 078 | End: 2020-05-08
Attending: INTERNAL MEDICINE
Payer: MEDICARE

## 2020-05-08 PROBLEM — R41.82 ALTERED MENTAL STATUS: Status: ACTIVE | Noted: 2020-05-08

## 2020-05-08 PROCEDURE — 99232 SBSQ HOSP IP/OBS MODERATE 35: CPT | Performed by: OTHER

## 2020-05-08 PROCEDURE — 99231 SBSQ HOSP IP/OBS SF/LOW 25: CPT | Performed by: INTERNAL MEDICINE

## 2020-05-08 PROCEDURE — 71045 X-RAY EXAM CHEST 1 VIEW: CPT | Performed by: INTERNAL MEDICINE

## 2020-05-08 PROCEDURE — 95819 EEG AWAKE AND ASLEEP: CPT | Performed by: OTHER

## 2020-05-08 RX ORDER — HYDRALAZINE HYDROCHLORIDE 20 MG/ML
10 INJECTION INTRAMUSCULAR; INTRAVENOUS EVERY 4 HOURS PRN
Status: DISCONTINUED | OUTPATIENT
Start: 2020-05-08 | End: 2020-05-10

## 2020-05-08 RX ORDER — HYDRALAZINE HYDROCHLORIDE 50 MG/1
100 TABLET, FILM COATED ORAL 3 TIMES DAILY
Status: DISCONTINUED | OUTPATIENT
Start: 2020-05-08 | End: 2020-05-10

## 2020-05-08 RX ORDER — HYDRALAZINE HYDROCHLORIDE 20 MG/ML
INJECTION INTRAMUSCULAR; INTRAVENOUS
Status: COMPLETED
Start: 2020-05-08 | End: 2020-05-08

## 2020-05-08 RX ORDER — AMLODIPINE BESYLATE 5 MG/1
5 TABLET ORAL DAILY
Status: DISCONTINUED | OUTPATIENT
Start: 2020-05-08 | End: 2020-05-09

## 2020-05-08 RX ORDER — FUROSEMIDE 10 MG/ML
20 INJECTION INTRAMUSCULAR; INTRAVENOUS ONCE
Status: COMPLETED | OUTPATIENT
Start: 2020-05-08 | End: 2020-05-08

## 2020-05-08 RX ORDER — SODIUM CHLORIDE 9 MG/ML
INJECTION, SOLUTION INTRAVENOUS CONTINUOUS
Status: ACTIVE | OUTPATIENT
Start: 2020-05-08 | End: 2020-05-08

## 2020-05-08 NOTE — PAYOR COMM NOTE
--------------  ADMISSION REVIEW     Payor: [address] [place], [place] [address]:  [id number]  Authorization Number: [id number]    Admit date: 5/7/20  Admit time: 1781       Admitting Physician: Jane Hernandez MD  Attending Physician:  Marlene Irvin MD • Constipation    • Diarrhea, unspecified    • Essential hypertension    • Hemorrhoids    • History of depression    • Hypertension    • Lumbar facet arthropathy 2/10/2018   • Macular degeneration - Brigid [facility]leg[facility] Medical Ophthalmologist 5/5/2011   • Pacemaker moves all 4 extremities to command      ED Course     Labs Reviewed   URINALYSIS WITH CULTURE REFLEX - Abnormal; Notable for the following components:       Result Value    Clarity Urine Hazy (*)     Ketones Urine Trace (*)     All other components within 5/7/2020  3:30 PM            H&P - H&P Note      H&P signed by Amauri Birch MD at 5/7/2020  3:59 PM     Author:  Amauri Birch MD Service:  Hospitalist Author Type:  Physician    Filed:  5/7/2020  3:59 PM Date of Service:  5/7/2020  3:46 PM Status:  Signed she is in the hospital.  Patient is on Eliquis for atrial fibrillation and CT head was done emergency room with no evidence of bleeding. Patient is admitted under seizure precautions and to avoid sedatives. EEG is ordered for morning.     ASSESSMENT / Debbie Rodríguez 5/7/2020 1615 Given 50 mg Oral Sisi Almaraz RN      lisinopril tab 40 mg     Date Action Dose Route User    5/7/2020 1615 Given 40 mg Oral Sisi Almaraz RN      metoprolol Tartrate (LOPRESSOR) tab 25 mg     Date Action Dose Route User    5/7/2020 2106 intact, no nystagmus, no ptosis  V face sensation normal  VII face symmetry  VIII hearing normal  IX, X, XI palate elevates symmetric   XII tongue midline, normal motility, no atrophy  Motor strength: move all extremities, unable to assess, generalized wea

## 2020-05-08 NOTE — CM/SW NOTE
Patient comes from [facility] has been tested for COVID-19. This patient's COVID-19 test is Negative. At the time of this note, there are no known positive cases in that facility.     Pt is a [facility] resident and was ready to discharge from

## 2020-05-08 NOTE — PROGRESS NOTES
[facility] Cardiology Progress Note    Patient seen and examined.  Chart reviewed.  Discussed with RN over phone this AM.    Per nursing patient is comfortable    BP (!) 169/82 (BP Location: Left arm)   Pulse 97   Temp 98.7 °F (37.1 °C) (Oral)   Resp 20   Ht 68\"

## 2020-05-08 NOTE — PROGRESS NOTES
[id number] [facility] Neurology Progress Note    Malgorzata Maldonado Patient Status:  Inpatient    10/25/1927 MRN [id number]   [facility] 5NW-A Attending Nkiole Fairbanks MD   Hosp Day # 1 PCP Isauro Petty MD     CC: KENDRA    Subjective:   Bronson hypertensive encephalopathy  · CT brain negative for acute findings  · EEG pending  · Seizure precautions  · HTN urgency  · Remains in 180s this AM  · Cardiology following  · PAF  · On Eliquis  · S/P PPM    Patient with no further seizure like activity and urgency and possibly infectious process. EEG did not suggest any seizures or subclinical seizures. Plan:  1. Altered Mental (HTN urgency on arrival to ED Status  - Likely TME.  Being ruled out for COVID but no temperature or elevated WBC count and UA lo

## 2020-05-08 NOTE — PROCEDURES
[facility] - ELECTROENCEPHALOGRAM (EEG) REPORT  Patient Name:  Opal Perera   MRN / CSN:  [id number] / [id number]   Date of Birth / Age:  10/25/1927 /  80year old   Encounter Date:  5/8/20         METHODS:  Twenty-two electrodes were applied according to the

## 2020-05-08 NOTE — PLAN OF CARE
Assumed care at 0730  A&Ox3, disoriented to situation  Very forgetful, reoriented frequently  Minimal verbal responses in the AM, intermittently  More talkative in the afternoon/evening  Seizure precautions maintained  Neuro check done Qshift, no deficits

## 2020-05-08 NOTE — PLAN OF CARE
RECEIVED PATIENT RESTING IN BED. PATIENT ALERT AND ORIENTED TO PERSON/PLACE AND TIME. DISORIENTED TO SITUATION. PATIENT IS VERBAL INTERMITTENTLY AND WHISPERS ANSWERS AT TIMES. SEE NEURO ASSESSMENT FOR DETAILS. SEIZURE PRECAUTIONS IN PLACE.  PATIENT DEN

## 2020-05-08 NOTE — PROGRESS NOTES
[facility]                INFECTIOUS DISEASE PROGRESS NOTE    Rikki Maldonado Patient Status:  Inpatient    10/25/1927 MRN [id number]   [facility] 5NW-A Attending Keven Kruger MD   Hosp Day # 1 PCP Eyal Wilkerson MD     Subject result)    Collection Time: 05/07/20 12:58 PM   Result Value Ref Range    Blood Culture Result No Growth 1 Day N/A         Radiology    CXR noted    Problem list reviewed:  Patient Active Problem List:     Pacemaker - M.  Perfecto     JAZZ on CPAP     Current

## 2020-05-08 NOTE — PROGRESS NOTES
Paged and spoke with Dr Blanca Zazueta. PAtient rapid and PCR covid tests negative. Patient is from [address] [place] [address], facility has no known cases of [id number] . Per MD no further covid testing to be done.  Orders received to discontinue isolation and transfer off un

## 2020-05-08 NOTE — PAYOR COMM NOTE
--------------  CONTINUED STAY REVIEW   5-8-20      Payor: [address] [place], [place] [address]Sean Fromberg #:  [id number]  Authorization Number: [id number]    Admit date: 5/7/20  Admit time: 9272    Admitting Physician: Gerard Ernandez MD  Attending Physician:  Arcelia Mcgowan, 5/7/2020 1048 Given 5 mg (none) Denia Spencer RN      hydrALAzine HCl (APRESOLINE) injection 5 mg     Date Action Dose Route User    5/7/2020 1148 Given 5 mg Intravenous Denia Spencer RN      hydrALAzine HCl (APRESOLINE) 20 MG/ML injection     D

## 2020-05-08 NOTE — H&P
[address] [facility] Patient Status:  Inpatient    10/25/1927 MRN [id number]   [facility] 5NW-A Attending Erwin Garcia MD   Hosp Day # 1 PCP Nadia Rodriguez MD     History of Present Illness:  Lorene Sommers neurogenic claudication 8/8/2018   • Visual impairment      Past Surgical History:   Procedure Laterality Date   • APPENDECTOMY     • BACK SURGERY     • BOWEL RESECTION     • CHOLECYSTECTOMY     • COLECTOMY     • COLONOSCOPY     • COLONOSCOPY     • Kate Richter she does not use drugs.     Allergies:    Ciprofloxacin           OTHER (SEE COMMENTS)    Comment:Nausea and abdominal cramping  Levofloxacin            OTHER (SEE COMMENTS)  Sulfamethoxazole W/*    OTHER (SEE COMMENTS)    Comment:Other reaction(s): Ilia Barber 2/20/2020 at Unknown time  gabapentin 300 MG Oral Cap, Take 300 mg by mouth 2 (two) times daily. , Disp: , Rfl: , 2/20/2020 at 1800  apixaban 5 MG Oral Tab, Take 1 tablet (5 mg total) by mouth 2 (two) times daily. , Disp: 60 tablet, Rfl: 3, 2/20/2020 at 20 Results   Component Value Date    WBC 5.4 05/07/2020    HGB 11.5 05/07/2020    HCT 33.4 05/07/2020    .0 05/07/2020    CREATSERUM 0.64 05/08/2020    BUN 9 05/08/2020     05/08/2020    K 3.9 05/08/2020     05/08/2020    CO2 20.0 05/08/202 for acute left maxillary sinusitis. Clinical correlation recommended. 3. Fluid in the left mastoid air cells.     Dictated by: Louie Yarbrough MD on 5/07/2020 at 10:40 AM     Finalized by: Louie Yarbrough MD on 5/07/2020 at 10:44 AM          Ct Abdomen+pe calculus. PELVIC NODES:  No adenopathy. PELVIC ORGANS:  No visible mass. Pelvic organs appropriate for patient age. BONES:  Mild levoscoliosis of lumbar spine. Bilateral hip arthroplasties is noted.  LUNG BASES:  Small bilateral pleural effusions with sinusitis - started on rocephin   Persistent AF , s/p ppp, on eliquis  Cardiology and neurology consult appreciated   Patient Active Problem List:     Pacemaker - [place]     JAZZ on CPAP     Current use of long term anticoagulation     Gastroesophageal r

## 2020-05-08 NOTE — CM/SW NOTE
Pt was at the [facility] prior to admission. Apparently she was about to be discharged from the [facility] and was going to return to her home at [facility]. Asked RN for a PT titi to determine what the pt is going to need. Will follow up.

## 2020-05-09 PROCEDURE — 99233 SBSQ HOSP IP/OBS HIGH 50: CPT | Performed by: INTERNAL MEDICINE

## 2020-05-09 PROCEDURE — 99232 SBSQ HOSP IP/OBS MODERATE 35: CPT | Performed by: NURSE PRACTITIONER

## 2020-05-09 RX ORDER — AMLODIPINE BESYLATE 10 MG/1
10 TABLET ORAL DAILY
Status: DISCONTINUED | OUTPATIENT
Start: 2020-05-09 | End: 2020-05-10

## 2020-05-09 NOTE — PROGRESS NOTES
[facility]  Progress Note    Kristal Maldonado Patient Status:  Inpatient    10/25/1927 MRN [id number]   [facility] 7NE-A Attending Ralph Lindsey MD   Hosp Day # 2 PCP Raina Morales MD       SUBJECTIVE:  MS back to baseline   BP is be 05/07/20  1010   PTP 17.7*   INR 1.41*     Hemoglobin A1c (%)   Date Value   07/05/2012 5.9 (H)   08/11/2011 5.8 (H)     HGBA1C (%)   Date Value   07/18/2014 5.8 (H)   02/17/2014 6.0 (H)   11/08/2013 6.3 (H)     HgbA1C (%)   Date Value   02/07/2018 6.0 (H)

## 2020-05-09 NOTE — PLAN OF CARE
Assumed care @ 1900. Patient alert oriented x2  On telemetry monitoring. Denies SOB, Denies any pain. Updated patient with plan of care, verbalizes understanding. Ensures patient safety. Maintained a calm and safe environment. Side rails up x2.   Bed a

## 2020-05-09 NOTE — PROGRESS NOTES
[id number] [facility] Neurology Progress Note    [facility] Patient Status:  Inpatient    10/25/1927 MRN [id number]   [facility] 7NE-A Attending Starleen Fleischer, MD   Hosp Day # 2 PCP Matheus Johnson MD     CC: KENDRA    Subjective:   Lawrence Calderon following  · Medication adjusted again this AM  · Goal -160mmHg  · PAF  · Remains on Eliquis  · S/P PPM    Patient with episode of unresponsiveness in setting of hypertensive urgency. Cardiology continues to adjust BP meds.   Neuro work up negative

## 2020-05-09 NOTE — PROGRESS NOTES
[facility]  Cardiology Progress Note    Gisselle Maldonado Patient Status:  Inpatient    10/25/1927 MRN [id number]   [facility] 7NE-A Attending Parley Buerger, MD   [address] Day # 2 PCP Bryan Jennings MD     Subjective:  No chest pain or SOB hydralazine, BB, ACE, dig. Increase Norvasc  2. Follow SBP    SHANNEN Melo  5/9/2020  7:33 AM    Patient seen and examined independently.  Note reviewed and labs reviewed.  Agree with above assessment and plan. Mentation much better.   BP still on

## 2020-05-09 NOTE — PLAN OF CARE
Assumed care at 0730  Patient alert and oriented to self, time, and place  Confused, forgetful at times  Very little food intake  Seizure precautions maintained  Afib, Vpacing on tele  Worked with PT. Walked in halls.  Recommending FARTUN  Up in chair througho

## 2020-05-09 NOTE — PHYSICAL THERAPY NOTE
PHYSICAL THERAPY EVALUATION - INPATIENT     Room Number: 5796/1877-J  Evaluation Date: 5/9/2020  Type of Evaluation: Initial  Physician Order: PT Eval and Treat    Presenting Problem: AMS, HTN and seizure dx  Reason for Therapy: Mobility Dysfunction Bloating    • Body piercing    • Cellulitis of chest wall 7/11/2018   • Cellulitis of left arm 7/19/2018   • Cellulitis of right upper extremity    • Closed bicondylar fracture of distal end of right humerus            Global exp 1-17-18 / RIGHT ELBOW / [facility] MAIN OR   • SKIN SURGERY  3-6-14    BCC nod, inf to left nasal tip / MMS done   • SKIN SURGERY  10/31/16    MMS for BCC-nod to the R upper cut lip-AB   • TONSILLECTOMY     • TOTAL HIP REPLACEMENT     • TRANSFORAMINAL LUMBAR EPIDURAL STEROID INJECTION MULTI are within functional limits     Lower extremity ROM is within functional limits     Lower extremity strength is: BLE hip flex 4/5, quad 4/5, ankle 4/5    BALANCE  Static Sitting: Fair -  Dynamic Sitting: Poor +  Static Standing: Poor +  Dynamic Standing: irritated c orientation questions. Sit/stand c 3 attempts on her own, however unable to complete sans min A and RW. Pt able to amb 50' c min A and RW, however above deviations.  Pt repeatedly asking why this writer did not bring a WC with for chair follow, mobility, t/f and amb using AD. Following this period of time, pt may require a more supportive living environment for safety. DISCHARGE RECOMMENDATIONS  PT Discharge Recommendations: Sub-acute rehabilitation; Other (Comment)(c MARGRETOS 12-14 days)    PLAN

## 2020-05-10 VITALS
BODY MASS INDEX: 21.16 KG/M2 | DIASTOLIC BLOOD PRESSURE: 77 MMHG | OXYGEN SATURATION: 98 % | WEIGHT: 139.63 LBS | RESPIRATION RATE: 16 BRPM | HEIGHT: 68 IN | SYSTOLIC BLOOD PRESSURE: 148 MMHG | TEMPERATURE: 98 F | HEART RATE: 79 BPM

## 2020-05-10 PROCEDURE — 99232 SBSQ HOSP IP/OBS MODERATE 35: CPT | Performed by: INTERNAL MEDICINE

## 2020-05-10 RX ORDER — METOPROLOL TARTRATE 50 MG/1
50 TABLET, FILM COATED ORAL 2 TIMES DAILY
Refills: 0 | Status: SHIPPED | COMMUNITY
Start: 2020-05-10 | End: 2021-01-08

## 2020-05-10 RX ORDER — POTASSIUM CHLORIDE 20 MEQ/1
40 TABLET, EXTENDED RELEASE ORAL EVERY 4 HOURS
Status: COMPLETED | OUTPATIENT
Start: 2020-05-10 | End: 2020-05-10

## 2020-05-10 RX ORDER — FLUTICASONE PROPIONATE 50 MCG
2 SPRAY, SUSPENSION (ML) NASAL DAILY
Qty: 1 BOTTLE | Refills: 3 | Status: SHIPPED | COMMUNITY
Start: 2020-05-10 | End: 2021-01-08

## 2020-05-10 RX ORDER — AMOXICILLIN AND CLAVULANATE POTASSIUM 875; 125 MG/1; MG/1
1 TABLET, FILM COATED ORAL 2 TIMES DAILY
Qty: 20 TABLET | Refills: 0 | Status: SHIPPED | OUTPATIENT
Start: 2020-05-10 | End: 2020-05-20

## 2020-05-10 RX ORDER — HYDRALAZINE HYDROCHLORIDE 100 MG/1
100 TABLET, FILM COATED ORAL 3 TIMES DAILY
Refills: 0 | Status: ON HOLD | COMMUNITY
Start: 2020-05-10 | End: 2020-06-03

## 2020-05-10 RX ORDER — METOPROLOL TARTRATE 50 MG/1
50 TABLET, FILM COATED ORAL
Status: DISCONTINUED | OUTPATIENT
Start: 2020-05-10 | End: 2020-05-10

## 2020-05-10 RX ORDER — AMLODIPINE BESYLATE 10 MG/1
10 TABLET ORAL DAILY
Refills: 0 | Status: ON HOLD | COMMUNITY
Start: 2020-05-11 | End: 2020-06-03

## 2020-05-10 NOTE — CM/SW NOTE
JOSE ALBERTO spoke with Fabio Riley from [facility]. They are able to take the patient back today. Report can be called to . [facility] ambulance notified. There are no medicars they will send an ambulance. ETA 6:30.

## 2020-05-10 NOTE — PLAN OF CARE
Assumed care. Patient very independent and doesn't want help. Bed alarm necessary. Patient gets OOB on her own. No other issues overnight. Back to [facility] soon.        Problem: CARDIOVASCULAR - ADULT  Goal: Maintains optimal cardiac output and hemodynamic stabi Monitor for seizure activity  - Administer anti-seizure medications as ordered  - Monitor neurological status  Outcome: Progressing

## 2020-05-10 NOTE — PROGRESS NOTES
NURSING DISCHARGE NOTE    Assumed pt care at 0730  VSS, A&Ox3-4, forgetful, hard of hearing  Afib on tele w/ Ventricular pacing  Pt denies pain  Up in chair   K+ replaced per protocol  Ambulated with OT  POC discussed with pt and son    All consults cl

## 2020-05-10 NOTE — PROGRESS NOTES
[facility]                INFECTIOUS DISEASE PROGRESS NOTE    Sohan Maldonado Patient Status:  Inpatient    10/25/1927 MRN [id number]   [facility] 5NW-A Attending Cadence Mahoney MD   Hosp Day # 2 PCP Thu Gomez MD     Subject (Preliminary result)    Collection Time: 05/07/20 12:58 PM   Result Value Ref Range    Blood Culture Result No Growth 2 Days N/A         Radiology    CXR noted    Problem list reviewed:  Patient Active Problem List:     Pacemaker - M.  Perfecto     JAZZ on CPA

## 2020-05-10 NOTE — OCCUPATIONAL THERAPY NOTE
OCCUPATIONAL THERAPY EVALUATION - INPATIENT     Room Number: 3469/8532-Y  Evaluation Date: 5/10/2020  Type of Evaluation: Initial  Presenting Problem: mental status change, possible seizure    Physician Order: IP Consult to Occupational Therapy  Reason for Closed bicondylar fracture of distal end of right humerus            Global exp 1-17-18 / RIGHT ELBOW / [facility]  11/9/2017   • Closed displaced [facility]    • Constipation    • Diarrhea, unspecified    • Essential hypertension    • Fatigue    • TONSILLECTOMY     • TOTAL HIP REPLACEMENT     • TRANSFORAMINAL LUMBAR EPIDURAL STEROID INJECTION MULTIPLE LEVEL Left 7/13/2015    Performed by Lidia De Los Santos MD at [facility] MAIN OR   • TRANSFORAMINAL LUMBAR EPIDURAL STEROID INJECTION MULTIPLE LEVEL Left 6/29/ awareness of deficits  Problem Solving:  assistance required to identify errors made, assistance required to generate solutions and assistance required to implement solutions    VISION  Current Vision: no visual deficits    PERCEPTION  Overall Perception S for safety. PPE worn by this OT: surgical mask and gloves. Patient End of Session: Up in chair;Needs met;Call light within reach;RN aware of session/findings; All patient questions and concerns addressed; Alarm set    ASSESSMENT     Patient is a 80 y training;Equipment eval/education; Compensatory technique education  Rehab Potential : Good  Frequency (Obs): 5x/week  Number of Visits to Meet Established Goals: 5    ADL Goals   Patient will perform grooming: with supervision and while standing at sink  P

## 2020-05-10 NOTE — PROGRESS NOTES
[facility]  Progress Note    Lizett Maldonado Patient Status:  Inpatient    10/25/1927 MRN [id number]   [facility] 7NE-A Attending Tea Kebede MD   Hosp Day # 3 PCP Hu Roberts MD       Assessment and Plan:  Patient Active Proble (37.3 °C) (Oral)   Resp 16   Ht 5' 8\" (1.727 m)   Wt 139 lb 9.6 oz (63.3 kg)   SpO2 91%   BMI 21.23 kg/m²     Temp (24hrs), Av.5 °F (36.9 °C), Min:98.1 °F (36.7 °C), Max:99.1 °F (37.3 °C)      Intake/Output:    Intake/Output Summary (Last 24 hours) at 31.4 05/10/2020    .0 05/10/2020    CREATSERUM 0.77 05/10/2020    BUN 19 05/10/2020     05/10/2020    K 3.1 05/10/2020     05/10/2020    CO2 26.0 05/10/2020    GLU 96 05/10/2020    CA 9.3 05/10/2020       Medications:  metoprolol Tartrat

## 2020-05-10 NOTE — PLAN OF CARE
Patient stable for discharge from cardiac standpoint. Her BP has come down nicely with addition of Norvasc and up-titration of hydralazine. These changes need to be reflected on her discharge medications as well. Norvsac 10 mg daily is a new script.

## 2020-05-11 ENCOUNTER — LAB REQUISITION (OUTPATIENT)
Dept: LAB | Facility: HOSPITAL | Age: 85
End: 2020-05-11
Payer: MEDICARE

## 2020-05-11 DIAGNOSIS — Z20.828 CONTACT WITH AND (SUSPECTED) EXPOSURE TO OTHER VIRAL COMMUNICABLE DISEASES: ICD-10-CM

## 2020-05-11 NOTE — PAYOR COMM NOTE
--------------  CONTINUED STAY REVIEW    Payor: [employer] #:  [id number]  Authorization Number: [id number]              5/9 CARDS    Assessment:  · HTN - norvasc added yesterday, along with increase in hydralazine to 100 mg PO T emergency   Better, BP meds adjusted  Diastolic dysfunction and pulmonary HTN  JAZZ   AF- Rate controlled - on eliquis                  5/9 INF DIS    ASSESSMENT/PLAN:  1. HTN per primary/cards  2. MS changes improved  Afebrile off abx                05/10/

## 2020-05-12 NOTE — PAYOR COMM NOTE
--------------  DISCHARGE REVIEW    Payor: [facility] Deandre Lora [place] #:  [id number]  Authorization Number: [id number]    Admit date: 5/7/20  Admit time:  0411  Discharge Date: 5/10/2020  6:51 PM     Admitting Physician: Stephanie Vargas MD  Attendi

## 2020-05-14 LAB — SARS-COV-2 BY PCR: NOT DETECTED

## 2020-05-15 ENCOUNTER — NURSE ONLY (OUTPATIENT)
Dept: LAB | Age: 85
End: 2020-05-15
Attending: INTERNAL MEDICINE
Payer: MEDICARE

## 2020-05-15 DIAGNOSIS — G40.909 EPILEPSY (HCC): Primary | ICD-10-CM

## 2020-05-15 PROCEDURE — 85025 COMPLETE CBC W/AUTO DIFF WBC: CPT

## 2020-05-15 PROCEDURE — 80053 COMPREHEN METABOLIC PANEL: CPT

## 2020-05-22 ENCOUNTER — NURSE ONLY (OUTPATIENT)
Dept: LAB | Age: 85
End: 2020-05-22
Attending: INTERNAL MEDICINE
Payer: MEDICARE

## 2020-05-22 DIAGNOSIS — G40.909 EPILEPSY (HCC): Primary | ICD-10-CM

## 2020-05-22 PROCEDURE — 80053 COMPREHEN METABOLIC PANEL: CPT

## 2020-05-22 PROCEDURE — 85025 COMPLETE CBC W/AUTO DIFF WBC: CPT

## 2020-05-29 ENCOUNTER — NURSE ONLY (OUTPATIENT)
Dept: LAB | Age: 85
End: 2020-05-29
Attending: FAMILY MEDICINE
Payer: MEDICARE

## 2020-05-29 DIAGNOSIS — J10.1 INFLUENZA DUE TO INFLUENZA VIRUS, TYPE A, HUMAN: Primary | ICD-10-CM

## 2020-06-01 ENCOUNTER — TELEPHONE (OUTPATIENT)
Dept: CARDIOLOGY | Age: 85
End: 2020-06-01

## 2020-06-01 RX ORDER — AMLODIPINE BESYLATE 10 MG/1
10 TABLET ORAL DAILY
COMMUNITY
End: 2020-06-05 | Stop reason: CLARIF

## 2020-06-01 RX ORDER — HYDRALAZINE HYDROCHLORIDE 100 MG/1
100 TABLET, FILM COATED ORAL 3 TIMES DAILY
COMMUNITY
End: 2020-06-05 | Stop reason: CLARIF

## 2020-06-01 RX ORDER — METOPROLOL TARTRATE 50 MG/1
50 TABLET, FILM COATED ORAL 2 TIMES DAILY
COMMUNITY
End: 2020-06-08

## 2020-06-01 NOTE — PROGRESS NOTES
Left message for DON at [facility] to call back regarding patient's results.

## 2020-06-02 ENCOUNTER — HOSPITAL ENCOUNTER (OUTPATIENT)
Facility: HOSPITAL | Age: 85
Setting detail: OBSERVATION
Discharge: HOME HEALTH | End: 2020-06-04
Attending: EMERGENCY MEDICINE | Admitting: INTERNAL MEDICINE
Payer: MEDICARE

## 2020-06-02 ENCOUNTER — TELEPHONE (OUTPATIENT)
Dept: CARDIOLOGY | Age: 85
End: 2020-06-02

## 2020-06-02 ENCOUNTER — APPOINTMENT (OUTPATIENT)
Dept: GENERAL RADIOLOGY | Facility: HOSPITAL | Age: 85
End: 2020-06-02
Attending: EMERGENCY MEDICINE
Payer: MEDICARE

## 2020-06-02 DIAGNOSIS — R53.1 WEAKNESS: Primary | ICD-10-CM

## 2020-06-02 DIAGNOSIS — I95.9 HYPOTENSION, UNSPECIFIED HYPOTENSION TYPE: ICD-10-CM

## 2020-06-02 PROCEDURE — 93005 ELECTROCARDIOGRAM TRACING: CPT

## 2020-06-02 PROCEDURE — 71045 X-RAY EXAM CHEST 1 VIEW: CPT | Performed by: EMERGENCY MEDICINE

## 2020-06-02 PROCEDURE — 85025 COMPLETE CBC W/AUTO DIFF WBC: CPT | Performed by: EMERGENCY MEDICINE

## 2020-06-02 PROCEDURE — 99285 EMERGENCY DEPT VISIT HI MDM: CPT

## 2020-06-02 PROCEDURE — 81001 URINALYSIS AUTO W/SCOPE: CPT | Performed by: EMERGENCY MEDICINE

## 2020-06-02 PROCEDURE — 36415 COLL VENOUS BLD VENIPUNCTURE: CPT

## 2020-06-02 PROCEDURE — 87040 BLOOD CULTURE FOR BACTERIA: CPT | Performed by: EMERGENCY MEDICINE

## 2020-06-02 PROCEDURE — 84484 ASSAY OF TROPONIN QUANT: CPT | Performed by: EMERGENCY MEDICINE

## 2020-06-02 PROCEDURE — 83605 ASSAY OF LACTIC ACID: CPT | Performed by: EMERGENCY MEDICINE

## 2020-06-02 PROCEDURE — 96360 HYDRATION IV INFUSION INIT: CPT

## 2020-06-02 PROCEDURE — 96361 HYDRATE IV INFUSION ADD-ON: CPT

## 2020-06-02 PROCEDURE — 80053 COMPREHEN METABOLIC PANEL: CPT | Performed by: EMERGENCY MEDICINE

## 2020-06-02 PROCEDURE — 80162 ASSAY OF DIGOXIN TOTAL: CPT | Performed by: INTERNAL MEDICINE

## 2020-06-02 PROCEDURE — 80162 ASSAY OF DIGOXIN TOTAL: CPT | Performed by: EMERGENCY MEDICINE

## 2020-06-02 PROCEDURE — 93010 ELECTROCARDIOGRAM REPORT: CPT

## 2020-06-02 RX ORDER — GABAPENTIN 300 MG/1
300 CAPSULE ORAL 2 TIMES DAILY
Status: DISCONTINUED | OUTPATIENT
Start: 2020-06-02 | End: 2020-06-04

## 2020-06-02 RX ORDER — GARLIC EXTRACT 500 MG
1 CAPSULE ORAL DAILY
Status: DISCONTINUED | OUTPATIENT
Start: 2020-06-02 | End: 2020-06-04

## 2020-06-02 RX ORDER — ACETAMINOPHEN 325 MG/1
650 TABLET ORAL EVERY 6 HOURS PRN
Status: DISCONTINUED | OUTPATIENT
Start: 2020-06-02 | End: 2020-06-04

## 2020-06-02 RX ORDER — DIGOXIN 125 MCG
125 TABLET ORAL DAILY
Status: DISCONTINUED | OUTPATIENT
Start: 2020-06-02 | End: 2020-06-04

## 2020-06-02 RX ORDER — FUROSEMIDE 20 MG/1
20 TABLET ORAL DAILY
Status: ON HOLD | COMMUNITY
End: 2020-06-03

## 2020-06-02 RX ORDER — SODIUM CHLORIDE 9 MG/ML
INJECTION, SOLUTION INTRAVENOUS CONTINUOUS
Status: DISCONTINUED | OUTPATIENT
Start: 2020-06-02 | End: 2020-06-02

## 2020-06-02 RX ORDER — METOPROLOL TARTRATE 50 MG/1
50 TABLET, FILM COATED ORAL 2 TIMES DAILY
Status: DISCONTINUED | OUTPATIENT
Start: 2020-06-02 | End: 2020-06-04

## 2020-06-02 RX ORDER — POTASSIUM CHLORIDE 20 MEQ/1
40 TABLET, EXTENDED RELEASE ORAL ONCE
Status: COMPLETED | OUTPATIENT
Start: 2020-06-02 | End: 2020-06-02

## 2020-06-02 NOTE — ED INITIAL ASSESSMENT (HPI)
Pt comes from [facility] after bp of 90/60 pt is acting normally but has been slightly more tired than usual. Pt complains of chronic back pain.

## 2020-06-02 NOTE — ED PROVIDER NOTES
Patient Seen in: [facility] Emergency Department      History   Patient presents with:  Hypotension    Stated Complaint: hypotension    HPI    54-year-old female comes to the hospital complaint of feeling very weak she states for about a week and wa 8/8/2018   • Visual impairment               Past Surgical History:   Procedure Laterality Date   • APPENDECTOMY     • BACK SURGERY     • BOWEL RESECTION     • CHOLECYSTECTOMY     • COLECTOMY     • COLONOSCOPY     • COLONOSCOPY     • DEBULKING OF Eufemia Counter Oral   SpO2 95 %   O2 Device None (Room air)       Current:/66   Pulse 70   Temp 98 °F (36.7 °C) (Oral)   Resp 20   SpO2 96%         Physical Exam    HEENT : NCAT, EOMI, PEERL, throat clear, neck supple, no JVD, trachea midline, No LAD  Heart: S1S2 n CBC W/ DIFFERENTIAL[[id number]]          Abnormal            Final result                 Please view results for these tests on the individual orders.    RAINBOW DRAW BLUE   RAINBOW DRAW LAVENDER   RAINBOW DRAW LIGHT GREEN   RAINBOW DRAW 5/07/2020 at 10:40 AM     Finalized by: Tanvir Muñoz MD on 5/07/2020 at 10:44 AM          Xr Chest Ap Portable  (cpt=71045)    Result Date: 6/2/2020  PROCEDURE:  XR CHEST AP PORTABLE  (CPT=71045)  TECHNIQUE:  AP chest radiograph was obtained.   Chantal Hinton Chest Ap Portable  (cpt=71045)    Result Date: 5/7/2020  PROCEDURE:  XR CHEST AP PORTABLE  (CPT=71045)  TECHNIQUE:  AP chest radiograph was obtained.   COMPARISON:  [facility] , XR, XR CHEST AP PORTABLE  (CPT=71045), 2/02/2020, 12:23 PM.  INDICATIONS:  ams  PAT specified.         Medications Prescribed:  Current Discharge Medication List                       Present on Admission  Date Reviewed: 2/28/2020    None

## 2020-06-03 PROCEDURE — 99214 OFFICE O/P EST MOD 30 MIN: CPT | Performed by: INTERNAL MEDICINE

## 2020-06-03 PROCEDURE — 80048 BASIC METABOLIC PNL TOTAL CA: CPT | Performed by: INTERNAL MEDICINE

## 2020-06-03 PROCEDURE — 83735 ASSAY OF MAGNESIUM: CPT | Performed by: INTERNAL MEDICINE

## 2020-06-03 PROCEDURE — 81001 URINALYSIS AUTO W/SCOPE: CPT | Performed by: INTERNAL MEDICINE

## 2020-06-03 PROCEDURE — 97535 SELF CARE MNGMENT TRAINING: CPT

## 2020-06-03 PROCEDURE — 97116 GAIT TRAINING THERAPY: CPT

## 2020-06-03 PROCEDURE — 97165 OT EVAL LOW COMPLEX 30 MIN: CPT

## 2020-06-03 PROCEDURE — 84132 ASSAY OF SERUM POTASSIUM: CPT | Performed by: INTERNAL MEDICINE

## 2020-06-03 PROCEDURE — 85025 COMPLETE CBC W/AUTO DIFF WBC: CPT | Performed by: INTERNAL MEDICINE

## 2020-06-03 PROCEDURE — 97161 PT EVAL LOW COMPLEX 20 MIN: CPT

## 2020-06-03 RX ORDER — LISINOPRIL 5 MG/1
5 TABLET ORAL DAILY
Status: SHIPPED | COMMUNITY
Start: 2020-06-03 | End: 2020-06-04

## 2020-06-03 RX ORDER — LISINOPRIL 20 MG/1
20 TABLET ORAL EVERY EVENING
Status: DISCONTINUED | OUTPATIENT
Start: 2020-06-03 | End: 2020-06-04

## 2020-06-03 RX ORDER — POTASSIUM CHLORIDE 20 MEQ/1
40 TABLET, EXTENDED RELEASE ORAL EVERY 4 HOURS
Status: COMPLETED | OUTPATIENT
Start: 2020-06-03 | End: 2020-06-03

## 2020-06-03 NOTE — H&P
[address] [facility] Patient Status:  Observation    10/25/1927 MRN [id number]   [facility] 5NW-A Attending Astrid Mendoza MD   Hosp Day # 0 PCP Brenda Cazares MD     History of Present Illness:  Rj Sarmiento Night sweats    • Pacemaker    • Reflux    • Renal disorder    • S/P colon resection / Colonoscopy (10/12) recheck - S. Tilton Rubinstein 5/5/2011   • S/P laminectomy 12/23/2013   • Seizure disorder [facility])    • Sleep apnea     cpap   • Spinal stenosis of lumbar region (Other) Sister         Kimberly Jordan      reports that she quit smoking about 48 years ago. She has a 2.00 pack-year smoking history. She has never used smokeless tobacco. She reports previous alcohol use of about 2.0 standard drinks of alcohol per week.  She time  Multiple Vitamins-Minerals (OCUVITE OR), 1 tablet two times daily, Disp: , Rfl: , 6/2/2020 at Unknown time  acetaminophen 325 MG Oral Tab, Take 650 mg by mouth every 6 (six) hours as needed for Pain., Disp: , Rfl: , 6/2/2020 at Unknown time  [DISCONT deficits. Musculoskeletal: Full range of motion of all extremities. No swelling noted. Integument: No lesions. No erythema. Psychiatric: Appropriate mood and affect.     Laboratory Data:   Lab Results   Component Value Date    WBC 4.0 06/03/2020    HGB CONCLUSION:   1. No acute intracranial abnormality identified. Stable chronic ischemic changes as above. 2. Findings concerning for acute left maxillary sinusitis. Clinical correlation recommended. 3. Fluid in the left mastoid air cells.     Dictated by consolidation. No pneumothorax. CONCLUSION:  1. No significant change, allowing for differences in technique when compared to 5/7/2020 chest radiograph.      Dictated by: Leesa Hoffmann MD on 5/08/2020 at 11:54 AM     Finalized by: Leesa Hoffmann MD on 5/08/2020 diarrhea     Other fatigue-mutifactorial     Chronic bronchitis (HCC)     CKD (chronic kidney disease) stage 3, GFR 30-59 ml/min (HCC)     PAF (paroxysmal atrial fibrillation) (HCC)     Glaucoma suspect of left eye     Pseudophakia     Trochanteric bursiti

## 2020-06-03 NOTE — PHYSICAL THERAPY NOTE
PHYSICAL THERAPY QUICK EVALUATION - INPATIENT    Room Number: 878/180-C  Evaluation Date: 6/3/2020  Presenting Problem: weakness, hypotension  Physician Order: PT Eval and Treat     PMH: HTN, seizure disorder, PAF  Recent admissions:   5/7-5/10/20: Kiki Tadeo Laterality Date   • APPENDECTOMY     • BACK SURGERY     • BOWEL RESECTION     • CHOLECYSTECTOMY     • COLECTOMY     • COLONOSCOPY     • COLONOSCOPY     • DEBULKING OF NASOLABIAL FLAP TO NOSE Left 3/28/2014    Performed by Coreen Pickett MD at [place] [address] alarm  Fall Risk: Standard fall risk    WEIGHT BEARING RESTRICTION  Weight Bearing Restriction: None                PAIN ASSESSMENT  Ratin         RANGE OF MOTION AND STRENGTH ASSESSMENT  Upper extremity ROM and strength are within functional limits wenceslao. Pt left with call light in reach. RN aware. Patient End of Session: Up in chair;Needs met;Call light within reach;RN aware of session/findings; All patient questions and concerns addressed; Alarm set    ASSESSMENT   Patient is a 80year old femal

## 2020-06-03 NOTE — HISTORICAL OFFICE NOTE
Progress Notes  - documented in this encounter  Cy Meyer MD - 2020 12:30 PM CDT  Formatting of this note might be different from the original.  [facility]  Clinic Note    Nydia Rios  : 10/25/1927  PCP: Alvarado Long PSHx:  Past Surgical History:   Procedure Laterality Date   • Cardiac pacemaker placement   permanent pacemaker   • Cardioversion 12/17/2019   • Cardioversion 02/04/2020   • Hb venogram extremity unilat s&i Left   • Other surgical history   st.geovanny daily.   • calcium carbonate-vitamin D (CALCIUM 500 + D) 500-125 MG-UNIT tablet 1 tablet 2 times daily.    • gabapentin (GRALISE) 300 MG extended-release tablet 1 tablet twice daily   • Multiple Vitamins-Minerals (OCUVITE PO) 1 tablet two times daily     No

## 2020-06-03 NOTE — OCCUPATIONAL THERAPY NOTE
OCCUPATIONAL THERAPY QUICK EVALUATION - INPATIENT    Room Number: 700/873-G  Evaluation Date: 6/3/2020     Type of Evaluation: Quick Eval  Presenting Problem: weakness, hypotension     Physician Order: IP Consult to Occupational Therapy  Reason for Therapy • Hemorrhoids    • History of depression    • Hypertension    • Irregular bowel habits    • Leg swelling    • Lumbar facet arthropathy 2/10/2018   • Macular degeneration - [facility] 5/5/2011   • Muscle weakness    • Night sweats    • MD JUSTYN at [address] [facility]   • TRANSFORAMINAL LUMBAR EPIDURAL STEROID INJECTION MULTIPLE LEVEL Left 5/5/2015    Performed by Pankaj Almanza MD at [address] [facility] SITUATION  Type of Home: Independent living facility([facility] (including washing, rinsing, drying)?: A Little  -   Toileting, which includes using toilet, bedpan or urinal? : A Little  -   Putting on and taking off regular upper body clothing?: A Little  -   Taking care of personal grooming such as brushing teeth?: A performance deficits impacting engagement in ADL/IADL  LOW  1 - 3 performance deficits    Client Assessment/Performance Deficits  LOW - No comorbidities nor modifications of tasks    Clinical Decision Making  LOW - Analysis of occupational profile, problem

## 2020-06-03 NOTE — PLAN OF CARE
Pt oriented to room. Call light in reach. Tele monitor on. VS taken. Head to toe complete. Admission Navigators complete including PTA medications. Consults aware of proper medications and of new patient. Patient's needs met by staff. A/Ox3.  Lung sound baseline  Description  INTERVENTIONS:  - Continuous cardiac monitoring, monitor vital signs, obtain 12 lead EKG if indicated  - Evaluate effectiveness of antiarrhythmic and heart rate control medications as ordered  - Initiate emergency measures for life t

## 2020-06-03 NOTE — PLAN OF CARE
Received care of patient at 0730. Patient is A&Ox2-3, forgetful/ confused. Room air. Afib on tele. VSS. C/o left hip pain. Up with assist and walker. POC updated with patient and son via phone. Will continue to monitor. Call light within reach.  Bed alarm/

## 2020-06-03 NOTE — CM/SW NOTE
06/03/20 1700   CM/SW Referral Data   Referral Source Social Work (self-referral)   Reason for Referral Discharge planning   Patient [facility]   ([place])   Patient lives with Caregiver   SW received call from the [address] [place] [address] [address]

## 2020-06-03 NOTE — CONSULTS
[facility]    Inpatient Cardiology Consultation Note    Moni Gabo Patient Status:  Observation    10/25/1927 MRN [id number]   [facility] 5NW-A Attending Keven Kruger MD   Hosp Day # 0 PCP Eyal Wilkerson MD controlled on metoprolol and digoxin  -Continue Eliquis for oral anticoagulation      Fausto LUCAS[facility] Cardiology    ---------------------------------------------------------------------------------------------------------------------------- • COLONOSCOPY     • COLONOSCOPY     • DEBULKING OF NASOLABIAL FLAP TO NOSE Left 3/28/2014    Performed by Petra Villanueva MD at [address] [facility]   • EGD     • 2011 [facility] Right 10/29/2017    Performed by Nik Matute 1300  Gross per 24 hour   Intake 1037.92 ml   Output 650 ml   Net 387.92 ml       Medications:   Allergies:   Ciprofloxacin           OTHER (SEE COMMENTS)    Comment:Nausea and abdominal cramping  Levofloxacin            OTHER (SEE COMMENTS)  Sulfamethoxazo Calcium Carb-Cholecalciferol (CALCIUM-VITAMIN D3) 600-500 MG-UNIT Oral Cap, Take 1 tablet by mouth daily. omega-3 fatty acids 1000 MG Oral Cap, Take 1,000 mg by mouth daily. [DISCONTINUED] lisinopril 40 MG Oral Tab, Take 40 mg by mouth daily.

## 2020-06-04 VITALS
TEMPERATURE: 98 F | HEART RATE: 74 BPM | OXYGEN SATURATION: 96 % | DIASTOLIC BLOOD PRESSURE: 86 MMHG | SYSTOLIC BLOOD PRESSURE: 143 MMHG | RESPIRATION RATE: 20 BRPM

## 2020-06-04 PROCEDURE — 99214 OFFICE O/P EST MOD 30 MIN: CPT | Performed by: INTERNAL MEDICINE

## 2020-06-04 RX ORDER — LISINOPRIL 20 MG/1
20 TABLET ORAL EVERY EVENING
Qty: 30 TABLET | Refills: 3 | Status: SHIPPED | OUTPATIENT
Start: 2020-06-04 | End: 2020-10-16 | Stop reason: DRUGHIGH

## 2020-06-04 NOTE — PROGRESS NOTES
[facility]  Progress Note    Chick Tarsha Maldonado Patient Status:  Observation    10/25/1927 MRN [id number]   [facility] 5NW-A Attending Luba Dumont MD   Hosp Day # 0 PCP Sindy Hough MD       SUBJECTIVE:  No new events     OBJECTIVE: 07/05/2012 5.9 (H)   08/11/2011 5.8 (H)     HGBA1C (%)   Date Value   07/18/2014 5.8 (H)   02/17/2014 6.0 (H)   11/08/2013 6.3 (H)     HgbA1C (%)   Date Value   02/07/2018 6.0 (H)   12/06/2017 5.5   02/21/2017 6.0 (H)     TSH   Date Value   11/03/2019 3. half of the time spent for counseling and co ordination of care    Terri Navarro  6/4/2020  9:10 AM

## 2020-06-04 NOTE — PLAN OF CARE
Assumed pt care at 1. A&Ox3, disoriented to situation. forgetful. RA, denies chest pain/SOB, lung sounds clear, VSS, Afib on tele. Voids. Up with SBA/walker.  POC monitor overnight on BP meds, possibly home in AM?, POC updated with pt-- will update famil life threatening arrhythmias  - Monitor electrolytes and administer replacement therapy as ordered  Outcome: Progressing     Problem: Impaired Functional Mobility  Goal: Achieve highest/safest level of mobility/gait  Description  Interventions:  - Assess p

## 2020-06-04 NOTE — PLAN OF CARE
Patient laying in bed, denies CP, SOB and dizziness.  Patient alert and oriented x 4. forgetful; VSS; cardiac monitor showing Afib; lung sounds diminished bilaterally, on room air, no cough noted; no edema noted; patient ambulates with an assist x 1 and wal

## 2020-06-04 NOTE — CM/SW NOTE
Per unit RN, pt to d/c home today. SW s/w Pt's son/POA, Josephus Eisenmenger. Pt lives at [facility] with caregiver, has Valentin Iglesias. Son hasn't heard from PCP yet and had questions regarding pt's heart, he is requesting a call, ph#[phone].  Will page MD.       Dr. House Render

## 2020-06-04 NOTE — PROGRESS NOTES
[facility]  Cardiology Progress Note    Subjective:  No chest pain or shortness of breath.     Objective:  /84 (BP Location: Left arm)   Pulse 75   Temp 98.5 °F (36.9 °C) (Oral)   Resp 20   SpO2 97%     Lab Results   Component Value Date    K 4.6 blood pressure. Would accept b/p 160mmHg or less and follow over time as outpatient. No cardiac objection to discharge.   Would keep her on the same antihypertensive/cardiac regimen she is presently on in the hospital.   F/u in office with APN ~1-2 weeks

## 2020-06-05 ENCOUNTER — DOCUMENTATION (OUTPATIENT)
Dept: CARDIOLOGY | Age: 85
End: 2020-06-05

## 2020-06-05 RX ORDER — LISINOPRIL 20 MG/1
20 TABLET ORAL NIGHTLY
COMMUNITY
End: 2020-09-28

## 2020-06-06 ENCOUNTER — TELEPHONE (OUTPATIENT)
Dept: CARDIOLOGY | Age: 85
End: 2020-06-06

## 2020-06-08 RX ORDER — METOPROLOL TARTRATE 50 MG/1
TABLET, FILM COATED ORAL
Qty: 180 TABLET | Refills: 2 | Status: SHIPPED | OUTPATIENT
Start: 2020-06-08 | End: 2021-02-18

## 2020-06-08 NOTE — DISCHARGE SUMMARY
[facility]  Discharge Summary    Sohan Maldonado Patient Status:  Inpatient    10/25/1927 MRN [id number]   [facility] 7NE-A Attending No att. providers found   Hosp Day # 3 PCP Thu Gomez MD     Date of Admission: 2020    Date (two) times daily. , Historical, R-0    amLODIPine Besylate 10 MG Oral Tab  Take 1 tablet (10 mg total) by mouth daily. , Historical, R-0    Amoxicillin-Pot Clavulanate 875-125 MG Oral Tab  Take 1 tablet by mouth 2 (two) times daily for 10 days. , Normal, Dis Historical    !! Multiple Vitamins-Minerals (TAB-A-MARA MAXIMUM) Oral Tab  Take 1 tablet by mouth daily. , Historical    !! - Potential duplicate medications found. Please discuss with provider.       STOP taking these medications    Flecainide Acetate 100 M

## 2020-06-12 ENCOUNTER — OFFICE VISIT (OUTPATIENT)
Dept: CARDIOLOGY | Age: 85
End: 2020-06-12

## 2020-06-12 VITALS
HEIGHT: 65 IN | BODY MASS INDEX: 22.33 KG/M2 | DIASTOLIC BLOOD PRESSURE: 60 MMHG | HEART RATE: 75 BPM | WEIGHT: 134 LBS | SYSTOLIC BLOOD PRESSURE: 98 MMHG | OXYGEN SATURATION: 95 %

## 2020-06-12 DIAGNOSIS — I95.9 HYPOTENSION, UNSPECIFIED HYPOTENSION TYPE: ICD-10-CM

## 2020-06-12 DIAGNOSIS — I48.19 OTHER PERSISTENT ATRIAL FIBRILLATION (CMD): Primary | ICD-10-CM

## 2020-06-12 PROCEDURE — 99213 OFFICE O/P EST LOW 20 MIN: CPT | Performed by: NURSE PRACTITIONER

## 2020-06-12 RX ORDER — LIDOCAINE 4 G/G
1 PATCH TOPICAL EVERY 24 HOURS
COMMUNITY

## 2020-06-12 RX ORDER — DOCUSATE SODIUM 100 MG/1
100 CAPSULE, LIQUID FILLED ORAL 2 TIMES DAILY
COMMUNITY
End: 2020-10-22 | Stop reason: ALTCHOICE

## 2020-06-12 ASSESSMENT — ENCOUNTER SYMPTOMS
HEMOPTYSIS: 0
ALLERGIC/IMMUNOLOGIC COMMENTS: NO NEW FOOD ALLERGIES
COUGH: 0
WEIGHT LOSS: 0
CHILLS: 0
FEVER: 0
WEIGHT GAIN: 0
SUSPICIOUS LESIONS: 0
BRUISES/BLEEDS EASILY: 0
HEMATOCHEZIA: 0

## 2020-07-02 ENCOUNTER — TELEPHONE (OUTPATIENT)
Dept: INTERNAL MEDICINE CLINIC | Facility: CLINIC | Age: 85
End: 2020-07-02

## 2020-07-02 NOTE — TELEPHONE ENCOUNTER
Pt called requesting nurse only appt for labs. After attempting to schedule pt, message appeared that pt has been dismissed from [facility] due to her behavior,  and can not be scheduled.  After speaking with  Brielle Ponce, contacted pt to inform her of th

## 2020-07-06 RX ORDER — APIXABAN 5 MG/1
TABLET, FILM COATED ORAL
Qty: 60 TABLET | Refills: 3 | Status: SHIPPED | OUTPATIENT
Start: 2020-07-06 | End: 2020-11-05

## 2020-07-10 ENCOUNTER — TELEPHONE (OUTPATIENT)
Dept: INTERNAL MEDICINE CLINIC | Facility: CLINIC | Age: 85
End: 2020-07-10

## 2020-07-10 NOTE — TELEPHONE ENCOUNTER
Patient called and wanted to know if she was able to schedule an appointment for labs in our office yet. I explained to her that Anil Kothari has a phone call into [facility] to see if they can lift the block to schedule in our office.  She became upset and wa

## 2020-07-13 ENCOUNTER — TELEPHONE (OUTPATIENT)
Dept: INTERNAL MEDICINE CLINIC | Facility: CLINIC | Age: 85
End: 2020-07-13

## 2020-07-13 NOTE — TELEPHONE ENCOUNTER
Informed patient that we are not able to schedule lab appointments here for her. Reminded her that she was discharged from this practice in March.     Advised her to contact Dr Justyna Arias office to see where she can have her labs drawn

## 2020-07-16 ENCOUNTER — TELEPHONE (OUTPATIENT)
Dept: CARDIOLOGY | Age: 85
End: 2020-07-16

## 2020-07-27 ENCOUNTER — LAB ENCOUNTER (OUTPATIENT)
Dept: LAB | Facility: HOSPITAL | Age: 85
End: 2020-07-27
Attending: INTERNAL MEDICINE
Payer: MEDICARE

## 2020-07-27 DIAGNOSIS — E74.39 GLUCOSE INTOLERANCE: ICD-10-CM

## 2020-07-27 DIAGNOSIS — I10 ESSENTIAL HYPERTENSION: ICD-10-CM

## 2020-07-27 DIAGNOSIS — E03.8 OTHER SPECIFIED HYPOTHYROIDISM: ICD-10-CM

## 2020-07-27 DIAGNOSIS — E78.2 MIXED HYPERLIPIDEMIA: ICD-10-CM

## 2020-07-27 DIAGNOSIS — D50.8 OTHER IRON DEFICIENCY ANEMIA: ICD-10-CM

## 2020-07-27 LAB
ALBUMIN SERPL-MCNC: 3.3 G/DL (ref 3.4–5)
ALBUMIN/GLOB SERPL: 0.8 {RATIO} (ref 1–2)
ALP LIVER SERPL-CCNC: 68 U/L (ref 55–142)
ALT SERPL-CCNC: 24 U/L (ref 13–56)
ANION GAP SERPL CALC-SCNC: 3 MMOL/L (ref 0–18)
AST SERPL-CCNC: 26 U/L (ref 15–37)
BASOPHILS # BLD AUTO: 0.07 X10(3) UL (ref 0–0.2)
BASOPHILS NFR BLD AUTO: 1.4 %
BILIRUB SERPL-MCNC: 0.3 MG/DL (ref 0.1–2)
BUN BLD-MCNC: 26 MG/DL (ref 7–18)
BUN/CREAT SERPL: 30.2 (ref 10–20)
CALCIUM BLD-MCNC: 9 MG/DL (ref 8.5–10.1)
CHLORIDE SERPL-SCNC: 105 MMOL/L (ref 98–112)
CHOLEST SMN-MCNC: 153 MG/DL (ref ?–200)
CO2 SERPL-SCNC: 26 MMOL/L (ref 21–32)
CREAT BLD-MCNC: 0.86 MG/DL (ref 0.55–1.02)
DEPRECATED RDW RBC AUTO: 51.7 FL (ref 35.1–46.3)
EOSINOPHIL # BLD AUTO: 0.14 X10(3) UL (ref 0–0.7)
EOSINOPHIL NFR BLD AUTO: 2.8 %
ERYTHROCYTE [DISTWIDTH] IN BLOOD BY AUTOMATED COUNT: 13 % (ref 11–15)
EST. AVERAGE GLUCOSE BLD GHB EST-MCNC: 105 MG/DL (ref 68–126)
GLOBULIN PLAS-MCNC: 3.9 G/DL (ref 2.8–4.4)
GLUCOSE BLD-MCNC: 93 MG/DL (ref 70–99)
HBA1C MFR BLD HPLC: 5.3 % (ref ?–5.7)
HCT VFR BLD AUTO: 36.7 % (ref 35–48)
HDLC SERPL-MCNC: 58 MG/DL (ref 40–59)
HGB BLD-MCNC: 11.5 G/DL (ref 12–16)
IMM GRANULOCYTES # BLD AUTO: 0.03 X10(3) UL (ref 0–1)
IMM GRANULOCYTES NFR BLD: 0.6 %
LDLC SERPL CALC-MCNC: 74 MG/DL (ref ?–100)
LYMPHOCYTES # BLD AUTO: 1.36 X10(3) UL (ref 1–4)
LYMPHOCYTES NFR BLD AUTO: 27 %
M PROTEIN MFR SERPL ELPH: 7.2 G/DL (ref 6.4–8.2)
MCH RBC QN AUTO: 33.7 PG (ref 26–34)
MCHC RBC AUTO-ENTMCNC: 31.3 G/DL (ref 31–37)
MCV RBC AUTO: 107.6 FL (ref 80–100)
MONOCYTES # BLD AUTO: 0.6 X10(3) UL (ref 0.1–1)
MONOCYTES NFR BLD AUTO: 11.9 %
NEUTROPHILS # BLD AUTO: 2.84 X10 (3) UL (ref 1.5–7.7)
NEUTROPHILS # BLD AUTO: 2.84 X10(3) UL (ref 1.5–7.7)
NEUTROPHILS NFR BLD AUTO: 56.3 %
NONHDLC SERPL-MCNC: 95 MG/DL (ref ?–130)
OSMOLALITY SERPL CALC.SUM OF ELEC: 282 MOSM/KG (ref 275–295)
PATIENT FASTING Y/N/NP: NO
PATIENT FASTING Y/N/NP: NO
PLATELET # BLD AUTO: 206 10(3)UL (ref 150–450)
POTASSIUM SERPL-SCNC: 4.5 MMOL/L (ref 3.5–5.1)
RBC # BLD AUTO: 3.41 X10(6)UL (ref 3.8–5.3)
SODIUM SERPL-SCNC: 134 MMOL/L (ref 136–145)
TRIGL SERPL-MCNC: 105 MG/DL (ref 30–149)
TSI SER-ACNC: 1.66 MIU/ML (ref 0.36–3.74)
VLDLC SERPL CALC-MCNC: 21 MG/DL (ref 0–30)
WBC # BLD AUTO: 5 X10(3) UL (ref 4–11)

## 2020-07-27 PROCEDURE — 85025 COMPLETE CBC W/AUTO DIFF WBC: CPT

## 2020-07-27 PROCEDURE — 84443 ASSAY THYROID STIM HORMONE: CPT

## 2020-07-27 PROCEDURE — 83036 HEMOGLOBIN GLYCOSYLATED A1C: CPT

## 2020-07-27 PROCEDURE — 80053 COMPREHEN METABOLIC PANEL: CPT

## 2020-07-27 PROCEDURE — 80061 LIPID PANEL: CPT

## 2020-07-27 PROCEDURE — 36415 COLL VENOUS BLD VENIPUNCTURE: CPT

## 2020-07-27 NOTE — DISCHARGE SUMMARY
[facility]  Discharge Summary    Belen Justice Joel Patient Status:  Observation    10/25/1927 MRN [id number]   [facility] 5NW-A Attending No att. providers found   Hosp Day # 0 PCP Virgil Estevez MD     Date of Admission: 2020    Da (50 mg total) by mouth 2 (two) times daily. , Historical, R-0    Fluticasone Propionate 50 MCG/ACT Nasal Suspension  2 sprays by Each Nare route daily. , Historical, Disp-1 Bottle, R-3    docusate sodium 100 MG Oral Cap  Take 100 mg by mouth 2 (two) times da

## 2020-08-10 PROCEDURE — 93296 REM INTERROG EVL PM/IDS: CPT | Performed by: INTERNAL MEDICINE

## 2020-08-10 PROCEDURE — 93294 REM INTERROG EVL PM/LDLS PM: CPT | Performed by: INTERNAL MEDICINE

## 2020-08-14 ENCOUNTER — ANCILLARY PROCEDURE (OUTPATIENT)
Dept: CARDIOLOGY | Age: 85
End: 2020-08-14
Attending: INTERNAL MEDICINE

## 2020-08-14 ENCOUNTER — ANCILLARY ORDERS (OUTPATIENT)
Dept: CARDIOLOGY | Age: 85
End: 2020-08-14

## 2020-08-14 DIAGNOSIS — Z45.018 ENCOUNTER FOR CARE OF PACEMAKER: ICD-10-CM

## 2020-08-14 PROCEDURE — X1114 CARDIAC DEVICE HOME CHECK - REMOTE UNSCHEDULED: HCPCS | Performed by: INTERNAL MEDICINE

## 2020-08-17 ENCOUNTER — TELEPHONE (OUTPATIENT)
Dept: CARDIOLOGY | Age: 85
End: 2020-08-17

## 2020-09-11 RX ORDER — DIGOXIN 125 MCG
TABLET ORAL
Qty: 30 TABLET | Refills: 6 | Status: SHIPPED | OUTPATIENT
Start: 2020-09-11 | End: 2021-03-22

## 2020-09-14 ENCOUNTER — TELEPHONE (OUTPATIENT)
Dept: INTERNAL MEDICINE CLINIC | Facility: CLINIC | Age: 85
End: 2020-09-14

## 2020-09-14 NOTE — TELEPHONE ENCOUNTER
Reminded patient that she was discharged from this practice earlier this year and that we cannot schedule her flu shot here. Advised patient to call her PCP to schedule.     Patient upset, stated feeling this is not fair and that Dr Gee Batres was not truthful f

## 2020-09-28 RX ORDER — LISINOPRIL 20 MG/1
TABLET ORAL
Qty: 30 TABLET | Refills: 3 | Status: SHIPPED | OUTPATIENT
Start: 2020-09-28 | End: 2020-10-22 | Stop reason: ALTCHOICE

## 2020-10-08 ENCOUNTER — EXTERNAL RECORD (OUTPATIENT)
Dept: HEALTH INFORMATION MANAGEMENT | Facility: OTHER | Age: 85
End: 2020-10-08

## 2020-10-13 PROBLEM — H34.8320: Status: ACTIVE | Noted: 2020-10-08

## 2020-10-14 ENCOUNTER — TELEPHONE (OUTPATIENT)
Dept: CARDIOLOGY | Age: 85
End: 2020-10-14

## 2020-10-22 ENCOUNTER — OFFICE VISIT (OUTPATIENT)
Dept: OPHTHALMOLOGY | Age: 85
End: 2020-10-22

## 2020-10-22 DIAGNOSIS — H35.3232 EXUDATIVE AGE-RELATED MACULAR DEGENERATION OF BOTH EYES WITH INACTIVE CHOROIDAL NEOVASCULARIZATION (CMD): ICD-10-CM

## 2020-10-22 DIAGNOSIS — H26.492 PCO (POSTERIOR CAPSULAR OPACIFICATION), LEFT: ICD-10-CM

## 2020-10-22 DIAGNOSIS — H34.8320 BRANCH RETINAL VEIN OCCLUSION OF LEFT EYE WITH MACULAR EDEMA: ICD-10-CM

## 2020-10-22 DIAGNOSIS — H40.003 GLAUCOMA SUSPECT, BILATERAL: Primary | ICD-10-CM

## 2020-10-22 PROCEDURE — 92133 CPTRZD OPH DX IMG PST SGM ON: CPT | Performed by: OPHTHALMOLOGY

## 2020-10-22 PROCEDURE — 99214 OFFICE O/P EST MOD 30 MIN: CPT | Performed by: OPHTHALMOLOGY

## 2020-10-22 PROCEDURE — 76514 ECHO EXAM OF EYE THICKNESS: CPT | Performed by: OPHTHALMOLOGY

## 2020-10-22 RX ORDER — ACETAMINOPHEN 325 MG/1
325 TABLET ORAL
COMMUNITY

## 2020-10-22 SDOH — HEALTH STABILITY: MENTAL HEALTH: HOW OFTEN DO YOU HAVE A DRINK CONTAINING ALCOHOL?: NEVER

## 2020-10-23 PROBLEM — H34.8320 BRANCH RETINAL VEIN OCCLUSION OF LEFT EYE WITH MACULAR EDEMA: Status: ACTIVE | Noted: 2020-10-08

## 2020-10-23 PROBLEM — H34.8320 BRANCH RETINAL VEIN OCCLUSION OF LEFT EYE WITH MACULAR EDEMA (HCC): Status: ACTIVE | Noted: 2020-10-08

## 2020-10-23 PROBLEM — H35.3232 EXUDATIVE AGE-RELATED MACULAR DEGENERATION OF BOTH EYES WITH INACTIVE CHOROIDAL NEOVASCULARIZATION (HCC): Status: ACTIVE | Noted: 2020-10-22

## 2020-10-23 PROBLEM — H26.492 PCO (POSTERIOR CAPSULAR OPACIFICATION), LEFT: Status: ACTIVE | Noted: 2020-10-22

## 2020-10-26 ENCOUNTER — LAB ENCOUNTER (OUTPATIENT)
Dept: LAB | Age: 85
End: 2020-10-26
Attending: INTERNAL MEDICINE
Payer: MEDICARE

## 2020-10-26 DIAGNOSIS — Z20.828 EXPOSURE TO SARS-ASSOCIATED CORONAVIRUS: ICD-10-CM

## 2020-11-03 NOTE — PLAN OF CARE
Assumed care at 0730  A&Ox4, forgetful, VSS, afib on tele  HR 80-90s with frequent PVCs. Cardizem gtt dc'd  Plan for Echo tomorrow  Ambulating in halls with SB assist. Denies dizziness or SOB  Pt and family updated on POC.  Needs attended to, will continue Normal vision: sees adequately in most situations; can see medication labels, newsprint

## 2020-11-05 RX ORDER — APIXABAN 5 MG/1
TABLET, FILM COATED ORAL
Qty: 60 TABLET | Refills: 3 | Status: SHIPPED | OUTPATIENT
Start: 2020-11-05 | End: 2021-02-18

## 2020-12-04 ENCOUNTER — OFFICE VISIT (OUTPATIENT)
Dept: CARDIOLOGY | Age: 85
End: 2020-12-04

## 2020-12-04 ENCOUNTER — HOSPITAL ENCOUNTER (OUTPATIENT)
Dept: LAB | Facility: HOSPITAL | Age: 85
Discharge: HOME | End: 2020-12-04
Attending: NURSE PRACTITIONER
Payer: MEDICARE

## 2020-12-04 ENCOUNTER — TELEPHONE (OUTPATIENT)
Dept: CARDIOLOGY | Age: 85
End: 2020-12-04

## 2020-12-04 VITALS
HEIGHT: 65 IN | SYSTOLIC BLOOD PRESSURE: 84 MMHG | WEIGHT: 148 LBS | DIASTOLIC BLOOD PRESSURE: 48 MMHG | HEART RATE: 75 BPM | BODY MASS INDEX: 24.66 KG/M2

## 2020-12-04 DIAGNOSIS — I95.9 HYPOTENSION, UNSPECIFIED HYPOTENSION TYPE: ICD-10-CM

## 2020-12-04 DIAGNOSIS — I48.0 PAROXYSMAL ATRIAL FIBRILLATION (CMD): Primary | ICD-10-CM

## 2020-12-04 PROCEDURE — 36415 COLL VENOUS BLD VENIPUNCTURE: CPT

## 2020-12-04 PROCEDURE — 85025 COMPLETE CBC W/AUTO DIFF WBC: CPT

## 2020-12-04 PROCEDURE — 99213 OFFICE O/P EST LOW 20 MIN: CPT | Performed by: NURSE PRACTITIONER

## 2020-12-04 PROCEDURE — 80048 BASIC METABOLIC PNL TOTAL CA: CPT

## 2020-12-04 RX ORDER — LISINOPRIL 40 MG/1
40 TABLET ORAL DAILY
COMMUNITY
Start: 2020-10-16

## 2020-12-04 RX ORDER — CHLORAL HYDRATE 500 MG
CAPSULE ORAL
COMMUNITY

## 2020-12-04 RX ORDER — AMLODIPINE BESYLATE 2.5 MG/1
2.5 TABLET ORAL NIGHTLY
Qty: 30 TABLET | Refills: 3 | Status: SHIPPED | OUTPATIENT
Start: 2020-12-04 | End: 2021-03-15

## 2020-12-04 RX ORDER — AMLODIPINE BESYLATE 5 MG/1
5 TABLET ORAL NIGHTLY
COMMUNITY
Start: 2020-11-10 | End: 2020-12-04 | Stop reason: SDUPTHER

## 2020-12-04 RX ORDER — DOCUSATE SODIUM 100 MG/1
100 CAPSULE, LIQUID FILLED ORAL PRN
COMMUNITY
Start: 2020-07-29

## 2020-12-04 ASSESSMENT — ENCOUNTER SYMPTOMS
SHORTNESS OF BREATH: 1
HEMOPTYSIS: 0
HEMATOCHEZIA: 0
BRUISES/BLEEDS EASILY: 0
SUSPICIOUS LESIONS: 0
WEIGHT LOSS: 0
WEIGHT GAIN: 0
CHILLS: 0
FEVER: 0
ALLERGIC/IMMUNOLOGIC COMMENTS: NO NEW FOOD ALLERGIES
COUGH: 0

## 2020-12-18 ENCOUNTER — TELEPHONE (OUTPATIENT)
Dept: CARDIOLOGY | Age: 85
End: 2020-12-18

## 2021-01-01 ENCOUNTER — EXTERNAL RECORD (OUTPATIENT)
Dept: HEALTH INFORMATION MANAGEMENT | Facility: OTHER | Age: 86
End: 2021-01-01

## 2021-01-26 ENCOUNTER — OFFICE VISIT (OUTPATIENT)
Dept: SURGERY | Facility: CLINIC | Age: 86
End: 2021-01-26
Payer: COMMERCIAL

## 2021-01-26 VITALS — HEART RATE: 84 BPM | TEMPERATURE: 97 F | SYSTOLIC BLOOD PRESSURE: 99 MMHG | DIASTOLIC BLOOD PRESSURE: 55 MMHG

## 2021-01-26 DIAGNOSIS — Z95.0 PACEMAKER: ICD-10-CM

## 2021-01-26 DIAGNOSIS — K42.9 UMBILICAL HERNIA WITHOUT OBSTRUCTION AND WITHOUT GANGRENE: ICD-10-CM

## 2021-01-26 DIAGNOSIS — J44.9 ASTHMA WITH COPD (CHRONIC OBSTRUCTIVE PULMONARY DISEASE) (HCC): Chronic | ICD-10-CM

## 2021-01-26 DIAGNOSIS — Z99.89 OSA ON CPAP: ICD-10-CM

## 2021-01-26 DIAGNOSIS — N18.30 STAGE 3 CHRONIC KIDNEY DISEASE, UNSPECIFIED WHETHER STAGE 3A OR 3B CKD (HCC): Chronic | ICD-10-CM

## 2021-01-26 DIAGNOSIS — G47.33 OSA ON CPAP: ICD-10-CM

## 2021-01-26 DIAGNOSIS — I48.0 PAF (PAROXYSMAL ATRIAL FIBRILLATION) (HCC): ICD-10-CM

## 2021-01-26 DIAGNOSIS — K40.90 RIGHT INGUINAL HERNIA: Primary | ICD-10-CM

## 2021-01-26 DIAGNOSIS — I10 HYPERTENSION, UNSPECIFIED TYPE: ICD-10-CM

## 2021-01-26 PROCEDURE — 3074F SYST BP LT 130 MM HG: CPT | Performed by: COLON & RECTAL SURGERY

## 2021-01-26 PROCEDURE — 99204 OFFICE O/P NEW MOD 45 MIN: CPT | Performed by: COLON & RECTAL SURGERY

## 2021-01-26 PROCEDURE — 3078F DIAST BP <80 MM HG: CPT | Performed by: COLON & RECTAL SURGERY

## 2021-01-26 NOTE — PATIENT INSTRUCTIONS
The patient presents today in consultation of Dr. Ino Gomez for a possible inguinal hernia. The patient states that she has noticed a bulge in her right groin that has been present for multiple months now.   She states that she notices it most when she is in with the patient as well as her son about the hernia. I discussed with the patient that it is up to her at this point if she wants to have the hernia repaired. It is relatively asymptomatic for her.   It is not increasing in size and has had no incarcerat

## 2021-01-26 NOTE — H&P
New Patient Visit Note       Active Problems      1. Right inguinal hernia    2. Umbilical hernia without obstruction and without gangrene    3. PAF (paroxysmal atrial fibrillation) ([facility] [address]    4. Pacemaker - [place]    5.  Hypertension, unspecified type she has a pacemaker placed and she is on Eliquis. She follows with Dr. Almas Owens as her cardiologist.  The patient also has hypertension, COPD, and sleep apnea. The patient lives at [facility].     This patient required a level of surgical and medical [employer]) NRDR ([address]) which includes the Dose Index Registry.      PATIENT STATED HISTORY:(As transcribed by Technologist)  Constipation and abdominal discomfort  for 4 days      CONTRAST USED:  80cc of Omnipaque 350 allergic to ciprofloxacin; levofloxacin; sulfamethoxazole w/trimethoprim; altaryl; amiodarone; bacitracin; escitalopram; garlic; hydrocodone; lexapro; thiazide-type diuretics; cephalexin; clonidine; and garlic.     Past Medical / Dracut Keo / Social / Family FLAP TO NOSE Left 3/28/2014    Performed by Kamron Black MD at [address] [place] [address]   • EGD     • 2011 [facility] Right 10/29/2017    Performed by Justyna Andrews MD at [facility] MAIN OR   • EXCISION OF LESION/LIPOMA N/A 12/2/2015    P Substance and Sexual Activity      Alcohol use: Not Currently        Alcohol/week: 2.0 standard drinks        Types: 2 Glasses of wine per week        Comment: wine occasionally      Drug use: No    Other Topics      Concerns:        Caffeine Concern:  No Cardiovascular: Negative for chest pain, palpitations and leg swelling. Gastrointestinal: Negative for abdominal distention, abdominal pain, anal bleeding, blood in stool, constipation, diarrhea, nausea and vomiting.    Genitourinary: Negative for diffi tenderness, no tenderness at McBurney's point and negative Chaudhry's sign. A hernia is present. Hernia confirmed positive in the right inguinal area. Hernia confirmed negative in the ventral area and confirmed negative in the left inguinal area.        Clini approximately the same size since she is first noticed it. She states that it does not really cause her much pain. She states that there are times when she can notice that it is there, but it does not cause her discomfort.     The patient denies having an strangulation event with the hernia. I discussed that if it does become strangulated we have approximately 6-8 hours to reduce the hernia either manually or via surgical intervention. I discussed the symptoms of a strangulated and incarcerated hernia.   I

## 2021-02-02 DIAGNOSIS — Z23 NEED FOR VACCINATION: ICD-10-CM

## 2021-02-18 RX ORDER — APIXABAN 5 MG/1
TABLET, FILM COATED ORAL
Qty: 180 TABLET | Refills: 3 | Status: SHIPPED | OUTPATIENT
Start: 2021-02-18

## 2021-02-18 RX ORDER — METOPROLOL TARTRATE 50 MG/1
TABLET, FILM COATED ORAL
Qty: 180 TABLET | Refills: 3 | Status: SHIPPED | OUTPATIENT
Start: 2021-02-18

## 2021-02-25 ENCOUNTER — ANCILLARY ORDERS (OUTPATIENT)
Dept: CARDIOLOGY | Age: 86
End: 2021-02-25

## 2021-02-25 ENCOUNTER — TELEPHONE (OUTPATIENT)
Dept: CARDIOLOGY | Age: 86
End: 2021-02-25

## 2021-02-25 ENCOUNTER — ANCILLARY PROCEDURE (OUTPATIENT)
Dept: CARDIOLOGY | Age: 86
End: 2021-02-25
Attending: INTERNAL MEDICINE

## 2021-02-25 DIAGNOSIS — Z95.0 CARDIAC PACEMAKER IN SITU: ICD-10-CM

## 2021-02-25 PROCEDURE — X1114 CARDIAC DEVICE HOME CHECK - REMOTE UNSCHEDULED: HCPCS | Performed by: INTERNAL MEDICINE

## 2021-02-25 PROCEDURE — 93296 REM INTERROG EVL PM/IDS: CPT | Performed by: INTERNAL MEDICINE

## 2021-02-25 PROCEDURE — 93294 REM INTERROG EVL PM/LDLS PM: CPT | Performed by: INTERNAL MEDICINE

## 2021-03-01 ENCOUNTER — LAB ENCOUNTER (OUTPATIENT)
Dept: LAB | Facility: HOSPITAL | Age: 86
End: 2021-03-01
Attending: INTERNAL MEDICINE
Payer: MEDICARE

## 2021-03-01 DIAGNOSIS — Z00.00 ROUTINE GENERAL MEDICAL EXAMINATION AT A HEALTH CARE FACILITY: ICD-10-CM

## 2021-03-01 DIAGNOSIS — R73.9 HYPERGLYCEMIA: ICD-10-CM

## 2021-03-01 DIAGNOSIS — E03.9 PRIMARY HYPOTHYROIDISM: ICD-10-CM

## 2021-03-01 DIAGNOSIS — E78.2 MIXED HYPERLIPIDEMIA: ICD-10-CM

## 2021-03-01 DIAGNOSIS — D64.9 ANEMIA, UNSPECIFIED TYPE: ICD-10-CM

## 2021-03-01 LAB
ALBUMIN SERPL-MCNC: 3.2 G/DL (ref 3.4–5)
ALBUMIN/GLOB SERPL: 0.9 {RATIO} (ref 1–2)
ALP LIVER SERPL-CCNC: 72 U/L
ALT SERPL-CCNC: 28 U/L
ANION GAP SERPL CALC-SCNC: 9 MMOL/L (ref 0–18)
AST SERPL-CCNC: 22 U/L (ref 15–37)
BASOPHILS # BLD AUTO: 0.07 X10(3) UL (ref 0–0.2)
BASOPHILS NFR BLD AUTO: 1.4 %
BILIRUB SERPL-MCNC: 0.3 MG/DL (ref 0.1–2)
BUN BLD-MCNC: 16 MG/DL (ref 7–18)
BUN/CREAT SERPL: 16.7 (ref 10–20)
CALCIUM BLD-MCNC: 9.2 MG/DL (ref 8.5–10.1)
CHLORIDE SERPL-SCNC: 106 MMOL/L (ref 98–112)
CHOLEST SMN-MCNC: 189 MG/DL (ref ?–200)
CO2 SERPL-SCNC: 25 MMOL/L (ref 21–32)
CREAT BLD-MCNC: 0.96 MG/DL
DEPRECATED RDW RBC AUTO: 45.7 FL (ref 35.1–46.3)
EOSINOPHIL # BLD AUTO: 0.19 X10(3) UL (ref 0–0.7)
EOSINOPHIL NFR BLD AUTO: 3.7 %
ERYTHROCYTE [DISTWIDTH] IN BLOOD BY AUTOMATED COUNT: 12.6 % (ref 11–15)
EST. AVERAGE GLUCOSE BLD GHB EST-MCNC: 134 MG/DL (ref 68–126)
GLOBULIN PLAS-MCNC: 3.6 G/DL (ref 2.8–4.4)
GLUCOSE BLD-MCNC: 98 MG/DL (ref 70–99)
HBA1C MFR BLD HPLC: 6.3 % (ref ?–5.7)
HCT VFR BLD AUTO: 37.1 %
HDLC SERPL-MCNC: 54 MG/DL (ref 40–59)
HGB BLD-MCNC: 12.3 G/DL
IMM GRANULOCYTES # BLD AUTO: 0.04 X10(3) UL (ref 0–1)
IMM GRANULOCYTES NFR BLD: 0.8 %
LDLC SERPL CALC-MCNC: 109 MG/DL (ref ?–100)
LYMPHOCYTES # BLD AUTO: 1.23 X10(3) UL (ref 1–4)
LYMPHOCYTES NFR BLD AUTO: 24 %
M PROTEIN MFR SERPL ELPH: 6.8 G/DL (ref 6.4–8.2)
MCH RBC QN AUTO: 32.5 PG (ref 26–34)
MCHC RBC AUTO-ENTMCNC: 33.2 G/DL (ref 31–37)
MCV RBC AUTO: 98.1 FL
MONOCYTES # BLD AUTO: 0.61 X10(3) UL (ref 0.1–1)
MONOCYTES NFR BLD AUTO: 11.9 %
NEUTROPHILS # BLD AUTO: 2.99 X10 (3) UL (ref 1.5–7.7)
NEUTROPHILS # BLD AUTO: 2.99 X10(3) UL (ref 1.5–7.7)
NEUTROPHILS NFR BLD AUTO: 58.2 %
NONHDLC SERPL-MCNC: 135 MG/DL (ref ?–130)
OSMOLALITY SERPL CALC.SUM OF ELEC: 291 MOSM/KG (ref 275–295)
PATIENT FASTING Y/N/NP: YES
PATIENT FASTING Y/N/NP: YES
PLATELET # BLD AUTO: 236 10(3)UL (ref 150–450)
POTASSIUM SERPL-SCNC: 4 MMOL/L (ref 3.5–5.1)
RBC # BLD AUTO: 3.78 X10(6)UL
SODIUM SERPL-SCNC: 140 MMOL/L (ref 136–145)
TRIGL SERPL-MCNC: 129 MG/DL (ref 30–149)
TSI SER-ACNC: 2.28 MIU/ML (ref 0.36–3.74)
VLDLC SERPL CALC-MCNC: 26 MG/DL (ref 0–30)
WBC # BLD AUTO: 5.1 X10(3) UL (ref 4–11)

## 2021-03-01 PROCEDURE — 80053 COMPREHEN METABOLIC PANEL: CPT

## 2021-03-01 PROCEDURE — 36415 COLL VENOUS BLD VENIPUNCTURE: CPT

## 2021-03-01 PROCEDURE — 80061 LIPID PANEL: CPT

## 2021-03-01 PROCEDURE — 85025 COMPLETE CBC W/AUTO DIFF WBC: CPT

## 2021-03-01 PROCEDURE — 84443 ASSAY THYROID STIM HORMONE: CPT

## 2021-03-01 PROCEDURE — 83036 HEMOGLOBIN GLYCOSYLATED A1C: CPT

## 2021-03-13 DIAGNOSIS — I95.9 HYPOTENSION, UNSPECIFIED HYPOTENSION TYPE: ICD-10-CM

## 2021-03-13 DIAGNOSIS — I48.0 PAROXYSMAL ATRIAL FIBRILLATION (CMD): ICD-10-CM

## 2021-03-15 RX ORDER — AMLODIPINE BESYLATE 2.5 MG/1
TABLET ORAL
Qty: 90 TABLET | Refills: 3 | Status: SHIPPED | OUTPATIENT
Start: 2021-03-15

## 2021-03-22 RX ORDER — DIGOXIN 125 MCG
TABLET ORAL
Qty: 90 TABLET | Refills: 3 | Status: SHIPPED | OUTPATIENT
Start: 2021-03-22

## 2021-06-01 ENCOUNTER — LAB ENCOUNTER (OUTPATIENT)
Dept: LAB | Facility: HOSPITAL | Age: 86
End: 2021-06-01
Attending: INTERNAL MEDICINE
Payer: MEDICARE

## 2021-06-01 DIAGNOSIS — Z11.59 SCREENING FOR VIRAL DISEASE: ICD-10-CM

## 2021-06-01 DIAGNOSIS — Z01.818 PREOP EXAMINATION: ICD-10-CM

## 2021-06-04 ENCOUNTER — OFFICE VISIT (OUTPATIENT)
Dept: SLEEP CENTER | Age: 86
End: 2021-06-04
Attending: INTERNAL MEDICINE
Payer: MEDICARE

## 2021-06-04 DIAGNOSIS — G47.33 OSA ON CPAP: ICD-10-CM

## 2021-06-04 DIAGNOSIS — Z99.89 OSA ON CPAP: ICD-10-CM

## 2021-06-04 DIAGNOSIS — I48.0 AF (PAROXYSMAL ATRIAL FIBRILLATION) (HCC): ICD-10-CM

## 2021-06-04 PROCEDURE — 95811 POLYSOM 6/>YRS CPAP 4/> PARM: CPT

## 2021-06-09 ENCOUNTER — OFFICE VISIT (OUTPATIENT)
Dept: CARDIOLOGY | Age: 86
End: 2021-06-09

## 2021-06-09 ENCOUNTER — ANCILLARY PROCEDURE (OUTPATIENT)
Dept: CARDIOLOGY | Age: 86
End: 2021-06-09
Attending: INTERNAL MEDICINE

## 2021-06-09 VITALS
BODY MASS INDEX: 25.01 KG/M2 | HEIGHT: 65 IN | HEART RATE: 80 BPM | DIASTOLIC BLOOD PRESSURE: 56 MMHG | SYSTOLIC BLOOD PRESSURE: 100 MMHG

## 2021-06-09 VITALS — SYSTOLIC BLOOD PRESSURE: 100 MMHG | DIASTOLIC BLOOD PRESSURE: 56 MMHG | HEART RATE: 80 BPM

## 2021-06-09 DIAGNOSIS — Z95.0 CARDIAC PACEMAKER IN SITU: ICD-10-CM

## 2021-06-09 DIAGNOSIS — Z95.0 PACEMAKER: Primary | ICD-10-CM

## 2021-06-09 PROCEDURE — 99213 OFFICE O/P EST LOW 20 MIN: CPT | Performed by: INTERNAL MEDICINE

## 2021-06-09 PROCEDURE — 93280 PM DEVICE PROGR EVAL DUAL: CPT | Performed by: INTERNAL MEDICINE

## 2021-06-09 PROCEDURE — 93296 REM INTERROG EVL PM/IDS: CPT | Performed by: INTERNAL MEDICINE

## 2021-06-09 RX ORDER — UREA 10 %
1 LOTION (ML) TOPICAL
COMMUNITY
Start: 2020-07-29

## 2021-08-03 NOTE — PROGRESS NOTES
Patient states she just does not feel right. Has a chronic history of constipation. Recently she complained of severe constipation she took her MiraLAX and now has runny stools. No blood in stool. No abdominal pain. She just feels very tired.   Patient What Type Of Note Output Would You Prefer (Optional)?: Standard Output Is The Patient Presenting As Previously Scheduled?: Yes How Severe Is Your Rash?: moderate Is This A New Presentation, Or A Follow-Up?: Rash Additional History: Possible poison ivy. Spreading and getting worse.

## 2021-08-09 ENCOUNTER — OFFICE VISIT (OUTPATIENT)
Dept: SURGERY | Facility: CLINIC | Age: 86
End: 2021-08-09
Payer: COMMERCIAL

## 2021-08-09 VITALS
SYSTOLIC BLOOD PRESSURE: 135 MMHG | WEIGHT: 154 LBS | HEIGHT: 64 IN | BODY MASS INDEX: 26.29 KG/M2 | DIASTOLIC BLOOD PRESSURE: 84 MMHG | TEMPERATURE: 98 F | HEART RATE: 80 BPM

## 2021-08-09 DIAGNOSIS — K42.9 UMBILICAL HERNIA WITHOUT OBSTRUCTION AND WITHOUT GANGRENE: ICD-10-CM

## 2021-08-09 DIAGNOSIS — Z95.0 PACEMAKER: ICD-10-CM

## 2021-08-09 DIAGNOSIS — N18.30 STAGE 3 CHRONIC KIDNEY DISEASE, UNSPECIFIED WHETHER STAGE 3A OR 3B CKD (HCC): Chronic | ICD-10-CM

## 2021-08-09 DIAGNOSIS — Z79.01 CURRENT USE OF LONG TERM ANTICOAGULATION: ICD-10-CM

## 2021-08-09 DIAGNOSIS — G40.909 SEIZURE DISORDER (HCC): ICD-10-CM

## 2021-08-09 DIAGNOSIS — I48.0 PAF (PAROXYSMAL ATRIAL FIBRILLATION) (HCC): ICD-10-CM

## 2021-08-09 DIAGNOSIS — J42 CHRONIC BRONCHITIS, UNSPECIFIED CHRONIC BRONCHITIS TYPE (HCC): ICD-10-CM

## 2021-08-09 DIAGNOSIS — I10 ESSENTIAL HYPERTENSION: ICD-10-CM

## 2021-08-09 DIAGNOSIS — K40.90 RIGHT INGUINAL HERNIA: Primary | ICD-10-CM

## 2021-08-09 DIAGNOSIS — J44.9 ASTHMA WITH COPD (CHRONIC OBSTRUCTIVE PULMONARY DISEASE) (HCC): Chronic | ICD-10-CM

## 2021-08-09 DIAGNOSIS — I27.20 PULMONARY HYPERTENSION (HCC): ICD-10-CM

## 2021-08-09 PROCEDURE — 99214 OFFICE O/P EST MOD 30 MIN: CPT | Performed by: COLON & RECTAL SURGERY

## 2021-08-09 PROCEDURE — 3075F SYST BP GE 130 - 139MM HG: CPT | Performed by: COLON & RECTAL SURGERY

## 2021-08-09 PROCEDURE — 3079F DIAST BP 80-89 MM HG: CPT | Performed by: COLON & RECTAL SURGERY

## 2021-08-09 PROCEDURE — 3008F BODY MASS INDEX DOCD: CPT | Performed by: COLON & RECTAL SURGERY

## 2021-08-09 NOTE — PROGRESS NOTES
Follow Up Visit Note       Active Problems      1. Right inguinal hernia    2. Umbilical hernia without obstruction and without gangrene    3. Seizure disorder ([facility] [address]    4. Essential hypertension    5. PAF (paroxysmal atrial fibrillation) ([facility] [address]    6.  Chroni spent with this patient on today's visit was 30 minutes.   This includes time in preparation, obtaining and reviewing history, performing the examination, counseling and education, ordering tests, referring and communicating with other healthcare profession Colonoscopy (10/12) recheck - SLinus Rogers Maty 5/5/2011   • S/P laminectomy 12/23/2013   • Seizure disorder [facility])    • Sleep apnea     cpap   • Spinal stenosis of lumbar region without neurogenic claudication 8/8/2018   • Visual impairment      Past Surgical Hist Drug use: No    Other Topics      Concerns:        Caffeine Concern: No        Exercise: Yes          walks dog 2 x a day       Current Outpatient Medications:   •  GABAPENTIN 300 MG Oral Cap, TAKE ONE CAPSULE BY MOUTH TWICE DAILY, Disp: 60 capsule, Rfl: 0 in stool, constipation, diarrhea, nausea and vomiting. Genitourinary: Negative for difficulty urinating, dysuria, frequency and urgency. Musculoskeletal: Negative for arthralgias and myalgias. Skin: Negative for color change and rash.    Neurological: patient to return in 6 to 8 months for further evaluation and treatment. The patient has multiple associated comorbidities.   These include COPD with asthma, chronic bronchitis, chronic anticoagulation, pacemaker status, chronic kidney disease, paroxysma

## 2021-08-09 NOTE — PATIENT INSTRUCTIONS
This patient presents for further care and treatment regarding 2 separate hernias. One is at the umbilicus, the other is in the right inguinal region. They have been present for many years.     I evaluated the patient in January and identified both hernia call my office at any time. I will see the patient in February 2022, for further care and treatment.

## 2021-10-20 ENCOUNTER — IMMUNIZATION (OUTPATIENT)
Dept: LAB | Facility: HOSPITAL | Age: 86
End: 2021-10-20
Attending: EMERGENCY MEDICINE
Payer: MEDICARE

## 2021-10-20 DIAGNOSIS — Z23 NEED FOR VACCINATION: Primary | ICD-10-CM

## 2021-10-20 PROCEDURE — 0003A SARSCOV2 VAC 30MCG/0.3ML IM: CPT

## 2021-10-25 ENCOUNTER — APPOINTMENT (OUTPATIENT)
Dept: OPHTHALMOLOGY | Age: 86
End: 2021-10-25

## 2021-11-03 ENCOUNTER — LAB ENCOUNTER (OUTPATIENT)
Dept: LAB | Facility: HOSPITAL | Age: 86
End: 2021-11-03
Attending: INTERNAL MEDICINE
Payer: MEDICARE

## 2021-11-03 DIAGNOSIS — D64.9 ANEMIA, UNSPECIFIED TYPE: ICD-10-CM

## 2021-11-03 DIAGNOSIS — R73.9 BLOOD GLUCOSE ELEVATED: ICD-10-CM

## 2021-11-03 DIAGNOSIS — Z00.00 ROUTINE GENERAL MEDICAL EXAMINATION AT A HEALTH CARE FACILITY: ICD-10-CM

## 2021-11-03 DIAGNOSIS — E55.9 VITAMIN D DEFICIENCY: ICD-10-CM

## 2021-11-03 DIAGNOSIS — E03.9 PRIMARY HYPOTHYROIDISM: ICD-10-CM

## 2021-11-03 PROCEDURE — 80053 COMPREHEN METABOLIC PANEL: CPT

## 2021-11-03 PROCEDURE — 84443 ASSAY THYROID STIM HORMONE: CPT

## 2021-11-03 PROCEDURE — 36415 COLL VENOUS BLD VENIPUNCTURE: CPT

## 2021-11-03 PROCEDURE — 82306 VITAMIN D 25 HYDROXY: CPT

## 2021-11-03 PROCEDURE — 85025 COMPLETE CBC W/AUTO DIFF WBC: CPT

## 2021-12-07 ENCOUNTER — OFFICE VISIT (OUTPATIENT)
Dept: OPHTHALMOLOGY | Age: 86
End: 2021-12-07

## 2021-12-07 DIAGNOSIS — H26.492 POSTERIOR CAPSULAR OPACIFICATION OF LEFT EYE, OBSCURING VISION: ICD-10-CM

## 2021-12-07 DIAGNOSIS — H35.3233 EXUDATIVE AGE-RELATED MACULAR DEGENERATION OF BOTH EYES WITH INACTIVE SCAR (CMD): Primary | ICD-10-CM

## 2021-12-07 DIAGNOSIS — H04.123 DRY EYES: ICD-10-CM

## 2021-12-07 DIAGNOSIS — H40.013 OPEN ANGLE WITH BORDERLINE FINDINGS AND LOW GLAUCOMA RISK IN BOTH EYES: ICD-10-CM

## 2021-12-07 PROCEDURE — 99214 OFFICE O/P EST MOD 30 MIN: CPT | Performed by: OPHTHALMOLOGY

## 2021-12-07 PROCEDURE — 92133 CPTRZD OPH DX IMG PST SGM ON: CPT | Performed by: OPHTHALMOLOGY

## 2022-02-10 ENCOUNTER — OFFICE VISIT (OUTPATIENT)
Dept: SURGERY | Facility: CLINIC | Age: 87
End: 2022-02-10
Payer: COMMERCIAL

## 2022-02-10 VITALS — TEMPERATURE: 97 F | SYSTOLIC BLOOD PRESSURE: 113 MMHG | DIASTOLIC BLOOD PRESSURE: 75 MMHG | HEART RATE: 92 BPM

## 2022-02-10 DIAGNOSIS — K42.9 UMBILICAL HERNIA WITHOUT OBSTRUCTION AND WITHOUT GANGRENE: ICD-10-CM

## 2022-02-10 DIAGNOSIS — Z79.01 CURRENT USE OF LONG TERM ANTICOAGULATION: ICD-10-CM

## 2022-02-10 DIAGNOSIS — N18.30 STAGE 3 CHRONIC KIDNEY DISEASE, UNSPECIFIED WHETHER STAGE 3A OR 3B CKD (HCC): Chronic | ICD-10-CM

## 2022-02-10 DIAGNOSIS — J44.9 ASTHMA WITH COPD (CHRONIC OBSTRUCTIVE PULMONARY DISEASE) (HCC): Chronic | ICD-10-CM

## 2022-02-10 DIAGNOSIS — G47.33 OSA ON CPAP: ICD-10-CM

## 2022-02-10 DIAGNOSIS — I27.20 PULMONARY HYPERTENSION (HCC): ICD-10-CM

## 2022-02-10 DIAGNOSIS — Z99.89 OSA ON CPAP: ICD-10-CM

## 2022-02-10 DIAGNOSIS — K40.90 RIGHT INGUINAL HERNIA: Primary | ICD-10-CM

## 2022-02-10 DIAGNOSIS — I48.0 PAF (PAROXYSMAL ATRIAL FIBRILLATION) (HCC): ICD-10-CM

## 2022-02-10 PROCEDURE — 99213 OFFICE O/P EST LOW 20 MIN: CPT | Performed by: COLON & RECTAL SURGERY

## 2022-02-10 PROCEDURE — 3078F DIAST BP <80 MM HG: CPT | Performed by: COLON & RECTAL SURGERY

## 2022-02-10 PROCEDURE — 3074F SYST BP LT 130 MM HG: CPT | Performed by: COLON & RECTAL SURGERY

## 2022-04-26 ENCOUNTER — IMMUNIZATION (OUTPATIENT)
Dept: LAB | Age: 87
End: 2022-04-26
Attending: EMERGENCY MEDICINE
Payer: MEDICARE

## 2022-04-26 DIAGNOSIS — Z23 NEED FOR VACCINATION: Primary | ICD-10-CM

## 2022-04-26 PROCEDURE — 0054A SARSCOV2 VAC 30MCG TRS SUCR: CPT

## 2022-07-30 ENCOUNTER — LAB ENCOUNTER (OUTPATIENT)
Dept: LAB | Facility: HOSPITAL | Age: 87
End: 2022-07-30
Attending: INTERNAL MEDICINE
Payer: MEDICARE

## 2022-07-30 DIAGNOSIS — R73.9 HYPERGLYCEMIA: ICD-10-CM

## 2022-07-30 DIAGNOSIS — E03.9 PRIMARY HYPOTHYROIDISM: ICD-10-CM

## 2022-07-30 DIAGNOSIS — E78.2 MIXED HYPERLIPIDEMIA: ICD-10-CM

## 2022-07-30 LAB
ALBUMIN SERPL-MCNC: 3.4 G/DL (ref 3.4–5)
ALBUMIN/GLOB SERPL: 1 {RATIO} (ref 1–2)
ALP LIVER SERPL-CCNC: 64 U/L
ALT SERPL-CCNC: 27 U/L
ANION GAP SERPL CALC-SCNC: 6 MMOL/L (ref 0–18)
AST SERPL-CCNC: 27 U/L (ref 15–37)
BASOPHILS # BLD AUTO: 0.04 X10(3) UL (ref 0–0.2)
BASOPHILS NFR BLD AUTO: 0.7 %
BILIRUB SERPL-MCNC: 0.4 MG/DL (ref 0.1–2)
BUN BLD-MCNC: 20 MG/DL (ref 7–18)
CALCIUM BLD-MCNC: 9.1 MG/DL (ref 8.5–10.1)
CHLORIDE SERPL-SCNC: 105 MMOL/L (ref 98–112)
CHOLEST SERPL-MCNC: 141 MG/DL (ref ?–200)
CO2 SERPL-SCNC: 26 MMOL/L (ref 21–32)
CREAT BLD-MCNC: 0.89 MG/DL
EOSINOPHIL # BLD AUTO: 0.15 X10(3) UL (ref 0–0.7)
EOSINOPHIL NFR BLD AUTO: 2.8 %
ERYTHROCYTE [DISTWIDTH] IN BLOOD BY AUTOMATED COUNT: 12.3 %
EST. AVERAGE GLUCOSE BLD GHB EST-MCNC: 126 MG/DL (ref 68–126)
FASTING PATIENT LIPID ANSWER: YES
FASTING STATUS PATIENT QL REPORTED: YES
GLOBULIN PLAS-MCNC: 3.3 G/DL (ref 2.8–4.4)
GLUCOSE BLD-MCNC: 90 MG/DL (ref 70–99)
HBA1C MFR BLD: 6 % (ref ?–5.7)
HCT VFR BLD AUTO: 38.3 %
HDLC SERPL-MCNC: 52 MG/DL (ref 40–59)
HGB BLD-MCNC: 12.7 G/DL
IMM GRANULOCYTES # BLD AUTO: 0.02 X10(3) UL (ref 0–1)
IMM GRANULOCYTES NFR BLD: 0.4 %
LDLC SERPL CALC-MCNC: 69 MG/DL (ref ?–100)
LYMPHOCYTES # BLD AUTO: 1.07 X10(3) UL (ref 1–4)
LYMPHOCYTES NFR BLD AUTO: 19.6 %
MCH RBC QN AUTO: 35.3 PG (ref 26–34)
MCHC RBC AUTO-ENTMCNC: 33.2 G/DL (ref 31–37)
MCV RBC AUTO: 106.4 FL
MONOCYTES # BLD AUTO: 0.59 X10(3) UL (ref 0.1–1)
MONOCYTES NFR BLD AUTO: 10.8 %
NEUTROPHILS # BLD AUTO: 3.58 X10 (3) UL (ref 1.5–7.7)
NEUTROPHILS # BLD AUTO: 3.58 X10(3) UL (ref 1.5–7.7)
NEUTROPHILS NFR BLD AUTO: 65.7 %
NONHDLC SERPL-MCNC: 89 MG/DL (ref ?–130)
OSMOLALITY SERPL CALC.SUM OF ELEC: 286 MOSM/KG (ref 275–295)
PLATELET # BLD AUTO: 223 10(3)UL (ref 150–450)
POTASSIUM SERPL-SCNC: 4 MMOL/L (ref 3.5–5.1)
PROT SERPL-MCNC: 6.7 G/DL (ref 6.4–8.2)
RBC # BLD AUTO: 3.6 X10(6)UL
SODIUM SERPL-SCNC: 137 MMOL/L (ref 136–145)
TRIGL SERPL-MCNC: 109 MG/DL (ref 30–149)
TSI SER-ACNC: 2.02 MIU/ML (ref 0.36–3.74)
VLDLC SERPL CALC-MCNC: 17 MG/DL (ref 0–30)
WBC # BLD AUTO: 5.5 X10(3) UL (ref 4–11)

## 2022-07-30 PROCEDURE — 84443 ASSAY THYROID STIM HORMONE: CPT

## 2022-07-30 PROCEDURE — 36415 COLL VENOUS BLD VENIPUNCTURE: CPT

## 2022-07-30 PROCEDURE — 85025 COMPLETE CBC W/AUTO DIFF WBC: CPT

## 2022-07-30 PROCEDURE — 80053 COMPREHEN METABOLIC PANEL: CPT

## 2022-07-30 PROCEDURE — 80061 LIPID PANEL: CPT

## 2022-07-30 PROCEDURE — 83036 HEMOGLOBIN GLYCOSYLATED A1C: CPT

## 2022-08-11 ENCOUNTER — OFFICE VISIT (OUTPATIENT)
Facility: LOCATION | Age: 87
End: 2022-08-11
Payer: COMMERCIAL

## 2022-08-11 VITALS — HEART RATE: 92 BPM | TEMPERATURE: 98 F

## 2022-08-11 DIAGNOSIS — K42.9 UMBILICAL HERNIA WITHOUT OBSTRUCTION AND WITHOUT GANGRENE: ICD-10-CM

## 2022-08-11 DIAGNOSIS — K40.90 RIGHT INGUINAL HERNIA: Primary | ICD-10-CM

## 2022-08-11 PROCEDURE — 99213 OFFICE O/P EST LOW 20 MIN: CPT | Performed by: COLON & RECTAL SURGERY

## 2022-08-11 NOTE — PATIENT INSTRUCTIONS
This patient has 2 chronic hernias. One at the umbilicus, one in the right groin region. We are monitoring her every 6 months to make sure that they are uncomplicated. This patient has significant comorbidities and advanced age that would make surgery for her extremely risky. She has atrial fibrillation, pacemaker, seizure disorder, obstructive sleep apnea, and spinal stenosis. She has impaired ambulation. She is marginally ambulatory with a walker. She has no nausea or vomiting. She has no bowel disturbances from the hernias themselves. She feels the hernias are stable. Currently she has no symptoms from either hernia. She has not had any evidence of incarceration events at either site of the right groin or umbilicus. Clinical exam reveals her abdomen to be soft, nontender, nondistended, good bowel sounds. No guarding or rebound. No masses. No ascites. She has a small 2 cm hernia at the umbilicus, she has a large 12 cm hernia in the right groin. The right groin hernia is reducible. The umbilical hernia is reducible. Neither 1 is tender. The remaining abdominal exam is unremarkable. We will continue to monitor this patient and pursue nonoperative expectant therapy. This patient should report to me urgently for any incarceration events, advancing pain or symptoms, or sudden advancement in the size of either of the 2 hernias.

## 2022-10-04 ENCOUNTER — TELEPHONE (OUTPATIENT)
Facility: LOCATION | Age: 87
End: 2022-10-04

## 2022-10-04 NOTE — TELEPHONE ENCOUNTER
Pt is c/o hernia pain. She was added on to Dr Ramon Cornea schedule of 11/21.  She would like sooner appt and would like to speak to a RN re: symptoms

## 2022-10-05 NOTE — TELEPHONE ENCOUNTER
Called pt and she is confused. Asked about hernia pain and pt states it is ok, \"just there', denies nausea or emesis and is going to bathroom fine. Pt wants us to call son. Hernia has been popping out constantly and pt lies down and it goes back in.  Son is concerned that hernia is worse, appt made 10/27

## 2022-10-27 ENCOUNTER — OFFICE VISIT (OUTPATIENT)
Facility: LOCATION | Age: 87
End: 2022-10-27
Payer: COMMERCIAL

## 2022-10-27 VITALS — HEART RATE: 79 BPM | TEMPERATURE: 97 F

## 2022-10-27 DIAGNOSIS — Z79.01 CURRENT USE OF LONG TERM ANTICOAGULATION: ICD-10-CM

## 2022-10-27 DIAGNOSIS — I27.20 PULMONARY HYPERTENSION (HCC): ICD-10-CM

## 2022-10-27 DIAGNOSIS — Z99.89 OSA ON CPAP: ICD-10-CM

## 2022-10-27 DIAGNOSIS — J44.9 ASTHMA WITH COPD (CHRONIC OBSTRUCTIVE PULMONARY DISEASE) (HCC): ICD-10-CM

## 2022-10-27 DIAGNOSIS — I48.0 PAF (PAROXYSMAL ATRIAL FIBRILLATION) (HCC): ICD-10-CM

## 2022-10-27 DIAGNOSIS — K42.9 UMBILICAL HERNIA WITHOUT OBSTRUCTION AND WITHOUT GANGRENE: ICD-10-CM

## 2022-10-27 DIAGNOSIS — G40.909 SEIZURE DISORDER (HCC): ICD-10-CM

## 2022-10-27 DIAGNOSIS — N18.30 STAGE 3 CHRONIC KIDNEY DISEASE, UNSPECIFIED WHETHER STAGE 3A OR 3B CKD (HCC): ICD-10-CM

## 2022-10-27 DIAGNOSIS — G47.33 OSA ON CPAP: ICD-10-CM

## 2022-10-27 DIAGNOSIS — K40.90 RIGHT INGUINAL HERNIA: Primary | ICD-10-CM

## 2022-10-27 PROCEDURE — 99213 OFFICE O/P EST LOW 20 MIN: CPT | Performed by: COLON & RECTAL SURGERY

## 2022-10-27 NOTE — PATIENT INSTRUCTIONS
This patient has 2 chronic hernias. One at the umbilicus, one in the right groin region. We are monitoring her every 6 months to make sure that they are uncomplicated. This patient has significant comorbidities and advanced age that would make surgery for her extremely risky. She has atrial fibrillation, pacemaker, seizure disorder, obstructive sleep apnea, and spinal stenosis. She has impaired ambulation. She is marginally ambulatory with a walker. The patient states her right inguinal hernia has been popping out more often. She states this happens on an almost daily basis, and requires manual reduction. She denies any strangulation events. She states it does not hurt hurt, but is occasionally uncomfortable. She denies an increase in size of her hernia. She denies associated nausea or vomiting. She denies a change in bowel habits. Clinical exam reveals a large right inguinal hernia. It has increased in size since the time of her previous exam.  I am able to feel small intestine within the hernia sac. The hernia is easily reducible. She has a stable umbilical hernia. The patient would like to avoid surgery at this time. I explained to the patient that if her hernia ever becomes incarcerated, she will need to notify us immediately. Her hernia will need to be reduced within 6 hours. I recommend whenever the patient lays down, she reduces her hernia, to help avoid any strangulation events. The patient and her son expressed understanding with the above plan. All questions and concerns were addressed. Patient will follow up with me in March 2023.

## 2023-01-20 ENCOUNTER — HOSPITAL ENCOUNTER (OUTPATIENT)
Dept: GENERAL RADIOLOGY | Facility: HOSPITAL | Age: 88
Discharge: HOME | End: 2023-01-20
Payer: MEDICARE

## 2023-01-20 DIAGNOSIS — R10.30 LOWER ABDOMINAL PAIN: ICD-10-CM

## 2023-01-20 DIAGNOSIS — K59.00 CONSTIPATION, UNSPECIFIED CONSTIPATION TYPE: ICD-10-CM

## 2023-01-20 PROCEDURE — 74018 RADEX ABDOMEN 1 VIEW: CPT

## 2023-02-01 ENCOUNTER — HOSPITAL ENCOUNTER (OUTPATIENT)
Dept: CT IMAGING | Facility: HOSPITAL | Age: 88
Discharge: HOME | End: 2023-02-01
Payer: MEDICARE

## 2023-02-01 DIAGNOSIS — R10.30 LOWER ABDOMINAL PAIN: ICD-10-CM

## 2023-02-01 PROCEDURE — 74176 CT ABD & PELVIS W/O CONTRAST: CPT

## 2023-02-06 NOTE — PROGRESS NOTES
I reviewed the test results and dicussed with the patient's son,Cortes over the phone. There was large amount of stool in the colon. He was instructed to continue Miralax daily and to monitor lower abdominal pain. There was also an right inguinal hernia containing a loop of small bowel and fat, but without appearance of strangulation, gangrene, or bowel obstruction. The patient son said that this has been followed by Dr Chuyita Tracy.

## 2023-02-08 ENCOUNTER — LAB ENCOUNTER (OUTPATIENT)
Dept: LAB | Age: 88
End: 2023-02-08
Payer: MEDICARE

## 2023-02-08 DIAGNOSIS — R13.19 ESOPHAGEAL DYSPHAGIA: ICD-10-CM

## 2023-02-09 LAB — SARS-COV-2 RNA RESP QL NAA+PROBE: NOT DETECTED

## 2023-02-11 ENCOUNTER — HOSPITAL ENCOUNTER (OUTPATIENT)
Dept: GENERAL RADIOLOGY | Facility: HOSPITAL | Age: 88
Discharge: HOME | End: 2023-02-11
Payer: MEDICARE

## 2023-02-11 DIAGNOSIS — R13.19 ESOPHAGEAL DYSPHAGIA: ICD-10-CM

## 2023-02-11 PROCEDURE — 74240 X-RAY XM UPR GI TRC 1CNTRST: CPT

## 2023-03-13 ENCOUNTER — OFFICE VISIT (OUTPATIENT)
Facility: LOCATION | Age: 88
End: 2023-03-13
Payer: MEDICARE

## 2023-03-13 DIAGNOSIS — K42.9 UMBILICAL HERNIA WITHOUT OBSTRUCTION AND WITHOUT GANGRENE: ICD-10-CM

## 2023-03-13 DIAGNOSIS — K40.90 RIGHT INGUINAL HERNIA: Primary | ICD-10-CM

## 2023-03-13 PROCEDURE — 99214 OFFICE O/P EST MOD 30 MIN: CPT | Performed by: COLON & RECTAL SURGERY

## 2023-03-14 ENCOUNTER — OFFICE VISIT (OUTPATIENT)
Facility: CLINIC | Age: 88
End: 2023-03-14
Payer: MEDICARE

## 2023-03-14 VITALS
DIASTOLIC BLOOD PRESSURE: 72 MMHG | SYSTOLIC BLOOD PRESSURE: 120 MMHG | HEART RATE: 77 BPM | BODY MASS INDEX: 23 KG/M2 | OXYGEN SATURATION: 96 % | HEIGHT: 66 IN

## 2023-03-14 DIAGNOSIS — Z99.89 OSA ON CPAP: Primary | ICD-10-CM

## 2023-03-14 DIAGNOSIS — G47.33 OSA ON CPAP: Primary | ICD-10-CM

## 2023-03-14 DIAGNOSIS — G47.09 OTHER INSOMNIA: ICD-10-CM

## 2023-03-14 PROCEDURE — 99214 OFFICE O/P EST MOD 30 MIN: CPT | Performed by: OTHER

## 2023-03-14 PROCEDURE — 3074F SYST BP LT 130 MM HG: CPT | Performed by: OTHER

## 2023-03-14 PROCEDURE — 3078F DIAST BP <80 MM HG: CPT | Performed by: OTHER

## 2023-03-15 NOTE — PATIENT INSTRUCTIONS
This patient presents for further care and treatment regarding her very large right inguinal hernia and her smaller but chronic umbilical hernia. Her presenting history as listed below: This patient has 2 chronic hernias. One at the umbilicus, one in the right groin region. We are monitoring her every 6 months to make sure that they are uncomplicated. This patient has significant comorbidities and advanced age that would make surgery for her extremely risky. She has atrial fibrillation, pacemaker, seizure disorder, obstructive sleep apnea, and spinal stenosis. She has impaired ambulation. She is marginally ambulatory with a walker. The patient states her right inguinal hernia has been popping out more often. She states this happens on an almost daily basis, and requires manual reduction. She denies any strangulation events. She states it does not hurt hurt, but is occasionally uncomfortable. She denies an increase in size of her hernia. She denies associated nausea or vomiting. She denies a change in bowel habits. The patient states there are no changes in her hernia since her last visit. She has no abdominal pain. She does feel the lump. It does go in and out in the groin. She has some chronic constipation. She has had no history of ever having nausea or vomiting, abdominal pain, or other issues with either of the umbilical or the inguinal hernia present. Current exam reveals the right inguinal hernia to remain fairly stable. It may be just slightly bigger than previous exam.  It is nontender. It is easily reducible. It reduces when she lays recumbent. It is approximately 8 cm in greatest dimension within the sac. The umbilical hernia is approximately 12 mm in greatest dimension. It is also nontender, it is nonreducible. Bowel sounds have normal activity normal pitch. There is no guarding or rebound. There is no evidence of ascites.   There is no left inguinal hernia present. Both hernias remained fairly stable and asymptomatic other than the bulge in the right groin. We will continue to follow her clinically. I would only recommend operation if she presents urgently with an obstruction or incarceration event. She has multiple medical problems as listed above that preclude her as a good candidate for elective surgery that could further impair her ambulation and require general anesthesia.

## 2023-04-27 PROBLEM — E46 PROTEIN-CALORIE MALNUTRITION, UNSPECIFIED SEVERITY (HCC): Status: ACTIVE | Noted: 2023-04-27

## 2023-04-27 PROBLEM — F03.90 DEMENTIA WITHOUT BEHAVIORAL DISTURBANCE (HCC): Status: ACTIVE | Noted: 2023-04-27

## 2023-04-27 PROBLEM — K40.90 INGUINAL HERNIA OF RIGHT SIDE WITHOUT OBSTRUCTION OR GANGRENE: Status: ACTIVE | Noted: 2021-01-26

## 2023-08-29 ENCOUNTER — OFFICE VISIT (OUTPATIENT)
Facility: LOCATION | Age: 88
End: 2023-08-29
Payer: MEDICARE

## 2023-08-29 DIAGNOSIS — K40.90 INGUINAL HERNIA OF RIGHT SIDE WITHOUT OBSTRUCTION OR GANGRENE: Primary | ICD-10-CM

## 2023-08-29 PROCEDURE — 1160F RVW MEDS BY RX/DR IN RCRD: CPT | Performed by: SURGERY

## 2023-08-29 PROCEDURE — 1159F MED LIST DOCD IN RCRD: CPT | Performed by: SURGERY

## 2023-08-29 PROCEDURE — 99204 OFFICE O/P NEW MOD 45 MIN: CPT | Performed by: SURGERY

## 2023-09-04 ENCOUNTER — APPOINTMENT (OUTPATIENT)
Dept: GENERAL RADIOLOGY | Facility: HOSPITAL | Age: 88
End: 2023-09-04
Attending: EMERGENCY MEDICINE
Payer: MEDICARE

## 2023-09-04 ENCOUNTER — HOSPITAL ENCOUNTER (EMERGENCY)
Facility: HOSPITAL | Age: 88
Discharge: HOME | End: 2023-09-04
Attending: EMERGENCY MEDICINE
Payer: MEDICARE

## 2023-09-04 VITALS
DIASTOLIC BLOOD PRESSURE: 84 MMHG | OXYGEN SATURATION: 97 % | SYSTOLIC BLOOD PRESSURE: 147 MMHG | TEMPERATURE: 98 F | RESPIRATION RATE: 19 BRPM | HEART RATE: 73 BPM

## 2023-09-04 DIAGNOSIS — R55 SYNCOPE, UNSPECIFIED SYNCOPE TYPE: Primary | ICD-10-CM

## 2023-09-04 LAB
ALBUMIN SERPL-MCNC: 3.2 G/DL (ref 3.4–5)
ALBUMIN/GLOB SERPL: 1 {RATIO} (ref 1–2)
ALP LIVER SERPL-CCNC: 62 U/L
ALT SERPL-CCNC: 30 U/L
ANION GAP SERPL CALC-SCNC: 8 MMOL/L (ref 0–18)
AST SERPL-CCNC: 36 U/L (ref 15–37)
BASOPHILS # BLD AUTO: 0.03 X10(3) UL (ref 0–0.2)
BASOPHILS NFR BLD AUTO: 0.6 %
BILIRUB SERPL-MCNC: 0.5 MG/DL (ref 0.1–2)
BILIRUB UR QL STRIP.AUTO: NEGATIVE
BUN BLD-MCNC: 20 MG/DL (ref 7–18)
CALCIUM BLD-MCNC: 8.7 MG/DL (ref 8.5–10.1)
CHLORIDE SERPL-SCNC: 102 MMOL/L (ref 98–112)
CLARITY UR REFRACT.AUTO: CLEAR
CO2 SERPL-SCNC: 26 MMOL/L (ref 21–32)
COLOR UR AUTO: YELLOW
CREAT BLD-MCNC: 1.34 MG/DL
EGFRCR SERPLBLD CKD-EPI 2021: 37 ML/MIN/1.73M2 (ref 60–?)
EOSINOPHIL # BLD AUTO: 0.07 X10(3) UL (ref 0–0.7)
EOSINOPHIL NFR BLD AUTO: 1.3 %
ERYTHROCYTE [DISTWIDTH] IN BLOOD BY AUTOMATED COUNT: 11.8 %
GLOBULIN PLAS-MCNC: 3.3 G/DL (ref 2.8–4.4)
GLUCOSE BLD-MCNC: 149 MG/DL (ref 70–99)
GLUCOSE UR STRIP.AUTO-MCNC: NORMAL MG/DL
HCT VFR BLD AUTO: 38 %
HGB BLD-MCNC: 12.8 G/DL
HYALINE CASTS #/AREA URNS AUTO: PRESENT /LPF
IMM GRANULOCYTES # BLD AUTO: 0.03 X10(3) UL (ref 0–1)
IMM GRANULOCYTES NFR BLD: 0.6 %
KETONES UR STRIP.AUTO-MCNC: NEGATIVE MG/DL
LEUKOCYTE ESTERASE UR QL STRIP.AUTO: 25
LYMPHOCYTES # BLD AUTO: 1.06 X10(3) UL (ref 1–4)
LYMPHOCYTES NFR BLD AUTO: 19.9 %
MCH RBC QN AUTO: 34.7 PG (ref 26–34)
MCHC RBC AUTO-ENTMCNC: 33.7 G/DL (ref 31–37)
MCV RBC AUTO: 103 FL
MONOCYTES # BLD AUTO: 0.64 X10(3) UL (ref 0.1–1)
MONOCYTES NFR BLD AUTO: 12 %
NEUTROPHILS # BLD AUTO: 3.51 X10 (3) UL (ref 1.5–7.7)
NEUTROPHILS # BLD AUTO: 3.51 X10(3) UL (ref 1.5–7.7)
NEUTROPHILS NFR BLD AUTO: 65.6 %
NITRITE UR QL STRIP.AUTO: NEGATIVE
OSMOLALITY SERPL CALC.SUM OF ELEC: 287 MOSM/KG (ref 275–295)
PH UR STRIP.AUTO: 6 [PH] (ref 5–8)
PLATELET # BLD AUTO: 216 10(3)UL (ref 150–450)
POTASSIUM SERPL-SCNC: 4 MMOL/L (ref 3.5–5.1)
PROT SERPL-MCNC: 6.5 G/DL (ref 6.4–8.2)
RBC # BLD AUTO: 3.69 X10(6)UL
RBC UR QL AUTO: NEGATIVE
SARS-COV-2 RNA RESP QL NAA+PROBE: NOT DETECTED
SODIUM SERPL-SCNC: 136 MMOL/L (ref 136–145)
SP GR UR STRIP.AUTO: 1.02 (ref 1–1.03)
TROPONIN I HIGH SENSITIVITY: 12 NG/L
UROBILINOGEN UR STRIP.AUTO-MCNC: NORMAL MG/DL
WBC # BLD AUTO: 5.3 X10(3) UL (ref 4–11)

## 2023-09-04 PROCEDURE — 84484 ASSAY OF TROPONIN QUANT: CPT | Performed by: EMERGENCY MEDICINE

## 2023-09-04 PROCEDURE — 99285 EMERGENCY DEPT VISIT HI MDM: CPT

## 2023-09-04 PROCEDURE — 85025 COMPLETE CBC W/AUTO DIFF WBC: CPT | Performed by: EMERGENCY MEDICINE

## 2023-09-04 PROCEDURE — 80053 COMPREHEN METABOLIC PANEL: CPT | Performed by: EMERGENCY MEDICINE

## 2023-09-04 PROCEDURE — 85025 COMPLETE CBC W/AUTO DIFF WBC: CPT

## 2023-09-04 PROCEDURE — 87086 URINE CULTURE/COLONY COUNT: CPT | Performed by: EMERGENCY MEDICINE

## 2023-09-04 PROCEDURE — 80053 COMPREHEN METABOLIC PANEL: CPT

## 2023-09-04 PROCEDURE — 96361 HYDRATE IV INFUSION ADD-ON: CPT

## 2023-09-04 PROCEDURE — 93010 ELECTROCARDIOGRAM REPORT: CPT

## 2023-09-04 PROCEDURE — 93005 ELECTROCARDIOGRAM TRACING: CPT

## 2023-09-04 PROCEDURE — 81001 URINALYSIS AUTO W/SCOPE: CPT | Performed by: EMERGENCY MEDICINE

## 2023-09-04 PROCEDURE — 71045 X-RAY EXAM CHEST 1 VIEW: CPT | Performed by: EMERGENCY MEDICINE

## 2023-09-04 PROCEDURE — 96360 HYDRATION IV INFUSION INIT: CPT

## 2023-09-05 LAB
Q-T INTERVAL: 388 MS
QRS DURATION: 80 MS
QTC CALCULATION (BEZET): 424 MS
R AXIS: -25 DEGREES
T AXIS: -78 DEGREES
VENTRICULAR RATE: 72 BPM

## 2023-09-05 NOTE — DISCHARGE INSTRUCTIONS
Follow-up with your primary care doctor within the next 2 to 3 days for reassessment. Return for any fevers, severe pain, weakness or any other concerning symptoms.

## 2023-09-05 NOTE — ED QUICK NOTES
Pt ambulated down the hallway (about 3-4 ft ) and back to room with walker. RN beside pt. Pt tolerated well. No distress and denies dizziness/light headed.

## 2023-12-06 ENCOUNTER — IMMUNIZATION (OUTPATIENT)
Dept: LAB | Age: 88
End: 2023-12-06
Attending: EMERGENCY MEDICINE
Payer: MEDICARE

## 2023-12-06 DIAGNOSIS — Z23 NEED FOR VACCINATION: Primary | ICD-10-CM

## 2023-12-06 PROCEDURE — 90480 ADMN SARSCOV2 VAC 1/ONLY CMP: CPT

## 2023-12-20 ENCOUNTER — HOSPITAL ENCOUNTER (EMERGENCY)
Facility: HOSPITAL | Age: 88
Discharge: NURSING, REHAB OR LONG-TERM CARE | End: 2023-12-20
Attending: EMERGENCY MEDICINE
Payer: MEDICARE

## 2023-12-20 ENCOUNTER — APPOINTMENT (OUTPATIENT)
Dept: CT IMAGING | Facility: HOSPITAL | Age: 88
End: 2023-12-20
Attending: EMERGENCY MEDICINE
Payer: MEDICARE

## 2023-12-20 ENCOUNTER — HOSPITAL ENCOUNTER (EMERGENCY)
Facility: HOSPITAL | Age: 88
Discharge: HOME | End: 2023-12-20
Attending: EMERGENCY MEDICINE
Payer: MEDICARE

## 2023-12-20 VITALS
HEART RATE: 69 BPM | OXYGEN SATURATION: 97 % | WEIGHT: 127.88 LBS | DIASTOLIC BLOOD PRESSURE: 74 MMHG | TEMPERATURE: 98 F | SYSTOLIC BLOOD PRESSURE: 160 MMHG | BODY MASS INDEX: 21 KG/M2 | RESPIRATION RATE: 20 BRPM

## 2023-12-20 DIAGNOSIS — R41.0 CONFUSION: ICD-10-CM

## 2023-12-20 DIAGNOSIS — E86.1 HYPOTENSION DUE TO HYPOVOLEMIA: Primary | ICD-10-CM

## 2023-12-20 LAB
ALBUMIN SERPL-MCNC: 3.3 G/DL (ref 3.4–5)
ALBUMIN/GLOB SERPL: 1.1 {RATIO} (ref 1–2)
ALP LIVER SERPL-CCNC: 73 U/L
ALT SERPL-CCNC: 30 U/L
ANION GAP SERPL CALC-SCNC: 5 MMOL/L (ref 0–18)
AST SERPL-CCNC: 31 U/L (ref 15–37)
BASOPHILS # BLD AUTO: 0.06 X10(3) UL (ref 0–0.2)
BASOPHILS NFR BLD AUTO: 1.1 %
BILIRUB SERPL-MCNC: 0.4 MG/DL (ref 0.1–2)
BILIRUB UR QL STRIP.AUTO: NEGATIVE
BUN BLD-MCNC: 20 MG/DL (ref 9–23)
CALCIUM BLD-MCNC: 8.8 MG/DL (ref 8.5–10.1)
CHLORIDE SERPL-SCNC: 105 MMOL/L (ref 98–112)
CLARITY UR REFRACT.AUTO: CLEAR
CO2 SERPL-SCNC: 28 MMOL/L (ref 21–32)
COLOR UR AUTO: YELLOW
CREAT BLD-MCNC: 1.12 MG/DL
DIGOXIN SERPL-MCNC: 1 NG/ML (ref 0.8–2)
EGFRCR SERPLBLD CKD-EPI 2021: 45 ML/MIN/1.73M2 (ref 60–?)
EOSINOPHIL # BLD AUTO: 0.17 X10(3) UL (ref 0–0.7)
EOSINOPHIL NFR BLD AUTO: 3 %
ERYTHROCYTE [DISTWIDTH] IN BLOOD BY AUTOMATED COUNT: 12.7 %
FLUAV + FLUBV RNA SPEC NAA+PROBE: NEGATIVE
FLUAV + FLUBV RNA SPEC NAA+PROBE: NEGATIVE
GLOBULIN PLAS-MCNC: 3 G/DL (ref 2.8–4.4)
GLUCOSE BLD-MCNC: 140 MG/DL (ref 70–99)
GLUCOSE UR STRIP.AUTO-MCNC: NORMAL MG/DL
HCT VFR BLD AUTO: 36.8 %
HGB BLD-MCNC: 12.5 G/DL
IMM GRANULOCYTES # BLD AUTO: 0.02 X10(3) UL (ref 0–1)
IMM GRANULOCYTES NFR BLD: 0.4 %
KETONES UR STRIP.AUTO-MCNC: NEGATIVE MG/DL
LEUKOCYTE ESTERASE UR QL STRIP.AUTO: NEGATIVE
LYMPHOCYTES # BLD AUTO: 0.96 X10(3) UL (ref 1–4)
LYMPHOCYTES NFR BLD AUTO: 17 %
MCH RBC QN AUTO: 34.4 PG (ref 26–34)
MCHC RBC AUTO-ENTMCNC: 34 G/DL (ref 31–37)
MCV RBC AUTO: 101.4 FL
MONOCYTES # BLD AUTO: 0.77 X10(3) UL (ref 0.1–1)
MONOCYTES NFR BLD AUTO: 13.7 %
NEUTROPHILS # BLD AUTO: 3.66 X10 (3) UL (ref 1.5–7.7)
NEUTROPHILS # BLD AUTO: 3.66 X10(3) UL (ref 1.5–7.7)
NEUTROPHILS NFR BLD AUTO: 64.8 %
NITRITE UR QL STRIP.AUTO: NEGATIVE
OSMOLALITY SERPL CALC.SUM OF ELEC: 291 MOSM/KG (ref 275–295)
PH UR STRIP.AUTO: 6.5 [PH] (ref 5–8)
PLATELET # BLD AUTO: 243 10(3)UL (ref 150–450)
POTASSIUM SERPL-SCNC: 4.4 MMOL/L (ref 3.5–5.1)
PROT SERPL-MCNC: 6.3 G/DL (ref 6.4–8.2)
RBC # BLD AUTO: 3.63 X10(6)UL
RBC UR QL AUTO: NEGATIVE
RSV RNA SPEC NAA+PROBE: NEGATIVE
SARS-COV-2 RNA RESP QL NAA+PROBE: NOT DETECTED
SODIUM SERPL-SCNC: 138 MMOL/L (ref 136–145)
SP GR UR STRIP.AUTO: 1.02 (ref 1–1.03)
TROPONIN I SERPL HS-MCNC: 11 NG/L
TROPONIN I SERPL HS-MCNC: 12 NG/L
UROBILINOGEN UR STRIP.AUTO-MCNC: NORMAL MG/DL
WBC # BLD AUTO: 5.6 X10(3) UL (ref 4–11)

## 2023-12-20 PROCEDURE — 99285 EMERGENCY DEPT VISIT HI MDM: CPT

## 2023-12-20 PROCEDURE — 80053 COMPREHEN METABOLIC PANEL: CPT | Performed by: EMERGENCY MEDICINE

## 2023-12-20 PROCEDURE — 81003 URINALYSIS AUTO W/O SCOPE: CPT | Performed by: EMERGENCY MEDICINE

## 2023-12-20 PROCEDURE — 70450 CT HEAD/BRAIN W/O DYE: CPT | Performed by: EMERGENCY MEDICINE

## 2023-12-20 PROCEDURE — 84484 ASSAY OF TROPONIN QUANT: CPT | Performed by: EMERGENCY MEDICINE

## 2023-12-20 PROCEDURE — 85025 COMPLETE CBC W/AUTO DIFF WBC: CPT | Performed by: EMERGENCY MEDICINE

## 2023-12-20 PROCEDURE — 93010 ELECTROCARDIOGRAM REPORT: CPT

## 2023-12-20 PROCEDURE — 0241U SARS-COV-2/FLU A AND B/RSV BY PCR (GENEXPERT): CPT | Performed by: EMERGENCY MEDICINE

## 2023-12-20 PROCEDURE — 96361 HYDRATE IV INFUSION ADD-ON: CPT

## 2023-12-20 PROCEDURE — 80162 ASSAY OF DIGOXIN TOTAL: CPT | Performed by: EMERGENCY MEDICINE

## 2023-12-20 PROCEDURE — 96360 HYDRATION IV INFUSION INIT: CPT

## 2023-12-20 PROCEDURE — 93005 ELECTROCARDIOGRAM TRACING: CPT

## 2023-12-20 NOTE — CM/SW NOTE
[facility] asked to speak with sons at bedside about patient going to the assisted living side of [address] ([facility]). Spoke with Kim at the [facility]. Patient to move into the supportive assisted memory care floor in January. The room is unfurnished. Family has to move the patient and will be doing that after the first of the year d/t holidays. The patient can go to the [facility] side today but will be private pay. Family will need to call admissions at the [facility] to discuss private pay. Ger, at bedside, updated. MD updated.
Adequate: hears normal conversation without difficulty

## 2023-12-20 NOTE — DISCHARGE INSTRUCTIONS
Possible medication reactive. Would hold her Remeron. Stay well-hydrated. Return if worsening symptoms, new complaints. Follow-up with cardiology. Return if chest pain, shortness of breath. Follow-up with primary care.

## 2023-12-20 NOTE — ED INITIAL ASSESSMENT (HPI)
Pt presents via EMS for weakness and not feeling right. BP 68/40 on arrival. Improved after IVF and trendelenburg positioning. Son reports pt has hx of memory issues. Pt lives on the independent side of [place].

## 2023-12-21 LAB
Q-T INTERVAL: 354 MS
QRS DURATION: 74 MS
QTC CALCULATION (BEZET): 403 MS
R AXIS: -24 DEGREES
T AXIS: -79 DEGREES
VENTRICULAR RATE: 78 BPM

## 2024-01-21 ENCOUNTER — HOSPITAL ENCOUNTER (OUTPATIENT)
Facility: HOSPITAL | Age: 89
Setting detail: OBSERVATION
Discharge: HOME | End: 2024-01-23
Attending: EMERGENCY MEDICINE | Admitting: INTERNAL MEDICINE
Payer: MEDICARE

## 2024-01-21 ENCOUNTER — APPOINTMENT (OUTPATIENT)
Dept: GENERAL RADIOLOGY | Facility: HOSPITAL | Age: 89
End: 2024-01-21
Attending: EMERGENCY MEDICINE
Payer: MEDICARE

## 2024-01-21 DIAGNOSIS — I48.91 ATRIAL FIBRILLATION WITH RAPID VENTRICULAR RESPONSE (HCC): Primary | ICD-10-CM

## 2024-01-21 LAB
ALBUMIN SERPL-MCNC: 3.6 G/DL (ref 3.4–5)
ALBUMIN/GLOB SERPL: 1 {RATIO} (ref 1–2)
ALP LIVER SERPL-CCNC: 74 U/L
ANION GAP SERPL CALC-SCNC: 2 MMOL/L (ref 0–18)
AST SERPL-CCNC: 36 U/L (ref 15–37)
BASOPHILS # BLD AUTO: 0.03 X10(3) UL (ref 0–0.2)
BASOPHILS NFR BLD AUTO: 0.6 %
BILIRUB SERPL-MCNC: 0.3 MG/DL (ref 0.1–2)
BILIRUB UR QL STRIP.AUTO: NEGATIVE
BUN BLD-MCNC: 16 MG/DL (ref 9–23)
CALCIUM BLD-MCNC: 8.9 MG/DL (ref 8.5–10.1)
CHLORIDE SERPL-SCNC: 99 MMOL/L (ref 98–112)
CLARITY UR REFRACT.AUTO: CLEAR
CO2 SERPL-SCNC: 30 MMOL/L (ref 21–32)
COLOR UR AUTO: COLORLESS
CREAT BLD-MCNC: 0.8 MG/DL
DIGOXIN SERPL-MCNC: 0.1 NG/ML (ref 0.8–2)
EGFRCR SERPLBLD CKD-EPI 2021: 67 ML/MIN/1.73M2 (ref 60–?)
EOSINOPHIL # BLD AUTO: 0.08 X10(3) UL (ref 0–0.7)
EOSINOPHIL NFR BLD AUTO: 1.6 %
ERYTHROCYTE [DISTWIDTH] IN BLOOD BY AUTOMATED COUNT: 12.7 %
GLOBULIN PLAS-MCNC: 3.7 G/DL (ref 2.8–4.4)
GLUCOSE BLD-MCNC: 124 MG/DL (ref 70–99)
GLUCOSE UR STRIP.AUTO-MCNC: NORMAL MG/DL
HCT VFR BLD AUTO: 36.8 %
HGB BLD-MCNC: 12.6 G/DL
IMM GRANULOCYTES # BLD AUTO: 0.02 X10(3) UL (ref 0–1)
IMM GRANULOCYTES NFR BLD: 0.4 %
KETONES UR STRIP.AUTO-MCNC: NEGATIVE MG/DL
LEUKOCYTE ESTERASE UR QL STRIP.AUTO: 75
LYMPHOCYTES # BLD AUTO: 0.98 X10(3) UL (ref 1–4)
LYMPHOCYTES NFR BLD AUTO: 19.9 %
MAGNESIUM SERPL-MCNC: 2 MG/DL (ref 1.6–2.6)
MCH RBC QN AUTO: 34.1 PG (ref 26–34)
MCHC RBC AUTO-ENTMCNC: 34.2 G/DL (ref 31–37)
MCV RBC AUTO: 99.7 FL
MONOCYTES # BLD AUTO: 0.57 X10(3) UL (ref 0.1–1)
MONOCYTES NFR BLD AUTO: 11.6 %
NEUTROPHILS # BLD AUTO: 3.24 X10 (3) UL (ref 1.5–7.7)
NEUTROPHILS # BLD AUTO: 3.24 X10(3) UL (ref 1.5–7.7)
NEUTROPHILS NFR BLD AUTO: 65.9 %
NITRITE UR QL STRIP.AUTO: NEGATIVE
OSMOLALITY SERPL CALC.SUM OF ELEC: 275 MOSM/KG (ref 275–295)
PH UR STRIP.AUTO: 7.5 [PH] (ref 5–8)
PLATELET # BLD AUTO: 224 10(3)UL (ref 150–450)
POTASSIUM SERPL-SCNC: 4.2 MMOL/L (ref 3.5–5.1)
PROT SERPL-MCNC: 7.3 G/DL (ref 6.4–8.2)
PROT UR STRIP.AUTO-MCNC: NEGATIVE MG/DL
RBC # BLD AUTO: 3.69 X10(6)UL
RBC UR QL AUTO: NEGATIVE
SODIUM SERPL-SCNC: 131 MMOL/L (ref 136–145)
SP GR UR STRIP.AUTO: 1.01 (ref 1–1.03)
TROPONIN I SERPL HS-MCNC: 11 NG/L
TSI SER-ACNC: 2.95 MIU/ML (ref 0.36–3.74)
UROBILINOGEN UR STRIP.AUTO-MCNC: NORMAL MG/DL
WBC # BLD AUTO: 4.9 X10(3) UL (ref 4–11)

## 2024-01-21 PROCEDURE — 81001 URINALYSIS AUTO W/SCOPE: CPT | Performed by: EMERGENCY MEDICINE

## 2024-01-21 PROCEDURE — 80053 COMPREHEN METABOLIC PANEL: CPT | Performed by: EMERGENCY MEDICINE

## 2024-01-21 PROCEDURE — 99285 EMERGENCY DEPT VISIT HI MDM: CPT

## 2024-01-21 PROCEDURE — 84443 ASSAY THYROID STIM HORMONE: CPT | Performed by: EMERGENCY MEDICINE

## 2024-01-21 PROCEDURE — 96374 THER/PROPH/DIAG INJ IV PUSH: CPT

## 2024-01-21 PROCEDURE — 93010 ELECTROCARDIOGRAM REPORT: CPT

## 2024-01-21 PROCEDURE — 84484 ASSAY OF TROPONIN QUANT: CPT | Performed by: EMERGENCY MEDICINE

## 2024-01-21 PROCEDURE — 93005 ELECTROCARDIOGRAM TRACING: CPT

## 2024-01-21 PROCEDURE — 87086 URINE CULTURE/COLONY COUNT: CPT | Performed by: EMERGENCY MEDICINE

## 2024-01-21 PROCEDURE — 71045 X-RAY EXAM CHEST 1 VIEW: CPT | Performed by: EMERGENCY MEDICINE

## 2024-01-21 PROCEDURE — 80162 ASSAY OF DIGOXIN TOTAL: CPT | Performed by: EMERGENCY MEDICINE

## 2024-01-21 PROCEDURE — 85025 COMPLETE CBC W/AUTO DIFF WBC: CPT | Performed by: EMERGENCY MEDICINE

## 2024-01-21 PROCEDURE — 83735 ASSAY OF MAGNESIUM: CPT | Performed by: EMERGENCY MEDICINE

## 2024-01-21 RX ORDER — METOPROLOL TARTRATE 1 MG/ML
5 INJECTION, SOLUTION INTRAVENOUS ONCE
Status: COMPLETED | OUTPATIENT
Start: 2024-01-21 | End: 2024-01-21

## 2024-01-21 RX ORDER — METOPROLOL TARTRATE 50 MG/1
50 TABLET, FILM COATED ORAL ONCE
Status: COMPLETED | OUTPATIENT
Start: 2024-01-21 | End: 2024-01-21

## 2024-01-22 LAB
Q-T INTERVAL: 312 MS
Q-T INTERVAL: 324 MS
Q-T INTERVAL: 344 MS
QRS DURATION: 70 MS
QRS DURATION: 72 MS
QRS DURATION: 76 MS
QTC CALCULATION (BEZET): 433 MS
QTC CALCULATION (BEZET): 454 MS
QTC CALCULATION (BEZET): 470 MS
R AXIS: -12 DEGREES
R AXIS: -17 DEGREES
R AXIS: -19 DEGREES
T AXIS: -25 DEGREES
T AXIS: -30 DEGREES
T AXIS: 20 DEGREES
VENTRICULAR RATE: 105 BPM
VENTRICULAR RATE: 116 BPM
VENTRICULAR RATE: 127 BPM

## 2024-01-22 RX ORDER — DILTIAZEM HYDROCHLORIDE 180 MG/1
180 CAPSULE, EXTENDED RELEASE ORAL DAILY
Qty: 30 CAPSULE | Refills: 0 | Status: SHIPPED | OUTPATIENT
Start: 2024-01-23 | End: 2025-01-22

## 2024-01-22 RX ORDER — METOCLOPRAMIDE HYDROCHLORIDE 5 MG/ML
5 INJECTION INTRAMUSCULAR; INTRAVENOUS EVERY 8 HOURS PRN
Status: DISCONTINUED | OUTPATIENT
Start: 2024-01-22 | End: 2024-01-23

## 2024-01-22 RX ORDER — DIGOXIN 125 MCG
125 TABLET ORAL EVERY MORNING
Status: DISCONTINUED | OUTPATIENT
Start: 2024-01-22 | End: 2024-01-23

## 2024-01-22 RX ORDER — SENNOSIDES 8.6 MG
17.2 TABLET ORAL NIGHTLY PRN
Status: DISCONTINUED | OUTPATIENT
Start: 2024-01-22 | End: 2024-01-23

## 2024-01-22 RX ORDER — POLYETHYLENE GLYCOL 3350 17 G/17G
17 POWDER, FOR SOLUTION ORAL DAILY PRN
Status: DISCONTINUED | OUTPATIENT
Start: 2024-01-22 | End: 2024-01-23

## 2024-01-22 RX ORDER — MEMANTINE HYDROCHLORIDE 5 MG/1
5 TABLET ORAL 2 TIMES DAILY
Status: DISCONTINUED | OUTPATIENT
Start: 2024-01-22 | End: 2024-01-22

## 2024-01-22 RX ORDER — GABAPENTIN 300 MG/1
300 CAPSULE ORAL 2 TIMES DAILY
Status: DISCONTINUED | OUTPATIENT
Start: 2024-01-22 | End: 2024-01-23

## 2024-01-22 RX ORDER — AMLODIPINE BESYLATE 2.5 MG/1
2.5 TABLET ORAL DAILY
Status: DISCONTINUED | OUTPATIENT
Start: 2024-01-22 | End: 2024-01-22

## 2024-01-22 RX ORDER — METOPROLOL TARTRATE 50 MG/1
50 TABLET, FILM COATED ORAL 2 TIMES DAILY
Status: DISCONTINUED | OUTPATIENT
Start: 2024-01-22 | End: 2024-01-23

## 2024-01-22 RX ORDER — MEMANTINE HYDROCHLORIDE 5 MG/1
5 TABLET ORAL DAILY
Status: SHIPPED | COMMUNITY
Start: 2024-01-22 | End: 2024-01-23

## 2024-01-22 RX ORDER — MEMANTINE HYDROCHLORIDE 5 MG/1
5 TABLET ORAL DAILY
Status: DISCONTINUED | OUTPATIENT
Start: 2024-01-23 | End: 2024-01-23

## 2024-01-22 RX ORDER — DILTIAZEM HYDROCHLORIDE 180 MG/1
180 CAPSULE, EXTENDED RELEASE ORAL DAILY
Status: DISCONTINUED | OUTPATIENT
Start: 2024-01-22 | End: 2024-01-23

## 2024-01-22 RX ORDER — ONDANSETRON 2 MG/ML
4 INJECTION INTRAMUSCULAR; INTRAVENOUS EVERY 6 HOURS PRN
Status: DISCONTINUED | OUTPATIENT
Start: 2024-01-22 | End: 2024-01-23

## 2024-01-22 RX ORDER — ACETAMINOPHEN 500 MG
500 TABLET ORAL EVERY 4 HOURS PRN
Status: DISCONTINUED | OUTPATIENT
Start: 2024-01-22 | End: 2024-01-23

## 2024-01-22 RX ORDER — ENEMA 19; 7 G/133ML; G/133ML
1 ENEMA RECTAL ONCE AS NEEDED
Status: DISCONTINUED | OUTPATIENT
Start: 2024-01-22 | End: 2024-01-23

## 2024-01-22 RX ORDER — LISINOPRIL 20 MG/1
20 TABLET ORAL DAILY
Status: DISCONTINUED | OUTPATIENT
Start: 2024-01-22 | End: 2024-01-23

## 2024-01-22 RX ORDER — SIMETHICONE 80 MG
80 TABLET,CHEWABLE ORAL 4 TIMES DAILY PRN
Status: DISCONTINUED | OUTPATIENT
Start: 2024-01-22 | End: 2024-01-23

## 2024-01-22 RX ORDER — BISACODYL 10 MG
10 SUPPOSITORY, RECTAL RECTAL
Status: DISCONTINUED | OUTPATIENT
Start: 2024-01-22 | End: 2024-01-23

## 2024-01-22 RX ORDER — TEMAZEPAM 15 MG/1
15 CAPSULE ORAL NIGHTLY PRN
Status: DISCONTINUED | OUTPATIENT
Start: 2024-01-22 | End: 2024-01-23

## 2024-01-22 NOTE — PROGRESS NOTES
Ayse Maldonado Patient Status:  Emergency    10/25/1927 MRN [id number]   Location [facility] EMERGENCY DEPARTMENT Attending Anthony Monroe MD   Hosp Day # 0 PCP Terri Navarro MD     Cardiology Nocturnal APN Note    Page Received: 0351    Briefly: (Documentation from chart review)     Ayse Maldonado is a 89 y/o female with PMH/PSH of HTN. Sinus node dysfunction s/p St Ankit ppm and persistent AF. Who presented to the ED with hypertension and rapid AF.    /118 at presentation, controlled at 126/81 after lopressor    's on arrival, now controlled at 81    Hx sinus node dysfunction s/p Dual chamber St Ankit pacemaker, now set VVIR due to persistent AF.     Primary Cardiologist Dr. Grove    Vital Signs:       2024     9:15 PM 2024     9:30 PM   Vitals History   /92 126/81   Pulse 98 91   Resp 20 20   SpO2 96 % 97 %        Labs:   Lab Results   Component Value Date    WBC 4.9 2024    HGB 12.6 2024    HCT 36.8 2024    .0 2024    CREATSERUM 0.80 2024    BUN 16 2024     2024    K 4.2 2024    CL 99 2024    CO2 30.0 2024     2024    CA 8.9 2024    ALB 3.6 2024    ALKPHO 74 2024    BILT 0.3 2024    TP 7.3 2024    AST 36 2024    ALT  2024      Comment:      Due to  backorder we are temporarily unable to offer hospital-based ALT testing at [facility].   If urgently needed, please order ALT test code [id number].   The new order will need a new venipuncture and will be sent to [facility] for testing.   The expected turnaround time will be within 24 hours.     TSH 2.950 2024    MG 2.0 2024    TROPHS 11 2024       Diagnostics:         Allergies:  Allergies   Allergen Reactions    Bacitracin SWELLING     Red and swelling    Ciprofloxacin OTHER (SEE COMMENTS)     Nausea and abdominal cramping    Levofloxacin OTHER (SEE COMMENTS)     Sulfamethoxazole W/Trimethoprim OTHER (SEE COMMENTS)     Other reaction(s): VOMITING  Oral    Altaryl     Amiodarone OTHER (SEE COMMENTS)     Elevated liver enzymes      Diphenhydramine UNKNOWN    Escitalopram     Thiazide-Type Diuretics OTHER (SEE COMMENTS)     CLASS    Cephalexin DIARRHEA     May have also caused drug rash    Clonidine RASH     Oral    Garlic DIARRHEA    Garlic DIARRHEA    Hydrocodone NAUSEA ONLY     nausea    Lexapro NAUSEA ONLY       Medications:    dilTIAZem    Assessment:     AF RVR  - Chronic AF  - SJM dual chamber ppm programmed VVIR  - Native at device visit 1/9/24 was AF with V rate 70s  - On Eliquis  - Now rate controlled with lopressor in ED    HTN  - On amlodipine 2.5mg daily , lisinopril 20mg and metoprolol tartrate 25mg BID  - 169/118 on arrival  - Controlled at 126/81 after lopressor 5mg IVP and 50 po      Plan:    - Continue to monitor overnight  - Formal Cardiology consult to follow in AM.       VON Waters  [facility]  1/21/2024  9:36 PM

## 2024-01-22 NOTE — ED QUICK NOTES
Orders for admission, patient is aware of plan and ready to go upstairs. Any questions, please call ED RN Jean-Paul at extension [phone].     Patient Covid vaccination status: Fully vaccinated     COVID Test Ordered in ED: None    COVID Suspicion at Admission: N/A    Running Infusions:   none    Mental Status/LOC at time of transport: a&ox3    Other pertinent information:   CIWA score: N/A   NIH score:  N/A

## 2024-01-22 NOTE — CONSULTS
Cardiology Consultation    Ayse Maldonado Patient Status:  Inpatient    10/25/1927 MRN [id number]   [facility] 2NE-A Attending Terri Navarro MD   Hosp Day # 1 PCP Terri Navarro MD     Reason for Consultation:  rapid afib      History of Present Illness:  Ayse Maldonado is a a(n) 89 year old female. Very nice elderly woman with chronic afib/PPM.  She takes eliquis, metoprolol, and digoxin.  She presented with rapid HR and accelerated HTN.  No cardiac sx's reported.  On arrival, BP was 170/110, .  She was given IV lopressor and BP and HR improved.  She is on RA.  She is happy as a clam, eating breakfast.    History:  Past Medical History:   Diagnosis Date    -Lumbar radicular pain-electrical stimulator 2013    Abdominal distention     Abdominal hernia     Abdominal pain     Age-related osteoporosis with current pathological fracture with routine healing 2018    Anesthesia complication     Arthritis     Atrial fibrillation (HCC)     Back pain     Basal cell cancer     colon    Bloating     Body piercing     Cellulitis of chest wall 2018    Cellulitis of left arm 2018    Cellulitis of right upper extremity     Closed bicondylar fracture of distal end of right humerus            Global exp -18 / RIGHT ELBOW / BAM  2017    Closed displaced fracture of pelvis (HCC)     Constipation     Diarrhea, unspecified     Easy bruising     Essential hypertension     Eye disease     Fatigue     Food intolerance     Frequent urination     Hearing impairment     Hearing loss     Heart palpitations     Hemorrhoids     History of depression     Hypertension     Irregular bowel habits     Leg swelling     Lumbar facet arthropathy 02/10/2018    Macular degeneration - [facility] 2011    Muscle weakness     Night sweats     Pacemaker     Reflux     Renal disorder     S/P colon resection / Colonoscopy (10/12) recheck - ANJALI Spencer 2011    S/P  laminectomy 12/23/2013    Seizure disorder (HCC)     Sleep apnea     cpap    Sleep disturbance     Spinal stenosis of lumbar region without neurogenic claudication 08/08/2018    Visual impairment      Past Surgical History:   Procedure Laterality Date    ADJ TISS XFER LID,NOS,EAR <10SQCM  03/28/2014    Procedure: DEBULKING OF NASOLABIAL FLAP TO NOSE;  Surgeon: Shar Vasquez MD;  Location: [facility]    APPENDECTOMY      BACK SURGERY      BOWEL RESECTION      CHOLECYSTECTOMY      COLECTOMY      COLONOSCOPY      COLONOSCOPY      EGD      HEMORRHOIDECTOMY,INT/EXT,SIMPLE      HIP REPLACEMENT SURGERY  left 3/06 right 10/06    OTHER ACCESSORY      electro-stimulator for spinal stenosis    OTHER SURGICAL HISTORY  10/10/2011    cysto-    OTHER SURGICAL HISTORY  10/2016    back surgery    PACEMAKER      St Ankit pacemaker    SKIN SURGERY  03/06/2014    BCC nod, inf to left nasal tip / MMS done    SKIN SURGERY  10/31/2016    MMS for BCC-nod to the R upper cut lip-AB    SPINE SURGERY PROCEDURE UNLISTED      TONSILLECTOMY      TOTAL HIP REPLACEMENT       Family History   Problem Relation Age of Onset    Cancer Father     Hypertension Father     Colon Polyps Son     Other (Other) Sister         Davina Mayes         Allergies:  Allergies   Allergen Reactions    Bacitracin SWELLING     Red and swelling    Ciprofloxacin OTHER (SEE COMMENTS)     Nausea and abdominal cramping    Levofloxacin OTHER (SEE COMMENTS)    Sulfamethoxazole W/Trimethoprim OTHER (SEE COMMENTS)     Other reaction(s): VOMITING  Oral    Altaryl     Amiodarone OTHER (SEE COMMENTS)     Elevated liver enzymes      Diphenhydramine UNKNOWN    Escitalopram     Thiazide-Type Diuretics OTHER (SEE COMMENTS)     CLASS    Cephalexin DIARRHEA     May have also caused drug rash    Clonidine RASH     Oral    Garlic DIARRHEA    Garlic DIARRHEA    Hydrocodone NAUSEA ONLY     nausea    Lexapro NAUSEA ONLY       Medications:   amLODIPine  2.5 mg Oral Daily    apixaban   5 mg Oral BID    digoxin  125 mcg Oral QAM    memantine  5 mg Oral BID    gabapentin  300 mg Oral BID    lisinopril  20 mg Oral Daily    metoprolol tartrate  50 mg Oral BID       Continuous Infusions:   dilTIAZem Stopped (01/21/24 2000)       Social History:   reports that she quit smoking about 52 years ago. Her smoking use included cigarettes. She has a 2 pack-year smoking history. She has never used smokeless tobacco. She reports that she does not currently use alcohol after a past usage of about 2.0 standard drinks of alcohol per week. She reports that she does not use drugs.    Review of Systems:  All systems were reviewed and are negative except as described above in HPI.    Physical Exam:      Temp:  [97.3 °F (36.3 °C)-98.6 °F (37 °C)] 98 °F (36.7 °C)  Pulse:  [] 94  Resp:  [16-21] 20  BP: (121-179)/() 137/75  SpO2:  [94 %-99 %] 95 %    Last 3 Weights   01/22/24 0130 135 lb 12.9 oz (61.6 kg)   01/22/24 0017 135 lb 12.9 oz (61.6 kg)   01/21/24 1952 132 lb 4.4 oz (60 kg)   12/28/23 1520 132 lb (59.9 kg)   12/20/23 1330 127 lb 13.9 oz (58 kg)           General: No apparent distress  HEENT: No focal deficits.  Neck: supple. JVP normal  Cardiac: Regular rhythm, S1, S2 normal,   No rub or gallop.  No murmur.  Lungs: CTA  Abdomen: Soft, non-tender.   Extremities: No edema  Neurologic: no focal deficits  Skin: Warm and dry.          Telemetry: fib    Laboratories and Data:  Diagnostics:    EKG, 1/22/2024:  fib, NSSTTW changes    CXR, 1/22/2024:  clear    Labs:   HEM:  Recent Labs   Lab 01/21/24 1955   WBC 4.9   HGB 12.6   .0       Chem:  Recent Labs   Lab 01/21/24 1955 01/21/24 2002   *  --    K 4.2  --    CL 99  --    CO2 30.0  --    BUN 16  --    CREATSERUM 0.80  --    CA 8.9  --    MG  --  2.0   *  --        Recent Labs   Lab 01/21/24 1955   AST 36   ALB 3.6       No results for input(s): \"PTP\", \"INR\" in the last 168 hours.    No results for input(s): \"TROP\", \"CK\" in the last  168 hours.      Impression:   Chronic afib - admission with RVR.  No clear inciting factors.  Rate down spontaneously for the most part.  HTN, accelerated on admission, now controlled.  PPM    Plan:  Same metoprolol.  Change amlodipine to diltiazem 180 mg daily.  See how she feels today and consider dc.  No cardiac w/u.      Pankaj Strong MD  1/22/2024  7:33 AM  C5

## 2024-01-22 NOTE — PROGRESS NOTES
01/22/24 0012 01/22/24 0014 01/22/24 0017   Vital Signs   Temp 97.3 °F (36.3 °C)  --   --    Temp src Oral  --   --    Pulse 112 117 (!) 121   Heart Rate Source Monitor  --   --    Resp 20  --   --    /71 (!) 121/98 124/86   MAP (mmHg) 97 (!) 106 99   BP Location Left arm  --   --    BP Method Automatic  --   --    Patient Position Lying Sitting Standing     Admission  orthostatics

## 2024-01-22 NOTE — ED PROVIDER NOTES
Patient Seen in: [facility] Emergency Department      History     Chief Complaint   Patient presents with    Arrythmia/Palpitations     [facility]    Subjective:   HPI    Patient with a history of atrial fibrillation on digoxin and Eliquis.  She is on lisinopril 20 mg daily, metoprolol tartrate 50 mg twice daily.  Patient also with a pacemaker.  Patient was seen in the emergency department just about a month ago not feeling well.  She had cardiology consultation and no extraordinary abnormalities identified.  She had follow-up with her primary care provider at the end of the month and was doing better.  Son provides majority of history.  Patient has been having her usual state of health.  No recent high fever or shaking chills.  No cold or cough.  No vomiting or diarrhea.  No black or bloody stool.  No urinary symptoms or flank pain.  She was complaining of some stomachache.  Son administered some Gas-X and MiraLAX that she takes a few times a week.  Son checks her blood pressure every once in a while and decided to do so today.  He noted blood pressure was elevated at 167/124 and her pulse was 148  Recheck of the blood pressure after she sat down was 170/92 and pulse was still elevated at 142.  Staff at her residential center also noted elevated heart rate.  Patient was not complaining of any palpitations.  She did seem to be a little short of breath.  She denied any headache, chest pain, or abdominal pain.  She has chronic discomfort in her legs but no extraordinary swelling.  No new numbness or weakness of her body.    Objective:   Past Medical History:   Diagnosis Date    -Lumbar radicular pain-electrical stimulator 07/29/2013    Abdominal distention     Abdominal hernia     Abdominal pain     Age-related osteoporosis with current pathological fracture with routine healing 08/08/2018    Anesthesia complication     Arthritis     Atrial fibrillation (HCC)     Back pain      Basal cell cancer     colon    Bloating     Body piercing     Cellulitis of chest wall 07/11/2018    Cellulitis of left arm 07/19/2018    Cellulitis of right upper extremity     Closed bicondylar fracture of distal end of right humerus            Global exp 1-17-18 / RIGHT ELBOW / BAM  11/09/2017    Closed displaced fracture of pelvis (HCC)     Constipation     Diarrhea, unspecified     Easy bruising     Essential hypertension     Eye disease     Fatigue     Food intolerance     Frequent urination     Hearing impairment     Hearing loss     Heart palpitations     Hemorrhoids     History of depression     Hypertension     Irregular bowel habits     Leg swelling     Lumbar facet arthropathy 02/10/2018    Macular degeneration - [facility] 05/05/2011    Muscle weakness     Night sweats     Pacemaker     Reflux     Renal disorder     S/P colon resection / Colonoscopy (10/12) recheck - ANJALI Spencer 05/05/2011    S/P laminectomy 12/23/2013    Seizure disorder (HCC)     Sleep apnea     cpap    Sleep disturbance     Spinal stenosis of lumbar region without neurogenic claudication 08/08/2018    Visual impairment               Past Surgical History:   Procedure Laterality Date    ADJ TISS XFER LID,NOS,EAR <10SQCM  03/28/2014    Procedure: DEBULKING OF NASOLABIAL FLAP TO NOSE;  Surgeon: Shar Vasquez MD;  [facility]    APPENDECTOMY      BACK SURGERY      BOWEL RESECTION      CHOLECYSTECTOMY      COLECTOMY      COLONOSCOPY      COLONOSCOPY      EGD      HEMORRHOIDECTOMY,INT/EXT,SIMPLE      HIP REPLACEMENT SURGERY  left 3/06 right 10/06    OTHER ACCESSORY      electro-stimulator for spinal stenosis    OTHER SURGICAL HISTORY  10/10/2011    cysto-    OTHER SURGICAL HISTORY  10/2016    back surgery    PACEMAKER      [employer] pacemaker    SKIN SURGERY  03/06/2014    BCC nod, inf to left nasal tip / MMS done    SKIN SURGERY  10/31/2016    MMS for BCC-nod to the R upper cut lip-AB    SPINE  SURGERY PROCEDURE UNLISTED      TONSILLECTOMY      TOTAL HIP REPLACEMENT                  Social History     Socioeconomic History    Marital status:    Tobacco Use    Smoking status: Former     Packs/day: 0.10     Years: 20.00     Additional pack years: 0.00     Total pack years: 2.00     Types: Cigarettes     Quit date: 1972     Years since quittin.0    Smokeless tobacco: Never   Vaping Use    Vaping Use: Never used   Substance and Sexual Activity    Alcohol use: Not Currently     Alcohol/week: 2.0 standard drinks of alcohol     Types: 2 Glasses of wine per week     Comment: wine occasionally    Drug use: No   Other Topics Concern    Caffeine Concern No    Exercise Yes     Comment: walks dog 2 x a day              Review of Systems    Positive for stated complaint: afib from [facility] [place]  Other systems are as noted in HPI.  Constitutional and vital signs reviewed.      All other systems reviewed and negative except as noted above.    Physical Exam     ED Triage Vitals [24]   BP (!) 169/118   Pulse 110   Resp 20   Temp 98.6 °F (37 °C)   Temp src Temporal   SpO2 98 %   O2 Device None (Room air)       Current:/81   Pulse 91   Temp 98.6 °F (37 °C) (Temporal)   Resp 20   Wt 60 kg   SpO2 97%   BMI 21.35 kg/m²         Physical Exam  General: The patient is awake, alert, conversant.  She is very hard of hearing.  Eyes: sclera white, conjunctiva pink and moist.  Lids and lashes are normal.  Oromucosa lips are moist  Neck: With no JVD  Lungs: Clear to auscultation bilaterally.  No rhonchi or rales.  Heart: Irregular and fast S1 and S2, without murmur.  Distal pulses are strong and symmetric.  Abdomen: Soft, nondistended.  Completely nontender even with deep palpation  Extremities: Unremarkable.  Calves nonswollen, symmetric.  No pedal edema.  Skin: Not particularly pale  Neurologic:  Mental status as above.  Patient moves all extremities with good strength and coordination.            ED Course     Labs Reviewed   COMP METABOLIC PANEL (14) - Abnormal; Notable for the following components:       Result Value    Glucose 124 (*)     Sodium 131 (*)     All other components within normal limits   URINALYSIS, ROUTINE - Abnormal; Notable for the following components:    Urine Color Colorless (*)     Leukocyte Esterase Urine 75 (*)     WBC Urine 6-10 (*)     Bacteria Urine Rare (*)     All other components within normal limits   DIGOXIN (LANOXIN) - Abnormal; Notable for the following components:    Digoxin 0.10 (*)     All other components within normal limits   CBC W/ DIFFERENTIAL - Abnormal; Notable for the following components:    RBC 3.69 (*)     MCH 34.1 (*)     Lymphocyte Absolute 0.98 (*)     All other components within normal limits   TROPONIN I HIGH SENSITIVITY - Normal   TSH W REFLEX TO FREE T4 - Normal   MAGNESIUM - Normal   CBC WITH DIFFERENTIAL WITH PLATELET    Narrative:     The following orders were created for panel order CBC With Differential With Platelet.  Procedure                               Abnormality         Status                     ---------                               -----------         ------                     CBC W/ DIFFERENTIAL[[id number]]          Abnormal            Final result                 Please view results for these tests on the individual orders.   RAINBOW DRAW LAVENDER   RAINBOW DRAW LIGHT GREEN   RAINBOW DRAW BLUE   URINE CULTURE, ROUTINE     EKG    Rate, intervals and axes as noted on EKG Report.  Rate: 127 bpm  Rhythm: Atrial Fibrillation  Reading: Atrial fibrillation with rapid ventricular response and low voltage with nonspecific change      Repeat EKG   Rate, intervals and axes as noted on EKG Report.  Rate: 116 bpm  Rhythm: Atrial Fibrillation  Reading: Atrial fibrillation with the same nonspecific changes low voltage       Repeat EKG   Rate, intervals and axes as noted on EKG Report.  Rate: 105 bpm  Rhythm: Atrial Fibrillation  Reading: Atrial  fibrillation with the same nonspecific changes low voltage                   [facility]      Patient identified having tachycardia complaining of shortness of breath without chest pain or palpitations found to be in atrial fibrillation with rapid ventricular response.  Abdominal discomfort is completely subsided at this time.  Abdominal examination is benign   Initially, Cardizem ordered however patient's heart rate improved spontaneously.   IV beta-blocker ordered and patient closely monitored.    CBC shows normal white count, hemoglobin, and platelets  Metabolic panel shows mild hyponatremia  Troponin negative  Digoxin 0.1  Urinalysis showed trace leuk esterase with 6-10 WBCs.  I will send off urine culture  Magnesium normal  TSH normal    Chest x-ray  I personally reviewed the actual radiographs themselves and my individual interpretation cardiomegaly without heart failure  radiologist's formal interpretation which I have reviewed    CONCLUSION:    Stable pacemaker leads right atrium and right ventricle.  Stable cardiomegaly out CHF.  Small patchy atelectasis left base.  Right lung clear.  No pneumothorax          Follow-up vitals blood pressure 148/94 pulse 99 respirations 20    I discussed case with Dr. Castro.  She will admit  I discussed case with [facility].  They will consult and make further recommendations                                 Medical Decision Making      Disposition and Plan     Clinical Impression:  1. Atrial fibrillation with rapid ventricular response (HCC)         Disposition:  Admit  1/21/2024  9:43 pm    Follow-up:  No follow-up provider specified.        Medications Prescribed:  Current Discharge Medication List                            Hospital Problems       Present on Admission  Date Reviewed: 1/9/2024            ICD-10-CM Noted POA    * (Principal) Atrial fibrillation with rapid ventricular response (HCC) I48.91 1/21/2024 Unknown

## 2024-01-22 NOTE — H&P
[facility]    History & Physical    Ayse Maldonado Patient Status:  Inpatient    10/25/1927 MRN [id number]   Location [facility] 2NE-A Attending Terri Navarro MD   Hosp Day # 1 PCP Terri Navarro MD     Date:  2024  Date of Admission:  2024    History of Present Illness:  Ayse Maldonado is a(n) 89 year old female.   Patient came to the hospital with palpitations.  She was found to be in atrial fibrillation with rapid ventricular response.  She was admitted for further management.    Of note she has history of atrial fibrillation and is on digoxin, Eliquis, metoprolol, amlodipine.  She is also on lisinopril 20 mg daily.  She has a pacemaker.  She was recently seen by the cardiologist and by PCP as well and had workup and everything was okay.  Her blood pressure was elevated today at home 167/124 and pulse was 148-still high on recheck.  Hence she was sent to the emergency room.  At that time she also had mild shortness of breath which has since resolved.    In the emergency the room she was treated with IV metoprolol-blood pressure and heart rate has both improved quickly.    Overnight she has done well she is on telemetry monitoring her heart rate and blood pressure are both normal on the telemetry monitor right now.  She has no complaints and denies shortness of breath.  Cardiologist is already seen her this morning and made recommendations to switch amlodipine to diltiazem 180 mg daily.  All her other medication doses will stay the same.  If she does not have any further rise in heart rate she may be discharged home within normal further cardiac workup-per cardiologist.    Chronic medical problems reviewed-chart reviewed-medications reconciled.    History:  Past Medical History:   Diagnosis Date    -Lumbar radicular pain-electrical stimulator 2013    Abdominal distention     Abdominal hernia     Abdominal pain     Age-related osteoporosis with current pathological fracture with  routine healing 08/08/2018    Anesthesia complication     Arthritis     Atrial fibrillation (HCC)     Back pain     Basal cell cancer     colon    Bloating     Body piercing     Cellulitis of chest wall 07/11/2018    Cellulitis of left arm 07/19/2018    Cellulitis of right upper extremity     Closed bicondylar fracture of distal end of right humerus            Global exp 1-17-18 / RIGHT ELBOW / BAM  11/09/2017    Closed displaced fracture of pelvis (HCC)     Constipation     Diarrhea, unspecified     Easy bruising     Essential hypertension     Eye disease     Fatigue     Food intolerance     Frequent urination     Hearing impairment     Hearing loss     Heart palpitations     Hemorrhoids     History of depression     Hypertension     Irregular bowel habits     Leg swelling     Lumbar facet arthropathy 02/10/2018    Macular degeneration - [facility] 05/05/2011    Muscle weakness     Night sweats     Pacemaker     Reflux     Renal disorder     S/P colon resection / Colonoscopy (10/12) recheck - ANJALI Spencer 05/05/2011    S/P laminectomy 12/23/2013    Seizure disorder (HCC)     Sleep apnea     cpap    Sleep disturbance     Spinal stenosis of lumbar region without neurogenic claudication 08/08/2018    Visual impairment      Past Surgical History:   Procedure Laterality Date    ADJ TISS XFER LID,NOS,EAR <10SQCM  03/28/2014    Procedure: DEBULKING OF NASOLABIAL FLAP TO NOSE;  Surgeon: Shar Vasquez MD;  [facility]: [facility]    APPENDECTOMY      BACK SURGERY      BOWEL RESECTION      CHOLECYSTECTOMY      COLECTOMY      COLONOSCOPY      COLONOSCOPY      EGD      HEMORRHOIDECTOMY,INT/EXT,SIMPLE      HIP REPLACEMENT SURGERY  left 3/06 right 10/06    OTHER ACCESSORY      electro-stimulator for spinal stenosis    OTHER SURGICAL HISTORY  10/10/2011    cysto-    OTHER SURGICAL HISTORY  10/2016    back surgery    PACEMAKER      [employer] pacemaker    SKIN SURGERY  03/06/2014    BCC nod, inf to left  nasal tip / MMS done    SKIN SURGERY  10/31/2016    MMS for BCC-nod to the R upper cut lip-AB    SPINE SURGERY PROCEDURE UNLISTED      TONSILLECTOMY      TOTAL HIP REPLACEMENT       Family History   Problem Relation Age of Onset    Cancer Father     Hypertension Father     Colon Polyps Son     Other (Other) Sister         Davina Mayes      reports that she quit smoking about 52 years ago. Her smoking use included cigarettes. She has a 2 pack-year smoking history. She has never used smokeless tobacco. She reports that she does not currently use alcohol after a past usage of about 2.0 standard drinks of alcohol per week. She reports that she does not use drugs.    Allergies:  Allergies   Allergen Reactions    Bacitracin SWELLING     Red and swelling    Ciprofloxacin OTHER (SEE COMMENTS)     Nausea and abdominal cramping    Levofloxacin OTHER (SEE COMMENTS)    Sulfamethoxazole W/Trimethoprim OTHER (SEE COMMENTS)     Other reaction(s): VOMITING  Oral    Altaryl     Amiodarone OTHER (SEE COMMENTS)     Elevated liver enzymes      Diphenhydramine UNKNOWN    Escitalopram     Thiazide-Type Diuretics OTHER (SEE COMMENTS)     CLASS    Cephalexin DIARRHEA     May have also caused drug rash    Clonidine RASH     Oral    Garlic DIARRHEA    Garlic DIARRHEA    Hydrocodone NAUSEA ONLY     nausea    Lexapro NAUSEA ONLY       Home Medications:  Medications Prior to Admission   Medication Sig Dispense Refill Last Dose    amLODIPine 2.5 MG Oral Tab Take 1 tablet (2.5 mg total) by mouth daily.   1/21/2024    LISINOPRIL 20 MG Oral Tab TAKE ONE TABLET BY MOUTH DAILY (Patient taking differently: Take 1 tablet (20 mg total) by mouth daily. Takes in the PM) 90 tablet 0 1/21/2024 at o9999    GABAPENTIN 300 MG Oral Cap TAKE ONE CAPSULE BY MOUTH TWICE DAILY 180 capsule 1 1/21/2024    memantine 5 MG Oral Tab TAKE ONE TABLET BY MOUTH TWICE DAILY (Patient taking differently: Take 1 tablet (5 mg total) by mouth daily. Evening only) 180 tablet 0  1/21/2024    Polyethylene Glycol 3350 (MIRALAX OR) Take by mouth.   Past Week    Saccharomyces boulardii (FLORASTOR OR) Take by mouth As Directed.   Past Month    Acetaminophen  MG Oral Tab CR Take 2 tablets (1,300 mg total) by mouth in the morning and 2 tablets (1,300 mg total) before bedtime.   1/21/2024    lidocaine 4 % External Patch Place 1 patch onto the skin daily.   Past Month    Calcium Carb-Cholecalciferol (CALCIUM-VITAMIN D3) 600-500 MG-UNIT Oral Cap Take 1 tablet by mouth daily.   1/21/2024    apixaban 5 MG Oral Tab Take 1 tablet (5 mg total) by mouth 2 (two) times daily. 60 tablet 3 1/21/2024    digoxin 0.125 MG Oral Tab Take 1 tablet (125 mcg total) by mouth every morning.   Past Month    Multiple Vitamins-Minerals (CENTRUM SILVER 50+WOMEN OR) Take by mouth.       Multiple Vitamins-Minerals (ICAPS AREDS 2 OR) Take by mouth.       Metoprolol Tartrate 50 MG Oral Tab Take 0.5 tablets (25 mg total) by mouth 2 (two) times daily. 8AM,8PM          Review of Systems:  Pertinent items are noted in HPI.    Physical Exam:  /75 (BP Location: Left arm)   Pulse 78   Temp 98.6 °F (37 °C) (Oral)   Resp 16   Wt 135 lb 12.9 oz (61.6 kg)   SpO2 95%   BMI 21.92 kg/m²   General appearance: alert, appears stated age, and cooperative  Eyes: conjunctivae/corneas clear. PERRL, EOM's intact. Fundi benign.  Neck: no adenopathy, no carotid bruit, no JVD, supple, symmetrical, trachea midline, and thyroid not enlarged, symmetric, no tenderness/mass/nodules  Lungs: clear to auscultation bilaterally  Heart: S1, S2 normal, no murmur, click, rub or gallop, regular rate and rhythm  Abdomen: soft, non-tender; bowel sounds normal; no masses,  no organomegaly  Extremities: extremities normal, atraumatic, no cyanosis or edema  Neurologic: Grossly normal    Laboratory Data:   Lab Results   Component Value Date    WBC 4.9 01/21/2024    HGB 12.6 01/21/2024    HCT 36.8 01/21/2024    .0 01/21/2024    CREATSERUM 0.80  01/21/2024    BUN 16 01/21/2024     01/21/2024    K 4.2 01/21/2024    CL 99 01/21/2024    CO2 30.0 01/21/2024     01/21/2024    CA 8.9 01/21/2024    ALB 3.6 01/21/2024    ALKPHO 74 01/21/2024    BILT 0.3 01/21/2024    TP 7.3 01/21/2024    AST 36 01/21/2024    ALT  01/21/2024      Comment:      Due to  backorder we are temporarily unable to offer hospital-based ALT testing at [facility].   If urgently needed, please order ALT test code [id number].   The new order will need a new venipuncture and will be sent to [facility] for testing.   The expected turnaround time will be within 24 hours.     TSH 2.950 01/21/2024    MG 2.0 01/21/2024    TROPHS 11 01/21/2024       Imaging:  X-Ray    Assessment:    Atrial fibrillation with rapid ventricular response  Chronic atrial fibrillation-status post pacemaker-on digoxin, Eliquis, metoprolol, amlodipine, lisinopril.  Hypertension with hypertensive heart disease-with accelerated hypertension on admission  Obstructive sleep apnea  GERD  Asthma with COPD  Pulmonary hypertension  CKD stage III  History of seizure disorder.    Patient Active Problem List   Diagnosis    Pacemaker - [employer] [place]    JAZZ on CPAP    Current use of long term anticoagulation    Gastroesophageal reflux disease without esophagitis    Asthma with COPD (chronic obstructive pulmonary disease)    Pulmonary hypertension (HCC)-52 mg Hg    Alternating constipation and diarrhea    Other fatigue-mutifactorial    Chronic bronchitis (HCC)    CKD (chronic kidney disease) stage 3, GFR 30-59 ml/min (HCC)    PAF (paroxysmal atrial fibrillation) (HCC)    Glaucoma suspect of left eye    Pseudophakia    Trochanteric bursitis of left hip    Fall    Essential hypertension    Chronic left hip pain    CHI (closed head injury), subsequent encounter    Altered mental status, unspecified altered mental status type    Hyponatremia    Seizure disorder (HCC)    Hypertension, unspecified type    Altered mental status     Weakness    Hypotension, unspecified hypotension type    Hypotension    Branch retinal vein occlusion of left eye with macular edema    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 (HCC)    PCO (posterior capsular opacification), left    Inguinal hernia of right side without obstruction or gangrene    Umbilical hernia without obstruction and without gangrene    Protein-calorie malnutrition, unspecified severity (HCC)    Dementia without behavioral disturbance (HCC)    Atrial fibrillation with rapid ventricular response (HCC)           Plan:      Admit to medical floor-inpatient  Telemetry monitoring  Cardiology consultation-[facility]  Continue all current medications except-discontinue amlodipine and start diltiazem 180 mg daily  If no further episodes of palpitation or racing heart rate, patient may be discharged home in the evening.      Tania Castro MD  1/22/2024  9:29 AM

## 2024-01-22 NOTE — PLAN OF CARE
NURSING ADMISSION NOTE      Patient admitted via Cart  Oriented to room.  Safety precautions initiated.  Bed in low position.  Call light in reach.  Accompanied by  Son Cortes.  Skin assessment completed with PCT  No open lesions  or abrasions.

## 2024-01-22 NOTE — HISTORICAL OFFICE NOTE
[facility]  Outside Information  Continuity of Care Document  7/30/2023  Ayse Maldonado - Female; born [birth year]Summary of episode note, generated on Jan. 21, 2024January 21, 2024   CHIEF COMPLAINT    CHIEF COMPLAINT  Reason for Visit/Chief Complaint   Annual follow up   Ayse Maldonado is a 89 year old woman with PMHx of sinus node dysfunction, status post placement of a dual-chamber pacemaker. Atrial fibrillation, had previously been on sotalol.    Recently, had recurrent atrial fibrillation that is now chronic. Presently well compensated no symptoms of heart failure or angina.    Pacemaker interrogation shows  31%, ~2 years battery in [facility].     PROBLEMS    PROBLEMS  Problem Effective Dates Date resolved Problem Status   Atrial fibrillation status post cardioversion, [SNOMED-CT: 30058900] 12/15/2021 12/15/2021 Resolved   Cardiac pacemaker (S/P PPM), [SNOMED-CT: 864285125] 12/15/2021 2/4/2020 Resolved   Pacemaker, [SNOMED-CT: 151257880] 6/19/2019 - Active   History of atrial fibrillation, [SNOMED-CT: 544192452] 6/19/2019 - Active   Long term (current) use of anticoagulants, [SNOMED-CT: 854293079] 11/7/2019 - Active   Atrial fibrillation (Afib), paroxysmal - A Fib, [SNOMED-CT: 306578655] 11/7/2019 - Active   PCO (posterior capsular opacification), left, [SNOMED-CT: 55738061740348180] 10/22/2020 - Active   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, [SNOMED-CT: 535694453] 10/22/2020 - Active   Branch retinal vein occlusion of left eye with macular edema, [SNOMED-CT: 088261622] 10/22/2020 - Active     ENCOUNTER DIAGNOSIS    No data available    VITAL SIGNS    VITAL SIGNS  Date / Time: 7/31/2023   BP Systolic 110 mmHg   BP Diastolic 58 mmHg   Height 64 inches   Weight 148 lbs   Pulse Rate 81 bpm   BSA (Body Surface Area) 1.8 cc/m2   BMI (Body Mass Index) 25.4 cc/m2   Blood Pressure 110 / 58 mmHg     PHYSICAL  EXAMINATION    PHYSICAL EXAMINATION  Header Details   Vitals Right Arm Sitting  / 58 mmHg, Pulse rate 81 bpm, Height in 5' 4\", BMI: 25.4, Weight in 148 lbs (or) 67.13 kgs, BSA : 1.75 cc/m²   General Appearance No Acute Distress   Head/Eyes/Ears/Nose/Mouth/Throat Conjunctiva pink, Sclera Clear, Mucous membranes Moist   Neck Normal carotid pulsations, No carotid bruits, No JVD   Respiratory Unlabored, Lungs clear with normal breath sounds, Equal bilaterally   Cardiovascular Intact distal pulses, Regular rhythm. Normal rate present. Normal and normal S1 and S2     ALLERGIES, ADVERSE REACTIONS, ALERTS  Reconcile with Patient's ChartALLERGIES, ADVERSE REACTIONS, ALERTS  Type Substance Reaction Severity Status   Allergies Amiodarone, [RxNorm: 723738]   Mild Active   Allergies Ciprofloxacin, [RxNorm: 176574]   Mild Active   Allergies Garlic (Food Or Med), [RxNorm: 5730369]   Mild Active   Allergies Hydrocodone, [RxNorm: 410482]   Mild Active     MEDICATIONS ADMINISTERED DURING VISIT    No data available    MEDICATIONS  Reconcile with Patient's ChartMEDICATIONS  Medication Start Date Route/Frequency Status   acetaminophen (TYLENOL) 325 MG tablet, [RxNorm: 968179] 11/11/2021 Take 325 mg by mouth. Active   amlodipine 2.5 mg tablet, [RxNorm: 755861] - TAKE ONE TABLET BY MOUTH DAILY Active   BEVACizumab (AVASTIN) 25 MG/ML injection, [RxNorm: 0] 11/11/2021 1.25 mg by Intravitreal route. Active   calcium carbonate-vitamin D (CALCIUM 500 + D) 500-125 MG-UNIT tablet, [RxNorm: 338284] 11/11/2021 1 tablet 2 times daily. Active   digoxin 125 mcg (0.125 mg) tablet, [RxNorm: 425846] 6/8/2023 Take 1 tablet orally once a day. Active   Eliquis 5 mg tablet, [RxNorm: 4069355] - TAKE ONE TABLET BY MOUTH TWICE DAILY Active   gabapentin (NEURONTIN) 300 MG capsule, [RxNorm: 301024] 11/11/2021 Take 300 mg by mouth 2 times daily. Active   lidocaine (LIDOCARE) 4 % patch, [RxNorm: 1364481] 11/11/2021 Place 1 patch onto the skin every 24 hours.  Active   lisinopriL 20 mg tablet, [RxNorm: 466347] 12/15/2021 Take 1 tablet orally once a day. Active   memantine 5 mg tablet, [RxNorm: 404921] 7/13/2022 Take 1 tablet orally once a day. Active   metoprolol tartrate 50 mg tablet, [RxNorm: 795161] 2/16/2023 Take 1 tablet orally 2 times a day. Active   PreserVision AREDS 7,160 unit-113 mg-100 unit tablet, [RxNorm: 275347] 7/13/2022 Take 1 tablet orally 2 times a day. Active     ASSESSMENT    -BP is stable, continue current medications. - HR is controlled with underlying chronic AF. - She is tolerating eliquis 5 mg BID with normal renal function, weight over 60kg. -Device function stable, continue routine follow up in device clinic. - Follow up in a year.     FAMILY HISTORY    No data available    GENERAL STATUS    No data available    PAST MEDICAL HISTORY    PAST MEDICAL HISTORY  Problem Date diagonsed Date resolved Status   Pacemaker, [SNOMED-CT: 992672484] 6/19/2019 - Active   History of atrial fibrillation, [SNOMED-CT: 817320577] 6/19/2019 - Active   Long term (current) use of anticoagulants, [SNOMED-CT: 573719297] 11/7/2019 - Active   Atrial fibrillation (Afib), paroxysmal - A Fib, [SNOMED-CT: 163746956] 11/7/2019 - Active   PCO (posterior capsular opacification), left, [SNOMED-CT: 12187398809223326] 10/22/2020 - Active   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, [SNOMED-CT: 222384755] 10/22/2020 - Active   Branch retinal vein occlusion of left eye with macular edema, [SNOMED-CT: 847285213] 10/22/2020 - Active     HISTORY OF PRESENT ILLNESS    Ayse Maldonado is a 89 year old woman with PMHx of sinus node dysfunction, status post placement of a dual-chamber pacemaker. Atrial fibrillation, had previously been on sotalol. Recently, had recurrent atrial fibrillation that is now chronic. Presently well compensated no symptoms of heart failure or angina. Pacemaker interrogation shows  31%, ~2 years battery  in [facility].     IMMUNIZATIONS    No data available    PLAN OF CARE    PLAN OF CARE  Planned Care Date   EKG (electrocardiogram) 1/1/1900   Follow up visit - [place] 1/1/1900     PROCEDURES    No data available    LAB RESULTS    No data available    REVIEW OF SYSTEMS    REVIEW OF SYSTEMS  Header Details   Cardiovascular No history of Chest pain, PERDUE, Palpitations, Syncope, PND, Orthopnea, Edema, Claudication     SOCIAL HISTORY    SOCIAL HISTORY  Social History Element Description Effective Dates   Smoking status Never smoked -     FUNCTIONAL STATUS    No data available    MEDICAL EQUIPMENT    No data available    Goals Sections    No data available    REASON FOR REFERRAL         Health Concerns Section    No data available    COGNITIVE/MENTAL STATUS    No data available    Patient Demographics    Patient Demographics  Patient Address Communication Language Race / Ethnicity Marital Status   [address]   [place], [place] [id number] [phone] (Mobile) Unknown White / Not  or       Document Information    Primary Care Provider Other Service Providers Document Coverage Dates   Perfecto Fair  NPI: [id number]  [phone] (Work)  [address] [place] [id number]  [place] [address]  Interpreting Physicians  [facility]  [phone] (Work)  [address] [place] [address] [address] [place]  NPI: [id number]  [phone] (Work)  [address] [place] [address]  [place] [address]  Nurses     Guanaco Cruz  NPI: [id number]  [phone] (Work)  [address] [place] [address]  [place] [address]  Nurses Jul. 31, 2023July 31, 2023      Organization   [facility]  [phone] (Work)  [address] [place] [address]  [place] [address]     Encounter Providers Encounter Date    Jul. 31, 2023July  31, 2023     Legal Authenticator    [place]  NPI: [id number]  [phone] (Work)  [address] [place] [address], 4th floor, [place] [address]

## 2024-01-22 NOTE — PLAN OF CARE
Problem: Patient/Family Goals  Discharge planning in progress  Son states family is looking into patient moving into higher level of care at [facility]  Goal: Patient/Family Long Term Goal  Description: Patient's Long Term Goal:  to return home    Interventions:  - Observation    Telemetry monitoring in progress  ditional Care Plan goals for specific interventions  Outcome: Progressing  Goal: Patient/Family Short Term Goal  Description: Patient's Short Term Goal:  to feel better so I can go home    Interventions:   - Cardiology consult  - See additional Care Plan goals for specific interventions  Outcome: Progressing     Problem: CARDIOVASCULAR - ADULT  Vital signs and stable  Telemetry monitoring in progress  A-Fib RVR on monitor, rate 90's to 130 non-sustained  Goal: Maintains optimal cardiac output and hemodynamic stability  Description: INTERVENTIONS:  - Monitor vital signs, rhythm, and trends  - Monitor for bleeding, hypotension and signs of decreased cardiac output  - Evaluate effectiveness of vasoactive medications to optimize hemodynamic stability  - Monitor arterial and/or venous puncture sites for bleeding and/or hematoma  - Assess quality of pulses, skin color and temperature  - Assess for signs of decreased coronary artery perfusion - ex. Angina  - Evaluate fluid balance, assess for edema, trend weights  Outcome: Progressing  Goal: Absence of cardiac arrhythmias or at baseline  Chronic A-fib,  Medication adjustment for better rate control  Description: INTERVENTIONS:  - Continuous cardiac monitoring, monitor vital signs, obtain 12 lead EKG if indicated  - Evaluate effectiveness of antiarrhythmic and heart rate control medications as ordered  - Initiate emergency measures for life threatening arrhythmias  - Monitor electrolytes and administer replacement therapy as ordered  Outcome: Progressing     Problem: RESPIRATORY - ADULT  Remains on room air  Denies feeling short of breath  Denies recent URI  or cough  Goal: Achieves optimal ventilation and oxygenation  Description: INTERVENTIONS:  - Assess for changes in respiratory status  - Assess for changes in mentation and behavior  - Position to facilitate oxygenation and minimize respiratory effort  - Oxygen supplementation based on oxygen saturation or ABGs  - Provide Smoking Cessation handout, if applicable  - Encourage broncho-pulmonary hygiene including cough, deep breathe, Incentive Spirometry  - Assess the need for suctioning and perform as needed  - Assess and instruct to report SOB or any respiratory difficulty  - Respiratory Therapy support as indicated  - Manage/alleviate anxiety  - Monitor for signs/symptoms of CO2 retention  Outcome: Progressing     Problem: METABOLIC/FLUID AND ELECTROLYTES - ADULT  Goal: Electrolytes maintained within normal limits  Description: INTERVENTIONS:  - Monitor labs and rhythm and assess patient for signs and symptoms of electrolyte imbalances  - Administer electrolyte replacement as ordered  - Monitor response to electrolyte replacements, including rhythm and repeat lab results as appropriate  - Fluid restriction as ordered  - Instruct patient on fluid and nutrition restrictions as appropriate  Outcome: Progressing     Problem: SKIN/TISSUE INTEGRITY - ADULT  Goal: Skin integrity remains intact  Description: INTERVENTIONS  - Assess and document risk factors for pressure ulcer development  - Assess and document skin integrity  - Monitor for areas of redness and/or skin breakdown  - Initiate interventions, skin care algorithm/standards of care as needed  Outcome: Progressing     Problem: MUSCULOSKELETAL - ADULT  Goal: Return mobility to safest level of function  Description: INTERVENTIONS:  - Assess patient stability and activity tolerance for standing, transferring and ambulating w/ or w/o assistive devices  - Assist with transfers and ambulation using safe patient handling equipment as needed  - Ensure adequate protection  for wounds/incisions during mobilization  - Obtain PT/OT consults as needed  - Advance activity as appropriate  - Communicate ordered activity level and limitations with patient/family  Outcome: Progressing     Problem: NEUROLOGICAL - ADULT  Alert x 3. Forgetful. Anxious needs to be reoriented to situation and place.  Goal: Achieves stable or improved neurological status  Description: INTERVENTIONS  - Assess for and report changes in neurological status  - Initiate measures to prevent increased intracranial pressure  - Maintain blood pressure and fluid volume within ordered parameters to optimize cerebral perfusion and minimize risk of hemorrhage  - Monitor temperature, glucose, and sodium. Initiate appropriate interventions as ordered  Outcome: Progressing     Problem: Impaired Cognition  Goal: Patient will exhibit improved attention, thought processing and/or memory  Description: Interventions:    Outcome: Progressing

## 2024-01-23 VITALS
DIASTOLIC BLOOD PRESSURE: 68 MMHG | OXYGEN SATURATION: 96 % | SYSTOLIC BLOOD PRESSURE: 119 MMHG | BODY MASS INDEX: 22 KG/M2 | RESPIRATION RATE: 16 BRPM | TEMPERATURE: 98 F | HEART RATE: 74 BPM | WEIGHT: 135.81 LBS

## 2024-01-23 PROBLEM — I48.91 ATRIAL FIBRILLATION WITH RAPID VENTRICULAR RESPONSE (HCC): Status: RESOLVED | Noted: 2024-01-21 | Resolved: 2024-01-23

## 2024-01-23 RX ORDER — METOPROLOL TARTRATE 50 MG/1
50 TABLET, FILM COATED ORAL 2 TIMES DAILY
Qty: 60 TABLET | Refills: 2 | Status: SHIPPED | OUTPATIENT
Start: 2024-01-23

## 2024-01-23 RX ORDER — MEMANTINE HYDROCHLORIDE 5 MG/1
5 TABLET ORAL DAILY
Status: SHIPPED | COMMUNITY
Start: 2024-01-23

## 2024-01-23 NOTE — PROGRESS NOTES
[facility]    Progress Note    Ayse Maldonado Patient Status:  Inpatient    10/25/1927 MRN [id number]   Location [facility] 2NE-A Attending Terri Navarro MD   Hosp Day # 1 PCP Terri Navarro MD       SUBJECTIVE:    Has been doing okay overnight.  No complaints.    Discharge held yesterday due to heart rate dropping to 50s.  NP from cardiology came and saw the patient and decided to monitor her overnight before discharge.  Heart rate has been stable overnight.  Cardiologist has seen the patient this morning and cleared to discharge home as heart rate has been stable.    Son at bedside with questions regarding heart rate and BP monitoring postdischarge.  She lives in an independent living facility.    Discussed with nurse in detail.    OBJECTIVE:  Vital signs in last 24 hours:  /71 (BP Location: Left arm)   Pulse 73   Temp 98.2 °F (36.8 °C) (Oral)   Resp 16   Wt 135 lb 12.9 oz (61.6 kg)   SpO2 96%   BMI 21.92 kg/m²     Intake/Output:    Intake/Output Summary (Last 24 hours) at 2024 0822  Last data filed at 2024 0726  Gross per 24 hour   Intake 956 ml   Output 1455 ml   Net -499 ml       Wt Readings from Last 3 Encounters:   24 135 lb 12.9 oz (61.6 kg)   23 132 lb (59.9 kg)   23 127 lb 13.9 oz (58 kg)       Exam  Gen: No acute distress, alert and oriented x3, no focal neurologic deficits  Pulm: Lungs clear, normal respiratory effort  CV: Heart with regular rate and rhythm, no peripheral edema  Abd: Abdomen soft, nontender, nondistended, no organomegaly, bowel sounds present  MSK: Full range of motion in extremities, no clubbing, no cyanosis  Skin: no rashes or lesions    Data Review:     Labs:      Noted    Imaging:  XR CHEST AP PORTABLE  (CPT=71045)    Result Date: 2024  CONCLUSION:    Stable pacemaker leads right atrium and right ventricle.  Stable cardiomegaly out CHF.  Small patchy atelectasis left base.  Right lung clear.  No pneumothorax  LOCATION:   [facility]      Dictated by (CST): Horacio Call MD on 1/21/2024 at 9:32 PM     Finalized by (CST): Horacio Call MD on 1/21/2024 at 9:39 PM          Meds:   Current Facility-Administered Medications   Medication Dose Route Frequency    acetaminophen (Tylenol Extra Strength) tab 500 mg  500 mg Oral Q4H PRN    temazepam (Restoril) cap 15 mg  15 mg Oral Nightly PRN    polyethylene glycol (PEG 3350) (Miralax) 17 g oral packet 17 g  17 g Oral Daily PRN    sennosides (Senokot) tab 17.2 mg  17.2 mg Oral Nightly PRN    bisacodyl (Dulcolax) 10 MG rectal suppository 10 mg  10 mg Rectal Daily PRN    fleet enema (Fleet) 7-19 GM/118ML rectal enema 133 mL  1 enema Rectal Once PRN    guaiFENesin (Robitussin) 100 MG/5 ML oral liquid 200 mg  200 mg Oral Q4H PRN    glycerin-hypromellose- (Artifical Tears) 0.2-0.2-1 % ophthalmic solution 1 drop  1 drop Both Eyes QID PRN    ondansetron (Zofran) 4 MG/2ML injection 4 mg  4 mg Intravenous Q6H PRN    metoclopramide (Reglan) 5 mg/mL injection 5 mg  5 mg Intravenous Q8H PRN    apixaban (Eliquis) tab 5 mg  5 mg Oral BID    digoxin (Lanoxin) tab 125 mcg  125 mcg Oral QAM    gabapentin (Neurontin) cap 300 mg  300 mg Oral BID    lisinopril (Prinivil; Zestril) tab 20 mg  20 mg Oral Daily    metoprolol tartrate (Lopressor) tab 50 mg  50 mg Oral BID    dilTIAZem ER (CardIZEM CD) 24 hr cap 180 mg  180 mg Oral Daily    simethicone (Mylicon) chewable tab 80 mg  80 mg Oral QID PRN    memantine (Namenda) tab 5 mg  5 mg Oral Daily         Assessment    Atrial fibrillation with rapid ventricular response  Chronic atrial fibrillation-status post pacemaker-on digoxin, Eliquis, metoprolol, amlodipine, lisinopril.  Hypertension with hypertensive heart disease-with accelerated hypertension on admission  Obstructive sleep apnea  GERD  Asthma with COPD  Pulmonary hypertension  CKD stage III  History of seizure disorder.        Plan:     January 23, 2024    Continue current medications  Discharge planning  to independent living facility.  Discussed with son regarding need for close monitoring and heart rate and blood pressure.  Options explained-either patient should move to an assisted living facility or skilled nursing facility or they should get a 24-hour caregiver or at least a  who can assist patient with these kind of health monitorings.    Discharge planning  Discussed with nurse in detail      January 22, 2024-     Admit to medical floor-inpatient  Telemetry monitoring  Cardiology consultation-[facility]  Continue all current medications except-discontinue amlodipine and start diltiazem 180 mg daily  If no further episodes of palpitation or racing heart rate, patient may be discharged home in the evening.    Dispo: Home when able    Questions/concerns were discussed with patient and/or family by bedside.    Total Time spent with patient and coordinating care: 40 minutes            Tania Castro MD  1/23/2024  8:22 AM

## 2024-01-23 NOTE — PAYOR COMM NOTE
--------------  ADMISSION REVIEW     Payor: [facility]  Subscriber #:  [id number]  Authorization Number: [id number]    Admit date: 1/22/24  Admit time: 12:11 AM       1/21 Patient Seen in: [facility] Emergency Department    History     Chief Complaint   Patient presents with    Arrythmia/Palpitations     Patient with a history of atrial fibrillation on digoxin and Eliquis.  She is on lisinopril 20 mg daily, metoprolol tartrate 50 mg twice daily.  Patient also with a pacemaker.  Patient was seen in the emergency department just about a month ago not feeling well.  She had cardiology consultation and no extraordinary abnormalities identified.  She had follow-up with her primary care provider at the end of the month and was doing better.  Son provides majority of history.  Patient has been having her usual state of health.  No recent high fever or shaking chills.  No cold or cough.  No vomiting or diarrhea.  No black or bloody stool.  No urinary symptoms or flank pain.  She was complaining of some stomachache.  Son administered some Gas-X and MiraLAX that she takes a few times a week.  Son checks her blood pressure every once in a while and decided to do so today.  He noted blood pressure was elevated at 167/124 and her pulse was 148  Recheck of the blood pressure after she sat down was 170/92 and pulse was still elevated at 142.  Staff at her care home center also noted elevated heart rate.  Patient was not complaining of any palpitations.  She did seem to be a little short of breath.  She denied any headache, chest pain, or abdominal pain.  She has chronic discomfort in her legs but no extraordinary swelling.  No new numbness or weakness of her body.    Objective:   Past Medical History:   Diagnosis Date    -Lumbar radicular pain-electrical stimulator 07/29/2013    Abdominal distention     Abdominal hernia     Abdominal pain     Age-related osteoporosis with current pathological fracture with routine  healing 08/08/2018    Anesthesia complication     Arthritis     Atrial fibrillation (HCC)     Back pain     Basal cell cancer     colon    Bloating     Body piercing     Cellulitis of chest wall 07/11/2018    Cellulitis of left arm 07/19/2018    Cellulitis of right upper extremity     Closed bicondylar fracture of distal end of right humerus            Global exp 1-17-18 / RIGHT ELBOW / BAM  11/09/2017    Closed displaced fracture of pelvis (HCC)     Constipation     Diarrhea, unspecified     Easy bruising     Essential hypertension     Eye disease     Fatigue     Food intolerance     Frequent urination     Hearing impairment     Hearing loss     Heart palpitations     Hemorrhoids     History of depression     Hypertension     Irregular bowel habits     Leg swelling     Lumbar facet arthropathy 02/10/2018    Macular degeneration - [facility] 05/05/2011    Muscle weakness     Night sweats     Pacemaker     Reflux     Renal disorder     S/P colon resection / Colonoscopy (10/12) recheck - ANJALI Spencer 05/05/2011    S/P laminectomy 12/23/2013    Seizure disorder (HCC)     Sleep apnea     cpap    Sleep disturbance     Spinal stenosis of lumbar region without neurogenic claudication 08/08/2018    Visual impairment      Past Surgical History:   Procedure Laterality Date    ADJ TISS XFER LID,NOS,EAR <10SQCM  03/28/2014    Procedure: DEBULKING OF NASOLABIAL FLAP TO NOSE;  Surgeon: Shar Vasquez MD;  Location: [facility], [facility]    APPENDECTOMY      BACK SURGERY      BOWEL RESECTION      CHOLECYSTECTOMY      COLECTOMY      COLONOSCOPY      COLONOSCOPY      EGD      HEMORRHOIDECTOMY,INT/EXT,SIMPLE      HIP REPLACEMENT SURGERY  left 3/06 right 10/06    OTHER ACCESSORY      electro-stimulator for spinal stenosis    OTHER SURGICAL HISTORY  10/10/2011    cysto-    OTHER SURGICAL HISTORY  10/2016    back surgery    PACEMAKER      [employer] pacemaker    SKIN SURGERY  03/06/2014    BCC nod, inf to left nasal tip  / MMS done    SKIN SURGERY  10/31/2016    MMS for BCC-nod to the R upper cut lip-AB    SPINE SURGERY PROCEDURE UNLISTED      TONSILLECTOMY      TOTAL HIP REPLACEMENT           Physical Exam     ED Triage Vitals [01/21/24 1952]   BP (!) 169/118   Pulse 110   Resp 20   Temp 98.6 °F (37 °C)   Temp src Temporal   SpO2 98 %   O2 Device None (Room air)     Current:/81   Pulse 91   Temp 98.6 °F (37 °C) (Temporal)   Resp 20   Wt 60 kg   SpO2 97%   BMI 21.35 kg/m²     Physical Exam  General: The patient is awake, alert, conversant.  She is very hard of hearing.  Eyes: sclera white, conjunctiva pink and moist.  Lids and lashes are normal.  Oromucosa lips are moist  Neck: With no JVD  Lungs: Clear to auscultation bilaterally.  No rhonchi or rales.  Heart: Irregular and fast S1 and S2, without murmur.  Distal pulses are strong and symmetric.  Abdomen: Soft, nondistended.  Completely nontender even with deep palpation  Extremities: Unremarkable.  Calves nonswollen, symmetric.  No pedal edema.  Skin: Not particularly pale  Neurologic:  Mental status as above.  Patient moves all extremities with good strength and coordination.    Labs Reviewed   COMP METABOLIC PANEL (14) - Abnormal; Notable for the following components:       Result Value    Glucose 124 (*)     Sodium 131 (*)     All other components within normal limits   URINALYSIS, ROUTINE - Abnormal; Notable for the following components:    Urine Color Colorless (*)     Leukocyte Esterase Urine 75 (*)     WBC Urine 6-10 (*)     Bacteria Urine Rare (*)     All other components within normal limits   DIGOXIN (LANOXIN) - Abnormal; Notable for the following components:    Digoxin 0.10 (*)     All other components within normal limits   CBC W/ DIFFERENTIAL - Abnormal; Notable for the following components:    RBC 3.69 (*)     MCH 34.1 (*)     Lymphocyte Absolute 0.98 (*)     All other components within normal limits   TROPONIN I HIGH SENSITIVITY - Normal   TSH W REFLEX  TO FREE T4 - Normal   MAGNESIUM - Normal   URINE CULTURE, ROUTINE     EKG    Rate, intervals and axes as noted on EKG Report.  Rate: 127 bpm  Rhythm: Atrial Fibrillation  Reading: Atrial fibrillation with rapid ventricular response and low voltage with nonspecific change      Repeat EKG   Rate, intervals and axes as noted on EKG Report.  Rate: 116 bpm  Rhythm: Atrial Fibrillation  Reading: Atrial fibrillation with the same nonspecific changes low voltage       Repeat EKG   Rate, intervals and axes as noted on EKG Report.  Rate: 105 bpm  Rhythm: Atrial Fibrillation  Reading: Atrial fibrillation with the same nonspecific changes low voltage  MDM      Patient identified having tachycardia complaining of shortness of breath without chest pain or palpitations found to be in atrial fibrillation with rapid ventricular response.  Abdominal discomfort is completely subsided at this time.  Abdominal examination is benign   Initially, Cardizem ordered however patient's heart rate improved spontaneously.   IV beta-blocker ordered and patient closely monitored.    CBC shows normal white count, hemoglobin, and platelets  Metabolic panel shows mild hyponatremia  Troponin negative  Digoxin 0.1  Urinalysis showed trace leuk esterase with 6-10 WBCs.  I will send off urine culture  Magnesium normal  TSH normal    Chest x-ray  I personally reviewed the actual radiographs themselves and my individual interpretation cardiomegaly without heart failure  radiologist's formal interpretation which I have reviewed    CONCLUSION:    Stable pacemaker leads right atrium and right ventricle.  Stable cardiomegaly out CHF.  Small patchy atelectasis left base.  Right lung clear.  No pneumothorax          Follow-up vitals blood pressure 148/94 pulse 99 respirations 20    I discussed case with Dr. Castro.  She will admit  I discussed case with [facility].  They will consult and make further recommendations    Disposition and Plan     Clinical  Impression:  1. Atrial fibrillation with rapid ventricular response (HCC)         Hospital Problems       Present on Admission  Date Reviewed: 1/9/2024            ICD-10-CM Noted POA    * (Principal) Atrial fibrillation with rapid ventricular response (HCC) I48.91 1/21/2024 Unknown           1/22:      History & Physical    History of Present Illness:  Ayse Maldonado is a(n) 89 year old female.   Patient came to the hospital with palpitations.  She was found to be in atrial fibrillation with rapid ventricular response.  She was admitted for further management.    Of note she has history of atrial fibrillation and is on digoxin, Eliquis, metoprolol, amlodipine.  She is also on lisinopril 20 mg daily.  She has a pacemaker.  She was recently seen by the cardiologist and by PCP as well and had workup and everything was okay.  Her blood pressure was elevated today at home 167/124 and pulse was 148-still high on recheck.  Hence she was sent to the emergency room.  At that time she also had mild shortness of breath which has since resolved.    In the emergency the room she was treated with IV metoprolol-blood pressure and heart rate has both improved quickly.    Overnight she has done well she is on telemetry monitoring her heart rate and blood pressure are both normal on the telemetry monitor right now.  She has no complaints and denies shortness of breath.  Cardiologist is already seen her this morning and made recommendations to switch amlodipine to diltiazem 180 mg daily.  All her other medication doses will stay the same.  If she does not have any further rise in heart rate she may be discharged home within normal further cardiac workup-per cardiologist.    Physical Exam:  /75 (BP Location: Left arm)   Pulse 78   Temp 98.6 °F (37 °C) (Oral)   Resp 16   Wt 135 lb 12.9 oz (61.6 kg)   SpO2 95%   BMI 21.92 kg/m²   General appearance: alert, appears stated age, and cooperative  Eyes: conjunctivae/corneas  clear. PERRL, EOM's intact. Fundi benign.  Neck: no adenopathy, no carotid bruit, no JVD, supple, symmetrical, trachea midline, and thyroid not enlarged, symmetric, no tenderness/mass/nodules  Lungs: clear to auscultation bilaterally  Heart: S1, S2 normal, no murmur, click, rub or gallop, regular rate and rhythm  Abdomen: soft, non-tender; bowel sounds normal; no masses,  no organomegaly  Extremities: extremities normal, atraumatic, no cyanosis or edema  Neurologic: Grossly normal    Laboratory Data:   Lab Results   Component Value Date    WBC 4.9 01/21/2024    HGB 12.6 01/21/2024    HCT 36.8 01/21/2024    .0 01/21/2024    CREATSERUM 0.80 01/21/2024    BUN 16 01/21/2024     01/21/2024    K 4.2 01/21/2024    CL 99 01/21/2024    CO2 30.0 01/21/2024     01/21/2024    CA 8.9 01/21/2024    ALB 3.6 01/21/2024    ALKPHO 74 01/21/2024    BILT 0.3 01/21/2024    TP 7.3 01/21/2024    AST 36 01/21/2024    ALT  01/21/2024      Comment:      Due to  backorder we are temporarily unable to offer hospital-based ALT testing at [facility].   If urgently needed, please order ALT test code [id number].   The new order will need a new venipuncture and will be sent to [facility] for testing.   The expected turnaround time will be within 24 hours.     TSH 2.950 01/21/2024    MG 2.0 01/21/2024    TROPHS 11 01/21/2024       Assessment:    Atrial fibrillation with rapid ventricular response  Chronic atrial fibrillation-status post pacemaker-on digoxin, Eliquis, metoprolol, amlodipine, lisinopril.  Hypertension with hypertensive heart disease-with accelerated hypertension on admission  Obstructive sleep apnea  GERD  Asthma with COPD  Pulmonary hypertension  CKD stage III  History of seizure disorder.    Plan:  Admit to medical floor-inpatient  Telemetry monitoring  Cardiology consultation-[facility]  Continue all current medications except-discontinue amlodipine and start diltiazem 180 mg daily        CARDS:    Ayse TRIPP  Joel is a a(n) 89 year old female. Very nice elderly woman with chronic afib/PPM.  She takes eliquis, metoprolol, and digoxin.  She presented with rapid HR and accelerated HTN.  No cardiac sx's reported.  On arrival, BP was 170/110, .  She was given IV lopressor and BP and HR improved.  She is on RA      Medications:   amLODIPine  2.5 mg Oral Daily    apixaban  5 mg Oral BID    digoxin  125 mcg Oral QAM    memantine  5 mg Oral BID    gabapentin  300 mg Oral BID    lisinopril  20 mg Oral Daily    metoprolol tartrate  50 mg Oral BID         Continuous Infusions:   dilTIAZem Stopped (01/21/24 2000)        Physical Exam:      Temp:  [97.3 °F (36.3 °C)-98.6 °F (37 °C)] 98 °F (36.7 °C)  Pulse:  [] 94  Resp:  [16-21] 20  BP: (121-179)/() 137/75  SpO2:  [94 %-99 %] 95 %       General: No apparent distress  HEENT: No focal deficits.  Neck: supple. JVP normal  Cardiac: Regular rhythm, S1, S2 normal,   No rub or gallop.  No murmur.  Lungs: CTA  Abdomen: Soft, non-tender.   Extremities: No edema  Neurologic: no focal deficits  Skin: Warm and dry.          Impression:   Chronic afib - admission with RVR.  No clear inciting factors.  Rate down spontaneously for the most part.  HTN, accelerated on admission, now controlled.  PPM     Plan:  Same metoprolol.  Change amlodipine to diltiazem 180 mg daily.     Telemetry: [facility]        Cardiology Nocturnal APN Plan of Care      Page Received: Bedside RN [id number]     Patient with Afib, admitted with RVR, stated on ER cardizem.  Per RN, patient HR diipping to 50-60 then PPM kicks in.  Patient sleepy most of the day.     Assessment/Plan:   - Check BP / HR prior to PM BB  - Evaluate in AM for additional medication adjustment       1/23:     CARDS:    No issues reported.  Breathing comfortably.  Rate controlled, intermittent pacing.     Medications:   apixaban  5 mg Oral BID    digoxin  125 mcg Oral QAM    gabapentin  300 mg Oral BID    lisinopril  20 mg Oral Daily     metoprolol tartrate  50 mg Oral BID    dilTIAZem ER  180 mg Oral Daily    memantine  5 mg Oral Daily          Physical Exam:     Temp:  [97.6 °F (36.4 °C)-98.6 °F (37 °C)] 97.6 °F (36.4 °C)  Pulse:  [] 81  Resp:  [16-18] 16  BP: (120-148)/(68-88) 123/71  SpO2:  [92 %-96 %] 94 %     General: No apparent distress  HEENT: No focal deficits.  Neck: supple. JVP normal  Cardiac: Regular rhythm, S1, S2 normal,  rub or gallop.  No murmur.  Lungs: CTA  Abdomen: Soft, non-tender.   Extremities: No edema  Neurologic: no focal deficits  Skin: Warm and dry.      Impression:  Chronic afib - admission with RVR.  No clear inciting factors.  Rate down now with demand pacing.  HTN, accelerated on admission, now controlled.  PPM         Plan:  Continue same meds, changing amlodipine to diltiazem.  Her fatigue is non cardiac.  Plan per hospitalist, stable CV status for dc.        MEDICATIONS ADMINISTERED IN LAST 1 DAY:  acetaminophen (Tylenol Extra Strength) tab 500 mg       Date Action Dose Route User    1/22/2024 1703 Given 500 mg Oral Nerissa Vyas RN          apixaban (Eliquis) tab 5 mg       Date Action Dose Route User    1/22/2024 2034 Given 5 mg Oral Obi Golden RN    1/22/2024 0922 Given 5 mg Oral Nerissa Vyas RN          dilTIAZem ER (CardIZEM CD) 24 hr cap 180 mg       Date Action Dose Route User    1/22/2024 0923 Given 180 mg Oral Nerissa Vyas RN          gabapentin (Neurontin) cap 300 mg       Date Action Dose Route User    1/22/2024 2034 Given 300 mg Oral Obi Golden RN    1/22/2024 0923 Given 300 mg Oral Nerissa Vyas RN          lisinopril (Prinivil; Zestril) tab 20 mg       Date Action Dose Route User    1/22/2024 2034 Given 20 mg Oral Obi Golden RN          memantine (Namenda) tab 5 mg       Date Action Dose Route User    1/22/2024 0922 Given 5 mg Oral Bivol, Nerissa, RN          metoprolol tartrate (Lopressor) tab 50 mg       Date Action Dose Route User    1/22/2024 2034 Given 50 mg Oral Digal,  Obi RICHMOND RN    1/22/2024 0922 Given 50 mg Oral BivolNerissa RN          simethicone (Mylicon) chewable tab 80 mg       Date Action Dose Route User    1/22/2024 1816 Given 80 mg Oral Bivol, CARLA Multani            Vitals (last day)       Date/Time Temp Pulse Resp BP SpO2 Weight O2 Device O2 Flow Rate (L/min) [facility]    01/23/24 0811 98.2 °F (36.8 °C) 73 16 125/71 96 % -- None (Room air) 0 L/min NC    01/23/24 0430 -- 81 16 123/71 -- -- -- --     01/23/24 0430 97.6 °F (36.4 °C) -- -- -- 94 % -- None (Room air) -- MD    01/22/24 2300 97.7 °F (36.5 °C) 74 16 143/88 95 % -- Nasal cannula -- MD    01/22/24 2015 98.4 °F (36.9 °C) 95 16 148/75 96 % -- None (Room air) --     01/22/24 1816 -- 89 18 -- -- -- -- --     01/22/24 1602 98.3 °F (36.8 °C) 75 16 120/68 95 % -- None (Room air) -- NC    01/22/24 1449 -- 70 16 127/72 94 % -- None (Room air) --     01/22/24 1410 -- 75 16 -- -- -- -- --     01/22/24 1227 -- 76 -- 124/68 93 % -- -- --     01/22/24 1227 97.6 °F (36.4 °C) -- 16 -- -- -- None (Room air) -- NC    01/22/24 0748 98.6 °F (37 °C) 102 16 137/75 95 % -- None (Room air) -- NC    01/22/24 0436 -- 94 -- 137/75 95 % -- -- --     01/22/24 0402 98 °F (36.7 °C) -- 20 -- -- -- None (Room air) --     01/22/24 0130 -- -- -- -- -- 135 lb 12.9 oz -- -- AHA    01/22/24 0017 -- 121 -- 124/86 95 % 135 lb 12.9 oz -- --     01/22/24 0014 -- 117 -- 121/98 94 % -- -- --     01/22/24 0012 97.3 °F (36.3 °C) 112 20 160/71 96 % -- None (Room air) --        01/21/24 2330 -- 104 20 -- 96 % -- None (Room air) --    01/21/24 2145 -- 81 20 140/72 96 % -- None (Room air) --    01/21/24 2130 -- 91 20 126/81 97 % -- None (Room air) --    01/21/24 2115 -- 98 20 159/92 Abnormal  96 % -- None (Room air) --    01/21/24 2100 -- 94 20 161/82 Abnormal  94 % -- None (Room air) --    01/21/24 2045 -- 94 20 -- 99 % -- None (Room air) --    01/21/24 2030 -- 99 20 148/94 Abnormal  97 % -- None (Room air) --    01/21/24 2022 --  -- -- -- -- -- None (Room air) -- TK   01/21/24 2015 -- 84 16 148/78 97 % -- None (Room air) -- TK   01/21/24 2000 -- 117 21 179/108 Abnormal  98 % -- None (Room air) -- TK   01/21/24 1952 98.6 °F (37 °C) 110 20 169/118 Abnormal  98 % 132 lb 4.4 oz None (Room air)

## 2024-01-23 NOTE — PLAN OF CARE
Ayse Maldonado Patient Status:  Inpatient    10/25/1927 MRN [id number]   Location [facility] 2NE-A Attending Terri Navarro MD   Hosp Day # 0 PCP Terri Navarro MD     Cardiology Nocturnal APN Plan of Care     Page Received: Bedside RN [id number]    Patient with Afib, admitted with RVR, stated on ER cardizem.  Per RN, patient HR diipping to 50-60 then PPM kicks in.  Patient sleepy most of the day.    Assessment/Plan:   - Check BP / HR prior to PM BB  - Evaluate in AM for additional medication adjustment       VON Guerra  [facility]  2024  6:49 PM

## 2024-01-23 NOTE — CM/SW NOTE
Message left with [place] to discuss in house programs that maybe able to monitor pt's blood pressure.

## 2024-01-23 NOTE — CM/SW NOTE
MSW attempted to complete DNR with pt and son Cortes. However POA is not present.  MSW left form in room for them to discuss, but wait for MSW to complete after contacting POA.  Dementia is documented in chart.    MSW called POA Pankaj and left message. Then MSW called next POA on list Son Jewel  who does not want to defer, he wants to discuss with all parties first.    Blank copy provided to Son Cortes to review with family. MSW suggested to son to consider a higher level of care, updated him that [facility] staff can assit with this.

## 2024-01-23 NOTE — PROGRESS NOTES
Cardiology Progress Note        Ayse Maldonado Patient Status:  Inpatient    10/25/1927 MRN [id number]   [facility] 2NE-A Attending Terri Navarro MD   Hosp Day # 1 PCP Terri Navarro MD     Subjective:  No issues reported.  Breathing comfortably.  Rate controlled, intermittent pacing.    Medications:   apixaban  5 mg Oral BID    digoxin  125 mcg Oral QAM    gabapentin  300 mg Oral BID    lisinopril  20 mg Oral Daily    metoprolol tartrate  50 mg Oral BID    dilTIAZem ER  180 mg Oral Daily    memantine  5 mg Oral Daily       Continuous Infusions:        Allergies:  Allergies   Allergen Reactions    Bacitracin SWELLING     Red and swelling    Ciprofloxacin OTHER (SEE COMMENTS)     Nausea and abdominal cramping    Levofloxacin OTHER (SEE COMMENTS)    Sulfamethoxazole W/Trimethoprim OTHER (SEE COMMENTS)     Other reaction(s): VOMITING  Oral    Altaryl     Amiodarone OTHER (SEE COMMENTS)     Elevated liver enzymes      Diphenhydramine UNKNOWN    Escitalopram     Thiazide-Type Diuretics OTHER (SEE COMMENTS)     CLASS    Cephalexin DIARRHEA     May have also caused drug rash    Clonidine RASH     Oral    Garlic DIARRHEA    Garlic DIARRHEA    Hydrocodone NAUSEA ONLY     nausea    Lexapro NAUSEA ONLY         Intake/Output:    Intake/Output Summary (Last 24 hours) at 2024 0656  Last data filed at 2024 0445  Gross per 24 hour   Intake 956 ml   Output 1480 ml   Net -524 ml           Last 3 Weights   24 0130 135 lb 12.9 oz (61.6 kg)   24 0017 135 lb 12.9 oz (61.6 kg)   24 1952 132 lb 4.4 oz (60 kg)   23 1520 132 lb (59.9 kg)   23 1330 127 lb 13.9 oz (58 kg)            Physical Exam:    Temp:  [97.6 °F (36.4 °C)-98.6 °F (37 °C)] 97.6 °F (36.4 °C)  Pulse:  [] 81  Resp:  [16-18] 16  BP: (120-148)/(68-88) 123/71  SpO2:  [92 %-96 %] 94 %    General: No apparent distress  HEENT: No focal deficits.  Neck: supple. JVP normal  Cardiac: Regular rhythm, S1, S2 normal,   rub or gallop.  No murmur.  Lungs: CTA  Abdomen: Soft, non-tender.   Extremities: No edema  Neurologic: no focal deficits  Skin: Warm and dry.     Telemetry: reviewed    EKG:      Echo:      Cardiac Cath:      Labs:  HEM:  Recent Labs   Lab 01/21/24 1955   WBC 4.9   HGB 12.6   .0       Chem:  Recent Labs   Lab 01/21/24 1955 01/21/24 2002   *  --    K 4.2  --    CL 99  --    CO2 30.0  --    BUN 16  --    CREATSERUM 0.80  --    CA 8.9  --    MG  --  2.0   *  --        Recent Labs   Lab 01/21/24 1955   AST 36   ALB 3.6       No results for input(s): \"TROP\", \"CK\" in the last 168 hours.    No results for input(s): \"PTP\", \"INR\" in the last 168 hours.              Impression:  Chronic afib - admission with RVR.  No clear inciting factors.  Rate down now with demand pacing.  HTN, accelerated on admission, now controlled.  PPM          Plan:  Continue same meds, changing amlodipine to diltiazem.  Her fatigue is non cardiac.  Plan per hospitalist, stable CV status for dc.        Pankaj Strong MD  1/23/2024  6:56 AM  L3

## 2024-01-23 NOTE — PLAN OF CARE
Assumed care of pt at 0700; alert, oriented x4 yet confused at times with poor short term memory recall. Bilateral lung sounds clear and diminished. V-paced on monitor. Patient denies chest pain, shortness of breath, and dizziness. Continent of bowel and bladder, active bowel sounds. Patient and son updated on plan of care for discharge and questions answered. At the time of this note patient is upright in the chair, alarm is on, call light within reach.     -----------------------------------------------------------

## 2024-01-23 NOTE — DISCHARGE SUMMARY
[facility]    Discharge Summary    Ayse Maldonado Patient Status:  Observation    10/25/1927 MRN [id number]   Location [facility] 2NE-A Attending No att. providers found   Hosp Day # 0 PCP Terri Navarro MD     Date of Admission: 2024 Disposition: Home or Self Care     Date of Discharge: 2024  4:51 PM    Admitting Diagnosis: Atrial fibrillation with rapid ventricular response (HCC) [I48.91]    Discharge Diagnosis:     Atrial fibrillation with rapid ventricular response  Chronic atrial fibrillation-status post pacemaker-on digoxin, Eliquis, metoprolol, amlodipine, lisinopril.  Hypertension with hypertensive heart disease-with accelerated hypertension on admission  Obstructive sleep apnea  GERD  Asthma with COPD  Pulmonary hypertension  CKD stage III  History of seizure disorder.         Patient Active Problem List   Diagnosis    Pacemaker - [place]    JAZZ on CPAP    Current use of long term anticoagulation    Gastroesophageal reflux disease without esophagitis    Asthma with COPD (chronic obstructive pulmonary disease)    Pulmonary hypertension (HCC)-52 mg Hg    Alternating constipation and diarrhea    Other fatigue-mutifactorial    Chronic bronchitis (HCC)    CKD (chronic kidney disease) stage 3, GFR 30-59 ml/min (HCC)    PAF (paroxysmal atrial fibrillation) (HCC)    Glaucoma suspect of left eye    Pseudophakia    Trochanteric bursitis of left hip    Fall    Essential hypertension    Chronic left hip pain    CHI (closed head injury), subsequent encounter    Altered mental status, unspecified altered mental status type    Hyponatremia    Seizure disorder (HCC)    Hypertension, unspecified type    Altered mental status    Weakness    Hypotension, unspecified hypotension type    Hypotension    Branch retinal vein occlusion of left eye with macular edema    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 (HCC)    PCO (posterior capsular  opacification), left    Inguinal hernia of right side without obstruction or gangrene    Umbilical hernia without obstruction and without gangrene    Protein-calorie malnutrition, unspecified severity (HCC)    Dementia without behavioral disturbance (HCC)       History of Present Illness:   Patient came to the hospital with palpitations.  She was found to be in atrial fibrillation with rapid ventricular response.  She was admitted for further management.     Of note she has history of atrial fibrillation and is on digoxin, Eliquis, metoprolol, amlodipine.  She is also on lisinopril 20 mg daily.  She has a pacemaker.  She was recently seen by the cardiologist and by PCP as well and had workup and everything was okay.  Her blood pressure was elevated today at home 167/124 and pulse was 148-still high on recheck.  Hence she was sent to the emergency room.  At that time she also had mild shortness of breath which has since resolved.     In the emergency the room she was treated with IV metoprolol-blood pressure and heart rate has both improved quickly.     Overnight she has done well she is on telemetry monitoring her heart rate and blood pressure are both normal on the telemetry monitor right now.  She has no complaints and denies shortness of breath.  Cardiologist is already seen her this morning and made recommendations to switch amlodipine to diltiazem 180 mg daily.  All her other medication doses will stay the same.  If she does not have any further rise in heart rate she may be discharged home within normal further cardiac workup-per cardiologist.       Hospital Course:     January 23, 2024     Continue current medications  Discharge planning to independent living facility.  Discussed with son regarding need for close monitoring and heart rate and blood pressure.  Options explained-either patient should move to an assisted living facility or skilled nursing facility or they should get a 24-hour caregiver or at  least a  who can assist patient with these kind of health monitorings.     Discharge planning  Discussed with nurse in detail        January 22, 2024-     Admit to medical floor-inpatient  Telemetry monitoring  Cardiology consultation-[facility]  Continue all current medications except-discontinue amlodipine and start diltiazem 180 mg daily  If no further episodes of palpitation or racing heart rate, patient may be discharged home in the evening.     Dispo: Home when able     Questions/concerns were discussed with patient and/or family by bedside.    Consultations: cardiology    Procedures: none    Complications: none    Discharge Condition: Stable         Discharge Medications:      Discharge Medications        START taking these medications        Instructions Prescription details   dilTIAZem HCl ER Beads 180 MG Cp24  Commonly known as: TIAZAC      Take 1 capsule (180 mg total) by mouth daily.   Quantity: 30 capsule  Refills: 0            CHANGE how you take these medications        Instructions Prescription details   lisinopril 20 MG Tabs  Commonly known as: Prinivil; Zestril  What changed: additional instructions      TAKE ONE TABLET BY MOUTH DAILY   Quantity: 90 tablet  Refills: 0     metoprolol tartrate 50 MG Tabs  Commonly known as: Lopressor  What changed:   how much to take  additional instructions      Take 1 tablet (50 mg total) by mouth 2 (two) times daily.   Quantity: 60 tablet  Refills: 2            CONTINUE taking these medications        Instructions Prescription details   Acetaminophen  MG Tbcr  Commonly known as: TYLENOL      Take 2 tablets (1,300 mg total) by mouth in the morning and 2 tablets (1,300 mg total) before bedtime.   Refills: 0     apixaban 5 MG Tabs  Commonly known as: Eliquis      Take 1 tablet (5 mg total) by mouth 2 (two) times daily.   Quantity: 60 tablet  Refills: 3     Calcium-Vitamin D3 600-500 MG-UNIT Caps      Take 1 tablet by mouth daily.   Refills: 0      CENTRUM SILVER 50+WOMEN OR  Notes to patient:  Take as directed      Take by mouth.   Refills: 0     ICAPS AREDS 2 OR  Notes to patient: Take as directed      Take by mouth.   Refills: 0     digoxin 0.125 MG Tabs  Commonly known as: Lanoxin      Take 1 tablet (125 mcg total) by mouth every morning.   Refills: 0     FLORASTOR OR  Notes to patient: Take as directed      Take by mouth As Directed.   Refills: 0     gabapentin 300 MG Caps  Commonly known as: Neurontin      TAKE ONE CAPSULE BY MOUTH TWICE DAILY   Quantity: 180 capsule  Refills: 1     lidocaine 4 % Ptch  Commonly known as: LIDODERM      Place 1 patch onto the skin daily.   Refills: 0     memantine 5 MG Tabs  Commonly known as: Namenda      Take 1 tablet (5 mg total) by mouth daily. Evening only   Refills: 0     MIRALAX OR  Notes to patient: Take as directed      Take by mouth.   Refills: 0            STOP taking these medications      amLODIPine 2.5 MG Tabs  Commonly known as: Norvasc                  Where to Get Your Medications        These medications were sent to [facility] [place] [address] [phone], [phone]  [address], [facility] [address]      Phone: [phone]   dilTIAZem HCl ER Beads 180 MG Cp24       Please  your prescriptions at the location directed by your doctor or nurse    Bring a paper prescription for each of these medications  metoprolol tartrate 50 MG Tabs         Follow up Visits: Follow-up with  cardiologist as recommended    Tania Castro MD  1/25/2024  5:02 PM

## 2024-01-23 NOTE — PLAN OF CARE
Problem: Patient/Family Goals  Goal: Patient/Family Long Term Goal  Description: Patient's Long Term Goal:  to return home    Interventions:  - Observation    Telemetry monitoring in progress  ditional Care Plan goals for specific interventions  Outcome: Progressing  Goal: Patient/Family Short Term Goal  Description: Patient's Short Term Goal:  to feel better so I can go home    Interventions:   - Cardiology consult  - See additional Care Plan goals for specific interventions  Outcome: Progressing     Problem: CARDIOVASCULAR - ADULT  Goal: Maintains optimal cardiac output and hemodynamic stability  Description: INTERVENTIONS:  - Monitor vital signs, rhythm, and trends  - Monitor for bleeding, hypotension and signs of decreased cardiac output  - Evaluate effectiveness of vasoactive medications to optimize hemodynamic stability  - Monitor arterial and/or venous puncture sites for bleeding and/or hematoma  - Assess quality of pulses, skin color and temperature  - Assess for signs of decreased coronary artery perfusion - ex. Angina  - Evaluate fluid balance, assess for edema, trend weights  Outcome: Progressing  Goal: Absence of cardiac arrhythmias or at baseline  Description: INTERVENTIONS:  - Continuous cardiac monitoring, monitor vital signs, obtain 12 lead EKG if indicated  - Evaluate effectiveness of antiarrhythmic and heart rate control medications as ordered  - Initiate emergency measures for life threatening arrhythmias  - Monitor electrolytes and administer replacement therapy as ordered  Outcome: Progressing     Problem: RESPIRATORY - ADULT  Goal: Achieves optimal ventilation and oxygenation  Description: INTERVENTIONS:  - Assess for changes in respiratory status  - Assess for changes in mentation and behavior  - Position to facilitate oxygenation and minimize respiratory effort  - Oxygen supplementation based on oxygen saturation or ABGs  - Provide Smoking Cessation handout, if applicable  - Encourage  broncho-pulmonary hygiene including cough, deep breathe, Incentive Spirometry  - Assess the need for suctioning and perform as needed  - Assess and instruct to report SOB or any respiratory difficulty  - Respiratory Therapy support as indicated  - Manage/alleviate anxiety  - Monitor for signs/symptoms of CO2 retention  Outcome: Progressing     Problem: METABOLIC/FLUID AND ELECTROLYTES - ADULT  Goal: Electrolytes maintained within normal limits  Description: INTERVENTIONS:  - Monitor labs and rhythm and assess patient for signs and symptoms of electrolyte imbalances  - Administer electrolyte replacement as ordered  - Monitor response to electrolyte replacements, including rhythm and repeat lab results as appropriate  - Fluid restriction as ordered  - Instruct patient on fluid and nutrition restrictions as appropriate  Outcome: Progressing     Problem: SKIN/TISSUE INTEGRITY - ADULT  Goal: Skin integrity remains intact  Description: INTERVENTIONS  - Assess and document risk factors for pressure ulcer development  - Assess and document skin integrity  - Monitor for areas of redness and/or skin breakdown  - Initiate interventions, skin care algorithm/standards of care as needed  Outcome: Progressing     Problem: MUSCULOSKELETAL - ADULT  Goal: Return mobility to safest level of function  Description: INTERVENTIONS:  - Assess patient stability and activity tolerance for standing, transferring and ambulating w/ or w/o assistive devices  - Assist with transfers and ambulation using safe patient handling equipment as needed  - Ensure adequate protection for wounds/incisions during mobilization  - Obtain PT/OT consults as needed  - Advance activity as appropriate  - Communicate ordered activity level and limitations with patient/family  Outcome: Progressing     Problem: NEUROLOGICAL - ADULT  Goal: Achieves stable or improved neurological status  Description: INTERVENTIONS  - Assess for and report changes in neurological  status  - Initiate measures to prevent increased intracranial pressure  - Maintain blood pressure and fluid volume within ordered parameters to optimize cerebral perfusion and minimize risk of hemorrhage  - Monitor temperature, glucose, and sodium. Initiate appropriate interventions as ordered  Outcome: Progressing     Problem: Impaired Cognition  Goal: Patient will exhibit improved attention, thought processing and/or memory  Description: Interventions:    Outcome: Progressing

## 2024-01-23 NOTE — PLAN OF CARE
Received bedside report on this Pt at 0745. Pt awake, A&O, forgetful.  Pt is in AFib on Tele monitor, sats greater than 92% on RA.  PRN Tylenol administered per Pt request for abdominal pain, effective.  Pt requested Gas-X, paged and received order from Dr. Castro, administered. Pt up to the BR multiple times throughout the shift, up with walker and SBA. Pt's son Cortes at bedside since 0920, updated on POC and his questions answered.  This afternoon Pt's HR was going down into the 50s-60s, pacemaker kicked in, Pt  very drowsy and tired, her son concerned about the Tele alarm going off and her drowsiness, notified SHANNEN Woo, was told she'll discuss with Dr. Strong.  Pt more awake this evening, went to the BR, ate some dinner.  Paged and spoke with Brigette Niño about discharge plan, was told Pt to stay until tomorrow, and parameters received for Metoprolol.  Pt's son Cortes had requested to speak with Social work regarding Pt's discharge plan, SW consulted and notified.  Report given to night nurse at this time with bedside rounding.  Pt in bed, call light is in reach, bed alarm is on for safety.     Problem: Patient/Family Goals  Goal: Patient/Family Long Term Goal  Description: Patient's Long Term Goal:  to return home    Interventions:  - Observation    Telemetry monitoring in progress  ditional Care Plan goals for specific interventions  Outcome: Progressing  Goal: Patient/Family Short Term Goal  Description: Patient's Short Term Goal:  to feel better so I can go home    Interventions:   - Cardiology consult  - See additional Care Plan goals for specific interventions  Outcome: Progressing     Problem: CARDIOVASCULAR - ADULT  Goal: Maintains optimal cardiac output and hemodynamic stability  Description: INTERVENTIONS:  - Monitor vital signs, rhythm, and trends  - Monitor for bleeding, hypotension and signs of decreased cardiac output  - Evaluate effectiveness of vasoactive medications to optimize  hemodynamic stability  - Monitor arterial and/or venous puncture sites for bleeding and/or hematoma  - Assess quality of pulses, skin color and temperature  - Assess for signs of decreased coronary artery perfusion - ex. Angina  - Evaluate fluid balance, assess for edema, trend weights  Outcome: Progressing  Goal: Absence of cardiac arrhythmias or at baseline  Description: INTERVENTIONS:  - Continuous cardiac monitoring, monitor vital signs, obtain 12 lead EKG if indicated  - Evaluate effectiveness of antiarrhythmic and heart rate control medications as ordered  - Initiate emergency measures for life threatening arrhythmias  - Monitor electrolytes and administer replacement therapy as ordered  Outcome: Progressing     Problem: RESPIRATORY - ADULT  Goal: Achieves optimal ventilation and oxygenation  Description: INTERVENTIONS:  - Assess for changes in respiratory status  - Assess for changes in mentation and behavior  - Position to facilitate oxygenation and minimize respiratory effort  - Oxygen supplementation based on oxygen saturation or ABGs  - Provide Smoking Cessation handout, if applicable  - Encourage broncho-pulmonary hygiene including cough, deep breathe, Incentive Spirometry  - Assess the need for suctioning and perform as needed  - Assess and instruct to report SOB or any respiratory difficulty  - Respiratory Therapy support as indicated  - Manage/alleviate anxiety  - Monitor for signs/symptoms of CO2 retention  Outcome: Progressing     Problem: METABOLIC/FLUID AND ELECTROLYTES - ADULT  Goal: Electrolytes maintained within normal limits  Description: INTERVENTIONS:  - Monitor labs and rhythm and assess patient for signs and symptoms of electrolyte imbalances  - Administer electrolyte replacement as ordered  - Monitor response to electrolyte replacements, including rhythm and repeat lab results as appropriate  - Fluid restriction as ordered  - Instruct patient on fluid and nutrition restrictions as  appropriate  Outcome: Progressing     Problem: SKIN/TISSUE INTEGRITY - ADULT  Goal: Skin integrity remains intact  Description: INTERVENTIONS  - Assess and document risk factors for pressure ulcer development  - Assess and document skin integrity  - Monitor for areas of redness and/or skin breakdown  - Initiate interventions, skin care algorithm/standards of care as needed  Outcome: Progressing     Problem: MUSCULOSKELETAL - ADULT  Goal: Return mobility to safest level of function  Description: INTERVENTIONS:  - Assess patient stability and activity tolerance for standing, transferring and ambulating w/ or w/o assistive devices  - Assist with transfers and ambulation using safe patient handling equipment as needed  - Ensure adequate protection for wounds/incisions during mobilization  - Obtain PT/OT consults as needed  - Advance activity as appropriate  - Communicate ordered activity level and limitations with patient/family  Outcome: Progressing

## 2024-01-23 NOTE — PLAN OF CARE
Patient tele discontinued. IV removed with catheter intact. Patient denies CP, SOB, dizziness, palpations, and calf tenderness. Discharge instructions reviewed with patient and son, verbalized understanding. Medications and side effects reviewed with patient and sent to pharmacy of choice. Patient escorted via wheelchair to lobby.

## 2024-01-24 NOTE — PAYOR COMM NOTE
--------------  DISCHARGE REVIEW    Payor: [employer] [facility]  Subscriber #:  [id number]  Authorization Number: [id number]    Admit date: 1/22/24  Admit time:  12:11 AM  Discharge Date: 1/23/2024  4:51 PM     Admitting Physician: Terri Navarro MD  Attending Physician:  No att. providers found  Primary Care Physician: Terri Navarro MD

## 2024-02-16 ENCOUNTER — APPOINTMENT (OUTPATIENT)
Dept: LAB | Age: 89
End: 2024-02-16
Attending: INTERNAL MEDICINE
Payer: MEDICARE

## 2024-03-14 ENCOUNTER — LAB REQUISITION (OUTPATIENT)
Dept: LAB | Facility: HOSPITAL | Age: 89
End: 2024-03-14
Payer: MEDICARE

## 2024-03-14 DIAGNOSIS — R69 ILLNESS, UNSPECIFIED: ICD-10-CM

## 2024-03-14 LAB
ALBUMIN SERPL-MCNC: 3 G/DL (ref 3.4–5)
ALBUMIN/GLOB SERPL: 1 {RATIO} (ref 1–2)
ALP LIVER SERPL-CCNC: 67 U/L
ALT SERPL-CCNC: 16 U/L
ANION GAP SERPL CALC-SCNC: 5 MMOL/L (ref 0–18)
AST SERPL-CCNC: 19 U/L (ref 15–37)
BASOPHILS # BLD AUTO: 0.04 X10(3) UL (ref 0–0.2)
BASOPHILS NFR BLD AUTO: 0.9 %
BILIRUB SERPL-MCNC: 0.4 MG/DL (ref 0.1–2)
BUN BLD-MCNC: 13 MG/DL (ref 9–23)
CALCIUM BLD-MCNC: 8.9 MG/DL (ref 8.5–10.1)
CHLORIDE SERPL-SCNC: 104 MMOL/L (ref 98–112)
CO2 SERPL-SCNC: 26 MMOL/L (ref 21–32)
CREAT BLD-MCNC: 0.78 MG/DL
EGFRCR SERPLBLD CKD-EPI 2021: 69 ML/MIN/1.73M2 (ref 60–?)
EOSINOPHIL # BLD AUTO: 0.18 X10(3) UL (ref 0–0.7)
EOSINOPHIL NFR BLD AUTO: 3.9 %
ERYTHROCYTE [DISTWIDTH] IN BLOOD BY AUTOMATED COUNT: 11.9 %
FASTING STATUS PATIENT QL REPORTED: YES
GLOBULIN PLAS-MCNC: 3 G/DL (ref 2.8–4.4)
GLUCOSE BLD-MCNC: 90 MG/DL (ref 70–99)
HCT VFR BLD AUTO: 33.8 %
HGB BLD-MCNC: 11.5 G/DL
IMM GRANULOCYTES # BLD AUTO: 0.03 X10(3) UL (ref 0–1)
IMM GRANULOCYTES NFR BLD: 0.6 %
LYMPHOCYTES # BLD AUTO: 1.29 X10(3) UL (ref 1–4)
LYMPHOCYTES NFR BLD AUTO: 27.6 %
MCH RBC QN AUTO: 34.6 PG (ref 26–34)
MCHC RBC AUTO-ENTMCNC: 34 G/DL (ref 31–37)
MCV RBC AUTO: 101.8 FL
MONOCYTES # BLD AUTO: 0.58 X10(3) UL (ref 0.1–1)
MONOCYTES NFR BLD AUTO: 12.4 %
NEUTROPHILS # BLD AUTO: 2.55 X10 (3) UL (ref 1.5–7.7)
NEUTROPHILS # BLD AUTO: 2.55 X10(3) UL (ref 1.5–7.7)
NEUTROPHILS NFR BLD AUTO: 54.6 %
OSMOLALITY SERPL CALC.SUM OF ELEC: 280 MOSM/KG (ref 275–295)
PLATELET # BLD AUTO: 197 10(3)UL (ref 150–450)
POTASSIUM SERPL-SCNC: 3.7 MMOL/L (ref 3.5–5.1)
PROT SERPL-MCNC: 6 G/DL (ref 6.4–8.2)
RBC # BLD AUTO: 3.32 X10(6)UL
SODIUM SERPL-SCNC: 135 MMOL/L (ref 136–145)
WBC # BLD AUTO: 4.7 X10(3) UL (ref 4–11)

## 2024-03-14 PROCEDURE — 85025 COMPLETE CBC W/AUTO DIFF WBC: CPT | Performed by: INTERNAL MEDICINE

## 2024-03-14 PROCEDURE — 80053 COMPREHEN METABOLIC PANEL: CPT | Performed by: INTERNAL MEDICINE

## 2024-03-15 NOTE — PROGRESS NOTES
[facility]  SLEEP PROGRESS NOTE        HPI:   This is a 89 year old female coming in for   Chief Complaint   Patient presents with    Obstructive Sleep Apnea (JAZZ)     Dme:  Mask:         HPI:     Ayse Maldonado  12/15/2023 - 2024  Patient ID: [id number]  : 10/25/1927  Age: 89 years  [facility]  [facility]  Compliance Report  Usage 12/15/2023 - 2024  Usage days 87/90 days (97%)  >= 4 hours 85 days (94%)  < 4 hours 2 days (2%)  Usage hours 876 hours 6 minutes  Average usage (total days) 9 hours 44 minutes  Average usage (days used) 10 hours 4 minutes  Median usage (days used) 10 hours 44 minutes  Total used hours (value since last reset - 2024) 10,936 hours  AirSense 10 AutoSet  Serial number [id number]  Mode AutoSet  Min Pressure 5 cmH2O  Max Pressure 15 cmH2O  EPR Off  Response Standard  Therapy  Pressure - cmH2O Median: 6.7 95th percentile: 10.7 Maximum: 12.1  Leaks - L/min Median: 26.5 95th percentile: 52.3 Maximum: 88.7  Events per hour AI: 0.5 HI: 0.3 AHI: 0.8  Apnea Index Central: 0.1 Obstructive: 0.0 Unknown: 0.3  RERA Index 1.1  Cheyne-Bettencourt respiration (average duration per night) 0 minutes (0%)    Dme: [facility] / [place]  Mask:  nasal  Some dry mouth  Sleeps on back and side  Takes gabapentin twice a day  Always tired  Living at assisted living  Had afib one year ago    Patient: Sleep review of systems today: see form.      Pt  PCP:  Terri Navarro MD  No referring provider defined for this encounter.           No data to display                    Past Medical History:   Diagnosis Date    -Lumbar radicular pain-electrical stimulator 2013    Abdominal distention     Abdominal hernia     Abdominal pain     Age-related osteoporosis with current pathological fracture with routine healing 2018    Anesthesia complication     Arthritis     Atrial fibrillation (HCC)     Back pain     Basal cell cancer     colon    Bloating      Body piercing     Cellulitis of chest wall 07/11/2018    Cellulitis of left arm 07/19/2018    Cellulitis of right upper extremity     Closed bicondylar fracture of distal end of right humerus            Global exp 1-17-18 / RIGHT ELBOW / BAM  11/09/2017    Closed displaced fracture of pelvis (HCC)     Constipation     Diarrhea, unspecified     Easy bruising     Essential hypertension     Eye disease     Fatigue     Food intolerance     Frequent urination     Hearing impairment     Hearing loss     Heart palpitations     Hemorrhoids     History of depression     Hypertension     Irregular bowel habits     Leg swelling     Lumbar facet arthropathy 02/10/2018    Macular degeneration - [facility] 05/05/2011    Muscle weakness     Night sweats     Pacemaker     Reflux     Renal disorder     S/P colon resection / Colonoscopy (10/12) recheck - ANJALI Spencer 05/05/2011    S/P laminectomy 12/23/2013    Seizure disorder (HCC)     Sleep apnea     cpap    Sleep disturbance     Spinal stenosis of lumbar region without neurogenic claudication 08/08/2018    Visual impairment      Past Surgical History:   Procedure Laterality Date    ADJ TISS XFER LID,NOS,EAR <10SQCM  03/28/2014    Procedure: DEBULKING OF NASOLABIAL FLAP TO NOSE;  Surgeon: Shar Vasquez MD;  Location: [facility], [facility]    APPENDECTOMY      BACK SURGERY      BOWEL RESECTION      CHOLECYSTECTOMY      COLECTOMY      COLONOSCOPY      COLONOSCOPY      EGD      HEMORRHOIDECTOMY,INT/EXT,SIMPLE      HIP REPLACEMENT SURGERY  left 3/06 right 10/06    OTHER ACCESSORY      electro-stimulator for spinal stenosis    OTHER SURGICAL HISTORY  10/10/2011    cysto-    OTHER SURGICAL HISTORY  10/2016    back surgery    PACEMAKER      St Ankit pacemaker    SKIN SURGERY  03/06/2014    BCC nod, inf to left nasal tip / MMS done    SKIN SURGERY  10/31/2016    MMS for BCC-nod to the R upper cut lip-AB    SPINE SURGERY PROCEDURE UNLISTED      TONSILLECTOMY      TOTAL  HIP REPLACEMENT       Social History:  Social History     Social History Narrative    Not on file     Social History     Socioeconomic History    Marital status:    Tobacco Use    Smoking status: Former     Packs/day: 0.10     Years: 20.00     Additional pack years: 0.00     Total pack years: 2.00     Types: Cigarettes     Quit date: 1972     Years since quittin.2    Smokeless tobacco: Never   Vaping Use    Vaping Use: Never used   Substance and Sexual Activity    Alcohol use: Not Currently     Alcohol/week: 2.0 standard drinks of alcohol     Types: 2 Glasses of wine per week     Comment: wine occasionally    Drug use: No   Other Topics Concern    Caffeine Concern No    Exercise Yes     Comment: walks dog 2 x a day     Social Determinants of Health     Food Insecurity: No Food Insecurity (2024)    Food Insecurity     Food Insecurity: Never true   Transportation Needs: No Transportation Needs (2024)    Transportation Needs     Lack of Transportation: No   Housing Stability: Low Risk  (2024)    Housing Stability     Housing Instability: No     Family History:  Family History   Problem Relation Age of Onset    Cancer Father     Hypertension Father     Colon Polyps Son     Other (Other) Sister         Davina Mayes     Allergies:  Allergies   Allergen Reactions    Bacitracin SWELLING     Red and swelling    Ciprofloxacin OTHER (SEE COMMENTS)     Nausea and abdominal cramping    Levofloxacin OTHER (SEE COMMENTS)    Sulfamethoxazole W/Trimethoprim OTHER (SEE COMMENTS)     Other reaction(s): VOMITING  Oral    Altaryl     Amiodarone OTHER (SEE COMMENTS)     Elevated liver enzymes      Diphenhydramine UNKNOWN    Escitalopram     Thiazide-Type Diuretics OTHER (SEE COMMENTS)     CLASS    Cephalexin DIARRHEA     May have also caused drug rash    Clonidine RASH     Oral    Garlic DIARRHEA    Garlic DIARRHEA    Hydrocodone NAUSEA ONLY     nausea    Lexapro NAUSEA ONLY     Current Meds:  Current  Outpatient Medications   Medication Sig Dispense Refill    memantine 5 MG Oral Tab Take 1 tablet (5 mg total) by mouth daily. Evening only      metoprolol tartrate 50 MG Oral Tab Take 1 tablet (50 mg total) by mouth 2 (two) times daily. 60 tablet 2    dilTIAZem HCl ER Beads 180 MG Oral Capsule SR 24 Hr Take 1 capsule (180 mg total) by mouth daily. (Patient taking differently: Take 1 capsule (180 mg total) by mouth daily. In the morning) 30 capsule 0    LISINOPRIL 20 MG Oral Tab TAKE ONE TABLET BY MOUTH DAILY 90 tablet 0    GABAPENTIN 300 MG Oral Cap TAKE ONE CAPSULE BY MOUTH TWICE DAILY 180 capsule 1    Polyethylene Glycol 3350 (MIRALAX OR) Take 17 g by mouth daily as needed (constipation).      Acetaminophen  MG Oral Tab CR Take 2 tablets (1,300 mg total) by mouth in the morning and 2 tablets (1,300 mg total) before bedtime.      lidocaine 4 % External Patch Place 1 patch onto the skin daily.      Calcium Carb-Cholecalciferol (CALCIUM-VITAMIN D3) 600-500 MG-UNIT Oral Cap Take 1 tablet by mouth daily.      apixaban 5 MG Oral Tab Take 1 tablet (5 mg total) by mouth 2 (two) times daily. 60 tablet 3    digoxin 0.125 MG Oral Tab Take 1 tablet (125 mcg total) by mouth every morning.      Multiple Vitamins-Minerals (CENTRUM SILVER 50+WOMEN OR) Take 1 tablet by mouth daily. (Patient not taking: Reported on 3/18/2024)      Multiple Vitamins-Minerals (ICAPS AREDS 2 OR) Take 1 capsule by mouth in the morning and 1 capsule before bedtime. (Patient not taking: Reported on 3/18/2024)        Counseling given: Not Answered         Problem List:  Patient Active Problem List   Diagnosis    Pacemaker - M. Perfecto    JAZZ on CPAP    Current use of long term anticoagulation    Gastroesophageal reflux disease without esophagitis    Asthma with COPD (chronic obstructive pulmonary disease)    Pulmonary hypertension (HCC)-52 mg Hg    Alternating constipation and diarrhea    Other fatigue-mutifactorial    Chronic bronchitis (HCC)    CKD  (chronic kidney disease) stage 3, GFR 30-59 ml/min (HCC)    PAF (paroxysmal atrial fibrillation) (HCC)    Glaucoma suspect of left eye    Pseudophakia    Trochanteric bursitis of left hip    Fall    Essential hypertension    Chronic left hip pain    CHI (closed head injury), subsequent encounter    Altered mental status, unspecified altered mental status type    Hyponatremia    Seizure disorder (HCC)    Hypertension, unspecified type    Altered mental status    Weakness    Hypotension, unspecified hypotension type    Hypotension    Branch retinal vein occlusion of left eye with macular edema (HCC)    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 (HCC)    PCO (posterior capsular opacification), left    Inguinal hernia of right side without obstruction or gangrene    Umbilical hernia without obstruction and without gangrene    Protein-calorie malnutrition, unspecified severity (HCC)    Dementia without behavioral disturbance (HCC)       REVIEW OF SYSTEMS:   Review of Systems    EXAM:   /66 (BP Location: Right arm, Patient Position: Sitting, Cuff Size: adult)   Pulse 74   Resp 18   Ht 5' 5\" (1.651 m)   Wt 135 lb (61.2 kg)   SpO2 97%   BMI 22.47 kg/m²  Estimated body mass index is 22.47 kg/m² as calculated from the following:    Height as of this encounter: 5' 5\" (1.651 m).    Weight as of this encounter: 135 lb (61.2 kg).   Neck in inches:      Wt Readings from Last 6 Encounters:   03/18/24 135 lb (61.2 kg)   02/09/24 135 lb (61.2 kg)   02/07/24 137 lb (62.1 kg)   01/22/24 135 lb 12.9 oz (61.6 kg)   12/28/23 132 lb (59.9 kg)   12/20/23 127 lb 13.9 oz (58 kg)     BP Readings from Last 3 Encounters:   03/18/24 124/66   02/09/24 114/56   02/07/24 114/72     Pulse Readings from Last 3 Encounters:   03/18/24 74   02/09/24 75   02/07/24 72     SpO2 Readings from Last 3 Encounters:   03/18/24 97%   02/09/24 96%   02/07/24 96%      Ideal body weight: 57 kg (125  lb 10.6 oz)  Adjusted ideal body weight: 58.7 kg (129 lb 6.4 oz)    Vital signs reviewed.  Physical Exam    ASSESSMENT AND PLAN:   1. JAZZ on CPAP  Leak slightly high but tolerating therapy and does not want to add chinstrap or change to FFM  Likes using device, feels she benefits   Avg use is 10 hours with AHI 0.8, excellent control  Patient is using and benefiting from regular cpap use.  Patient was instructed to clean equipment on a weekly basis.  Patient was instructed to keep up to date with supplies.  Patient was informed to avoid drowsy driving, or using heavy machinery.  Patient was instructed to followup on a annual basis.   2. CHI (closed head injury), subsequent encounter    3. Pacemaker - NICOLA [place] 2006,2015    4. Pulmonary hypertension (HCC)-52 mg Hg    5. Seizure disorder (HCC)    There are no Patient Instructions on file for this visit.    Independent interpretation of Sleep Download as defined above.  Continue with Rx management of Sleep apnea with PAP therapy.    COMPLIANCE is required by insurance for 4 hours a night most nights of the week.    Advised if still with sleep apnea and not using CPAP has a 7 fold increase in risk of heart attack, stroke, abnormal heart rhythm  and death,  increased risk of driving accidents.     Advised to refrain from driving when sleepy.      Recommend weight loss, and maintain and optimal BMI with Exercise 30 minutes most days to target heart rate .     Advised patient to change filters,masks,hoses  and tubes and equiptment on a  regular schedule.    Filters and seals shall be changed every 1 month,  Hoses every 3 months,   CPAP mask and humidifier chamber changed every 6 month  with the durable medical equipment provider.         Meds & Refills for this Visit:  Requested Prescriptions      No prescriptions requested or ordered in this encounter       Outcome: Parent verbalizes understanding. Parent is notified to call with any questions, complications, allergies, or  worsening or changing symptoms.  Parent is to call with any side effects or complications from the treatments as a result of today.     \" This note was created utilizing Dragon speech recognition software.  Please excuse any grammatical errors. Call my office if you have any questions regarding this note. \"     Poornima Hinton,   3/18/2024  11:26 AM

## 2024-03-18 ENCOUNTER — OFFICE VISIT (OUTPATIENT)
Facility: CLINIC | Age: 89
End: 2024-03-18
Payer: MEDICARE

## 2024-03-18 VITALS
WEIGHT: 135 LBS | HEART RATE: 74 BPM | BODY MASS INDEX: 22.49 KG/M2 | HEIGHT: 65 IN | DIASTOLIC BLOOD PRESSURE: 66 MMHG | OXYGEN SATURATION: 97 % | SYSTOLIC BLOOD PRESSURE: 124 MMHG | RESPIRATION RATE: 18 BRPM

## 2024-03-18 DIAGNOSIS — S09.90XD CHI (CLOSED HEAD INJURY), SUBSEQUENT ENCOUNTER: ICD-10-CM

## 2024-03-18 DIAGNOSIS — I27.20 PULMONARY HYPERTENSION (HCC): ICD-10-CM

## 2024-03-18 DIAGNOSIS — Z95.0 PACEMAKER: ICD-10-CM

## 2024-03-18 DIAGNOSIS — G40.909 SEIZURE DISORDER (HCC): ICD-10-CM

## 2024-03-18 DIAGNOSIS — G47.33 OSA ON CPAP: Primary | ICD-10-CM

## 2024-03-18 PROCEDURE — 99214 OFFICE O/P EST MOD 30 MIN: CPT | Performed by: OTHER

## 2024-03-18 RX ORDER — DILTIAZEM HYDROCHLORIDE 180 MG/1
CAPSULE, COATED, EXTENDED RELEASE ORAL
COMMUNITY
Start: 2024-02-29 | End: 2024-03-18

## 2024-03-28 ENCOUNTER — APPOINTMENT (OUTPATIENT)
Dept: CT IMAGING | Facility: HOSPITAL | Age: 89
End: 2024-03-28
Attending: STUDENT IN AN ORGANIZED HEALTH CARE EDUCATION/TRAINING PROGRAM
Payer: MEDICARE

## 2024-03-28 ENCOUNTER — HOSPITAL ENCOUNTER (INPATIENT)
Facility: HOSPITAL | Age: 89
LOS: 9 days | Discharge: NURSING, REHAB OR LONG-TERM CARE | End: 2024-04-06
Attending: STUDENT IN AN ORGANIZED HEALTH CARE EDUCATION/TRAINING PROGRAM | Admitting: INTERNAL MEDICINE
Payer: MEDICARE

## 2024-03-28 ENCOUNTER — APPOINTMENT (OUTPATIENT)
Dept: GENERAL RADIOLOGY | Facility: HOSPITAL | Age: 89
End: 2024-03-28
Attending: STUDENT IN AN ORGANIZED HEALTH CARE EDUCATION/TRAINING PROGRAM
Payer: MEDICARE

## 2024-03-28 ENCOUNTER — ANESTHESIA EVENT (OUTPATIENT)
Dept: SURGERY | Facility: HOSPITAL | Age: 89
End: 2024-03-28
Payer: MEDICARE

## 2024-03-28 ENCOUNTER — ANESTHESIA (OUTPATIENT)
Dept: SURGERY | Facility: HOSPITAL | Age: 89
End: 2024-03-28
Payer: MEDICARE

## 2024-03-28 DIAGNOSIS — K40.30 UNILATERAL INGUINAL HERNIA WITH OBSTRUCTION AND WITHOUT GANGRENE, RECURRENCE NOT SPECIFIED: Primary | ICD-10-CM

## 2024-03-28 LAB
ALBUMIN SERPL-MCNC: 3.3 G/DL (ref 3.4–5)
ALBUMIN/GLOB SERPL: 1 {RATIO} (ref 1–2)
ALP LIVER SERPL-CCNC: 74 U/L
ALT SERPL-CCNC: 21 U/L
ANION GAP SERPL CALC-SCNC: 4 MMOL/L (ref 0–18)
AST SERPL-CCNC: 36 U/L (ref 15–37)
BASOPHILS # BLD AUTO: 0.05 X10(3) UL (ref 0–0.2)
BASOPHILS NFR BLD AUTO: 0.9 %
BILIRUB SERPL-MCNC: 0.4 MG/DL (ref 0.1–2)
BUN BLD-MCNC: 16 MG/DL (ref 9–23)
CALCIUM BLD-MCNC: 9.3 MG/DL (ref 8.5–10.1)
CHLORIDE SERPL-SCNC: 106 MMOL/L (ref 98–112)
CO2 SERPL-SCNC: 27 MMOL/L (ref 21–32)
CREAT BLD-MCNC: 0.94 MG/DL
EGFRCR SERPLBLD CKD-EPI 2021: 56 ML/MIN/1.73M2 (ref 60–?)
EOSINOPHIL # BLD AUTO: 0.1 X10(3) UL (ref 0–0.7)
EOSINOPHIL NFR BLD AUTO: 1.8 %
ERYTHROCYTE [DISTWIDTH] IN BLOOD BY AUTOMATED COUNT: 11.9 %
GLOBULIN PLAS-MCNC: 3.3 G/DL (ref 2.8–4.4)
GLUCOSE BLD-MCNC: 101 MG/DL (ref 70–99)
HCT VFR BLD AUTO: 37.7 %
HGB BLD-MCNC: 13.2 G/DL
IMM GRANULOCYTES # BLD AUTO: 0.02 X10(3) UL (ref 0–1)
IMM GRANULOCYTES NFR BLD: 0.4 %
LIPASE SERPL-CCNC: 42 U/L (ref 13–75)
LYMPHOCYTES # BLD AUTO: 1.01 X10(3) UL (ref 1–4)
LYMPHOCYTES NFR BLD AUTO: 18 %
MCH RBC QN AUTO: 34.8 PG (ref 26–34)
MCHC RBC AUTO-ENTMCNC: 35 G/DL (ref 31–37)
MCV RBC AUTO: 99.5 FL
MONOCYTES # BLD AUTO: 0.79 X10(3) UL (ref 0.1–1)
MONOCYTES NFR BLD AUTO: 14.1 %
NEUTROPHILS # BLD AUTO: 3.64 X10 (3) UL (ref 1.5–7.7)
NEUTROPHILS # BLD AUTO: 3.64 X10(3) UL (ref 1.5–7.7)
NEUTROPHILS NFR BLD AUTO: 64.8 %
OSMOLALITY SERPL CALC.SUM OF ELEC: 285 MOSM/KG (ref 275–295)
PLATELET # BLD AUTO: 242 10(3)UL (ref 150–450)
POTASSIUM SERPL-SCNC: 4.4 MMOL/L (ref 3.5–5.1)
PROT SERPL-MCNC: 6.6 G/DL (ref 6.4–8.2)
RBC # BLD AUTO: 3.79 X10(6)UL
SODIUM SERPL-SCNC: 137 MMOL/L (ref 136–145)
WBC # BLD AUTO: 5.6 X10(3) UL (ref 4–11)

## 2024-03-28 PROCEDURE — 71045 X-RAY EXAM CHEST 1 VIEW: CPT | Performed by: STUDENT IN AN ORGANIZED HEALTH CARE EDUCATION/TRAINING PROGRAM

## 2024-03-28 PROCEDURE — 74177 CT ABD & PELVIS W/CONTRAST: CPT | Performed by: STUDENT IN AN ORGANIZED HEALTH CARE EDUCATION/TRAINING PROGRAM

## 2024-03-28 NOTE — ED QUICK NOTES
Rounding Completed    Plan of Care reviewed. Waiting for Labs and imaging.  Elimination needs assessed.  Provided Comfort Measures.    Bed is locked and in lowest position. Call light within reach.

## 2024-03-28 NOTE — ED INITIAL ASSESSMENT (HPI)
Pt here for C/O a bulging hernia and abnormal vitals. Pt has hx of hernia. Up arrival pt is Ax3 and hard of hearing. No apparent distress or pain, and all vitals are table.

## 2024-03-29 LAB
ALBUMIN SERPL-MCNC: 3.2 G/DL (ref 3.4–5)
ALBUMIN/GLOB SERPL: 1 {RATIO} (ref 1–2)
ALP LIVER SERPL-CCNC: 68 U/L
ALT SERPL-CCNC: 19 U/L
ANION GAP SERPL CALC-SCNC: 6 MMOL/L (ref 0–18)
AST SERPL-CCNC: 23 U/L (ref 15–37)
BASOPHILS # BLD AUTO: 0.05 X10(3) UL (ref 0–0.2)
BASOPHILS NFR BLD AUTO: 0.8 %
BILIRUB SERPL-MCNC: 0.6 MG/DL (ref 0.1–2)
BILIRUB UR QL STRIP.AUTO: NEGATIVE
BUN BLD-MCNC: 13 MG/DL (ref 9–23)
CALCIUM BLD-MCNC: 9.3 MG/DL (ref 8.5–10.1)
CHLORIDE SERPL-SCNC: 106 MMOL/L (ref 98–112)
CO2 SERPL-SCNC: 28 MMOL/L (ref 21–32)
CREAT BLD-MCNC: 0.84 MG/DL
EGFRCR SERPLBLD CKD-EPI 2021: 64 ML/MIN/1.73M2 (ref 60–?)
EOSINOPHIL # BLD AUTO: 0.1 X10(3) UL (ref 0–0.7)
EOSINOPHIL NFR BLD AUTO: 1.6 %
ERYTHROCYTE [DISTWIDTH] IN BLOOD BY AUTOMATED COUNT: 12 %
GLOBULIN PLAS-MCNC: 3.1 G/DL (ref 2.8–4.4)
GLUCOSE BLD-MCNC: 101 MG/DL (ref 70–99)
GLUCOSE UR STRIP.AUTO-MCNC: NORMAL MG/DL
HCT VFR BLD AUTO: 37.8 %
HGB BLD-MCNC: 13.2 G/DL
IMM GRANULOCYTES # BLD AUTO: 0.02 X10(3) UL (ref 0–1)
IMM GRANULOCYTES NFR BLD: 0.3 %
KETONES UR STRIP.AUTO-MCNC: NEGATIVE MG/DL
LEUKOCYTE ESTERASE UR QL STRIP.AUTO: NEGATIVE
LYMPHOCYTES # BLD AUTO: 1.15 X10(3) UL (ref 1–4)
LYMPHOCYTES NFR BLD AUTO: 18.9 %
MCH RBC QN AUTO: 34.3 PG (ref 26–34)
MCHC RBC AUTO-ENTMCNC: 34.9 G/DL (ref 31–37)
MCV RBC AUTO: 98.2 FL
MONOCYTES # BLD AUTO: 0.79 X10(3) UL (ref 0.1–1)
MONOCYTES NFR BLD AUTO: 13 %
NEUTROPHILS # BLD AUTO: 3.97 X10 (3) UL (ref 1.5–7.7)
NEUTROPHILS # BLD AUTO: 3.97 X10(3) UL (ref 1.5–7.7)
NEUTROPHILS NFR BLD AUTO: 65.4 %
NITRITE UR QL STRIP.AUTO: NEGATIVE
OSMOLALITY SERPL CALC.SUM OF ELEC: 290 MOSM/KG (ref 275–295)
PH UR STRIP.AUTO: 8 [PH] (ref 5–8)
PLATELET # BLD AUTO: 223 10(3)UL (ref 150–450)
POTASSIUM SERPL-SCNC: 3.8 MMOL/L (ref 3.5–5.1)
PROT SERPL-MCNC: 6.3 G/DL (ref 6.4–8.2)
PROT UR STRIP.AUTO-MCNC: NEGATIVE MG/DL
RBC # BLD AUTO: 3.85 X10(6)UL
RBC UR QL AUTO: NEGATIVE
SODIUM SERPL-SCNC: 140 MMOL/L (ref 136–145)
SP GR UR STRIP.AUTO: >1.03 (ref 1–1.03)
UROBILINOGEN UR STRIP.AUTO-MCNC: NORMAL MG/DL
WBC # BLD AUTO: 6.1 X10(3) UL (ref 4–11)
YEAST UR QL: PRESENT /HPF

## 2024-03-29 PROCEDURE — 99222 1ST HOSP IP/OBS MODERATE 55: CPT | Performed by: STUDENT IN AN ORGANIZED HEALTH CARE EDUCATION/TRAINING PROGRAM

## 2024-03-29 PROCEDURE — 0D9670Z DRAINAGE OF STOMACH WITH DRAINAGE DEVICE, VIA NATURAL OR ARTIFICIAL OPENING: ICD-10-PCS | Performed by: INTERNAL MEDICINE

## 2024-03-29 RX ORDER — ONDANSETRON 2 MG/ML
4 INJECTION INTRAMUSCULAR; INTRAVENOUS EVERY 4 HOURS PRN
Status: DISCONTINUED | OUTPATIENT
Start: 2024-03-29 | End: 2024-04-06

## 2024-03-29 RX ORDER — HYDRALAZINE HYDROCHLORIDE 20 MG/ML
10 INJECTION INTRAMUSCULAR; INTRAVENOUS EVERY 6 HOURS PRN
Status: DISCONTINUED | OUTPATIENT
Start: 2024-03-29 | End: 2024-04-06

## 2024-03-29 RX ORDER — SODIUM CHLORIDE 450 MG/100ML
INJECTION, SOLUTION INTRAVENOUS CONTINUOUS
Status: DISCONTINUED | OUTPATIENT
Start: 2024-03-29 | End: 2024-03-29

## 2024-03-29 RX ORDER — MORPHINE SULFATE 2 MG/ML
1 INJECTION, SOLUTION INTRAMUSCULAR; INTRAVENOUS EVERY 4 HOURS PRN
Status: DISCONTINUED | OUTPATIENT
Start: 2024-03-29 | End: 2024-04-05

## 2024-03-29 RX ORDER — DOCUSATE SODIUM 100 MG/1
100 CAPSULE, LIQUID FILLED ORAL 2 TIMES DAILY
COMMUNITY

## 2024-03-29 RX ORDER — DILTIAZEM HYDROCHLORIDE 5 MG/ML
10 INJECTION INTRAVENOUS EVERY 2 HOUR PRN
Status: DISCONTINUED | OUTPATIENT
Start: 2024-03-29 | End: 2024-03-30

## 2024-03-29 RX ORDER — METOPROLOL TARTRATE 1 MG/ML
5 INJECTION, SOLUTION INTRAVENOUS EVERY 6 HOURS
Status: DISCONTINUED | OUTPATIENT
Start: 2024-03-29 | End: 2024-04-03

## 2024-03-29 RX ORDER — ENALAPRILAT 1.25 MG/ML
1.25 INJECTION INTRAVENOUS EVERY 6 HOURS
Status: DISCONTINUED | OUTPATIENT
Start: 2024-03-29 | End: 2024-04-03

## 2024-03-29 RX ORDER — SODIUM CHLORIDE, SODIUM LACTATE, POTASSIUM CHLORIDE, CALCIUM CHLORIDE 600; 310; 30; 20 MG/100ML; MG/100ML; MG/100ML; MG/100ML
INJECTION, SOLUTION INTRAVENOUS CONTINUOUS
Status: DISCONTINUED | OUTPATIENT
Start: 2024-03-29 | End: 2024-04-05

## 2024-03-29 RX ORDER — DILTIAZEM HYDROCHLORIDE 5 MG/ML
10 INJECTION INTRAVENOUS ONCE
Status: DISCONTINUED | OUTPATIENT
Start: 2024-03-29 | End: 2024-04-03 | Stop reason: ALTCHOICE

## 2024-03-29 NOTE — CERTIFICATION
**Certification      PHYSICIAN Certification of Need for Inpatient Hospitalization - Initial Certification    Patient will require inpatient services that will reasonably be expected to span two midnight's based on the clinical documentation in H+P.   Based on patients current state of illness, I anticipate that, after discharge, patient will require TBD.

## 2024-03-29 NOTE — ANESTHESIA PREPROCEDURE EVALUATION
PRE-OP EVALUATION    Patient Name: Ayse Maldonado    Admit Diagnosis: No admission diagnoses are documented for this encounter.    Pre-op Diagnosis: Small bowel obstruction (HCC) [K56.609]    OPEN RIGHT INGUINAL HERNIA REPAIR    Anesthesia Procedure: OPEN RIGHT INGUINAL HERNIA REPAIR (Right)    Surgeon(s) and Role:     * Mally Fernandez MD - Primary    Pre-op vitals reviewed.  Temp: 98.1 °F (36.7 °C)  Pulse: 68  Resp: 21  BP: 151/71  SpO2: 94 %  Body mass index is 21.61 kg/m².    Current medications reviewed.  Hospital Medications:   [COMPLETED] iopamidol 76% (ISOVUE-370) injection for power injector  100 mL Intravenous ONCE PRN       Outpatient Medications:   (Not in a hospital admission)      Allergies: Bacitracin, Ciprofloxacin, Levofloxacin, Sulfamethoxazole w/trimethoprim, Altaryl, Amiodarone, Diphenhydramine, Escitalopram, Thiazide-type diuretics, Cephalexin, Clonidine, Garlic, Garlic, Hydrocodone, and Lexapro      Anesthesia Evaluation    Patient summary reviewed.    Anesthetic Complications  (-) history of anesthetic complications         GI/Hepatic/Renal      (+) GERD                           Cardiovascular  Comment: TTE 3 yrs ago    EF 55%, mod pHTN, normal valves    ECG reviewed.      MET: <=4    (+) obesity  (+) hypertension           (+) pacemaker/AICD       (+) dysrhythmias and atrial fibrillation                  Endo/Other    Negative endo/other ROS.                              Pulmonary      (+) asthma  (+) COPD and mild  COPD not requiring home oxygen.         (+) sleep apnea and CPAP      Neuro/Psych                 (+) neuromuscular disease             Incarcerated right Inguinal Hernia:    º NG to suction.   Perm AF:    º Remains AF with moderate rate control.   º Diltiazem Gtt, IV BB.   º Single chamber St. Ankit PPM.   º Eliquis remains on hold.   · HFpEF:    º EF:  55-60 %.   º Euvolemic.              Patient Active Problem List   Diagnosis    Pacemaker - M. Perfecto    JAZZ on CPAP     Current use of long term anticoagulation    Gastroesophageal reflux disease without esophagitis    Asthma with COPD (chronic obstructive pulmonary disease) ([facility])    Pulmonary hypertension ([facility])-52 mg Hg    Alternating constipation and diarrhea    Other fatigue-mutifactorial    Chronic bronchitis ([facility])    CKD (chronic kidney disease) stage 3, GFR 30-59 ml/min ([facility])    PAF (paroxysmal atrial fibrillation) ([facility])    Glaucoma suspect of left eye    Pseudophakia    Trochanteric bursitis of left hip    Fall    Essential hypertension    Chronic left hip pain    CHI (closed head injury), subsequent encounter    Altered mental status, unspecified altered mental status type    Hyponatremia    Seizure disorder ([facility])    Hypertension, unspecified type    Altered mental status    Weakness    Hypotension, unspecified hypotension type    Hypotension    Branch retinal vein occlusion of left eye with macular edema ([facility])    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 ([facility])    PCO (posterior capsular opacification), left    Inguinal hernia of right side without obstruction or gangrene    Umbilical hernia without obstruction and without gangrene    Protein-calorie malnutrition, unspecified severity ([facility])    Dementia without behavioral disturbance ([facility])    Unilateral inguinal hernia with obstruction and without gangrene, recurrence not specified        Past Medical History:   Diagnosis Date    -Lumbar radicular pain-electrical stimulator 07/29/2013    Abdominal distention     Abdominal hernia     Abdominal pain     Age-related osteoporosis with current pathological fracture with routine healing 08/08/2018    Anesthesia complication     Arthritis     Atrial fibrillation ([facility])     Back pain     Basal cell cancer     colon    Bloating     Body piercing     Cellulitis of chest wall 07/11/2018    Cellulitis of left arm 07/19/2018    Cellulitis of right upper extremity     Closed bicondylar  fracture of distal end of right humerus            Global exp 1-17-18 / RIGHT ELBOW / BAM  11/09/2017    Closed displaced fracture of pelvis (HCC)     Constipation     Diarrhea, unspecified     Easy bruising     Essential hypertension     Eye disease     Fatigue     Food intolerance     Frequent urination     Hearing impairment     Hearing loss     Heart palpitations     Hemorrhoids     History of depression     Hypertension     Irregular bowel habits     Leg swelling     Lumbar facet arthropathy 02/10/2018    Macular degeneration - [facility] 05/05/2011    Muscle weakness     Night sweats     Pacemaker     Reflux     Renal disorder     S/P colon resection / Colonoscopy (10/12) recheck - ANJALI Spencer 05/05/2011    S/P laminectomy 12/23/2013    Seizure disorder (HCC)     Sleep apnea     cpap    Sleep disturbance     Spinal stenosis of lumbar region without neurogenic claudication 08/08/2018    Visual impairment           Past Surgical History:   Procedure Laterality Date    ADJ TISS XFER LID,NOS,EAR <10SQCM  03/28/2014    Procedure: DEBULKING OF NASOLABIAL FLAP TO NOSE;  Surgeon: Shar Vasquez MD;  [facility] [place] [facility]    APPENDECTOMY      BACK SURGERY      BOWEL RESECTION      CHOLECYSTECTOMY      COLECTOMY      COLONOSCOPY      COLONOSCOPY      EGD      HEMORRHOIDECTOMY,INT/EXT,SIMPLE      HIP REPLACEMENT SURGERY  left 3/06 right 10/06    OTHER ACCESSORY      electro-stimulator for spinal stenosis    OTHER SURGICAL HISTORY  10/10/2011    cysto-    OTHER SURGICAL HISTORY  10/2016    back surgery    PACEMAKER      St Ankit pacemaker    SKIN SURGERY  03/06/2014    BCC nod, inf to left nasal tip / MMS done    SKIN SURGERY  10/31/2016    MMS for BCC-nod to the R upper cut lip-AB    SPINE SURGERY PROCEDURE UNLISTED      TONSILLECTOMY      TOTAL HIP REPLACEMENT       Social History     Socioeconomic History    Marital status:    Tobacco Use    Smoking status: Former     Packs/day:  0.10     Years: 20.00     Additional pack years: 0.00     Total pack years: 2.00     Types: Cigarettes     Quit date: 1972     Years since quittin.2    Smokeless tobacco: Never   Vaping Use    Vaping Use: Never used   Substance and Sexual Activity    Alcohol use: Not Currently     Alcohol/week: 2.0 standard drinks of alcohol     Types: 2 Glasses of wine per week     Comment: wine occasionally    Drug use: No   Other Topics Concern    Caffeine Concern No    Exercise Yes     Comment: walks dog 2 x a day     History   Drug Use No     Available pre-op labs reviewed.  Lab Results   Component Value Date    WBC 5.6 2024    RBC 3.79 (L) 2024    HGB 13.2 2024    HCT 37.7 2024    MCV 99.5 2024    MCH 34.8 (H) 2024    MCHC 35.0 2024    RDW 11.9 2024    .0 2024     Lab Results   Component Value Date     2024    K 4.4 2024     2024    CO2 27.0 2024    BUN 16 2024    CREATSERUM 0.94 2024     (H) 2024    CA 9.3 2024            Airway      Mallampati: II  Mouth opening: 3 FB  TM distance: 4 - 6 cm  Neck ROM: limited Cardiovascular    Cardiovascular exam normal.         Dental    Dentition appears grossly intact         Pulmonary    Pulmonary exam normal.                 Other findings          ASA: 3   Plan: general  NPO status verified and patient meets guidelines.    Post-procedure pain management plan discussed with surgeon and patient.    Comment: Discussed risks and benefits of GA including sore throat, allergy, nausea, vomiting, dental trauma, pain management modalities, transfusion if needed, etc. Questions answered and options explained. Patient accepts and wishes to proceed. The consent was signed without further questions.    Discussed with son, POA, who signed consent.  DNR not active during procedure.        Plan/risks discussed with: patient and child/children                Present  on Admission:  **None**

## 2024-03-29 NOTE — HISTORICAL OFFICE NOTE
Facility Logo [facility]  [address] [place] [address], 4th floor, [place] [address]  [phone]      Ayse Maldonado  Progress Note  Demographics:  Name: Ayse Maldonado YOB: 1927  Age: 89, Female Medical Record No: [id number]  Visited Date/Time: 01/30/2024 02:00 PM    Chief Complaints  1/20 admission for A-Fib with RVR  History of Present Illness  Patient presents for routine hospital follow-up. Accompanied by her son, Cortes.    Recently admitted with atrial fibrillation with RVR. Amlodipine transitioned to diltiazem.  She was restarted back on digoxin 125 mcg daily.  She was continued on metoprolol tartrate 50 mg twice daily.    Today she presents to clinic without any specific complaints.  She notes ongoing fatigue.  She denies any chest pain, shortness of breath, palpitations, presyncope or syncopal events.  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  Social History  Smoking status Never smoked  Tobacco usage - No (Non-smoker for personal reasons (finding))  Review of systems  Cardiovascular No history of Chest pain, PERDUE, Palpitations, Syncope, PND, Orthopnea, Edema and Claudication  Physical Examination  Vitals Right Arm Sitting  / 64 mmHg, Pulse rate 69 bpm, Regular, Height in 5' 4\", BMI: 25, Weight in 145.6 lbs (or) 66.04 kgs and BSA : 1.74 cc/m²  General Appearance No Acute Distress and  Appropriate  Head/Eyes/Ears/Nose/Mouth/Throat PERRLA and Mucous membranes Moist  Neck Normal carotid pulsations, No carotid bruits and No JVD  Respiratory Unlabored, Lungs clear with normal breath sounds and Equal bilaterally  Cardiovascular Intact distal pulses and Regular rhythm. Normal rate present. Normal and normal S1 and S2    Gastrointestinal Abdomen soft, Non-tender and Normoactive bowel sounds  Gait Normal gait  Lower Extremities Pulses 2+ and equal bilaterally and No edema  Skin Warm and dry and Intact  Neurologic / Psychiatric Alert and Oriented  Speech Normal speech  EKG/Other abnormalities  ECG today shows ventricular pacing with underlying atrial fibrillation, ventricular rate 69 bpm  Allergies  1.Amiodarone(Reaction:, Severity:Mild)  2.Ciprofloxacin(Reaction:, Severity:Mild)  3.Garlic (Food Or Med)(Reaction:, Severity:Mild)  4.Hydrocodone(Reaction:, Severity:Mild)  Medications  1.acetaminophen 325 mg tablet, Take 1 tablet orally 2 times a day.  2.BEVACizumab (AVASTIN) 25 MG/ML injection, 1.25 mg by Intravitreal route.  3.calcium carbonate-vitamin D (CALCIUM 500 + D) 500-125 MG-UNIT tablet, 1 tablet 2 times daily.  4.Digox 125 mcg (0.125 mg) tablet, Take 1 tablet orally once a day.  5.dilTIAZem  mg capsule,24 hr,extended release, Take 1 capsule orally once a day.  6.Eliquis 5 mg tablet, TAKE ONE TABLET BY MOUTH TWICE DAILY  7.gabapentin (NEURONTIN) 300 MG capsule, Take 300 mg by mouth 2 times daily.  8.lidocaine (LIDOCARE) 4 % patch, Place 1 patch onto the skin every 24 hours.  9.lisinopriL 20 mg tablet, Take 1 tablet orally once a day.  10.memantine 5 mg tablet, Take 1 tablet orally once a day.  11.metoprolol tartrate 50 mg tablet, Take 1 tablet orally once a day.  12.PreserVision AREDS 7,160 unit-113 mg-100 unit tablet, Take 1 tablet orally 2 times a day.  Impression  1.Atrial fibrillation status post cardioversion  2.Cardiac pacemaker (S/P PPM)  3.Pacemaker  4.Long term (current) use of  anticoagulants  Assessment & Plan  Chronic afib -recent admission with RVR.  No clear inciting factors.  Rate down spontaneously for the most part.  Continue diltiazem  mg daily, digoxin 125 mcg daily as well as metoprolol tartrate 50 mg twice daily for rate control.  She is on Eliquis 5 mg twice daily for stroke prophylaxis.  Heart rate is well-controlled on ECG today.     HTN, chronic and well controlled.     PPM    Plan:  - Same medications, please refill 90 day supply of diltiazem   - Notify office with any new or worsening symptoms  - Follow up with Dr. Grove as scheduled in April  Labs and Diagnostics ordered  1.EKG (electrocardiogram) (Today)  Miscellaneous  1.Weight monitoring (regime/therapy)  Nurses documentation  Triage - Nursing Doc:  Upcoming surgeries:  Use of assistive devices(s):  Triage & medication list reviewed by: ESTELA  EKG: yes  Refills:   Patient instructions  Plan:  - Same medications, please refill 90 day supply of diltiazem   - Notify office with any new or worsening symptoms  - Follow up with Dr. Grove as scheduled in April     CPOE Orders carried out by: Jenny Painter  Care Providers: Mima LONGORIA and Jenny Painter  Electronically Authenticated by  Mima LONGORIA  01/30/2024 02:39:17 PM  Disclaimer: Components of this note were documented using voice recognition system and are subject to errors not corrected at proofreading. Contact the author of this note for any clarifications.

## 2024-03-29 NOTE — PAYOR COMM NOTE
--------------  ADMISSION REVIEW     Payor: [employer]  Subscriber #:  [id number]  Authorization Number: [id number]    Admit date: 3/28/24  Admit time: 11:46 PM       REVIEW DOCUMENTATION:       Patient Seen in: [facility] Emergency Department      History     Chief Complaint   Patient presents with    Abdomen/Flank Pain     Stated Complaint:     Subjective:   HPI    89-year-old female with a history of A-fib on Eliquis presents to the emergency room with reports of abdominal pain.  Patient's son is here.  He is visiting her today at [facility] when patient complained of abdominal pain.  He stated that he saw a bulge in her groin.  Family was aware that she had a hernia.  Patient tells me she had a bowel movement earlier today.  Son states he had no reports of patient vomiting.  The patient tells me she just does not feel well has no further specific complaints.    Past Surgical History:   Procedure Laterality Date    ADJ TISS XFER LID,NOS,EAR <10SQCM  03/28/2014    Procedure: DEBULKING OF NASOLABIAL FLAP TO NOSE;  Surgeon: Shar Vasquez MD;  [facility] [facility]    APPENDECTOMY      BACK SURGERY      BOWEL RESECTION      CHOLECYSTECTOMY      COLECTOMY      COLONOSCOPY      COLONOSCOPY      EGD      HEMORRHOIDECTOMY,INT/EXT,SIMPLE      HIP REPLACEMENT SURGERY  left 3/06 right 10/06    OTHER ACCESSORY      electro-stimulator for spinal stenosis    OTHER SURGICAL HISTORY  10/10/2011    cysto-    OTHER SURGICAL HISTORY  10/2016    back surgery    PACEMAKER      [employer] pacemaker    SKIN SURGERY  03/06/2014    BCC nod, inf to left nasal tip / MMS done    SKIN SURGERY  10/31/2016    MMS for BCC-nod to the R upper cut lip-AB    SPINE SURGERY PROCEDURE UNLISTED      TONSILLECTOMY      TOTAL HIP REPLACEMENT       Physical Exam     ED Triage Vitals   BP 03/28/24 1825 (!) 166/81   Pulse 03/28/24 1818 70   Resp 03/28/24 1818 20   Temp 03/28/24 1818 98.1 °F (36.7 °C)   Temp src 03/28/24  1818 Oral   SpO2 03/28/24 1825 96 %   O2 Device 03/28/24 1825 None (Room air)       Current:/71   Pulse 68   Temp 98.1 °F (36.7 °C) (Oral)   Resp 21   Ht 170.2 cm (5' 7\")   Wt 62.6 kg   SpO2 94%   BMI 21.61 kg/m²         Physical Exam  Vitals and nursing note reviewed.   Constitutional:       General: She is not in acute distress.     Appearance: Normal appearance.   HENT:      Head: Normocephalic.      Nose: Nose normal.      Mouth/Throat:      Mouth: Mucous membranes are moist.   Eyes:      Extraocular Movements: Extraocular movements intact.      Pupils: Pupils are equal, round, and reactive to light.   Cardiovascular:      Rate and Rhythm: Normal rate and regular rhythm.      Pulses: Normal pulses.   Pulmonary:      Effort: Pulmonary effort is normal.   Abdominal:      General: Abdomen is flat. Bowel sounds are increased. There is distension.      Palpations: Abdomen is soft.      Tenderness: There is no abdominal tenderness. There is no right CVA tenderness, left CVA tenderness, guarding or rebound.      Hernia: A hernia is present. Hernia is present in the right inguinal area.   Musculoskeletal:         General: No swelling or tenderness. Normal range of motion.      Cervical back: Normal range of motion.   Skin:     General: Skin is warm and dry.   Neurological:      Mental Status: She is alert and oriented to person, place, and time. Mental status is at baseline.   Psychiatric:         Mood and Affect: Mood normal.           ED Course     Labs Reviewed   COMP METABOLIC PANEL (14) - Abnormal; Notable for the following components:       Result Value    Glucose 101 (*)     eGFR-Cr 56 (*)     Albumin 3.3 (*)     All other components within normal limits   CBC W/ DIFFERENTIAL - Abnormal; Notable for the following components:    RBC 3.79 (*)     MCH 34.8 (*)     All other components within normal limits   LIPASE - Normal     CT ABDOMEN+PELVIS(CONTRAST ONLY)(CPT=74177)    Result Date:  3/28/2024  CONCLUSION:  1. Findings compatible with small bowel obstruction with a transition point in the region of a right inguinal hernia containing small bowel.   LOCATION:  [facility]   Dictated by (CST): Tom Clinton MD on 3/28/2024 at 8:47 PM     Finalized by (CST): Tom Clinton MD on 3/28/2024 at 8:57 PM          Signed by Daay Aguero MD on 3/28/2024  9:41 PM         SURGERY CONSULT  3-29-24    On interview, patient denies any abdominal pain, nausea, or vomiting. NG tube was placed with clear output. Patient has a significant medical history for afib on eliquis, last dose was just prior to presentation. She also has a pacemaker in place. She has significant surgical history for colon resection, cholecystectomy, and appendectomy.     Patient has a history of abdominal pain and general malaise  She did have a bowel movement earlier today  CT imaging is concerning for small bowel obstruction with a transition at right inguinal hernia  On physical exam, hernia was reduced and remained reducible throughout the examination     At this time, I recommend holding on any emergent intervention for the hernia  I did speak with the patient and her 2 sons, 1 via phone, the need for hernia repair when there is an incarcerated hernia containing bowel  The hernia was reduced so no emergent intervention is needed  Patient is on Eliquis for A-fib  Recommend holding this in anticipation of repair in approximately 2 days     Patient was seen previously by Dr. Hodge and deemed too high risk for elective hernia repair  At that time, patient wished not to have any surgical intervention  Today, patient has evidence of incarceration with bowel obstruction  This is an indication for more urgent repair  Recommend medical and cardiac clearance  Continue NG tube for decompression  N.p.o. for bowel rest  IV fluids for hydration       CARDIOLOGY CONSULT      Date of Admission:  3/28/2024  Date of Consult:  3/29/24     Reason for  Consultation:  Right inguinal hernia and SBO     History of Present Illness:  Ayse Maldonado is a a(n) 89 year old female. Admitted last evening with abdominal discomfort. Evaluation revealed right inguinal hernia which was reducible yet associated with CT evidence of bowel obstruction.     NG in place - throat sore but not c/o abdominal pain     Chronic atrial fibrillation with PPM on Eliquis     Historically preserved LV function     Blood pressures elevated which would be expected - no need to be overaggressive here       Telemetry: atrial fibrillation - rates controlled  General: Weak  HEENT: NG in place  Neck: No JVD  Cardiac: irregular - no s/o significant aortic stenosis  Lungs: Clear without wheezes anteriorly  Abdomen: Soft, non-tender.   Extremities: Without clubbing, cyanosis or edema.   Neurologic: Alert and oriented  Skin: Warm and dry.      Labs:         Lab Results   Component Value Date     WBC 6.1 03/29/2024     RBC 3.85 03/29/2024     HGB 13.2 03/29/2024     HCT 37.8 03/29/2024     MCV 98.2 03/29/2024     MCH 34.3 03/29/2024     MCHC 34.9 03/29/2024     RDW 12.0 03/29/2024     .0 03/29/2024       Compensated cardiac status. Incarcerated right inguinal hernia with SBO.     Hold eliquis     Rate control agents via IV route      Continue telemetry     Acceptable cardiac risk to proceed with surgery as needed     Avoid excess crystalloid administration    MEDICATIONS ADMINISTERED IN LAST 1 DAY:  benzocaine-menthol (Cepacol) lozenge 1 lozenge       Date Action Dose Route User    3/29/2024 1220 Given 1 lozenge Oral Paul Ross RN    3/29/2024 0925 Given 1 lozenge Oral Paul Ross, CARLA          enalaprilat (Vasotec) 1.25 mg/mL injection 1.25 mg       Date Action Dose Route User    3/29/2024 1333 Given 1.25 mg Intravenous Paul Ross, CARLA          hydrALAzine (Apresoline) 20 mg/mL injection 10 mg       Date Action Dose Route User    3/29/2024 0804 Given 10 mg Intravenous Cody  CARLA Miller          iopamidol 76% (ISOVUE-370) injection for power injector       Date Action Dose Route User    3/28/2024 2020 Given 100 mL Intravenous Lisa Lepe L          lactated ringers infusion 100 ML HR       Date Action Dose Route User    3/29/2024 0926 New Bag (none) Intravenous Paul Ross RN          metoprolol (Lopressor) 5 mg/5mL injection 5 mg       Date Action Dose Route User    3/29/2024 1220 Given 5 mg Intravenous Paul Ross RN    3/29/2024 0637 Given 5 mg Intravenous Itzle Parkinson RN    3/29/2024 0120 Given 5 mg Intravenous Itzel Parkinson RN          morphINE PF 2 MG/ML injection 1 mg       Date Action Dose Route User    3/29/2024 1333 Given 1 mg Intravenous Paul Ross RN    3/29/2024 0804 Given 1 mg Intravenous Paul Ross RN          pantoprazole (Protonix) 40 mg in sodium chloride 0.9% PF 10 mL IV push       Date Action Dose Route User    3/29/2024 0925 Given 40 mg Intravenous Paul Ross RN          phenol (Chloraseptic) 1.4 % oral liquid spray       Date Action Dose Route User    3/29/2024 1441 Given (none) Oral Paul Ross RN          sodium chloride 0.45% infusion       Date Action Dose Route User    3/29/2024 0113 New Bag (none) Intravenous Itzel Parkinson RN            Vitals (last day)       Date/Time Temp Pulse Resp BP SpO2 Weight O2 Device O2 Flow Rate (L/min) Who    03/29/24 1230 -- 92 -- -- -- -- -- -- AC    03/29/24 1220 97.7 °F (36.5 °C) 85 20 169/74 97 % -- None (Room air) -- AC    03/29/24 0755 97.7 °F (36.5 °C) 76 18 177/77 94 % -- None (Room air) -- DK    03/29/24 0632 98.1 °F (36.7 °C) 69 20 173/76 -- -- None (Room air) -- EK    03/29/24 0121 -- 74 -- 160/87 94 % -- None (Room air) -- EK    03/29/24 0002 98 °F (36.7 °C) 69 21 164/67 95 % 135 lb 9.6 oz None (Room air) -- EK    03/28/24 1825 -- -- -- 166/81 96 % 138 lb None (Room air) --     03/28/24 1818 98.1 °F (36.7 °C) 70 20 -- -- -- -- -- LP

## 2024-03-29 NOTE — PROGRESS NOTES
[facility]  General Surgery Progress Note    Ayse Maldonado Patient Status:  Inpatient    10/25/1927 MRN [id number]   Location [facility] 4NW-A Attending Terri Navarro MD   Hosp Day # 1 PCP Terri Navarro MD     Subjective:  No acute events overnight.  Patient had 1 small bowel movement.  NG tube put out 650 cc since insertion.  This morning, patient reports some abdominal pain especially in the right groin.  She denies any other issues.    Objective/Physical Exam:      Intake/Output Summary (Last 24 hours) at 3/29/2024 1123  Last data filed at 3/29/2024 0900  Gross per 24 hour   Intake 334.83 ml   Output 1050 ml   Net -715.17 ml       Vital Signs:  Blood pressure (!) 177/77, pulse 76, temperature 97.7 °F (36.5 °C), temperature source Oral, resp. rate 18, height 67\", weight 135 lb 9.6 oz (61.5 kg), SpO2 94%.    General: Alert, orientated x3.  Cooperative.  No apparent distress.  HEENT: Exam is unremarkable.  Without scleral icterus.  Mucous membranes are moist. Oropharynx is clear.  NG tube in place with brown output  Neck: No JVD. Supple.   Lungs: Non labored breathing, equal chest rise  Cardiac: Regular rate and rhythm. No murmur.  Abdomen:  Soft, distended, non-tender, with no rebound or guarding.  No peritoneal signs.  Right inguinal hernia present but reducible with pressure, tender to palpation  Extremities:  No lower extremity edema noted.  Without clubbing or cyanosis.  2+ pulses x4, motor and sensation grossly intact      Labs:  Lab Results   Component Value Date    WBC 6.1 2024    RBC 3.85 2024    HGB 13.2 2024    HCT 37.8 2024    MCV 98.2 2024    MCH 34.3 2024    MCHC 34.9 2024    RDW 12.0 2024    .0 2024     Lab Results   Component Value Date     2024    K 3.8 2024     2024    CO2 28.0 2024    BUN 13 2024    CREATSERUM 0.84 2024     2024    CA 9.3 2024    ALKPHO  68 03/29/2024    ALT 19 03/29/2024    AST 23 03/29/2024    BILT 0.6 03/29/2024    ALB 3.2 03/29/2024    TP 6.3 03/29/2024          Images:  XR CHEST AP PORTABLE  (CPT=71045)    Result Date: 3/28/2024  CONCLUSION:  Gastric tube in appropriate position.   LOCATION:  [facility]      Dictated by (CST): Tom Clinton MD on 3/28/2024 at 10:30 PM     Finalized by (CST): Tom Clinton MD on 3/28/2024 at 10:31 PM       CT ABDOMEN+PELVIS(CONTRAST ONLY)(CPT=74177)    Result Date: 3/28/2024  CONCLUSION:  1. Findings compatible with small bowel obstruction with a transition point in the region of a right inguinal hernia containing small bowel.   LOCATION:  [facility]   Dictated by (CST): Tom Clinton MD on 3/28/2024 at 8:47 PM     Finalized by (CST): Tom Clinton MD on 3/28/2024 at 8:57 PM        Assessment/Plan:  Patient Active Problem List   Diagnosis    Pacemaker - M. Perfecto    JAZZ on CPAP    Current use of long term anticoagulation    Gastroesophageal reflux disease without esophagitis    Asthma with COPD (chronic obstructive pulmonary disease)    Pulmonary hypertension (HCC)-52 mg Hg    Alternating constipation and diarrhea    Other fatigue-mutifactorial    Chronic bronchitis (HCC)    CKD (chronic kidney disease) stage 3, GFR 30-59 ml/min (HCC)    PAF (paroxysmal atrial fibrillation) ([facility])    Glaucoma suspect of left eye    Pseudophakia    Trochanteric bursitis of left hip    Fall    Essential hypertension    Chronic left hip pain    CHI (closed head injury), subsequent encounter    Altered mental status, unspecified altered mental status type    Hyponatremia    Seizure disorder (HCC)    Hypertension, unspecified type    Altered mental status    Weakness    Hypotension, unspecified hypotension type    Hypotension    Branch retinal vein occlusion of left eye with macular edema (HCC)    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 (HCC)    PCO (posterior capsular opacification),  left    Inguinal hernia of right side without obstruction or gangrene    Umbilical hernia without obstruction and without gangrene    Protein-calorie malnutrition, unspecified severity (HCC)    Dementia without behavioral disturbance (HCC)    Unilateral inguinal hernia with obstruction and without gangrene, recurrence not specified       89 year old female with with an incarcerated right inguinal hernia with bowel obstruction    No acute surgical intervention  Hernia remains reducible on exam  Continue to hold Eliquis  Will plan for open right inguinal hernia repair with mesh either this weekend or early next week  Surgery will continue to follow  Rest of care per primary    Mally Fernandez MD  [facility]  3/29/2024  11:23 AM

## 2024-03-29 NOTE — CONSULTS
[facility]  Report of Surgical Consultation with History and Physical Exam    Ayse Maldonado Patient Status:  Inpatient    10/25/1927 MRN [id number]   Location [facility] 4NW-A Attending Terri Navarro MD   Hosp Day # 1 PCP Terri Navarro MD     Reason for Surgical Consultation: I am seeing this patient at the request of Dr. Aguero for incarcerated right inguinal hernia with bowel obstruction    History of Present Illness:  Ayse Maldonado is a a(n) 89 year old female who presented to hospital with 1 day history of abdominal pain. Patient has a known right inguinal hernia and was found to have right bulge earlier today. She denies any nausea or vomiting. Per chart review, her last bowel movement was today. Patient continued to not feel well and presented to the hospital for further evaluation. In the emergency room, patient was hemodynamically stable. She had normal serology. CT imaging showed dilated loops of small bowel concerning for bowel obstruction with transition point in the right inguinal hernia. Hernia also had fluid in the sac. General Surgery was called for further evaluation.    On interview, patient denies any abdominal pain, nausea, or vomiting. NG tube was placed with clear output. Patient has a significant medical history for afib on eliquis, last dose was just prior to presentation. She also has a pacemaker in place. She has significant surgical history for colon resection, cholecystectomy, and appendectomy.      History:  Past Medical History:   Diagnosis Date    -Lumbar radicular pain-electrical stimulator 2013    Abdominal distention     Abdominal hernia     Abdominal pain     Age-related osteoporosis with current pathological fracture with routine healing 2018    Anesthesia complication     Arthritis     Atrial fibrillation (HCC)     Back pain     Basal cell cancer     colon    Bloating     Body piercing     Cellulitis of chest wall 2018    Cellulitis of left arm  07/19/2018    Cellulitis of right upper extremity     Closed bicondylar fracture of distal end of right humerus            Global exp 1-17-18 / RIGHT ELBOW / BAM  11/09/2017    Closed displaced fracture of pelvis (HCC)     Constipation     Diarrhea, unspecified     Easy bruising     Essential hypertension     Eye disease     Fatigue     Food intolerance     Frequent urination     Hearing impairment     Hearing loss     Heart palpitations     Hemorrhoids     History of depression     Hypertension     Irregular bowel habits     Leg swelling     Lumbar facet arthropathy 02/10/2018    Macular degeneration - [facility] 05/05/2011    Muscle weakness     Night sweats     Pacemaker     Reflux     Renal disorder     S/P colon resection / Colonoscopy (10/12) recheck - ANJALI Spencer 05/05/2011    S/P laminectomy 12/23/2013    Seizure disorder (HCC)     Sleep apnea     cpap    Sleep disturbance     Spinal stenosis of lumbar region without neurogenic claudication 08/08/2018    Visual impairment        Past Surgical History:   Procedure Laterality Date    ADJ TISS XFER LID,NOS,EAR <10SQCM  03/28/2014    Procedure: DEBULKING OF NASOLABIAL FLAP TO NOSE;  Surgeon: Shar Vasquez MD;  Location: [facility], [facility]    APPENDECTOMY      BACK SURGERY      BOWEL RESECTION      CHOLECYSTECTOMY      COLECTOMY      COLONOSCOPY      COLONOSCOPY      EGD      HEMORRHOIDECTOMY,INT/EXT,SIMPLE      HIP REPLACEMENT SURGERY  left 3/06 right 10/06    OTHER ACCESSORY      electro-stimulator for spinal stenosis    OTHER SURGICAL HISTORY  10/10/2011    cysto-    OTHER SURGICAL HISTORY  10/2016    back surgery    PACEMAKER      [employer] pacemaker    SKIN SURGERY  03/06/2014    BCC nod, inf to left nasal tip / MMS done    SKIN SURGERY  10/31/2016    MMS for BCC-nod to the R upper cut lip-AB    SPINE SURGERY PROCEDURE UNLISTED      TONSILLECTOMY      TOTAL HIP REPLACEMENT         Family History   Problem Relation Age of Onset     Cancer Father     Hypertension Father     Colon Polyps Son     Other (Other) Sister         Davina Mayes        reports that she quit smoking about 52 years ago. Her smoking use included cigarettes. She has a 2 pack-year smoking history. She has never used smokeless tobacco. She reports that she does not currently use alcohol after a past usage of about 2.0 standard drinks of alcohol per week. She reports that she does not use drugs.      Allergies:  Allergies   Allergen Reactions    Bacitracin SWELLING     Red and swelling    Ciprofloxacin OTHER (SEE COMMENTS)     Nausea and abdominal cramping    Levofloxacin OTHER (SEE COMMENTS)    Sulfamethoxazole W/Trimethoprim OTHER (SEE COMMENTS)     Other reaction(s): VOMITING  Oral    Altaryl     Amiodarone OTHER (SEE COMMENTS)     Elevated liver enzymes      Diphenhydramine UNKNOWN    Escitalopram     Thiazide-Type Diuretics OTHER (SEE COMMENTS)     CLASS    Cephalexin DIARRHEA     May have also caused drug rash    Clonidine RASH     Oral    Garlic DIARRHEA    Garlic DIARRHEA    Hydrocodone NAUSEA ONLY     nausea    Lexapro NAUSEA ONLY         Medications:  No current facility-administered medications for this encounter.      Review of Systems:  The review of systems was negative other than the above listed HPI and past medical history including the HEENT, Heart, Lungs, GI, , Neuro, Musculoskeletal, Hematologic, Endocrine and Psych.       Physical Exam:  Blood pressure (!) 164/67, pulse 69, temperature 98 °F (36.7 °C), temperature source Oral, resp. rate 21, height 67\", weight 138 lb (62.6 kg), SpO2 95%.  General: Alert, orientated x3.  Cooperative.  No apparent distress.  HEENT: Exam is unremarkable.  Without scleral icterus.  Mucous membranes are moist. EOM are WNL. NG tube in place with clear output  Neck: No tenderness to palpitation.  Full range of motion to flexion and extension, lateral rotation and lateral flexion of cervical spine.  No JVD. Supple.   Lungs:  Bilateral chest rise. Good excursion of the diaphragms. No secondary use of accessory respiratory musculature.  Cardiac: Regular rate and rhythm. No murmur.  Abdomen:  soft, distended, non tender, right inguinal hernia reduced at bedside and remained reducible  Extremities:  No lower extremity edema noted.  Without clubbing or cyanosis.  2+ pulses x4  Skin: Normal texture and turgor.      Laboratory Data and Relevant Imaging:  Recent Labs   Lab 03/28/24  1843   RBC 3.79*   HGB 13.2   HCT 37.7   MCV 99.5   MCH 34.8*   MCHC 35.0   RDW 11.9   NEPRELIM 3.64   WBC 5.6   .0       Recent Labs   Lab 03/28/24  1843   *   BUN 16   CREATSERUM 0.94   CA 9.3   ALB 3.3*      K 4.4      CO2 27.0   ALKPHO 74   AST 36   ALT 21   BILT 0.4   TP 6.6         No results for input(s): \"PTP\", \"INR\", \"PTT\" in the last 168 hours.    Imaging  XR CHEST AP PORTABLE  (CPT=71045)    Result Date: 3/28/2024  CONCLUSION:  Gastric tube in appropriate position.   LOCATION:  [facility]      Dictated by (CST): Tom Clinton MD on 3/28/2024 at 10:30 PM     Finalized by (CST): Tom Clinton MD on 3/28/2024 at 10:31 PM       CT ABDOMEN+PELVIS(CONTRAST ONLY)(CPT=74177)    Result Date: 3/28/2024  CONCLUSION:  1. Findings compatible with small bowel obstruction with a transition point in the region of a right inguinal hernia containing small bowel.   LOCATION:  [facility]   Dictated by (CST): Tom Clinton MD on 3/28/2024 at 8:47 PM     Finalized by (CST): Tom Clinton MD on 3/28/2024 at 8:57 PM          Impression/Plan:  Patient Active Problem List   Diagnosis    Pacemaker - M. Perfecto    JAZZ on CPAP    Current use of long term anticoagulation    Gastroesophageal reflux disease without esophagitis    Asthma with COPD (chronic obstructive pulmonary disease)    Pulmonary hypertension (HCC)-52 mg Hg    Alternating constipation and diarrhea    Other fatigue-mutifactorial    Chronic bronchitis (HCC)    CKD (chronic kidney disease) stage 3, GFR  30-59 ml/min (HCC)    PAF (paroxysmal atrial fibrillation) (HCC)    Glaucoma suspect of left eye    Pseudophakia    Trochanteric bursitis of left hip    Fall    Essential hypertension    Chronic left hip pain    CHI (closed head injury), subsequent encounter    Altered mental status, unspecified altered mental status type    Hyponatremia    Seizure disorder (HCC)    Hypertension, unspecified type    Altered mental status    Weakness    Hypotension, unspecified hypotension type    Hypotension    Branch retinal vein occlusion of left eye with macular edema (HCC)    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 (HCC)    PCO (posterior capsular opacification), left    Inguinal hernia of right side without obstruction or gangrene    Umbilical hernia without obstruction and without gangrene    Protein-calorie malnutrition, unspecified severity (HCC)    Dementia without behavioral disturbance (HCC)    Unilateral inguinal hernia with obstruction and without gangrene, recurrence not specified       89 year old female with incarcerated right inguinal hernia with small bowel obstruction    Patient has a history of abdominal pain and general malaise  She did have a bowel movement earlier today  CT imaging is concerning for small bowel obstruction with a transition at right inguinal hernia  On physical exam, hernia was reduced and remained reducible throughout the examination    At this time, I recommend holding on any emergent intervention for the hernia  I did speak with the patient and her 2 sons, 1 via phone, the need for hernia repair when there is an incarcerated hernia containing bowel  The hernia was reduced so no emergent intervention is needed  Patient is on Eliquis for A-fib  Recommend holding this in anticipation of repair in approximately 2 days    Patient was seen previously by Dr. Hodge and deemed too high risk for elective hernia repair  At that time,  patient wished not to have any surgical intervention  Today, patient has evidence of incarceration with bowel obstruction  This is an indication for more urgent repair  Recommend medical and cardiac clearance  Continue NG tube for decompression  N.p.o. for bowel rest  IV fluids for hydration    Surgery will continue to follow  Rest of care per primary    Thank you for letting me participate in the care of this patient    Mally Fernandez MD  3/29/2024  12:08 AM

## 2024-03-29 NOTE — PLAN OF CARE
Pt a/o x2, forgetful at times. BP elevated, MD notified. Remained afebrile. Meds given per MAR. Cards consulted. Morphine given for throat pain with no relief. Lozenge and chloraseptic spray given with minimal relief to throat pain. NGT intact and attached to LIS. IVF infusing per orders. Ambulates to bathroom with walker and SBA. PRN hydralazine given per orders. Fall precautions in place. Call light within reach.

## 2024-03-29 NOTE — ED QUICK NOTES
Orders for admission, patient is aware of plan and ready to go upstairs. Any questions, please call ED RN kassy at extension [phone].     Patient Covid vaccination status: Fully vaccinated     COVID Test Ordered in ED: None    COVID Suspicion at Admission: N/A    Running Infusions:  None    Mental Status/LOC at time of transport: AxO X3- forgetful     Other pertinent information: ON room air. Right NG @ 60 cm to LIS. Continent x2 but on purewick. Up with assistance per nursing home. Surgery saw pt at bedside and reduced by surgery, plan for surgery at some point during hospitalization per surgery.   CIWA score: N/A   NIH score:  N/A

## 2024-03-29 NOTE — PLAN OF CARE
NURSING ADMISSION NOTE      Patient admitted via Cart at 0120 with son at bedside.  Oriented to room.  Safety precautions initiated.  Bed in low position.  Call light in reach. Pt alert to self at this time very forgetful keeps asking where she is and why. Received with ngt to lis with clear light brown output noted. Pt noted to be hypertensive. Denies pain abdomen distended but soft non tender to touch. Pt up and voiding assisted with walker. Started on ivf.  called for admission orders which were received. Son remains at bedside. Bed alarm in place. POC discussed and all questions and concerns addressed.

## 2024-03-29 NOTE — CONSULTS
[facility]   part of [facility]    Report of Consultation    Ayse Maldonado Patient Status:  Inpatient    10/25/1927 MRN [id number]   Location [facility] 4NW-A Attending Terri Navarro MD   Hosp Day # 1 PCP Terri Navarro MD     Date of Admission:  3/28/2024  Date of Consult:  3/29/24    Reason for Consultation:  Right inguinal hernia and SBO    History of Present Illness:  Ayse Maldonado is a a(n) 89 year old female. Admitted last evening with abdominal discomfort. Evaluation revealed right inguinal hernia which was reducible yet associated with CT evidence of bowel obstruction.    NG in place - throat sore but not c/o abdominal pain    Chronic atrial fibrillation with PPM on Eliquis    Historically preserved LV function    Blood pressures elevated which would be expected - no need to be overaggressive here    History:  Past Medical History:   Diagnosis Date    -Lumbar radicular pain-electrical stimulator 2013    Abdominal distention     Abdominal hernia     Abdominal pain     Age-related osteoporosis with current pathological fracture with routine healing 2018    Anesthesia complication     Arthritis     Atrial fibrillation (HCC)     Back pain     Basal cell cancer     colon    Bloating     Body piercing     Cellulitis of chest wall 2018    Cellulitis of left arm 2018    Cellulitis of right upper extremity     Closed bicondylar fracture of distal end of right humerus            Global exp 18 / RIGHT ELBOW / BAM  2017    Closed displaced fracture of pelvis (HCC)     Constipation     Diarrhea, unspecified     Easy bruising     Essential hypertension     Eye disease     Fatigue     Food intolerance     Frequent urination     Hearing impairment     Hearing loss     Heart palpitations     Hemorrhoids     History of depression     Hypertension     Irregular bowel habits     Leg swelling     Lumbar facet arthropathy 02/10/2018    Macular degeneration - [facility] Ophthalmologist 05/05/2011    Muscle weakness     Night sweats     Pacemaker     Reflux     Renal disorder     S/P colon resection / Colonoscopy (10/12) recheck - ANJALI Spencer 05/05/2011    S/P laminectomy 12/23/2013    Seizure disorder (HCC)     Sleep apnea     cpap    Sleep disturbance     Spinal stenosis of lumbar region without neurogenic claudication 08/08/2018    Visual impairment      Past Surgical History:   Procedure Laterality Date    ADJ TISS XFER LID,NOS,EAR <10SQCM  03/28/2014    Procedure: DEBULKING OF NASOLABIAL FLAP TO NOSE;  Surgeon: Shar Vasquez MD;  Location: [facility], [facility]    APPENDECTOMY      BACK SURGERY      BOWEL RESECTION      CHOLECYSTECTOMY      COLECTOMY      COLONOSCOPY      COLONOSCOPY      EGD      HEMORRHOIDECTOMY,INT/EXT,SIMPLE      HIP REPLACEMENT SURGERY  left 3/06 right 10/06    OTHER ACCESSORY      electro-stimulator for spinal stenosis    OTHER SURGICAL HISTORY  10/10/2011    cysto-    OTHER SURGICAL HISTORY  10/2016    back surgery    PACEMAKER      [employer] pacemaker    SKIN SURGERY  03/06/2014    BCC nod, inf to left nasal tip / MMS done    SKIN SURGERY  10/31/2016    MMS for BCC-nod to the R upper cut lip-AB    SPINE SURGERY PROCEDURE UNLISTED      TONSILLECTOMY      TOTAL HIP REPLACEMENT       Family History   Problem Relation Age of Onset    Cancer Father     Hypertension Father     Colon Polyps Son     Other (Other) Sister         Davina Mayes      reports that she quit smoking about 52 years ago. Her smoking use included cigarettes. She has a 2 pack-year smoking history. She has never used smokeless tobacco. She reports that she does not currently use alcohol after a past usage of about 2.0 standard drinks of alcohol per week. She reports that she does not use drugs.    Allergies:  Allergies   Allergen Reactions    Bacitracin SWELLING     Red and swelling    Ciprofloxacin OTHER (SEE COMMENTS)     Nausea and abdominal cramping    Levofloxacin OTHER (SEE  COMMENTS)    Sulfamethoxazole W/Trimethoprim OTHER (SEE COMMENTS)     Other reaction(s): VOMITING  Oral    Altaryl     Amiodarone OTHER (SEE COMMENTS)     Elevated liver enzymes      Diphenhydramine UNKNOWN    Escitalopram     Thiazide-Type Diuretics OTHER (SEE COMMENTS)     CLASS    Cephalexin DIARRHEA     May have also caused drug rash    Clonidine RASH     Oral    Garlic DIARRHEA    Garlic DIARRHEA    Hydrocodone NAUSEA ONLY     nausea    Lexapro NAUSEA ONLY       Medications:    Current Facility-Administered Medications:     hydrALAzine (Apresoline) 20 mg/mL injection 10 mg, 10 mg, Intravenous, Q6H PRN    morphINE PF 2 MG/ML injection 1 mg, 1 mg, Intravenous, Q4H PRN    ondansetron (Zofran) 4 MG/2ML injection 4 mg, 4 mg, Intravenous, Q4H PRN    metoprolol (Lopressor) 5 mg/5mL injection 5 mg, 5 mg, Intravenous, Q6H    enalaprilat (Vasotec) 1.25 mg/mL injection 1.25 mg, 1.25 mg, Intravenous, Q6H    lactated ringers infusion, , Intravenous, Continuous    pantoprazole (Protonix) 40 mg in sodium chloride 0.9% PF 10 mL IV push, 40 mg, Intravenous, Q24H    benzocaine-menthol (Cepacol) lozenge 1 lozenge, 1 lozenge, Oral, PRN    Review of Systems:  All systems were reviewed and are negative except as described above in HPI.    Physical Exam:  Blood pressure (!) 173/76, pulse 69, temperature 98.1 °F (36.7 °C), temperature source Oral, resp. rate 20, height 170.2 cm (5' 7\"), weight 135 lb 9.6 oz (61.5 kg), SpO2 94%.  Temp (24hrs), Av.1 °F (36.7 °C), Min:98 °F (36.7 °C), Max:98.1 °F (36.7 °C)    Wt Readings from Last 3 Encounters:   24 135 lb 9.6 oz (61.5 kg)   24 135 lb (61.2 kg)   24 135 lb (61.2 kg)       Telemetry: atrial fibrillation - rates controlled  General: Weak  HEENT: NG in place  Neck: No JVD  Cardiac: irregular - no s/o significant aortic stenosis  Lungs: Clear without wheezes anteriorly  Abdomen: Soft, non-tender.   Extremities: Without clubbing, cyanosis or edema.   Neurologic: Alert  and oriented  Skin: Warm and dry.     Laboratories and Data:  Diagnostics:  EKG: no ECG performed  Echo:   Stress Test:   Cath:   CTA Chest:   CXR: clear    Labs:   Lab Results   Component Value Date    WBC 6.1 03/29/2024    RBC 3.85 03/29/2024    HGB 13.2 03/29/2024    HCT 37.8 03/29/2024    MCV 98.2 03/29/2024    MCH 34.3 03/29/2024    MCHC 34.9 03/29/2024    RDW 12.0 03/29/2024    .0 03/29/2024     Lab Results   Component Value Date    PT 30.8 (H) 11/08/2013    PT 16.1 (H) 06/28/2011    PT 15.3 06/27/2011    INR 1.41 (H) 05/07/2020    INR 1.49 (H) 04/24/2020    INR 1.38 (H) 02/21/2020       Assessment/Plan:  Patient Active Problem List   Diagnosis    Pacemaker - M. Perfecto    JAZZ on CPAP    Current use of long term anticoagulation    Gastroesophageal reflux disease without esophagitis    Asthma with COPD (chronic obstructive pulmonary disease)    Pulmonary hypertension (HCC)-52 mg Hg    Alternating constipation and diarrhea    Other fatigue-mutifactorial    Chronic bronchitis (HCC)    CKD (chronic kidney disease) stage 3, GFR 30-59 ml/min ([facility])    PAF (paroxysmal atrial fibrillation) ([facility])    Glaucoma suspect of left eye    Pseudophakia    Trochanteric bursitis of left hip    Fall    Essential hypertension    Chronic left hip pain    CHI (closed head injury), subsequent encounter    Altered mental status, unspecified altered mental status type    Hyponatremia    Seizure disorder (HCC)    Hypertension, unspecified type    Altered mental status    Weakness    Hypotension, unspecified hypotension type    Hypotension    Branch retinal vein occlusion of left eye with macular edema (HCC)    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 (HCC)    PCO (posterior capsular opacification), left    Inguinal hernia of right side without obstruction or gangrene    Umbilical hernia without obstruction and without gangrene    Protein-calorie malnutrition, unspecified  severity (HCC)    Dementia without behavioral disturbance (HCC)    Unilateral inguinal hernia with obstruction and without gangrene, recurrence not specified       Compensated cardiac status. Incarcerated right inguinal hernia with SBO.    Hold eliquis    Rate control agents via IV route     Continue telemetry    Acceptable cardiac risk to proceed with surgery as needed    Avoid excess crystalloid administration    Kayode Rubio MD  3/29/2024  9:20 AM

## 2024-03-29 NOTE — ED PROVIDER NOTES
Patient Seen in: [facility] Emergency Department      History     Chief Complaint   Patient presents with    Abdomen/Flank Pain     Stated Complaint:     Subjective:   HPI    89-year-old female with a history of A-fib on Eliquis presents to the emergency room with reports of abdominal pain.  Patient's son is here.  He is visiting her today at [facility] when patient complained of abdominal pain.  He stated that he saw a bulge in her groin.  Family was aware that she had a hernia.  Patient tells me she had a bowel movement earlier today.  Son states he had no reports of patient vomiting.  The patient tells me she just does not feel well has no further specific complaints.    Objective:   No pertinent past medical history.            Past Surgical History:   Procedure Laterality Date    ADJ TISS XFER LID,NOS,EAR <10SQCM  2014    Procedure: DEBULKING OF NASOLABIAL FLAP TO NOSE;  Surgeon: Shar Vasquez MD;  Location: [facility]    APPENDECTOMY      BACK SURGERY      BOWEL RESECTION      CHOLECYSTECTOMY      COLECTOMY      COLONOSCOPY      COLONOSCOPY      EGD      HEMORRHOIDECTOMY,INT/EXT,SIMPLE      HIP REPLACEMENT SURGERY  left 3/06 right 10/06    OTHER ACCESSORY      electro-stimulator for spinal stenosis    OTHER SURGICAL HISTORY  10/10/2011    cysto-    OTHER SURGICAL HISTORY  10/2016    back surgery    PACEMAKER      [employer] pacemaker    SKIN SURGERY  2014    BCC nod, inf to left nasal tip / MMS done    SKIN SURGERY  10/31/2016    MMS for BCC-nod to the R upper cut lip-AB    SPINE SURGERY PROCEDURE UNLISTED      TONSILLECTOMY      TOTAL HIP REPLACEMENT                  Social History     Socioeconomic History    Marital status:    Tobacco Use    Smoking status: Former     Packs/day: 0.10     Years: 20.00     Additional pack years: 0.00     Total pack years: 2.00     Types: Cigarettes     Quit date: 1972     Years since quittin.2    Smokeless tobacco:  Never   Vaping Use    Vaping Use: Never used   Substance and Sexual Activity    Alcohol use: Not Currently     Alcohol/week: 2.0 standard drinks of alcohol     Types: 2 Glasses of wine per week     Comment: wine occasionally    Drug use: No   Other Topics Concern    Caffeine Concern No    Exercise Yes     Comment: walks dog 2 x a day     Social Determinants of Health     Food Insecurity: No Food Insecurity (1/22/2024)    Food Insecurity     Food Insecurity: Never true   Transportation Needs: No Transportation Needs (1/22/2024)    Transportation Needs     Lack of Transportation: No   Housing Stability: Low Risk  (1/22/2024)    Housing Stability     Housing Instability: No              Review of Systems    Positive for stated complaint:   Other systems are as noted in HPI.  Constitutional and vital signs reviewed.      All other systems reviewed and negative except as noted above.    Physical Exam     ED Triage Vitals   BP 03/28/24 1825 (!) 166/81   Pulse 03/28/24 1818 70   Resp 03/28/24 1818 20   Temp 03/28/24 1818 98.1 °F (36.7 °C)   Temp src 03/28/24 1818 Oral   SpO2 03/28/24 1825 96 %   O2 Device 03/28/24 1825 None (Room air)       Current:/71   Pulse 68   Temp 98.1 °F (36.7 °C) (Oral)   Resp 21   Ht 170.2 cm (5' 7\")   Wt 62.6 kg   SpO2 94%   BMI 21.61 kg/m²         Physical Exam  Vitals and nursing note reviewed.   Constitutional:       General: She is not in acute distress.     Appearance: Normal appearance.   HENT:      Head: Normocephalic.      Nose: Nose normal.      Mouth/Throat:      Mouth: Mucous membranes are moist.   Eyes:      Extraocular Movements: Extraocular movements intact.      Pupils: Pupils are equal, round, and reactive to light.   Cardiovascular:      Rate and Rhythm: Normal rate and regular rhythm.      Pulses: Normal pulses.   Pulmonary:      Effort: Pulmonary effort is normal.   Abdominal:      General: Abdomen is flat. Bowel sounds are increased. There is distension.       Palpations: Abdomen is soft.      Tenderness: There is no abdominal tenderness. There is no right CVA tenderness, left CVA tenderness, guarding or rebound.      Hernia: A hernia is present. Hernia is present in the right inguinal area.   Musculoskeletal:         General: No swelling or tenderness. Normal range of motion.      Cervical back: Normal range of motion.   Skin:     General: Skin is warm and dry.   Neurological:      Mental Status: She is alert and oriented to person, place, and time. Mental status is at baseline.   Psychiatric:         Mood and Affect: Mood normal.               ED Course     Labs Reviewed   COMP METABOLIC PANEL (14) - Abnormal; Notable for the following components:       Result Value    Glucose 101 (*)     eGFR-Cr 56 (*)     Albumin 3.3 (*)     All other components within normal limits   CBC W/ DIFFERENTIAL - Abnormal; Notable for the following components:    RBC 3.79 (*)     MCH 34.8 (*)     All other components within normal limits   LIPASE - Normal   CBC WITH DIFFERENTIAL WITH PLATELET    Narrative:     The following orders were created for panel order CBC With Differential With Platelet.  Procedure                               Abnormality         Status                     ---------                               -----------         ------                     CBC W/ DIFFERENTIAL[[id number]]          Abnormal            Final result                 Please view results for these tests on the individual orders.   URINALYSIS, ROUTINE     CT ABDOMEN+PELVIS(CONTRAST ONLY)(CPT=74177)    Result Date: 3/28/2024  CONCLUSION:  1. Findings compatible with small bowel obstruction with a transition point in the region of a right inguinal hernia containing small bowel.   LOCATION:  [facility]   Dictated by (CST): Tom Clinton MD on 3/28/2024 at 8:47 PM     Finalized by (CST): Tom Clinton MD on 3/28/2024 at 8:57 PM        [facility]        Medical Decision Making  The differential includes the  following  Bowel obstruction, incarcerated hernia, strangulated hernia or both which are life threats, constipation,    Pertinent comorbidities include  As listed above    Pertinent social history includes  As listed above    ER course  Patient afebrile hemodynamically stable on arrival.  On exam abdomen is distended, nontender with what appears to be increased bowel sounds.  There is a right inguinal hernia.  Patient is uncomfortable with palpation unable to reduce it.  No overlying skin changes over this region.  Patient's labs notable for normal white blood cell count.  She underwent a CT scan with findings consistent with a small bowel obstruction and a right inguinal hernia leading to the transition point.  Case discussed with Dr. Navarro who is the patient's PCP who patient will be admitted under.  Also discussed with Dr. Fernandez of general surgery.  She recommends NG tube, n.p.o. status.  Patient may need to go to the OR tonight.      Imaging studies  I reviewed the images and agree with the radiologist report that showed the following bowel obstruction with transition point in region of right inguinal hernia that has small bowel within it.          Disposition and Plan     Clinical Impression:  1. Unilateral inguinal hernia with obstruction and without gangrene, recurrence not specified         Disposition:  There is no disposition on file for this visit.  There is no disposition time on file for this visit.    Follow-up:  No follow-up provider specified.        Medications Prescribed:  Current Discharge Medication List

## 2024-03-29 NOTE — PHYSICAL THERAPY NOTE
PHYSICAL THERAPY EVALUATION - INPATIENT     Room Number: 405/405-A  Evaluation Date: 3/29/2024  Type of Evaluation: Initial  Physician Order: PT Eval and Treat    Presenting Problem: bulding hernia and abnormal labs  Co-Morbidities : pacemaker, COPD, chronic bronchtiis, glaucoma, CKD3, PAF, ess HTN, seizures, dementia, macular degeneration  Reason for Therapy: Mobility Dysfunction and Discharge Planning    CT ABDOMEN/PELVIS:  1. Findings compatible with small bowel obstruction with a transition point in the region of a right inguinal hernia containing small bowel.    PHYSICAL THERAPY ASSESSMENT   Patient is currently functioning below baseline with bed mobility, transfers, gait, maintaining seated position, and standing prolonged periods.  Prior to admission, patient's baseline is Shara with RW.  Patient is requiring contact guard assist as a result of the following impairments: decreased functional strength, decreased endurance/aerobic capacity, pain, impaired static and dynamic standing balance, impaired coordination, impaired motor planning, decreased muscular endurance, and medical status.  Physical Therapy will continue to follow for duration of hospitalization.    Patient will benefit from continued skilled PT Services at discharge to promote functional independence and safety with additional support and return home with home health PT.    PLAN  PT Treatment Plan: Bed mobility;Body mechanics;Coordination;Endurance;Energy conservation;Patient education;Family education;Gait training;Strengthening;Range of motion;Neuromuscular re-educate;Transfer training;Balance training  Rehab Potential : Good  Frequency (Obs): 3-5x/week  Number of Visits to Meet Established Goals: 4      CURRENT GOALS    Goal #1 Patient is able to demonstrate supine - sit EOB @ level: independent     Goal #2 Patient is able to demonstrate transfers Sit to/from Stand at assistance level: modified independent     Goal #3 Patient is able to  ambulate 50 feet with assist device: walker - rolling at assistance level: supervision     Goal #4    Goal #5    Goal #6    Goal Comments: Goals established on 3/29/2024    PHYSICAL THERAPY MEDICAL/SOCIAL HISTORY  History related to current admission: Patient is a 89 year old female admitted on 3/28/2024 from home for bulging hernia. Workup is ongoing at this point in time, per EMR review and discussion with RN, pt may go to OR at some point this weekend.     HOME SITUATION  Type of Home: Independent living facility ([facility])   Home Layout: One level                Lives With: Alone  Drives: No  Patient Owned Equipment: Rolling walker  Patient Regularly Uses: Glasses    Prior Level of [place]: Per pt and pt's son (Woody)- lives at [facility]. Ambulates with RW at all times. Able to toilet and dress self. Bed mobility and transfers independently. Supervised showers two times a week. Ambulates to dining room for meals. No recent hx of falls.    SUBJECTIVE  \"My throat!\" - pt very soft spoken, per RN pt's throat very painful from trauma of insertion of NG    OBJECTIVE  Precautions: Bed/chair alarm;Hard of hearing;NG suction  Fall Risk: High fall risk    WEIGHT BEARING RESTRICTION  Weight Bearing Restriction: None                PAIN ASSESSMENT  Rating: 10  Location: throat  Management Techniques: Breathing techniques;Relaxation    COGNITION  Overall Cognitive Status:  WFL - within functional limits    RANGE OF MOTION AND STRENGTH ASSESSMENT  Upper extremity ROM and strength are within functional limits   Lower extremity ROM is within functional limits   Lower extremity strength is within functional limits     BALANCE  Static Sitting: Good  Dynamic Sitting: Fair +  Static Standing: Fair  Dynamic Standing: Fair -    ADDITIONAL TESTS                                    ACTIVITY TOLERANCE                         O2 WALK       NEUROLOGICAL FINDINGS                        AM-PAC '6-Clicks' INPATIENT SHORT FORM -  BASIC MOBILITY  How much difficulty does the patient currently have...  Patient Difficulty: Turning over in bed (including adjusting bedclothes, sheets and blankets)?: A Little   Patient Difficulty: Sitting down on and standing up from a chair with arms (e.g., wheelchair, bedside commode, etc.): A Little   Patient Difficulty: Moving from lying on back to sitting on the side of the bed?: A Little   How much help from another person does the patient currently need...   Help from Another: Moving to and from a bed to a chair (including a wheelchair)?: A Little   Help from Another: Need to walk in hospital room?: A Little   Help from Another: Climbing 3-5 steps with a railing?: A Little       AM-PAC Score:  Raw Score: 18   Approx Degree of Impairment: 46.58%   Standardized Score (AM-PAC Scale): 43.63   CMS Modifier (G-Code): CK    FUNCTIONAL ABILITY STATUS  Gait Assessment   Functional Mobility/Gait Assessment  Gait Assistance: Contact guard assist  Distance (ft): 25  Assistive Device: Rolling walker  Pattern: Shuffle (kyphotic posturing)    Skilled Therapy Provided     Bed Mobility:  Rolling: NT  Supine to sit: supervision   Sit to supine: supervision     Transfer Mobility:  Sit to stand: CGA to RW- v/c for hand placement   Stand to sit: CGA  Gait = CGA x 25 feet w/ RW; shuffle, kyphotic posturing    Therapist's Comments:   Patient presents sitting up in bed. RN present in room giving pt throat spray due to 10/10 pain in throat. Discussed role and goal of physical therapy in hospital setting. Pt in agreement to session with encouragement from son. Bed mobility w/ HOB significantly elevated at supervision with incr time. Sit to stand CGA to RW, v/c for hand placement. Ambulated 25 feet w/ RW at CGA. Pt politely declining any further mobility at this time. Sit to supine at supervision.  Educated on importance of continued out of bed mobility, encouraged continued ambulation with nursing staff. Per RN, she has been  ambulating to/from bathroom with RW and nursing staff multiple times daily.     Exercise/Education Provided:  Bed mobility  Body mechanics  Energy conservation  Functional activity tolerated  Gait training  Posture  Transfer training    Patient End of Session: Needs met;Call light within reach;RN aware of session/findings;In bed;All patient questions and concerns addressed;Alarm set;Family present;Discussed recommendations with /    Patient Evaluation Complexity Level:  History Moderate - 1 or 2 personal factors and/or co-morbidities   Examination of body systems Low - addressing 1-2 elements   Clinical Presentation Low - Stable   Clinical Decision Making Low - Stable     PT Session Time: 25 minutes  Gait Training: 10 minutes

## 2024-03-30 LAB — GLUCOSE BLD-MCNC: 110 MG/DL (ref 70–99)

## 2024-03-30 RX ORDER — DILTIAZEM HYDROCHLORIDE 5 MG/ML
10 INJECTION INTRAVENOUS ONCE
Status: COMPLETED | OUTPATIENT
Start: 2024-03-30 | End: 2024-03-30

## 2024-03-30 NOTE — PROGRESS NOTES
Progress Note  Ayse Maldonado Patient Status:  Inpatient    10/25/1927 MRN [id number]   [facility] 8NE-A Attending Terri Navarro MD   Hosp Day # 2 PCP Terri Navarro MD     Subjective:  In bed on exam. A little disoriented, but no acute distress. Can't remember why she is in the hospital. Denies cp, sob. No abdominal pain this am.     Objective:  BP (!) 173/82   Pulse 84   Temp 98.7 °F (37.1 °C) (Oral)   Resp 21   Ht 5' 7\" (1.702 m)   Wt 135 lb 9.3 oz (61.5 kg)   SpO2 95%   BMI 21.24 kg/m²     Telemetry: afib, intermittently v paced      Intake/Output:    Intake/Output Summary (Last 24 hours) at 3/30/2024 0956  Last data filed at 3/30/2024 0833  Gross per 24 hour   Intake 768 ml   Output 1780 ml   Net -1012 ml       Last 3 Weights   24 0423 135 lb 9.3 oz (61.5 kg)   24 0421 135 lb 9.3 oz (61.5 kg)   24 0002 135 lb 9.6 oz (61.5 kg)   24 1825 138 lb (62.6 kg)   24 1110 135 lb (61.2 kg)   24 1006 135 lb (61.2 kg)       Labs:  Recent Labs   Lab 24  1843 24  0820   * 101*   BUN 16 13   CREATSERUM 0.94 0.84   EGFRCR 56* 64   CA 9.3 9.3    140   K 4.4 3.8    106   CO2 27.0 28.0     Recent Labs   Lab 24  1843 24  0820   RBC 3.79* 3.85   HGB 13.2 13.2   HCT 37.7 37.8   MCV 99.5 98.2   MCH 34.8* 34.3*   MCHC 35.0 34.9   RDW 11.9 12.0   NEPRELIM 3.64 3.97   WBC 5.6 6.1   .0 223.0         No results for input(s): \"TROP\", \"TROPHS\", \"CK\" in the last 168 hours.    Review of Systems   Cardiovascular:  Positive for irregular heartbeat.   Respiratory: Negative.         Physical Exam:    Gen: alert, oriented x 2, NAD  Heent: pupils equal, reactive. Mucous membranes moist. NG in place.  Neck: no jvd  Cardiac: irregularly irregular, normal S1,S2  Lungs: CTA  Abd: soft, NT/ND +bs  Ext: no edema  Skin: Warm, dry  Neuro: No focal deficits        Medications:     metoprolol  5 mg Intravenous Q6H    enalaprilat  1.25 mg  Intravenous Q6H    pantoprazole  40 mg Intravenous Q24H    dilTIAZem  10 mg Intravenous Once      dilTIAZem 10 mg/hr (03/30/24 0311)    lactated ringers 100 mL/hr at 03/30/24 0608           Assessment:  Incarcerated right inguinal hernia with SBO admitted with abdominal pain  General surgery following-plans for open right inguinal hernia repair with mesh this weekend or early this week.   Chronic Afib s/p SJM dual chamber PPM (2015)  On eliquis--held given above  Historically LVEF preserved.   Bb, ccb, dig  Prior hx DCCV-follows with Dr. Perfecto Barrera santy 2015  HTN-2/2 above.   Preop cardiac risk assessment  Acceptable cardiac risk to proceed with surgery.    Plan:  Hold eliquis.   Plan for surgery--timing tbd  Rates controlled on IV bb and cardizem gtt.   On scheduled IV acei as well.   Follow vol status--currently appears compensated.    Plan of care discussed with patient, RN.    Ana Corona, APRN  3/30/2024  9:56 AM  [phone]  [facility] [phone]      Reviewed with Nurse Chloe    Transferred to heart hospital bed last evening secondary to increased atrial fibrillatory rates - certainly not unexpected given the circumstances    Blood pressure remain elevated - again as expected - no need to be over aggressive here    As outlined above - acceptable cardiac risk to proceed with surgery as required.    Should probably return to heart hospital bed post-op      L2

## 2024-03-30 NOTE — PROGRESS NOTES
Received pt at 1900 confused. BP and HR elevated. PRN cardizem along with scheduled BP medications given with little response. Cardiology made aware. Tele called and pt was afib RVR with HR in 140`s. Flowsheet monitors showing pt with a HR of 70`s. Tele not reflecting chart overview. Continuously monitored. NG to LIS. IV fluids infusing.   Cardiology paged about continued elevated BP and HR. Requested pt to be transferred to cardiac unit for further observation. Cardizem gtt ordered along with transfer orders. Report given to tele RN and cardizem gtt started. Pt transferred.

## 2024-03-30 NOTE — PROGRESS NOTES
[facility]  Progress Note    Ayse Maldonado Patient Status:  Inpatient    10/25/1927 MRN [id number]   [facility] 8NE-A Attending Terri Navarro MD   Hosp Day # 2 PCP Terri Navarro MD       SUBJECTIVE:  Transferred to CTU because of AF with RVR , started on Cardizem drip   HR better   More agitated , pulling on N/G     OBJECTIVE:  Vital signs in last 24 hours:  BP (!) 175/62   Pulse 85   Temp 98.7 °F (37.1 °C) (Oral)   Resp 21   Ht 5' 7\" (1.702 m)   Wt 135 lb 9.3 oz (61.5 kg)   SpO2 95%   BMI 21.24 kg/m²     Intake/Output:    Intake/Output Summary (Last 24 hours) at 3/30/2024 1140  Last data filed at 3/30/2024 0833  Gross per 24 hour   Intake 768 ml   Output 1780 ml   Net -1012 ml       Wt Readings from Last 3 Encounters:   24 135 lb 9.3 oz (61.5 kg)   24 135 lb (61.2 kg)   24 135 lb (61.2 kg)       Medications:  Current Facility-Administered Medications   Medication Dose Route Frequency    dilTIAZem 10 mg BOLUS FROM BAG infusion  10 mg Intravenous Q1H PRN    dilTIAZem (cardIZEM) 100 mg in sodium chloride 0.9% 100 mL IVPB-ADDV  2.5-20 mg/hr Intravenous Continuous    hydrALAzine (Apresoline) 20 mg/mL injection 10 mg  10 mg Intravenous Q6H PRN    morphINE PF 2 MG/ML injection 1 mg  1 mg Intravenous Q4H PRN    ondansetron (Zofran) 4 MG/2ML injection 4 mg  4 mg Intravenous Q4H PRN    metoprolol (Lopressor) 5 mg/5mL injection 5 mg  5 mg Intravenous Q6H    enalaprilat (Vasotec) 1.25 mg/mL injection 1.25 mg  1.25 mg Intravenous Q6H    lactated ringers infusion   Intravenous Continuous    pantoprazole (Protonix) 40 mg in sodium chloride 0.9% PF 10 mL IV push  40 mg Intravenous Q24H    benzocaine-menthol (Cepacol) lozenge 1 lozenge  1 lozenge Oral PRN    phenol (Chloraseptic) 1.4 % oral liquid spray   Oral Q2H PRN    dilTIAZem (cardIZEM) 25 mg/5mL injection 10 mg  10 mg Intravenous Once       Exam:  Gen: No acute distress, alert and oriented x3, no focal neurologic  deficits  Pulm: Lungs clear, normal respiratory effort  CV: Heart with irregular rate and rhythm, no peripheral edema  Abd: Abdomen soft, nontender, nondistended, no organomegaly, bowel sounds present  MSK: Full range of motion in extremities, no clubbing, no cyanosis  Skin: no rashes or lesions    Data Review:     Labs:   No components found for: \"BMP\"   Recent Labs   Lab 03/28/24  1843 03/29/24  0820   RBC 3.79* 3.85   HGB 13.2 13.2   HCT 37.7 37.8   MCV 99.5 98.2   MCH 34.8* 34.3*   MCHC 35.0 34.9   RDW 11.9 12.0   NEPRELIM 3.64 3.97   WBC 5.6 6.1   .0 223.0     No results for input(s): \"PTP\", \"INR\" in the last 168 hours.  Hemoglobin A1c (%)   Date Value   07/05/2012 5.9 (H)   08/11/2011 5.8 (H)     HEMOGLOBIN A1c (% of total Hgb)   Date Value   04/27/2023 5.7 (H)     HGBA1C (%)   Date Value   07/18/2014 5.8 (H)   02/17/2014 6.0 (H)   11/08/2013 6.3 (H)     HgbA1C (%)   Date Value   07/30/2022 6.0 (H)   03/01/2021 6.3 (H)   07/27/2020 5.3     TSH   Date Value   01/21/2024 2.950 mIU/mL   07/30/2022 2.020 mIU/mL   11/03/2021 0.926 mIU/mL   07/05/2012 1.940 uIU/mL   08/11/2011 2.230 uIU/mL   05/06/2011 1.690 uIU/mL       Assessment & Plan:  1.Unilateral inguinal hernia with obstruction and without gangrene -continue n.p.o. IV fluid and intermittent NG suction.  Holding Eliquis for surgery Planned for tomorrow     2.  Hypertension-holding oral medications  Currently on Vasotec and metoprolol IV  3.  Chronic A-fib-holding Eliquis, started on cardizem drip   4.  Pulmonary HTN   5.  History of COPD continue inhalers  6. Dementia and cognitive dysfunction - start  soft restraints as needed   Cardiology and surgery consult Appreciated     Patient Active Problem List   Diagnosis    Pacemaker - M. Perfecto    JAZZ on CPAP    Current use of long term anticoagulation    Gastroesophageal reflux disease without esophagitis    Asthma with COPD (chronic obstructive pulmonary disease)    Pulmonary hypertension (HCC)-52 mg Hg     Alternating constipation and diarrhea    Other fatigue-mutifactorial    Chronic bronchitis (HCC)    CKD (chronic kidney disease) stage 3, GFR 30-59 ml/min (HCC)    PAF (paroxysmal atrial fibrillation) (HCC)    Glaucoma suspect of left eye    Pseudophakia    Trochanteric bursitis of left hip    Fall    Essential hypertension    Chronic left hip pain    CHI (closed head injury), subsequent encounter    Altered mental status, unspecified altered mental status type    Hyponatremia    Seizure disorder (HCC)    Hypertension, unspecified type    Altered mental status    Weakness    Hypotension, unspecified hypotension type    Hypotension    Branch retinal vein occlusion of left eye with macular edema (HCC)    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    Terri Navarro MD  3/30/2024  11:40 AM

## 2024-03-30 NOTE — PLAN OF CARE
Received patient from room 405. She was awake but confused. She was afib on tele at controlled rate of 90`s. Skin check was done with the PCT. Needs attended.    Problem: Patient/Family Goals  Goal: Patient/Family Long Term Goal  Description: Patient's Long Term Goal: will be able to go home without complication    Interventions:  -   - See additional Care Plan goals for specific interventions  Outcome: Progressing  Goal: Patient/Family Short Term Goal  Description: Patient's Short Term Goal: will have normal bowel function    Interventions:   - NPO, NGT to LIS, IV fluids, pain control  - See additional Care Plan goals for specific interventions  Outcome: Progressing     Problem: CARDIOVASCULAR - ADULT  Goal: Maintains optimal cardiac output and hemodynamic stability  Description: INTERVENTIONS:  - Monitor vital signs, rhythm, and trends  - Monitor for bleeding, hypotension and signs of decreased cardiac output  - Evaluate effectiveness of vasoactive medications to optimize hemodynamic stability  - Monitor arterial and/or venous puncture sites for bleeding and/or hematoma  - Assess quality of pulses, skin color and temperature  - Assess for signs of decreased coronary artery perfusion - ex. Angina  - Evaluate fluid balance, assess for edema, trend weights  Outcome: Progressing  Goal: Absence of cardiac arrhythmias or at baseline  Description: INTERVENTIONS:  - Continuous cardiac monitoring, monitor vital signs, obtain 12 lead EKG if indicated  - Evaluate effectiveness of antiarrhythmic and heart rate control medications as ordered  - Initiate emergency measures for life threatening arrhythmias  - Monitor electrolytes and administer replacement therapy as ordered  Outcome: Progressing     Problem: GASTROINTESTINAL - ADULT  Goal: Minimal or absence of nausea and vomiting  Description: INTERVENTIONS:  - Maintain adequate hydration with IV or PO as ordered and tolerated  - Nasogastric tube to low intermittent suction as  ordered  - Evaluate effectiveness of ordered antiemetic medications  - Provide nonpharmacologic comfort measures as appropriate  - Advance diet as tolerated, if ordered  - Obtain nutritional consult as needed  - Evaluate fluid balance  Outcome: Progressing  Goal: Maintains or returns to baseline bowel function  Description: INTERVENTIONS:  - Assess bowel function  - Maintain adequate hydration with IV or PO as ordered and tolerated  - Evaluate effectiveness of GI medications  - Encourage mobilization and activity  - Obtain nutritional consult as needed  - Establish a toileting routine/schedule  - Consider collaborating with pharmacy to review patient's medication profile  Outcome: Progressing

## 2024-03-30 NOTE — H&P
[facility]  History & Physical    Ayse Maldonado Patient Status:  Inpatient    10/25/1927 MRN [id number]   Location [facility] 8NE-A Attending Terri Navarro MD   Hosp Day # 2 PCP Terri Navarro MD     History of Present Illness:  Ayse Maldonado is a(n) 89 year old female with dementia, hypertension, CHF, PAF, status post pacemaker and history of right inguinal hernia came to the emergency room because of the abdominal pain and right inguinal swelling.  She she did not vomit had a bowel movement before coming to the ER.  Her emergency room workup showed small bowel obstruction with a transition point in the region of a right inguinal hernia  She is admitted for further management.  She is on Eliquis for anticoagulation due to atrial fibrillation.    She lives at [facility], walks with a walker, has poor vision due to macular degeneration  She is already seen by surgery planning for hernia repair in few days  Her son is at bedside, she denies any discomfort, has NG in place draining bloody fluid.  History:  Past Medical History:   Diagnosis Date    -Lumbar radicular pain-electrical stimulator 2013    Abdominal distention     Abdominal hernia     Abdominal pain     Age-related osteoporosis with current pathological fracture with routine healing 2018    Anesthesia complication     Arthritis     Atrial fibrillation (HCC)     Back pain     Basal cell cancer     colon    Bloating     Body piercing     Cellulitis of chest wall 2018    Cellulitis of left arm 2018    Cellulitis of right upper extremity     Closed bicondylar fracture of distal end of right humerus            Global exp 18 / RIGHT ELBOW / BAM  2017    Closed displaced fracture of pelvis (HCC)     Constipation     Diarrhea, unspecified     Easy bruising     Essential hypertension     Eye disease     Fatigue     Food intolerance     Frequent urination     Hearing impairment     Hearing loss     Heart  palpitations     Hemorrhoids     History of depression     Hypertension     Irregular bowel habits     Leg swelling     Lumbar facet arthropathy 02/10/2018    Macular degeneration - [facility] 05/05/2011    Muscle weakness     Night sweats     Pacemaker     Reflux     Renal disorder     S/P colon resection / Colonoscopy (10/12) recheck - ANJALI Spencer 05/05/2011    S/P laminectomy 12/23/2013    Seizure disorder (HCC)     Sleep apnea     cpap    Sleep disturbance     Spinal stenosis of lumbar region without neurogenic claudication 08/08/2018    Visual impairment      Past Surgical History:   Procedure Laterality Date    ADJ TISS XFER LID,NOS,EAR <10SQCM  03/28/2014    Procedure: DEBULKING OF NASOLABIAL FLAP TO NOSE;  Surgeon: Shar Vasquez MD;  [place] [facility] [place] [place] [facility], [facility]    APPENDECTOMY      BACK SURGERY      BOWEL RESECTION      CHOLECYSTECTOMY      COLECTOMY      COLONOSCOPY      COLONOSCOPY      EGD      HEMORRHOIDECTOMY,INT/EXT,SIMPLE      HIP REPLACEMENT SURGERY  left 3/06 right 10/06    OTHER ACCESSORY      electro-stimulator for spinal stenosis    OTHER SURGICAL HISTORY  10/10/2011    cysto-    OTHER SURGICAL HISTORY  10/2016    back surgery    PACEMAKER      [employer] pacemaker    SKIN SURGERY  03/06/2014    BCC nod, inf to left nasal tip / MMS done    SKIN SURGERY  10/31/2016    MMS for BCC-nod to the R upper cut lip-AB    SPINE SURGERY PROCEDURE UNLISTED      TONSILLECTOMY      TOTAL HIP REPLACEMENT       Family History   Problem Relation Age of Onset    Cancer Father     Hypertension Father     Colon Polyps Son     Other (Other) Sister         Davina Mayse      reports that she quit smoking about 52 years ago. Her smoking use included cigarettes. She has a 2 pack-year smoking history. She has never used smokeless tobacco. She reports that she does not currently use alcohol after a past usage of about 2.0 standard drinks of alcohol per week. She reports that she does not use  drugs.    Allergies:  Allergies   Allergen Reactions    Bacitracin SWELLING     Red and swelling    Ciprofloxacin OTHER (SEE COMMENTS)     Nausea and abdominal cramping    Levofloxacin OTHER (SEE COMMENTS)    Sulfamethoxazole W/Trimethoprim OTHER (SEE COMMENTS)     Other reaction(s): VOMITING  Oral    Altaryl     Amiodarone OTHER (SEE COMMENTS)     Elevated liver enzymes      Diphenhydramine UNKNOWN    Escitalopram     Thiazide-Type Diuretics OTHER (SEE COMMENTS)     CLASS    Cephalexin DIARRHEA     May have also caused drug rash    Clonidine RASH     Oral    Garlic DIARRHEA    Garlic DIARRHEA    Hydrocodone NAUSEA ONLY     nausea    Lexapro NAUSEA ONLY       Home Medications:  Medications Prior to Admission   Medication Sig Dispense Refill Last Dose    docusate sodium 100 MG Oral Cap Take 1 capsule (100 mg total) by mouth 2 (two) times daily.   3/29/2024 at 0900    memantine 5 MG Oral Tab Take 1 tablet (5 mg total) by mouth daily. Evening only   3/28/2024    metoprolol tartrate 50 MG Oral Tab Take 1 tablet (50 mg total) by mouth 2 (two) times daily. 60 tablet 2 3/29/2024 at 1715    dilTIAZem HCl ER Beads 180 MG Oral Capsule SR 24 Hr Take 1 capsule (180 mg total) by mouth daily. (Patient taking differently: Take 1 capsule (180 mg total) by mouth daily. In the morning) 30 capsule 0 3/29/2024 at 0900    LISINOPRIL 20 MG Oral Tab TAKE ONE TABLET BY MOUTH DAILY 90 tablet 0 3/29/2024 at 0900    GABAPENTIN 300 MG Oral Cap TAKE ONE CAPSULE BY MOUTH TWICE DAILY 180 capsule 1 3/29/2024 at 0900    Polyethylene Glycol 3350 (MIRALAX OR) Take 17 g by mouth daily as needed (constipation).   3/29/2024    Multiple Vitamins-Minerals (CENTRUM SILVER 50+WOMEN OR) Take 1 tablet by mouth daily.   3/29/2024    Multiple Vitamins-Minerals (ICAPS AREDS 2 OR) Take 1 capsule by mouth in the morning and 1 capsule before bedtime.   3/28/2024    Acetaminophen  MG Oral Tab CR Take 2 tablets (1,300 mg total) by mouth in the morning and 2  tablets (1,300 mg total) before bedtime.   3/29/2024 at 1715    lidocaine 4 % External Patch Place 1 patch onto the skin daily as needed (pain).   Taking    Calcium Carb-Cholecalciferol (CALCIUM-VITAMIN D3) 600-500 MG-UNIT Oral Cap Take 1 tablet by mouth daily.   3/29/2024    apixaban 5 MG Oral Tab Take 1 tablet (5 mg total) by mouth 2 (two) times daily. 60 tablet 3 3/29/2024 at 1715    digoxin 0.125 MG Oral Tab Take 1 tablet (125 mcg total) by mouth every morning.   3/29/2024 at 0900       Review of Systems:  A comprehensive 10 point review of systems was completed.  Pertinent positives and negatives noted in the the HPI.    Physical Exam:  Vital signs: Blood pressure (!) 162/60, pulse 98, temperature 98.4 °F (36.9 °C), temperature source Oral, resp. rate 26, height 5' 7\" (1.702 m), weight 135 lb 9.3 oz (61.5 kg), SpO2 94%.  General: No acute distress. Alert and oriented x 2  HEENT: Moist mucous membranes. EOM-I. PERRL  Neck: No lymphadenopathy.  No JVD. No carotid bruits.  Respiratory: Clear to auscultation bilaterally.  No wheezes. No rhonchi.  Cardiovascular: S1, S2.  IRRegular rate and rhythm.  No murmurs. Equal pulses   Abdomen: Soft, nontender, nondistended.  Hypoactive bowel sounds. No rebound tenderness  Neurologic: No focal neurological deficits.  Musculoskeletal: Full range of motion of all extremities.  No swelling noted.  Integument: No lesions. No erythema.  Psychiatric: Appropriate mood and affect.    Laboratory Data:   Lab Results   Component Value Date    PGLU 110 03/30/2024       Imaging:  XR CHEST AP PORTABLE  (CPT=71045)    Result Date: 3/28/2024  PROCEDURE:  XR CHEST AP PORTABLE  (CPT=71045)  TECHNIQUE:  AP chest radiograph was obtained.  COMPARISON:  [facility] , XR, XR CHEST AP PORTABLE  (CPT=71045), 1/21/2024, 9:12 PM.  INDICATIONS:  Verify correct tube placement  PATIENT STATED HISTORY: (As transcribed by Technologist)  Patient has a history of a hernia. Imaging is being done for NG tube.     FINDINGS:  Cardiac silhouette is mildly enlarged, stable.  Left pacer is stable.  No consolidation, effusion, or pneumothorax.  Chronic interstitial changes are noted in the lungs.  Gastric tube terminates in the body of the stomach in appropriate position.            CONCLUSION:  Gastric tube in appropriate position.   LOCATION:  [facility]      Dictated by (CST): Tom Clinton MD on 3/28/2024 at 10:30 PM     Finalized by (CST): Tom Clinton MD on 3/28/2024 at 10:31 PM       CT ABDOMEN+PELVIS(CONTRAST ONLY)(CPT=74177)    Result Date: 3/28/2024  PROCEDURE:  CT ABDOMEN+PELVIS (CONTRAST ONLY) (CPT=74177)  COMPARISON:  [facility] , CT, CT ABDOMEN+PELVIS(CPT=74176), 2/01/2023, 6:29 PM.  INDICATIONS:  abd pain w/ hx of hernia  TECHNIQUE:  CT scanning was performed from the dome of the diaphragm to the pubic symphysis with non-ionic intravenous contrast material. Post contrast coronal MPR imaging was performed.  Dose reduction techniques were used. Dose information is transmitted to the ACR (American College of Radiology) NRDR (National Radiology Data Registry) which includes the Dose Index Registry.  PATIENT STATED HISTORY:(As transcribed by Technologist)  Right lower quadrant pain with bulge to area.   CONTRAST USED:  100cc of Isovue 370  FINDINGS:  LIVER:  Multiple hepatic cysts are noted with the largest cyst in the left hepatic lobe measuring up to 3.6 x 3.0 cm (image 26), not significantly changed from the prior exam. BILIARY:  No visible dilatation or calcification.  PANCREAS:  No lesion, fluid collection, ductal dilatation, or atrophy.  SPLEEN:  No enlargement or focal lesion.  KIDNEYS:  Incidental fat containing lesion in the upper pole of the right kidney measures up to 1.3 cm (image 32) compatible with an AML.  Additional left hepatic cyst measures up to 2.1 cm (image 27).  There is no hydronephrosis. ADRENALS:  No mass or enlargement.  AORTA/VASCULAR:  No aneurysm or dissection.  RETROPERITONEUM:  No mass or  adenopathy.  BOWEL/MESENTERY:  Dilated loops of small bowel are noted with a transition point in the region of a right inguinal hernia containing small bowel.  There is a small amount of free fluid in the right inguinal hernia.  The small bowel measures up to 3.9 cm with fluid levels.  The colon is decompressed. ABDOMINAL WALL:  No mass or hernia.  URINARY BLADDER:  No visible focal wall thickening, lesion, or calculus.  PELVIC NODES:  No adenopathy.  PELVIC ORGANS:  No visible mass.  Pelvic organs appropriate for patient age.  BONES:  Severe multilevel degenerative changes throughout the lumbar spine noted.  Pain stimulator device is noted.  LUNG BASES:  No visible pulmonary or pleural disease.  OTHER:  Negative.             CONCLUSION:  1. Findings compatible with small bowel obstruction with a transition point in the region of a right inguinal hernia containing small bowel.   LOCATION:  [facility]   Dictated by (CST): oTm Clinton MD on 3/28/2024 at 8:47 PM     Finalized by (CST): Tom Clinton MD on 3/28/2024 at 8:57 PM          Assessment & Plan:  1.Unilateral inguinal hernia with obstruction and without gangrene -continue n.p.o. IV fluid and intermittent NG suction.  Holding Eliquis for surgery in 2 days  Consult cardiology for cardiac clearance for surgery  2.  Hypertension-holding oral medications  Currently on Vasotec and metoprolol IV  3.  Chronic A-fib-holding Eliquis, continue cardiac monitoring  4.   Pulmonary HTN    5.  History of COPD continue inhalers  PT /OT, increase activity as tolerated  Patient Active Problem List   Diagnosis    Pacemaker - M. Perfecto    JAZZ on CPAP    Current use of long term anticoagulation    Gastroesophageal reflux disease without esophagitis    Asthma with COPD (chronic obstructive pulmonary disease)    Pulmonary hypertension (HCC)-52 mg Hg    Alternating constipation and diarrhea    Other fatigue-mutifactorial    Chronic bronchitis (HCC)    CKD (chronic kidney disease) stage  3, GFR 30-59 ml/min (HCC)    PAF (paroxysmal atrial fibrillation) (HCC)    Glaucoma suspect of left eye    Pseudophakia    Trochanteric bursitis of left hip    Fall    Essential hypertension    Chronic left hip pain    CHI (closed head injury), subsequent encounter    Altered mental status, unspecified altered mental status type    Hyponatremia    Seizure disorder (HCC)    Hypertension, unspecified type    Altered mental status    Weakness    Hypotension, unspecified hypotension type    Hypotension    Branch retinal vein occlusion of left eye with macular edema ([facility])    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

## 2024-03-30 NOTE — PROGRESS NOTES
03/30/24 1116   Provider Notification   Reason for Communication Review case  (no NGT tube suction output)   Provider Name Other (comment)  (Keven Dozier)   Method of Communication   (Leidy Dozier)   Response Other (Comment)   Notification Time 1117     Surg notified for no output from NGT suction. PA ordered to keep NGT tube suction at low intermittent .

## 2024-03-30 NOTE — PROGRESS NOTES
[facility]  Progress Note    Ayse Maldonado Patient Status:  Inpatient    10/25/1927 MRN [id number]   Location [facility] 8NE-A Attending Terri Navarro MD   Hosp Day # 2 PCP Terri Navarro MD     Subjective:  The patient is a poor historian secondary to dementia.  She reports lower abdominal pain.    Objective/Physical Exam:  General: Awake and alert, cooperative.  No apparent distress.  Vital Signs:  Blood pressure (!) 175/62, pulse 85, temperature 98.7 °F (37.1 °C), temperature source Oral, resp. rate 21, height 67\", weight 135 lb 9.3 oz (61.5 kg), SpO2 95%.  Lungs: No respiratory distress.  Cardiac: Regular rate and rhythm.       Labs:        Lab Results   Component Value Date    PT 30.8 (H) 2013    PT 16.1 (H) 2011    PT 15.3 2011    INR 1.41 (H) 2020    INR 1.49 (H) 2020    INR 1.38 (H) 2020       I/O last 3 completed shifts:  In: 1102.8 [I.V.:1102.8]  Out: 2530 [Urine:1280; Emesis/NG output:1250]  I/O this shift:  In: -   Out: 300 [Urine:300]    Assessment  Patient Active Problem List   Diagnosis    Pacemaker - M. Perfecto    JAZZ on CPAP    Current use of long term anticoagulation    Gastroesophageal reflux disease without esophagitis    Asthma with COPD (chronic obstructive pulmonary disease)    Pulmonary hypertension (HCC)-52 mg Hg    Alternating constipation and diarrhea    Other fatigue-mutifactorial    Chronic bronchitis (HCC)    CKD (chronic kidney disease) stage 3, GFR 30-59 ml/min (HCC)    PAF (paroxysmal atrial fibrillation) ([facility])    Glaucoma suspect of left eye    Pseudophakia    Trochanteric bursitis of left hip    Fall    Essential hypertension    Chronic left hip pain    CHI (closed head injury), subsequent encounter    Altered mental status, unspecified altered mental status type    Hyponatremia    Seizure disorder (HCC)    Hypertension, unspecified type    Altered mental status    Weakness    Hypotension, unspecified hypotension type     Hypotension    Branch retinal vein occlusion of left eye with macular edema (HCC)    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 Maza.  Continue NG tube to low intermittent suction.  N.p.o.    Activity as tolerated  DVT prophylaxis as per cardiology recommendations    Tasia Baca PA-C  3/30/2024  11:35 AM

## 2024-03-30 NOTE — PLAN OF CARE
Pt is admitted for abd pain and vomiting. Pt is AOX2-3. Confused at times and trying to bed out of the bed ,trying to pull out the NGT. Wrist restrain ordered , but family refused the restrain when family is staying with patient. Restrain discontinued.    VSS, afebrile and c/o sore throat pain. Gave PRN Cepacol. SpO2 maintained on RA. . Denies any SOB, cough. Tele-Afib. Cardizem gtt at 10ml/hr. HR - .  BP elevated. Gave PRN Hydralazine and scheduled BP med. Eliquis is on hold. NPO. NGT is at 60 cm, LIS, suctioning small amount of brown drainage.  Abdomen mildly distended. Pt is scheduled for left hernia repair procedure tomorrow. Denies any n/v/d. Pt had one loose bowel movement. Pt is burping and passing gasses. Voids in purewick. . Up with 1/walker. IV fluid rate decreased to 75 ml/hr. WCTM. Pt is updated with plan of care.        Problem: Patient/Family Goals  Goal: Patient/Family Long Term Goal  Description: Patient's Long Term Goal: DC to home     Interventions:  - follow plan of care     - See additional Care Plan goals for specific interventions  Outcome: Progressing  Goal: Patient/Family Short Term Goal  Description: Patient's Short Term Goal: getting better, help with sore throat     Interventions:   - NGT suction, PRN blood pressure med, Cepacol for sire throat      - See additional Care Plan goals for specific interventions  Outcome: Progressing     Problem: CARDIOVASCULAR - ADULT  Goal: Maintains optimal cardiac output and hemodynamic stability  Description: INTERVENTIONS:  - Monitor vital signs, rhythm, and trends  - Monitor for bleeding, hypotension and signs of decreased cardiac output  - Evaluate effectiveness of vasoactive medications to optimize hemodynamic stability  - Monitor arterial and/or venous puncture sites for bleeding and/or hematoma  - Assess quality of pulses, skin color and temperature  - Assess for signs of decreased coronary artery perfusion - ex. Angina  - Evaluate fluid  balance, assess for edema, trend weights  Outcome: Progressing  Goal: Absence of cardiac arrhythmias or at baseline  Description: INTERVENTIONS:  - Continuous cardiac monitoring, monitor vital signs, obtain 12 lead EKG if indicated  - Evaluate effectiveness of antiarrhythmic and heart rate control medications as ordered  - Initiate emergency measures for life threatening arrhythmias  - Monitor electrolytes and administer replacement therapy as ordered  Outcome: Progressing     Problem: GASTROINTESTINAL - ADULT  Goal: Minimal or absence of nausea and vomiting  Description: INTERVENTIONS:  - Maintain adequate hydration with IV or PO as ordered and tolerated  - Nasogastric tube to low intermittent suction as ordered  - Evaluate effectiveness of ordered antiemetic medications  - Provide nonpharmacologic comfort measures as appropriate  - Advance diet as tolerated, if ordered  - Obtain nutritional consult as needed  - Evaluate fluid balance  Outcome: Progressing  Goal: Maintains or returns to baseline bowel function  Description: INTERVENTIONS:  - Assess bowel function  - Maintain adequate hydration with IV or PO as ordered and tolerated  - Evaluate effectiveness of GI medications  - Encourage mobilization and activity  - Obtain nutritional consult as needed  - Establish a toileting routine/schedule  - Consider collaborating with pharmacy to review patient's medication profile  Outcome: Progressing

## 2024-03-30 NOTE — CM/SW NOTE
03/30/24 1600   CM/SW Referral Data   Referral Source Social Work (self-referral)   Reason for Referral Discharge planning   Informant Daughter   Medical Hx   Does patient have an established PCP? Yes  (Dr. Terri Navarro)   Patient Info   Patient's Current Mental Status at Time of Assessment Confused or unable to complete assessment   Patient's Home Environment Long Term Care Facility   Post Acute Care Provider Upon Admission [facility]   Patient Status Prior to Admission   Independent with ADLs and Mobility No   Discharge Needs   Anticipated D/C needs Long term care facility     SW confirmed w/ Glenna from [facility], pt is from LTC. SW sent clinical updates via [employer].   SW spoke w/ pt's daughter in law via room phone, who stated pt recently moved to [facility] for LTC about 1 month ago. Anticipated plan for pt to return when medically stable.     Ayana Mishra, DIANA - [phone]     Calm/Appropriate

## 2024-03-31 DIAGNOSIS — K40.90 INGUINAL HERNIA: Primary | ICD-10-CM

## 2024-03-31 LAB
ANION GAP SERPL CALC-SCNC: 10 MMOL/L (ref 0–18)
ATRIAL RATE: 107 BPM
BASOPHILS # BLD AUTO: 0.03 X10(3) UL (ref 0–0.2)
BASOPHILS NFR BLD AUTO: 0.3 %
BUN BLD-MCNC: 11 MG/DL (ref 9–23)
CALCIUM BLD-MCNC: 9.2 MG/DL (ref 8.5–10.1)
CHLORIDE SERPL-SCNC: 104 MMOL/L (ref 98–112)
CO2 SERPL-SCNC: 24 MMOL/L (ref 21–32)
CREAT BLD-MCNC: 0.71 MG/DL
EGFRCR SERPLBLD CKD-EPI 2021: 78 ML/MIN/1.73M2 (ref 60–?)
EOSINOPHIL # BLD AUTO: 0.01 X10(3) UL (ref 0–0.7)
EOSINOPHIL NFR BLD AUTO: 0.1 %
ERYTHROCYTE [DISTWIDTH] IN BLOOD BY AUTOMATED COUNT: 12 %
GLUCOSE BLD-MCNC: 110 MG/DL (ref 70–99)
GLUCOSE BLD-MCNC: 113 MG/DL (ref 70–99)
GLUCOSE BLD-MCNC: 116 MG/DL (ref 70–99)
GLUCOSE BLD-MCNC: 123 MG/DL (ref 70–99)
GLUCOSE BLD-MCNC: 123 MG/DL (ref 70–99)
HCT VFR BLD AUTO: 40.7 %
HGB BLD-MCNC: 14 G/DL
IMM GRANULOCYTES # BLD AUTO: 0.03 X10(3) UL (ref 0–1)
IMM GRANULOCYTES NFR BLD: 0.3 %
LYMPHOCYTES # BLD AUTO: 0.79 X10(3) UL (ref 1–4)
LYMPHOCYTES NFR BLD AUTO: 8 %
MAGNESIUM SERPL-MCNC: 1.5 MG/DL (ref 1.6–2.6)
MCH RBC QN AUTO: 34.6 PG (ref 26–34)
MCHC RBC AUTO-ENTMCNC: 34.4 G/DL (ref 31–37)
MCV RBC AUTO: 100.5 FL
MONOCYTES # BLD AUTO: 1.11 X10(3) UL (ref 0.1–1)
MONOCYTES NFR BLD AUTO: 11.2 %
NEUTROPHILS # BLD AUTO: 7.9 X10 (3) UL (ref 1.5–7.7)
NEUTROPHILS # BLD AUTO: 7.9 X10(3) UL (ref 1.5–7.7)
NEUTROPHILS NFR BLD AUTO: 80.1 %
OSMOLALITY SERPL CALC.SUM OF ELEC: 287 MOSM/KG (ref 275–295)
PLATELET # BLD AUTO: 239 10(3)UL (ref 150–450)
POTASSIUM SERPL-SCNC: 2.8 MMOL/L (ref 3.5–5.1)
POTASSIUM SERPL-SCNC: 3.2 MMOL/L (ref 3.5–5.1)
Q-T INTERVAL: 328 MS
QRS DURATION: 80 MS
QTC CALCULATION (BEZET): 431 MS
R AXIS: -21 DEGREES
RBC # BLD AUTO: 4.05 X10(6)UL
SODIUM SERPL-SCNC: 138 MMOL/L (ref 136–145)
T AXIS: 262 DEGREES
VENTRICULAR RATE: 104 BPM
WBC # BLD AUTO: 9.9 X10(3) UL (ref 4–11)

## 2024-03-31 RX ORDER — POTASSIUM CHLORIDE 14.9 MG/ML
20 INJECTION INTRAVENOUS ONCE
Status: COMPLETED | OUTPATIENT
Start: 2024-03-31 | End: 2024-03-31

## 2024-03-31 NOTE — PROGRESS NOTES
[facility]  Progress Note    Ayse Maldonado Patient Status:  Inpatient    10/25/1927 MRN [id number]   Location [facility] 8NE-A Attending Terri Navarro MD   Hosp Day # 3 PCP Terri Navarro MD     Subjective:  The patient is resting in bed with family at bedside.  NG tube remains in place with 600 mL of bilious output over the past 24 hours.  There are 2 documented bowel movements.  He reports lower abdominal pain.    Objective/Physical Exam:  General: Cooperative.  No apparent distress.  Vital Signs:  Blood pressure (!) 182/82, pulse 87, temperature 98.1 °F (36.7 °C), temperature source Oral, resp. rate 26, height 67\", weight 135 lb 9.3 oz (61.5 kg), SpO2 95%.  HEENT: Normocephalic, atraumatic. No scleral icterus.  NG tube in place  Abdomen:  Soft, non-distended, tender patient in the lower abdomen, with no rebound or guarding.  No peritoneal signs.  Reducible right inguinal hernia.    Labs:  CBC:    Lab Results   Component Value Date    WBC 9.9 2024    WBC 6.1 2024    WBC 5.6 2024     Lab Results   Component Value Date    HEMOGLOBIN 11.8 2012    HEMOGLOBIN 11.2 (L) 2011    HEMOGLOBIN 11.1 (L) 2011    HGB 14.0 2024    HGB 13.2 2024    HGB 13.2 2024      Lab Results   Component Value Date    .0 2024    .0 2024    .0 2024         Assessment/Plan:  Patient Active Problem List   Diagnosis    Pacemaker - M. Perfecto    JAZZ on CPAP    Current use of long term anticoagulation    Gastroesophageal reflux disease without esophagitis    Asthma with COPD (chronic obstructive pulmonary disease)    Pulmonary hypertension (HCC)-52 mg Hg    Alternating constipation and diarrhea    Other fatigue-mutifactorial    Chronic bronchitis (HCC)    CKD (chronic kidney disease) stage 3, GFR 30-59 ml/min (HCC)    PAF (paroxysmal atrial fibrillation) (HCC)    Glaucoma suspect of left eye    Pseudophakia    Trochanteric bursitis of left hip     Fall    Essential hypertension    Chronic left hip pain    CHI (closed head injury), subsequent encounter    Altered mental status, unspecified altered mental status type    Hyponatremia    Seizure disorder (HCC)    Hypertension, unspecified type    Altered mental status    Weakness    Hypotension, unspecified hypotension type    Hypotension    Branch retinal vein occlusion of left eye with macular edema (HCC)    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 (HCC)    PCO (posterior capsular opacification), left    Inguinal hernia of right side without obstruction or gangrene    Umbilical hernia without obstruction and without gangrene    Protein-calorie malnutrition, unspecified severity (HCC)    Dementia without behavioral disturbance (HCC)    Unilateral inguinal hernia with obstruction and without gangrene, recurrence not specified     Right inguinal hernia with intermittent bowel obstruction  Chronic A-fib, Eliquis on hold    Plan for open right inguinal hernia repair with mesh on 4/1/2024 with Dr. Fernandez.  Continue NG tube to low intermittent suction.  Continue NPO.  Continue pain control and antiemetics as needed.  Activity as tolerated.  DVT prophylaxis per cardiology recommendations.  GI prophylaxis with Protonix.    LORY Kay  3/31/2024  2:26 PM     Patient seen and examined, I agree with the documentation above with the following addendum.    No acute events overnight. Patient having bowel movements.       Physical exam:  General: nad  Abdomen: soft, non tender, recurrent right inguinal hernia that is reducible       Assesment & Plan:  89 year old female, presents with small bowel obstruction in a right inguinal hernia. The hernia contents are reducible however they return into the hernia sac after every reduction attempt. I reviewed her CT scan and the transition point is within the hernia. Plan for open right inguinal hernia repair with  mesh. I discussed the risks and benefits with the patient's son who is POA. He agrees with planned procedure. All questions were answered.       Ian Maza MD  [facility] General Surgery

## 2024-03-31 NOTE — PLAN OF CARE
Assumed care of this patient at this time. Report received from CARLA Nolasco.   Patient is alert to self and situation. Her family is at the bedside. Lungs clear on auscultation. She is in afib on the monitor. IV cardizem is infusing at 10 mg/hr. She also has fluids of lactated ringers infusing at 75 ml/hr. NG placement checked and is in place and draining brownish liquid. Blood pressure re check is 168/77. Abdominal hernia noted. Bowel sounds present. She has no c/o pain at this time.

## 2024-03-31 NOTE — PLAN OF CARE
C/o generalized pain.  Sometimes upon light touch she moans, however IV potassium is infusing and educated the son that it is often painful for patients even though its running with LR.  IV potassium is compatible with LR.  Remains in AFIB, irregular rate and rhythm.  HRs in the 90s.  NG tube to LIS.  Brown drainage noted.  Possibel surgery for inguinal hernia this evening.  Strict NPO.  Very hypertensive, yet PRN hydralazine available.  All needs met by staff.  Care endorsed to next RN.    Problem: Patient/Family Goals  Goal: Patient/Family Long Term Goal  Description: Patient's Long Term Goal: DC to home     Interventions:  - follow plan of care     - See additional Care Plan goals for specific interventions  Outcome: Progressing  Goal: Patient/Family Short Term Goal  Description: Patient's Short Term Goal: getting better, help with sore throat     Interventions:   - NGT suction, PRN blood pressure med, Cepacol for sire throat      - See additional Care Plan goals for specific interventions  Outcome: Progressing     Problem: CARDIOVASCULAR - ADULT  Goal: Maintains optimal cardiac output and hemodynamic stability  Description: INTERVENTIONS:  - Monitor vital signs, rhythm, and trends  - Monitor for bleeding, hypotension and signs of decreased cardiac output  - Evaluate effectiveness of vasoactive medications to optimize hemodynamic stability  - Monitor arterial and/or venous puncture sites for bleeding and/or hematoma  - Assess quality of pulses, skin color and temperature  - Assess for signs of decreased coronary artery perfusion - ex. Angina  - Evaluate fluid balance, assess for edema, trend weights  Outcome: Progressing  Goal: Absence of cardiac arrhythmias or at baseline  Description: INTERVENTIONS:  - Continuous cardiac monitoring, monitor vital signs, obtain 12 lead EKG if indicated  - Evaluate effectiveness of antiarrhythmic and heart rate control medications as ordered  - Initiate emergency measures for  life threatening arrhythmias  - Monitor electrolytes and administer replacement therapy as ordered  Outcome: Progressing     Problem: GASTROINTESTINAL - ADULT  Goal: Minimal or absence of nausea and vomiting  Description: INTERVENTIONS:  - Maintain adequate hydration with IV or PO as ordered and tolerated  - Nasogastric tube to low intermittent suction as ordered  - Evaluate effectiveness of ordered antiemetic medications  - Provide nonpharmacologic comfort measures as appropriate  - Advance diet as tolerated, if ordered  - Obtain nutritional consult as needed  - Evaluate fluid balance  Outcome: Progressing  Goal: Maintains or returns to baseline bowel function  Description: INTERVENTIONS:  - Assess bowel function  - Maintain adequate hydration with IV or PO as ordered and tolerated  - Evaluate effectiveness of GI medications  - Encourage mobilization and activity  - Obtain nutritional consult as needed  - Establish a toileting routine/schedule  - Consider collaborating with pharmacy to review patient's medication profile  Outcome: Progressing     Problem: Safety Risk - Non-Violent Restraints  Goal: Patient will remain free from self-harm  Description: INTERVENTIONS:  - Apply the least restrictive restraint to prevent harm  - Notify patient and family of reasons restraints applied  - Assess for any contributing factors to confusion (electrolyte disturbances, delirium, medications)  - Discontinue any unnecessary medical devices as soon as possible  - Assess the patient's physical comfort, circulation, skin condition, hydration, nutrition and elimination needs   - Reorient and redirection as needed  - Assess for the need to continue restraints  Outcome: Progressing

## 2024-03-31 NOTE — PROGRESS NOTES
[facility]  Progress Note    Ayse Maldonado Patient Status:  Inpatient    10/25/1927 MRN [id number]   Location [facility] 8NE-A Attending Terri Navarro MD   Hosp Day # 3 PCP Terri Navarro MD       SUBJECTIVE:  Stable , total N/G output 100 cc last 24 hrs   Denies any abdominal pain, Had BMx2   OBJECTIVE:  Vital signs in last 24 hours:  BP (!) 182/82 (BP Location: Left arm)   Pulse 87   Temp 98.1 °F (36.7 °C) (Oral)   Resp 26   Ht 5' 7\" (1.702 m)   Wt 135 lb 9.3 oz (61.5 kg)   SpO2 95%   BMI 21.24 kg/m²     Intake/Output:    Intake/Output Summary (Last 24 hours) at 3/31/2024 1508  Last data filed at 3/31/2024 1300  Gross per 24 hour   Intake 1218 ml   Output 750 ml   Net 468 ml       Wt Readings from Last 3 Encounters:   24 135 lb 9.3 oz (61.5 kg)   24 135 lb (61.2 kg)   24 135 lb (61.2 kg)       Medications:        Exam:  Gen: No acute distress, alert and oriented x3, no focal neurologic deficits  Pulm: Lungs clear, normal respiratory effort  CV: Heart with irregular rate and rhythm, no peripheral edema  Abd: Abdomen soft, nontender, nondistended, no organomegaly, bowel sounds present  MSK: Full range of motion in extremities, no clubbing, no cyanosis  Skin: no rashes or lesions    Data Review:     Labs:   No components found for: \"BMP\"   Recent Labs   Lab 24  1843 24  0820 24  0631   RBC 3.79* 3.85 4.05   HGB 13.2 13.2 14.0   HCT 37.7 37.8 40.7   MCV 99.5 98.2 100.5*   MCH 34.8* 34.3* 34.6*   MCHC 35.0 34.9 34.4   RDW 11.9 12.0 12.0   NEPRELIM 3.64 3.97 7.90*   WBC 5.6 6.1 9.9   .0 223.0 239.0     No results for input(s): \"PTP\", \"INR\" in the last 168 hours.  Hemoglobin A1c (%)   Date Value   2012 5.9 (H)   2011 5.8 (H)     HEMOGLOBIN A1c (% of total Hgb)   Date Value   2023 5.7 (H)     HGBA1C (%)   Date Value   2014 5.8 (H)   2014 6.0 (H)   2013 6.3 (H)     HgbA1C (%)   Date Value   2022 6.0 (H)   2021  6.3 (H)   07/27/2020 5.3     TSH   Date Value   01/21/2024 2.950 mIU/mL   07/30/2022 2.020 mIU/mL   11/03/2021 0.926 mIU/mL   07/05/2012 1.940 uIU/mL   08/11/2011 2.230 uIU/mL   05/06/2011 1.690 uIU/mL       Assessment & Plan:  1.Unilateral inguinal hernia with obstruction and without gangrene -continue n.p.o. IV fluid and intermittent NG suction.  Holding Eliquis for surgery Planned for tomorrow     2.  Hypertension-holding oral medications  Currently on Vasotec and metoprolol IV  3.  Chronic A-fib-holding Eliquis, started on cardizem drip   4.  Pulmonary HTN   5.  History of COPD continue inhalers  6. Dementia and cognitive dysfunction - start  soft restraints as needed   Cardiology and surgery consult Appreciated     Patient Active Problem List   Diagnosis    Pacemaker - M. Perfecto    AJZZ on CPAP    Current use of long term anticoagulation    Gastroesophageal reflux disease without esophagitis    Asthma with COPD (chronic obstructive pulmonary disease)    Pulmonary hypertension (HCC)-52 mg Hg    Alternating constipation and diarrhea    Other fatigue-mutifactorial    Chronic bronchitis (HCC)    CKD (chronic kidney disease) stage 3, GFR 30-59 ml/min (HCC)    PAF (paroxysmal atrial fibrillation) (HCC)    Glaucoma suspect of left eye    Pseudophakia    Trochanteric bursitis of left hip    Fall    Essential hypertension    Chronic left hip pain    CHI (closed head injury), subsequent encounter    Altered mental status, unspecified altered mental status type    Hyponatremia    Seizure disorder (HCC)    Hypertension, unspecified type    Altered mental status    Weakness    Hypotension, unspecified hypotension type    Hypotension    Branch retinal vein occlusion of left eye with macular edema (HCC)    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 (HCC)    PCO (posterior capsular opacification), left    Inguinal hernia of right side without obstruction or gangrene     Umbilical hernia without obstruction and without gangrene    Protein-calorie malnutrition, unspecified severity (HCC)    Dementia without behavioral disturbance (HCC)    Unilateral inguinal hernia with obstruction and without gangrene, recurrence not specified        Questions/concerns were discussed with patient and/or family by bedside.    Terri Navarro MD  3/31/2024

## 2024-03-31 NOTE — PROGRESS NOTES
Progress Note  Ayse Maldonado Patient Status:  Inpatient    10/25/1927 MRN [id number]   Location [facility] 8NE-A Attending Terri Navarro MD   Hosp Day # 3 PCP Terri Navarro MD     Subjective:  NG tube in place. Denies cp, sob, abdominal pain, or nausea. No acute distress.     Objective:  BP (!) 164/69 (BP Location: Left arm)   Pulse 86   Temp 98.1 °F (36.7 °C) (Oral)   Resp (!) 28   Ht 5' 7\" (1.702 m)   Wt 135 lb 9.3 oz (61.5 kg)   SpO2 95%   BMI 21.24 kg/m²     Telemetry: afib, intermittently v paced      Intake/Output:    Intake/Output Summary (Last 24 hours) at 3/31/2024 0823  Last data filed at 3/31/2024 0500  Gross per 24 hour   Intake 968 ml   Output 900 ml   Net 68 ml       Last 3 Weights   24 0423 135 lb 9.3 oz (61.5 kg)   24 0421 135 lb 9.3 oz (61.5 kg)   24 0002 135 lb 9.6 oz (61.5 kg)   24 1825 138 lb (62.6 kg)   24 1110 135 lb (61.2 kg)   24 1006 135 lb (61.2 kg)       Labs:  Recent Labs   Lab 24  0820 24  0631   * 101* 123*   BUN 16 13 11   CREATSERUM 0.94 0.84 0.71   EGFRCR 56* 64 78   CA 9.3 9.3 9.2    140 138   K 4.4 3.8 2.8*    106 104   CO2 27.0 28.0 24.0     Recent Labs   Lab 24  0820 24  0631   RBC 3.79* 3.85 4.05   HGB 13.2 13.2 14.0   HCT 37.7 37.8 40.7   MCV 99.5 98.2 100.5*   MCH 34.8* 34.3* 34.6*   MCHC 35.0 34.9 34.4   RDW 11.9 12.0 12.0   NEPRELIM 3.64 3.97 7.90*   WBC 5.6 6.1 9.9   .0 223.0 239.0         No results for input(s): \"TROP\", \"TROPHS\", \"CK\" in the last 168 hours.    Review of Systems   Cardiovascular:  Positive for irregular heartbeat.   Respiratory: Negative.         Physical Exam:    Gen: alert, oriented x 2, NAD  Heent: pupils equal, reactive. Mucous membranes moist. NG in place.  Neck: no jvd  Cardiac: irregularly irregular, normal S1,S2  Lungs: CTA  Abd: soft, NT/ND +bs  Ext: no edema  Skin: Warm, dry  Neuro: No focal  deficits        Medications:     potassium chloride  40 mEq Intravenous Once    Followed by    potassium chloride  20 mEq Intravenous Once    metoprolol  5 mg Intravenous Q6H    [Held by provider] enalaprilat  1.25 mg Intravenous Q6H    pantoprazole  40 mg Intravenous Q24H    dilTIAZem  10 mg Intravenous Once      dilTIAZem 10 mg/hr (03/30/24 6517)    lactated ringers 75 mL/hr at 03/31/24 0645           Assessment:  Incarcerated right inguinal hernia with SBO admitted with abdominal pain  General surgery following-plans for open right inguinal hernia repair with mesh today.  Chronic Afib s/p SJM dual chamber PPM (2015)  On eliquis--held given above  Historically LVEF preserved.   Bb, ccb, dig  Prior hx DCCV-follows with Dr. Perfecto Barrera santy 2015  HTN-2/2 above.   Iv bb, ccb, acei. Would not over treat.   Preop cardiac risk assessment  Acceptable cardiac risk to proceed with surgery.    Plan:  Hold eliquis.   Plan for surgery noted for today.  Rates controlled on IV bb and cardizem gtt.   Replace K    Plan of care discussed with patient, RN.    Ana Corona, APRN  3/31/2024  8:23 AM  [phone]  [facility] [phone]    I have seen and examined the patient with the APN and I agree with the assessment and plan.  Eliquis held for surgery  Will monitor on telemetry and continue supportive care.    Natali Wong MD  L-2

## 2024-03-31 NOTE — PLAN OF CARE
Assumed care of pt at 1930 on 3/30  A/Ox2, forgetful and confused at times, easily redirected  Room air and maintaining o2 sats above 90%.   Afib on tele. HR 80s-90s. No complaints of chest pain.   Incontinent of bladder and bowel. Purewick in place.   NPO w/ NGT to LIS. Pt reports tenderness upon palpitation, denies pain otherwise and denies pain meds at this time.  Cardizem gtt & IVF running as ordered.   Poss hernia repair 3/31.   Fall precautions in place. Call light in reach. Pt updated on plan of care.     Problem: Patient/Family Goals  Goal: Patient/Family Long Term Goal  Description: Patient's Long Term Goal: DC to home     Interventions:  - follow plan of care     - See additional Care Plan goals for specific interventions  Outcome: Progressing  Goal: Patient/Family Short Term Goal  Description: Patient's Short Term Goal: getting better, help with sore throat     Interventions:   - NGT suction, PRN blood pressure med, Cepacol for sire throat      - See additional Care Plan goals for specific interventions  Outcome: Progressing     Problem: CARDIOVASCULAR - ADULT  Goal: Maintains optimal cardiac output and hemodynamic stability  Description: INTERVENTIONS:  - Monitor vital signs, rhythm, and trends  - Monitor for bleeding, hypotension and signs of decreased cardiac output  - Evaluate effectiveness of vasoactive medications to optimize hemodynamic stability  - Monitor arterial and/or venous puncture sites for bleeding and/or hematoma  - Assess quality of pulses, skin color and temperature  - Assess for signs of decreased coronary artery perfusion - ex. Angina  - Evaluate fluid balance, assess for edema, trend weights  Outcome: Progressing  Goal: Absence of cardiac arrhythmias or at baseline  Description: INTERVENTIONS:  - Continuous cardiac monitoring, monitor vital signs, obtain 12 lead EKG if indicated  - Evaluate effectiveness of antiarrhythmic and heart rate control medications as ordered  - Initiate  emergency measures for life threatening arrhythmias  - Monitor electrolytes and administer replacement therapy as ordered  Outcome: Progressing     Problem: GASTROINTESTINAL - ADULT  Goal: Minimal or absence of nausea and vomiting  Description: INTERVENTIONS:  - Maintain adequate hydration with IV or PO as ordered and tolerated  - Nasogastric tube to low intermittent suction as ordered  - Evaluate effectiveness of ordered antiemetic medications  - Provide nonpharmacologic comfort measures as appropriate  - Advance diet as tolerated, if ordered  - Obtain nutritional consult as needed  - Evaluate fluid balance  Outcome: Progressing  Goal: Maintains or returns to baseline bowel function  Description: INTERVENTIONS:  - Assess bowel function  - Maintain adequate hydration with IV or PO as ordered and tolerated  - Evaluate effectiveness of GI medications  - Encourage mobilization and activity  - Obtain nutritional consult as needed  - Establish a toileting routine/schedule  - Consider collaborating with pharmacy to review patient's medication profile  Outcome: Progressing     Problem: Safety Risk - Non-Violent Restraints  Goal: Patient will remain free from self-harm  Description: INTERVENTIONS:  - Apply the least restrictive restraint to prevent harm  - Notify patient and family of reasons restraints applied  - Assess for any contributing factors to confusion (electrolyte disturbances, delirium, medications)  - Discontinue any unnecessary medical devices as soon as possible  - Assess the patient's physical comfort, circulation, skin condition, hydration, nutrition and elimination needs   - Reorient and redirection as needed  - Assess for the need to continue restraints  Outcome: Progressing

## 2024-03-31 NOTE — PHYSICAL THERAPY NOTE
PHYSICAL THERAPY TREATMENT NOTE - INPATIENT    Room Number: 8610/8610-A     Session: 1     Number of Visits to Meet Established Goals: 4    Presenting Problem: bulding hernia and abnormal labs  Co-Morbidities : pacemaker, COPD, chronic bronchtiis, glaucoma, CKD3, PAF, ess HTN, seizures, dementia, macular degeneration  CT ABDOMEN/PELVIS:  1. Findings compatible with small bowel obstruction with a transition point in the region of a right inguinal hernia containing small bowel.    Pt pending inguinal hernia repair on this date.  ASSESSMENT   Patient demonstrates limited progress this session, goals  remain in progress.    Patient continues to function near baseline with bed mobility, transfers, and gait.  Contributing factors to remaining limitations include decreased functional strength, pain, and medical status.  Next session anticipate patient to progress bed mobility, transfers, and gait.  Physical Therapy will continue to follow patient for duration of hospitalization.    Patient continues to benefit from continued skilled PT services: at discharge to promote functional independence and safety with additional support and return home with home health PT.    PLAN  PT Treatment Plan: Bed mobility;Body mechanics;Coordination;Endurance;Energy conservation;Patient education;Family education;Gait training;Strengthening;Range of motion;Neuromuscular re-educate;Transfer training;Balance training  Rehab Potential : Good  Frequency (Obs): 3-5x/week    CURRENT GOALS     Goal #1 Patient is able to demonstrate supine - sit EOB @ level: independent      Goal #2 Patient is able to demonstrate transfers Sit to/from Stand at assistance level: modified independent      Goal #3 Patient is able to ambulate 50 feet with assist device: walker - rolling at assistance level: supervision      Goal #4     Goal #5     Goal #6     Goal Comments: Goals established on 3/29/2024  3/31/2024 all goals ongoing    SUBJECTIVE  \"Yes\"  pt response  when asked if pt is SOB.    OBJECTIVE  Precautions: Bed/chair alarm;Hard of hearing;NG suction    WEIGHT BEARING RESTRICTION  Weight Bearing Restriction: None                PAIN ASSESSMENT   Rating: Unable to rate  Location: R LE  Management Techniques: Repositioning    BALANCE                                                                                                                       Static Sitting: Good  Dynamic Sitting: Fair +           Static Standing: Fair  Dynamic Standing: Fair -    ACTIVITY TOLERANCE  Pulse: 91  Heart Rate Source: Monitor                   O2 WALK         AM-PAC '6-Clicks' INPATIENT SHORT FORM - BASIC MOBILITY  How much difficulty does the patient currently have...  Patient Difficulty: Turning over in bed (including adjusting bedclothes, sheets and blankets)?: A Little   Patient Difficulty: Sitting down on and standing up from a chair with arms (e.g., wheelchair, bedside commode, etc.): A Little   Patient Difficulty: Moving from lying on back to sitting on the side of the bed?: A Little   How much help from another person does the patient currently need...   Help from Another: Moving to and from a bed to a chair (including a wheelchair)?: A Little   Help from Another: Need to walk in hospital room?: A Little   Help from Another: Climbing 3-5 steps with a railing?: A Little       AM-PAC Score:  Raw Score: 18   Approx Degree of Impairment: 46.58%   Standardized Score (AM-PAC Scale): 43.63   CMS Modifier (G-Code): CK    FUNCTIONAL ABILITY STATUS  Gait Assessment   Functional Mobility/Gait Assessment  Gait Assistance: Not tested  Distance (ft): 0  Assistive Device: Rolling walker  Pattern: Shuffle (kyphotic posturing)    Skilled Therapy Provided    Bed Mobility:  Rolling: not tested   Supine<>Sit: not tested   Sit<>Supine: not tested     Transfer Mobility:  Sit<>Stand: not tested   Stand<>Sit: not tested   Gait: not tested    Therapist's Comments: per RN pt ok to be seen. Pt received  asleep in bed. Pt son at bedside, requesting pt have therapy in bed as pt is going for procedure later today.  Pt instructed in and completed B LE ex in supine with resting breaks between sets due to fatigue. Pt appears sleepy throughout session but able to complete ex with verbal cues. Pt son returns to room, pt left in bed with needs in reach and bed alarm set.      THERAPEUTIC EXERCISES  Lower Extremity Ankle pumps  Hip AB/AD  Heel slides     Upper Extremity N/a     Position Supine     Repetitions   10   Sets   1-2     Patient End of Session: In bed;Needs met;Call light within reach;RN aware of session/findings;All patient questions and concerns addressed;Alarm set;Family present    PT Session Time: 15 minutes    Therapeutic Exercise: 15 minutes

## 2024-04-01 LAB
GLUCOSE BLD-MCNC: 125 MG/DL (ref 70–99)
MAGNESIUM SERPL-MCNC: 2.1 MG/DL (ref 1.6–2.6)
POTASSIUM SERPL-SCNC: 3.9 MMOL/L (ref 3.5–5.1)

## 2024-04-01 PROCEDURE — 99232 SBSQ HOSP IP/OBS MODERATE 35: CPT | Performed by: STUDENT IN AN ORGANIZED HEALTH CARE EDUCATION/TRAINING PROGRAM

## 2024-04-01 RX ORDER — LABETALOL HYDROCHLORIDE 5 MG/ML
10 INJECTION, SOLUTION INTRAVENOUS EVERY 4 HOURS PRN
Status: DISCONTINUED | OUTPATIENT
Start: 2024-04-01 | End: 2024-04-06

## 2024-04-01 RX ORDER — HEPARIN SODIUM 5000 [USP'U]/ML
5000 INJECTION, SOLUTION INTRAVENOUS; SUBCUTANEOUS ONCE
Status: DISCONTINUED | OUTPATIENT
Start: 2024-04-01 | End: 2024-04-02 | Stop reason: HOSPADM

## 2024-04-01 NOTE — PROGRESS NOTES
[facility]  Progress Note    Ayse Maldonado Patient Status:  Inpatient    10/25/1927 MRN [id number]   Location [facility] 8NE-A Attending Terri Navarro MD   Hosp Day # 4 PCP Terri Navarro MD       SUBJECTIVE:  Await for surgery  today   Family at bedside   OBJECTIVE:  Vital signs in last 24 hours:  BP (!) 173/78 (BP Location: Left arm)   Pulse 95   Temp 98.1 °F (36.7 °C) (Oral)   Resp 22   Ht 5' 7\" (1.702 m)   Wt 135 lb 9.3 oz (61.5 kg)   SpO2 94%   BMI 21.24 kg/m²     Intake/Output:    Intake/Output Summary (Last 24 hours) at 2024 1506  Last data filed at 2024 0600  Gross per 24 hour   Intake 1020 ml   Output 700 ml   Net 320 ml       Wt Readings from Last 3 Encounters:   24 135 lb 9.3 oz (61.5 kg)   24 135 lb (61.2 kg)   24 135 lb (61.2 kg)       Medications:        Exam:  Gen: No acute distress, alert and oriented x3, no focal neurologic deficits  Pulm: Lungs clear, normal respiratory effort  CV: Heart with irregular rate and rhythm, no peripheral edema  Abd: Abdomen soft, nontender, nondistended, no organomegaly, bowel sounds present  MSK: Full range of motion in extremities, no clubbing, no cyanosis  Skin: no rashes or lesions    Data Review:     Labs:   No components found for: \"BMP\"   Recent Labs   Lab 24  1843 24  0820 24  0631   RBC 3.79* 3.85 4.05   HGB 13.2 13.2 14.0   HCT 37.7 37.8 40.7   MCV 99.5 98.2 100.5*   MCH 34.8* 34.3* 34.6*   MCHC 35.0 34.9 34.4   RDW 11.9 12.0 12.0   NEPRELIM 3.64 3.97 7.90*   WBC 5.6 6.1 9.9   .0 223.0 239.0     No results for input(s): \"PTP\", \"INR\" in the last 168 hours.  Hemoglobin A1c (%)   Date Value   2012 5.9 (H)   2011 5.8 (H)     HEMOGLOBIN A1c (% of total Hgb)   Date Value   2023 5.7 (H)     HGBA1C (%)   Date Value   2014 5.8 (H)   2014 6.0 (H)   2013 6.3 (H)     HgbA1C (%)   Date Value   2022 6.0 (H)   2021 6.3 (H)   2020 5.3     TSH   Date  Value   01/21/2024 2.950 mIU/mL   07/30/2022 2.020 mIU/mL   11/03/2021 0.926 mIU/mL   07/05/2012 1.940 uIU/mL   08/11/2011 2.230 uIU/mL   05/06/2011 1.690 uIU/mL       Assessment & Plan:  1.Unilateral inguinal hernia with obstruction and without gangrene -continue n.p.o. IV fluid and intermittent NG suction.  Holding Eliquis for surgery Planned for today     2.  Hypertension-holding oral medications  Currently on Vasotec and metoprolol IV  3.  Chronic A-fib-holding Eliquis, started on cardizem drip   4.  Pulmonary HTN   5.  History of COPD continue inhalers  6. Dementia and cognitive dysfunction - start  soft restraints as needed   Cardiology and surgery consult Appreciated     Patient Active Problem List   Diagnosis    Pacemaker - [place]    JAZZ on CPAP    Current use of long term anticoagulation    Gastroesophageal reflux disease without esophagitis    Asthma with COPD (chronic obstructive pulmonary disease) ([facility])    Pulmonary hypertension (HCC)-52 mg Hg    Alternating constipation and diarrhea    Other fatigue-mutifactorial    Chronic bronchitis ([facility])    CKD (chronic kidney disease) stage 3, GFR 30-59 ml/min ([facility])    PAF (paroxysmal atrial fibrillation) ([facility])    Glaucoma suspect of left eye    Pseudophakia    Trochanteric bursitis of left hip    Fall    Essential hypertension    Chronic left hip pain    CHI (closed head injury), subsequent encounter    Altered mental status, unspecified altered mental status type    Hyponatremia    Seizure disorder (HCC)    Hypertension, unspecified type    Altered mental status    Weakness    Hypotension, unspecified hypotension type    Hypotension    Branch retinal vein occlusion of left eye with macular edema (HCC)    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    Terri Navarro MD  4/1/2024

## 2024-04-01 NOTE — PROGRESS NOTES
Progress Note  Ayse Maldonado Patient Status:  Inpatient    10/25/1927 MRN [id number]   Location [facility] 8NE-A Attending Terri Navarro MD   Hosp Day # 4 PCP Terri Navarro MD     Subjective:  Scheduled for Right hernia repair today.     Objective:  BP (!) 170/93   Pulse 90   Temp 98 °F (36.7 °C) (Oral)   Resp 22   Ht 5' 7\" (1.702 m)   Wt 135 lb 9.3 oz (61.5 kg)   SpO2 94%   BMI 21.24 kg/m²     Telemetry: AF 98 BPM.      Intake/Output:    Intake/Output Summary (Last 24 hours) at 2024 0941  Last data filed at 2024 0600  Gross per 24 hour   Intake 1020 ml   Output 850 ml   Net 170 ml       Last 3 Weights   24 0423 135 lb 9.3 oz (61.5 kg)   24 0421 135 lb 9.3 oz (61.5 kg)   24 0002 135 lb 9.6 oz (61.5 kg)   24 1825 138 lb (62.6 kg)   24 1110 135 lb (61.2 kg)   24 1006 135 lb (61.2 kg)       Labs:  Recent Labs   Lab 24  0820 24  0631 24  1852 24  0551   * 101* 123*  --   --    BUN 16 13 11  --   --    CREATSERUM 0.94 0.84 0.71  --   --    EGFRCR 56* 64 78  --   --    CA 9.3 9.3 9.2  --   --     140 138  --   --    K 4.4 3.8 2.8* 3.2* 3.9    106 104  --   --    CO2 27.0 28.0 24.0  --   --      Recent Labs   Lab 24  0820 24  0631   RBC 3.79* 3.85 4.05   HGB 13.2 13.2 14.0   HCT 37.7 37.8 40.7   MCV 99.5 98.2 100.5*   MCH 34.8* 34.3* 34.6*   MCHC 35.0 34.9 34.4   RDW 11.9 12.0 12.0   NEPRELIM 3.64 3.97 7.90*   WBC 5.6 6.1 9.9   .0 223.0 239.0         No results for input(s): \"TROP\", \"TROPHS\", \"CK\" in the last 168 hours.  EKG:  3/29/24:   BPM, QRS:  80 m,s QTc: 431 ms.   Echo:  24:  1. Left ventricle: The cavity size was normal. Wall thickness was normal.      Systolic function was normal. The estimated ejection fraction was 55-60%.      No diagnostic evidence for regional wall motion abnormalities. Features      are consistent with concomitant abnormal  relaxation and increased filling      pressure - grade 2 diastolic dysfunction.   2. Aortic valve: Trileaflet; mildly calcified leaflets. Trivial      regurgitation.   3. Mitral valve: Mitral valve demonstrates mildly calcified leaflets. There      was mild regurgitation.   4. Left atrium: The left atrium was markedly dilated. The left atrial volume      was moderately increased.   5. Right ventricle: Systolic function was mildly reduced.   6. Right atrium: The atrium was markedly dilated.   7. Tricuspid valve: There was moderate regurgitation.   8. Pulmonary arteries: Systolic pressure was mildly to moderately increased,      in the range of 45mm Hg to 52mm Hg.   9. Inferior vena cava: The respirophasic diameter changes were blunted (less      than 50%).     Review of Systems:   Constitutional: No fevers, chills, fatigue or night sweats.  ENT: No mouth pain, neck pain, running nose, headaches or swollen glands.  Skin: No rashes, pruritus or skin changes,  Respiratory: Denies cough, wheezing or shortness of breath.  CV: Denies chest pain, palpitations, orthopnea, PND or dizziness.  Musculoskeletal: No joint pain, stiffness or swelling.  GI: No nausea, vomiting or diarrhea. No blood in stools.  Neurologic: No seizures, tremors, weakness or numbness.     Physical Exam:  Gen: alert, oriented x 3, NAD  Heent: pupils equal, reactive. Mucous membranes moist.   Neck: no jvd  Cardiac: Irregular rate and rhythm, normal S1,S2, no murmur, gallop or rub.   Lungs: CTA  Abd: soft, NT/ND +bs  Ext: no edema  Skin: Warm, dry  Neuro: No focal deficits    Medications:     metoprolol  5 mg Intravenous Q6H    enalaprilat  1.25 mg Intravenous Q6H    pantoprazole  40 mg Intravenous Q24H    dilTIAZem  10 mg Intravenous Once      dilTIAZem 10 mg/hr (04/01/24 0249)    lactated ringers 50 mL/hr at 03/31/24 5253       Assessment:  Incarcerated right Inguinal Hernia:  Scheduled for repair this afternoon.   NG to suction.   Perm AF:    Remains  AF with moderate rate control.   Diltiazem Gtt, IV BB.   Single chamber St. Ankit PPM.   Eliquis remains on hold.   HFpEF:    EF:  55-60 %.   Euvolemic.     Plan:  Scheduled for Right inguinal hernia repair this afternoon.   AF with moderate rate control.   Continue to hold Eliquis.     Plan of care discussed with patient, RN.    Jerricahilario Samuels, VON  4/1/2024  9:41 AM  [phone]  [phone]         Patient seen and examined independently.  Note reviewed and labs reviewed. Agree with above assessment and plan. 89 year old female who presented with incarcerated right inguinal hernia and was noted to have Afib w/ RVR. Has hx of afib. Rate control via cardizem gtt for now. NPO. Add IV labetalol 10 mg q4hrs prn for elevated BP. AC held for surgery. Surgery pending this evening. Will cont to follow.      Cortez Santos DO  Cardiologist  [facility]  4/1/2024 10:40 AM      Note to the patient: The 21st Century Cures Act makes medical notes like these available to patients in the interest of transparency. However, be advised that this is a medical document. It is intended as peer to peer communication. It is written in medical language and may contain abbreviations or verbiage that are unfamiliar. It may appear blunt or direct. Medical documents are intended to carry relevant information, facts as evident, and clinical opinion of the practitioner.     Disclaimer: Components of this note were documented using voice recognition system and are subject to errors not corrected at proofreading. Contact the author of this note for any clarifications.

## 2024-04-01 NOTE — PHYSICAL THERAPY NOTE
Pt is scheduled for hernia repair surgery today. Will follow up after the procedure when clinically appropriate.

## 2024-04-01 NOTE — PLAN OF CARE
Assumed pt care at 1930. A&O x1-2. Tele rhythm Atrial fibrillation. SPO2 93% on room air. Potassium 3.2, replaced via IV, redraw in the AM. NGT to low intermittent suction. Breath sounds clear bilaterally. Pt voiding with no issues. No c/o chest pain or shortness of breath. Scattered bruising BUE. Bed is locked and in low position. Call light and personal items within reach. Reviewed plan of care and patient verbalizes understanding.  POC: Right inguinal hernia repair surgery 4/1, Lactated ringer @ 50 ml/hour, Cardizem gtt @ 10 ml/hour, Eliquis on hold d/t surgery.  Problem: Patient/Family Goals  Goal: Patient/Family Long Term Goal  Description: Patient's Long Term Goal: DC to home     Interventions:  - follow plan of care     - See additional Care Plan goals for specific interventions  Outcome: Progressing  Goal: Patient/Family Short Term Goal  Description: Patient's Short Term Goal: getting better, help with sore throat     Interventions:   - NGT suction, PRN blood pressure med, Cepacol for sire throat      - See additional Care Plan goals for specific interventions  Outcome: Progressing     Problem: CARDIOVASCULAR - ADULT  Goal: Maintains optimal cardiac output and hemodynamic stability  Description: INTERVENTIONS:  - Monitor vital signs, rhythm, and trends  - Monitor for bleeding, hypotension and signs of decreased cardiac output  - Evaluate effectiveness of vasoactive medications to optimize hemodynamic stability  - Monitor arterial and/or venous puncture sites for bleeding and/or hematoma  - Assess quality of pulses, skin color and temperature  - Assess for signs of decreased coronary artery perfusion - ex. Angina  - Evaluate fluid balance, assess for edema, trend weights  Outcome: Progressing  Goal: Absence of cardiac arrhythmias or at baseline  Description: INTERVENTIONS:  - Continuous cardiac monitoring, monitor vital signs, obtain 12 lead EKG if indicated  - Evaluate effectiveness of antiarrhythmic and  heart rate control medications as ordered  - Initiate emergency measures for life threatening arrhythmias  - Monitor electrolytes and administer replacement therapy as ordered  Outcome: Progressing     Problem: GASTROINTESTINAL - ADULT  Goal: Minimal or absence of nausea and vomiting  Description: INTERVENTIONS:  - Maintain adequate hydration with IV or PO as ordered and tolerated  - Nasogastric tube to low intermittent suction as ordered  - Evaluate effectiveness of ordered antiemetic medications  - Provide nonpharmacologic comfort measures as appropriate  - Advance diet as tolerated, if ordered  - Obtain nutritional consult as needed  - Evaluate fluid balance  Outcome: Progressing  Goal: Maintains or returns to baseline bowel function  Description: INTERVENTIONS:  - Assess bowel function  - Maintain adequate hydration with IV or PO as ordered and tolerated  - Evaluate effectiveness of GI medications  - Encourage mobilization and activity  - Obtain nutritional consult as needed  - Establish a toileting routine/schedule  - Consider collaborating with pharmacy to review patient's medication profile  Outcome: Progressing     Problem: Safety Risk - Non-Violent Restraints  Goal: Patient will remain free from self-harm  Description: INTERVENTIONS:  - Apply the least restrictive restraint to prevent harm  - Notify patient and family of reasons restraints applied  - Assess for any contributing factors to confusion (electrolyte disturbances, delirium, medications)  - Discontinue any unnecessary medical devices as soon as possible  - Assess the patient's physical comfort, circulation, skin condition, hydration, nutrition and elimination needs   - Reorient and redirection as needed  - Assess for the need to continue restraints  Outcome: Progressing

## 2024-04-01 NOTE — PLAN OF CARE
Pt Aox2   -forgetful  Pt denies pain  RA  AFIB    -cardizem gtt went down to 5ml/hr  BP high   -prn given   NGT to LIS  Open right inguinal hernia with mesh at 5:45 pm   -POA nina notified   Pt's bed in lowest position and bed alarm on  Call light within reach  All of pt and family question answered.

## 2024-04-01 NOTE — OCCUPATIONAL THERAPY NOTE
OT orders received, chart reviewed. Per RN, pt hernia repair surgery scheduled for later today. Will hold OT eval for tomorrow post-op. RN notified

## 2024-04-02 LAB
ALBUMIN SERPL-MCNC: 2.9 G/DL (ref 3.4–5)
ALBUMIN/GLOB SERPL: 0.9 {RATIO} (ref 1–2)
ALP LIVER SERPL-CCNC: 70 U/L
ALT SERPL-CCNC: 27 U/L
ANION GAP SERPL CALC-SCNC: 9 MMOL/L (ref 0–18)
AST SERPL-CCNC: 59 U/L (ref 15–37)
BILIRUB SERPL-MCNC: 0.6 MG/DL (ref 0.1–2)
BUN BLD-MCNC: 19 MG/DL (ref 9–23)
CALCIUM BLD-MCNC: 8.8 MG/DL (ref 8.5–10.1)
CHLORIDE SERPL-SCNC: 115 MMOL/L (ref 98–112)
CO2 SERPL-SCNC: 24 MMOL/L (ref 21–32)
CREAT BLD-MCNC: 0.65 MG/DL
EGFRCR SERPLBLD CKD-EPI 2021: 81 ML/MIN/1.73M2 (ref 60–?)
GLOBULIN PLAS-MCNC: 3.4 G/DL (ref 2.8–4.4)
GLUCOSE BLD-MCNC: 119 MG/DL (ref 70–99)
MAGNESIUM SERPL-MCNC: 1.8 MG/DL (ref 1.6–2.6)
OSMOLALITY SERPL CALC.SUM OF ELEC: 309 MOSM/KG (ref 275–295)
PHOSPHATE SERPL-MCNC: 2.9 MG/DL (ref 2.5–4.9)
POTASSIUM SERPL-SCNC: 3.1 MMOL/L (ref 3.5–5.1)
PROT SERPL-MCNC: 6.3 G/DL (ref 6.4–8.2)
SODIUM SERPL-SCNC: 148 MMOL/L (ref 136–145)

## 2024-04-02 PROCEDURE — 3E0T3BZ INTRODUCTION OF ANESTHETIC AGENT INTO PERIPHERAL NERVES AND PLEXI, PERCUTANEOUS APPROACH: ICD-10-PCS | Performed by: STUDENT IN AN ORGANIZED HEALTH CARE EDUCATION/TRAINING PROGRAM

## 2024-04-02 PROCEDURE — 0YU50JZ SUPPLEMENT RIGHT INGUINAL REGION WITH SYNTHETIC SUBSTITUTE, OPEN APPROACH: ICD-10-PCS | Performed by: STUDENT IN AN ORGANIZED HEALTH CARE EDUCATION/TRAINING PROGRAM

## 2024-04-02 DEVICE — BARD MESH PERFIX PLUG, EXTRA LARGE
Type: IMPLANTABLE DEVICE | Site: INGUINAL | Status: FUNCTIONAL
Brand: BARD MESH PERFIX PLUG

## 2024-04-02 RX ORDER — KETOROLAC TROMETHAMINE 30 MG/ML
INJECTION, SOLUTION INTRAMUSCULAR; INTRAVENOUS AS NEEDED
Status: DISCONTINUED | OUTPATIENT
Start: 2024-04-02 | End: 2024-04-02 | Stop reason: SURG

## 2024-04-02 RX ORDER — HEPARIN SODIUM 5000 [USP'U]/ML
5000 INJECTION, SOLUTION INTRAVENOUS; SUBCUTANEOUS EVERY 8 HOURS SCHEDULED
Status: DISCONTINUED | OUTPATIENT
Start: 2024-04-02 | End: 2024-04-04

## 2024-04-02 RX ORDER — ONDANSETRON 2 MG/ML
INJECTION INTRAMUSCULAR; INTRAVENOUS AS NEEDED
Status: DISCONTINUED | OUTPATIENT
Start: 2024-04-02 | End: 2024-04-02 | Stop reason: SURG

## 2024-04-02 RX ORDER — SODIUM CHLORIDE 9 MG/ML
INJECTION, SOLUTION INTRAVENOUS CONTINUOUS PRN
Status: DISCONTINUED | OUTPATIENT
Start: 2024-04-02 | End: 2024-04-02 | Stop reason: SURG

## 2024-04-02 RX ORDER — MAGNESIUM SULFATE HEPTAHYDRATE 40 MG/ML
2 INJECTION, SOLUTION INTRAVENOUS ONCE
Status: COMPLETED | OUTPATIENT
Start: 2024-04-02 | End: 2024-04-02

## 2024-04-02 RX ORDER — PHENYLEPHRINE HCL 10 MG/ML
VIAL (ML) INJECTION AS NEEDED
Status: DISCONTINUED | OUTPATIENT
Start: 2024-04-02 | End: 2024-04-02 | Stop reason: SURG

## 2024-04-02 RX ORDER — MEPERIDINE HYDROCHLORIDE 25 MG/ML
12.5 INJECTION INTRAMUSCULAR; INTRAVENOUS; SUBCUTANEOUS AS NEEDED
Status: DISCONTINUED | OUTPATIENT
Start: 2024-04-02 | End: 2024-04-02 | Stop reason: HOSPADM

## 2024-04-02 RX ORDER — CEFAZOLIN SODIUM 1 G/3ML
INJECTION, POWDER, FOR SOLUTION INTRAMUSCULAR; INTRAVENOUS AS NEEDED
Status: DISCONTINUED | OUTPATIENT
Start: 2024-04-02 | End: 2024-04-02 | Stop reason: SURG

## 2024-04-02 RX ORDER — SODIUM CHLORIDE, SODIUM LACTATE, POTASSIUM CHLORIDE, CALCIUM CHLORIDE 600; 310; 30; 20 MG/100ML; MG/100ML; MG/100ML; MG/100ML
INJECTION, SOLUTION INTRAVENOUS CONTINUOUS
Status: DISCONTINUED | OUTPATIENT
Start: 2024-04-02 | End: 2024-04-02 | Stop reason: HOSPADM

## 2024-04-02 RX ORDER — HYDROMORPHONE HYDROCHLORIDE 1 MG/ML
0.2 INJECTION, SOLUTION INTRAMUSCULAR; INTRAVENOUS; SUBCUTANEOUS EVERY 5 MIN PRN
Status: DISCONTINUED | OUTPATIENT
Start: 2024-04-02 | End: 2024-04-02 | Stop reason: HOSPADM

## 2024-04-02 RX ORDER — ACETAMINOPHEN 500 MG
1000 TABLET ORAL ONCE AS NEEDED
Status: DISCONTINUED | OUTPATIENT
Start: 2024-04-02 | End: 2024-04-02 | Stop reason: HOSPADM

## 2024-04-02 RX ORDER — ESMOLOL HYDROCHLORIDE 10 MG/ML
INJECTION INTRAVENOUS AS NEEDED
Status: DISCONTINUED | OUTPATIENT
Start: 2024-04-02 | End: 2024-04-02 | Stop reason: SURG

## 2024-04-02 RX ORDER — HYDROMORPHONE HYDROCHLORIDE 1 MG/ML
0.6 INJECTION, SOLUTION INTRAMUSCULAR; INTRAVENOUS; SUBCUTANEOUS EVERY 5 MIN PRN
Status: DISCONTINUED | OUTPATIENT
Start: 2024-04-02 | End: 2024-04-02 | Stop reason: HOSPADM

## 2024-04-02 RX ORDER — LIDOCAINE HYDROCHLORIDE 10 MG/ML
INJECTION, SOLUTION EPIDURAL; INFILTRATION; INTRACAUDAL; PERINEURAL AS NEEDED
Status: DISCONTINUED | OUTPATIENT
Start: 2024-04-02 | End: 2024-04-02 | Stop reason: SURG

## 2024-04-02 RX ORDER — ACETAMINOPHEN 10 MG/ML
INJECTION, SOLUTION INTRAVENOUS
Status: COMPLETED
Start: 2024-04-02 | End: 2024-04-02

## 2024-04-02 RX ORDER — MAGNESIUM SULFATE HEPTAHYDRATE 500 MG/ML
INJECTION, SOLUTION INTRAMUSCULAR; INTRAVENOUS AS NEEDED
Status: DISCONTINUED | OUTPATIENT
Start: 2024-04-02 | End: 2024-04-02 | Stop reason: SURG

## 2024-04-02 RX ORDER — LIDOCAINE HYDROCHLORIDE 10 MG/ML
INJECTION, SOLUTION INFILTRATION; PERINEURAL AS NEEDED
Status: DISCONTINUED | OUTPATIENT
Start: 2024-04-02 | End: 2024-04-02 | Stop reason: HOSPADM

## 2024-04-02 RX ORDER — LABETALOL HYDROCHLORIDE 5 MG/ML
5 INJECTION, SOLUTION INTRAVENOUS EVERY 5 MIN PRN
Status: DISCONTINUED | OUTPATIENT
Start: 2024-04-02 | End: 2024-04-02 | Stop reason: HOSPADM

## 2024-04-02 RX ORDER — KETAMINE HYDROCHLORIDE 50 MG/ML
INJECTION, SOLUTION INTRAMUSCULAR; INTRAVENOUS AS NEEDED
Status: DISCONTINUED | OUTPATIENT
Start: 2024-04-02 | End: 2024-04-02 | Stop reason: SURG

## 2024-04-02 RX ORDER — NALOXONE HYDROCHLORIDE 0.4 MG/ML
0.08 INJECTION, SOLUTION INTRAMUSCULAR; INTRAVENOUS; SUBCUTANEOUS AS NEEDED
Status: DISCONTINUED | OUTPATIENT
Start: 2024-04-02 | End: 2024-04-02 | Stop reason: HOSPADM

## 2024-04-02 RX ORDER — HYDROMORPHONE HYDROCHLORIDE 1 MG/ML
0.4 INJECTION, SOLUTION INTRAMUSCULAR; INTRAVENOUS; SUBCUTANEOUS EVERY 5 MIN PRN
Status: DISCONTINUED | OUTPATIENT
Start: 2024-04-02 | End: 2024-04-02 | Stop reason: HOSPADM

## 2024-04-02 RX ORDER — ROCURONIUM BROMIDE 10 MG/ML
INJECTION, SOLUTION INTRAVENOUS AS NEEDED
Status: DISCONTINUED | OUTPATIENT
Start: 2024-04-02 | End: 2024-04-02 | Stop reason: SURG

## 2024-04-02 RX ORDER — DEXAMETHASONE SODIUM PHOSPHATE 4 MG/ML
VIAL (ML) INJECTION AS NEEDED
Status: DISCONTINUED | OUTPATIENT
Start: 2024-04-02 | End: 2024-04-02 | Stop reason: SURG

## 2024-04-02 RX ORDER — OXYCODONE HYDROCHLORIDE 5 MG/1
5 TABLET ORAL EVERY 4 HOURS PRN
Status: DISCONTINUED | OUTPATIENT
Start: 2024-04-02 | End: 2024-04-05

## 2024-04-02 RX ORDER — HYDROCODONE BITARTRATE AND ACETAMINOPHEN 5; 325 MG/1; MG/1
2 TABLET ORAL ONCE AS NEEDED
Status: DISCONTINUED | OUTPATIENT
Start: 2024-04-02 | End: 2024-04-02 | Stop reason: HOSPADM

## 2024-04-02 RX ORDER — BUPIVACAINE HYDROCHLORIDE AND EPINEPHRINE 5; 5 MG/ML; UG/ML
INJECTION, SOLUTION EPIDURAL; INTRACAUDAL; PERINEURAL AS NEEDED
Status: DISCONTINUED | OUTPATIENT
Start: 2024-04-02 | End: 2024-04-02 | Stop reason: HOSPADM

## 2024-04-02 RX ORDER — ACETAMINOPHEN 10 MG/ML
1000 INJECTION, SOLUTION INTRAVENOUS ONCE
Status: COMPLETED | OUTPATIENT
Start: 2024-04-02 | End: 2024-04-02

## 2024-04-02 RX ORDER — ACETAMINOPHEN 325 MG/1
650 TABLET ORAL EVERY 6 HOURS PRN
Status: DISCONTINUED | OUTPATIENT
Start: 2024-04-02 | End: 2024-04-06

## 2024-04-02 RX ORDER — METOCLOPRAMIDE HYDROCHLORIDE 5 MG/ML
10 INJECTION INTRAMUSCULAR; INTRAVENOUS EVERY 8 HOURS PRN
Status: DISCONTINUED | OUTPATIENT
Start: 2024-04-02 | End: 2024-04-02 | Stop reason: HOSPADM

## 2024-04-02 RX ORDER — HYDROCODONE BITARTRATE AND ACETAMINOPHEN 5; 325 MG/1; MG/1
1 TABLET ORAL ONCE AS NEEDED
Status: DISCONTINUED | OUTPATIENT
Start: 2024-04-02 | End: 2024-04-02 | Stop reason: HOSPADM

## 2024-04-02 RX ORDER — ONDANSETRON 2 MG/ML
4 INJECTION INTRAMUSCULAR; INTRAVENOUS EVERY 6 HOURS PRN
Status: DISCONTINUED | OUTPATIENT
Start: 2024-04-02 | End: 2024-04-02 | Stop reason: HOSPADM

## 2024-04-02 RX ADMIN — KETAMINE HYDROCHLORIDE 10 MG: 50 INJECTION, SOLUTION INTRAMUSCULAR; INTRAVENOUS at 16:28:00

## 2024-04-02 RX ADMIN — PHENYLEPHRINE HCL 100 MCG: 10 MG/ML VIAL (ML) INJECTION at 16:30:00

## 2024-04-02 RX ADMIN — SODIUM CHLORIDE: 9 INJECTION, SOLUTION INTRAVENOUS at 17:32:00

## 2024-04-02 RX ADMIN — PHENYLEPHRINE HCL 200 MCG: 10 MG/ML VIAL (ML) INJECTION at 16:03:00

## 2024-04-02 RX ADMIN — PHENYLEPHRINE HCL 200 MCG: 10 MG/ML VIAL (ML) INJECTION at 16:02:00

## 2024-04-02 RX ADMIN — LIDOCAINE HYDROCHLORIDE 20 MG: 10 INJECTION, SOLUTION EPIDURAL; INFILTRATION; INTRACAUDAL; PERINEURAL at 15:53:00

## 2024-04-02 RX ADMIN — PHENYLEPHRINE HCL 100 MCG: 10 MG/ML VIAL (ML) INJECTION at 16:35:00

## 2024-04-02 RX ADMIN — DEXAMETHASONE SODIUM PHOSPHATE 4 MG: 4 MG/ML VIAL (ML) INJECTION at 16:16:00

## 2024-04-02 RX ADMIN — SODIUM CHLORIDE: 9 INJECTION, SOLUTION INTRAVENOUS at 16:06:00

## 2024-04-02 RX ADMIN — PHENYLEPHRINE HCL 100 MCG: 10 MG/ML VIAL (ML) INJECTION at 16:15:00

## 2024-04-02 RX ADMIN — PHENYLEPHRINE HCL 100 MCG: 10 MG/ML VIAL (ML) INJECTION at 16:39:00

## 2024-04-02 RX ADMIN — MAGNESIUM SULFATE HEPTAHYDRATE 2 G: 500 INJECTION, SOLUTION INTRAMUSCULAR; INTRAVENOUS at 16:17:00

## 2024-04-02 RX ADMIN — KETOROLAC TROMETHAMINE 30 MG: 30 INJECTION, SOLUTION INTRAMUSCULAR; INTRAVENOUS at 17:08:00

## 2024-04-02 RX ADMIN — ESMOLOL HYDROCHLORIDE 30 MG: 10 INJECTION INTRAVENOUS at 16:03:00

## 2024-04-02 RX ADMIN — SODIUM CHLORIDE: 9 INJECTION, SOLUTION INTRAVENOUS at 16:33:00

## 2024-04-02 RX ADMIN — ESMOLOL HYDROCHLORIDE 20 MG: 10 INJECTION INTRAVENOUS at 16:15:00

## 2024-04-02 RX ADMIN — ESMOLOL HYDROCHLORIDE 30 MG: 10 INJECTION INTRAVENOUS at 17:20:00

## 2024-04-02 RX ADMIN — ROCURONIUM BROMIDE 50 MG: 10 INJECTION, SOLUTION INTRAVENOUS at 15:56:00

## 2024-04-02 RX ADMIN — SODIUM CHLORIDE: 9 INJECTION, SOLUTION INTRAVENOUS at 15:40:00

## 2024-04-02 RX ADMIN — ONDANSETRON 4 MG: 2 INJECTION INTRAMUSCULAR; INTRAVENOUS at 17:08:00

## 2024-04-02 RX ADMIN — CEFAZOLIN SODIUM 2 G: 1 INJECTION, POWDER, FOR SOLUTION INTRAMUSCULAR; INTRAVENOUS at 16:14:00

## 2024-04-02 RX ADMIN — PHENYLEPHRINE HCL 100 MCG: 10 MG/ML VIAL (ML) INJECTION at 16:24:00

## 2024-04-02 RX ADMIN — PHENYLEPHRINE HCL 100 MCG: 10 MG/ML VIAL (ML) INJECTION at 16:01:00

## 2024-04-02 NOTE — OCCUPATIONAL THERAPY NOTE
Per EMR - possible Sx intervention now scheduled for 4/2/2024 with General surgery/hernia repair.  Will continue to follow and will eval as approp.

## 2024-04-02 NOTE — ANESTHESIA POSTPROCEDURE EVALUATION
[facility]    Ayse Maldonado Patient Status:  Inpatient   Age/Gender 89 year old female MRN [id number]   Location [facility] SURGERY Attending Terri Navarro MD   Hosp Day # 5 PCP Terri Navarro MD       Anesthesia Post-op Note    OPEN RIGHT INGUINGAL HERNIA REPAIR WITH MESH    Procedure Summary       Date: 04/02/24 Room / Location:  MAIN OR 15 / [facility] MAIN OR    Anesthesia Start: 1540 Anesthesia Stop: 1733    Procedure: OPEN RIGHT INGUINGAL HERNIA REPAIR WITH MESH (Right: Groin) Diagnosis:       Inguinal hernia      (Inguinal hernia [K40.90])    Surgeons: Mally Fernandez MD Anesthesiologist: Shar Mukherjee MD    Anesthesia Type: general ASA Status: 3            Anesthesia Type: general    Vitals Value Taken Time   /62 04/02/24 1729   Temp 97.1 04/02/24 1733   Pulse 122 04/02/24 1733   Resp 22 04/02/24 1733   SpO2 99 % 04/02/24 1733   Vitals shown include unfiled device data.    Patient Location: PACU    Anesthesia Type: general    Airway Patency: patent    Postop Pain Control: adequate    Mental Status: preanesthetic baseline    Nausea/Vomiting: none    Cardiopulmonary/Hydration status: stable euvolemic    Complications: no apparent anesthesia related complications    Postop vital signs: stable    Dental Exam: Unchanged from Preop    Patient to be discharged from PACU when criteria met.

## 2024-04-02 NOTE — PROGRESS NOTES
Progress Note  Ayse Maldonado Patient Status:  Inpatient    10/25/1927 MRN [id number]   Location [facility] 8NE-A Attending Terri Navarro MD   Hosp Day # 5 PCP Terri Navarro MD     Subjective:  In bed, sleeping, no acute distress.     Objective:  BP (!) 182/77 (BP Location: Left arm)   Pulse 109   Temp 97.5 °F (36.4 °C) (Axillary)   Resp 19   Ht 5' 7\" (1.702 m)   Wt 135 lb 9.3 oz (61.5 kg)   SpO2 96%   BMI 21.24 kg/m²     Telemetry: afib      Intake/Output:    Intake/Output Summary (Last 24 hours) at 2024 09  Last data filed at 2024 0829  Gross per 24 hour   Intake 0 ml   Output 800 ml   Net -800 ml       Last 3 Weights   24 0423 135 lb 9.3 oz (61.5 kg)   24 0421 135 lb 9.3 oz (61.5 kg)   24 0002 135 lb 9.6 oz (61.5 kg)   24 1825 138 lb (62.6 kg)   24 1110 135 lb (61.2 kg)   24 1006 135 lb (61.2 kg)       Labs:  Recent Labs   Lab 24  0820 24  0631 24  1852 24  0551   * 101* 123*  --   --    BUN 16 13 11  --   --    CREATSERUM 0.94 0.84 0.71  --   --    EGFRCR 56* 64 78  --   --    CA 9.3 9.3 9.2  --   --     140 138  --   --    K 4.4 3.8 2.8* 3.2* 3.9    106 104  --   --    CO2 27.0 28.0 24.0  --   --      Recent Labs   Lab 24  0820 24  0631   RBC 3.79* 3.85 4.05   HGB 13.2 13.2 14.0   HCT 37.7 37.8 40.7   MCV 99.5 98.2 100.5*   MCH 34.8* 34.3* 34.6*   MCHC 35.0 34.9 34.4   RDW 11.9 12.0 12.0   NEPRELIM 3.64 3.97 7.90*   WBC 5.6 6.1 9.9   .0 223.0 239.0         No results for input(s): \"TROP\", \"TROPHS\", \"CK\" in the last 168 hours.    Review of Systems   Cardiovascular:  Positive for irregular heartbeat.   Respiratory: Negative.         Physical Exam:    Gen: sleeping  Heent: pupils equal, reactive. Mucous membranes moist.   Neck: no jvd, NG in place  Cardiac: irregularly irregular, normal S1,S2  Lungs: CTA  Abd: soft, NT/ND +bs  Ext: no edema  Skin: Warm,  dry  Neuro: No focal deficits        Medications:     heparin  5,000 Units Subcutaneous Once    metoprolol  5 mg Intravenous Q6H    enalaprilat  1.25 mg Intravenous Q6H    pantoprazole  40 mg Intravenous Q24H    dilTIAZem  10 mg Intravenous Once      dilTIAZem 5 mg/hr (04/01/24 1427)    lactated ringers 50 mL/hr at 04/01/24 4287           Assessment:  Incarcerated right inguinal hernia with SBO admitted with abdominal pain  General surgery following-plans for open right inguinal hernia repair   Chronic Afib s/p SJM dual chamber PPM (2015)  On eliquis--held given above  Historically LVEF preserved.   Bb, ccb, dig  Prior hx DCCV-follows with Dr. Perfecto Barrera santy 2015  HTN-2/2 above.   Preop cardiac risk assessment  Acceptable cardiac risk to proceed with surgery.      Plan:  Cont IV rate control  Eliquis remains on hold. Plans for OR this afternoon  Recommend restarting po eliquis vs IV heparin post procedure when safe from surgical standpoint.  If plans for OR deferred this afternoon, will start heparin gtt per protocol no bolus.    Plan of care discussed with patient, multiple family members at bedside, RN, Dr. Tom Corona, APRN  4/2/2024  9:23 AM  [phone]  M [phone]        Patient seen and examined independently.  Note reviewed and labs reviewed. Agree with above assessment and plan.  89-year-old female who presented with incarcerated right inguinal hernia who is planned for surgical intervention, hopefully today.  Patient continues to remain n.p.o. in the interim and appears to be deconditioning.  Continue IV Cardizem infusion for rate control.  IV labetalol, hydralazine for blood pressure control.  We will plan for slow resumption of p.o. regimen once able to tolerate.  Certainly, patient is at elevated risk given age and comorbid conditions, however given incarcerated right inguinal hernia with SBO, benefit of intervention is also present. If surgery is further delayed,  recommend starting IV heparin.  Will continue to follow.  Discussed in detail with patient's family at bedside.        Cortez Santos DO  Cardiologist  [facility]  4/2/2024 11:06 AM      Note to the patient: The 21st Century Cures Act makes medical notes like these available to patients in the interest of transparency. However, be advised that this is a medical document. It is intended as peer to peer communication. It is written in medical language and may contain abbreviations or verbiage that are unfamiliar. It may appear blunt or direct. Medical documents are intended to carry relevant information, facts as evident, and clinical opinion of the practitioner.     Disclaimer: Components of this note were documented using voice recognition system and are subject to errors not corrected at proofreading. Contact the author of this note for any clarifications.

## 2024-04-02 NOTE — ANESTHESIA PROCEDURE NOTES
Arterial Line    Performed by: Shar Mukherjee MD  Authorized by: Shar Mukherjee MD    General Information and Staff    Procedure Start:   Procedure End:  4/2/2024 3:52 PM  Anesthesiologist:  Shar Mukherjee MD  Performed By:  Anesthesiologist  Patient Location:  OR  Indication: continuous blood pressure monitoring    Site Identification: real time ultrasound guided    Preanesthetic Checklist: 2 patient identifiers, IV checked, risks and benefits discussed, monitors and equipment checked, pre-op evaluation, timeout performed, anesthesia consent and sterile technique used    Procedure Details    Catheter Size:  20 G  Catheter Length:  1 and 3/4 inch  Catheter Type:  Angiocath  Seldinger Technique?: No    Laterality:  Left  Site:  Radial artery  Site Prep: alcohol swabs    Line Secured:  Tape and Tegaderm    Assessment    Events: patient tolerated procedure well with no complications      Medications      Additional Comments

## 2024-04-02 NOTE — DISCHARGE INSTRUCTIONS
Hernia Repair  Dr. Mally Fernandez    MEDICATIONS  For post-operative pain control, the medications are usually tramadol.  This is a narcotic and is best taken by starting with one tablet and repeating every four to six hours as needed.  If the patient does not feel they need the narcotics they shouldn’t take them.  If the pain is severe the patient may take up to two pills every six hours.  The patient can take tylenol 1g three times a day and ibuprofen 400-600mg in between up to 4 times a day as needed for pain as well.  The patient can take zofran 4mg every 6 hours as needed for nausea. The patient can also take miralax or colace as needed for constipation. Please ask your surgeon before resuming blood thinners such as aspirin, Plavix or Coumadin.  All other home medications may be resumed as scheduled.     DIET  The patient may resume a general diet immediately.  This is not a good time to eat excessively.  The patient should eat in moderation and stick with foods the patient feels are easy to digest. There should be no alcohol consumption in the immediate recovery time period or within six hours of taking narcotics.    WOUND CARE  The dressing is usually a dissolvable glue which protects the wound. The patient can take a shower starting the night of surgery, but please, no baths or soaking. Please do not put any creams or ointments on the surgical incisions. If the patient does have a top dressing with clear tape and gauze, this dressing may be removed in 2 days. Do not remove the steri-strips or butterfly tapes that are white and adherent to the skin.  The steri-strips will eventually peel up at the ends and at this point they may be removed.  This is usually seven to ten days after surgery.  When showering, soap can get on the wounds but do not scrub over the wounds.  No hair dye or chemicals of any kind should get in the wounds.  Avoid tub baths, swimming or sitting in a hot tub for two weeks.  If visible  sutures or staples are present they will be removed in the office by the surgeon or nurse.  Most wounds will be closed with dissolving suture underneath the skin.  These sutures will dissolve on their own.  If a drain is present make sure the patient receives drain care instructions from the nurse prior to discharge.  Most patients will not have a drain.    ACTIVITY  Every day the patient should be up, showered and dressed.  Each day the patient should be up and around the house.  The patient should not lie in bed and should not stay in pajamas.  We count on the patient being up, coughing, walking and deep breathing to avoid pneumonia and blood clots in the legs.  Once a day the patient should get out of the house and go shopping, go to the mall, the [employer] store, the movies or a restaurant.  The patient may ride in a car but should not drive the car for at least one week.  Patients should be off narcotics for at least 8 hours prior to being a .  The average time off work is 10 to 14 days; most adults will be seeing the surgeon prior to returning to work.  Students will return to school within 1-5 days after discharge from the hospital but will be off gym, sports, and indoors for recess for one month.  Patients may go up and down stairs and lift up to five pounds but no bending, pushing or pulling.  Nothing called work or exercise until the follow up visit.  No ‘stair-master’, power walking, jogging or workouts until the follow up visit.  Patients should seek further activity limits at the time of their appointment.    APPOINTMENT  Please call our office for an appointment within five to ten days of discharge.  Any fever greater than 100.5, chills, nausea, vomiting, or severe diarrhea please call our office.  If the wound turns red, hot, swollen, becomes increasingly painful, or drains pus call us immediately at [phone].  For life threatening emergencies call [phone].  For non-emergent care please call  our office after 8:30 a.m. Monday through Friday.  The number listed above is our office number; our phone automatically switches to our answering service if we are not there.  Please do not use the emergency room for non-urgent care.    Thank you for entrusting us with your care.  [facility]--General Surgery

## 2024-04-02 NOTE — PROGRESS NOTES
[facility]  General Surgery Progress Note    Ayse Maldonado Patient Status:  Inpatient    10/25/1927 MRN [id number]   Location [facility] PRE OP HOLDING Attending Terri Navarro MD   Hosp Day # 5 PCP Terri Navarro MD     Subjective:  No new issues.    Objective/Physical Exam:      Intake/Output Summary (Last 24 hours) at 2024 1541  Last data filed at 2024 1230  Gross per 24 hour   Intake 300 ml   Output 900 ml   Net -600 ml       Vital Signs:  Blood pressure (!) 167/79, pulse 100, temperature 98.8 °F (37.1 °C), temperature source Axillary, resp. rate 18, height 67\", weight 135 lb 9.3 oz (61.5 kg), SpO2 97%.    General: Alert, orientated x3.  Cooperative.  No apparent distress.  HEENT: Exam is unremarkable.  Without scleral icterus.  Mucous membranes are moist. Oropharynx is clear.  Neck: No JVD. Supple.   Lungs: Non labored breathing, equal chest rise  Cardiac: Regular rate and rhythm. No murmur.  Abdomen:  Soft, non-distended, non-tender, with no rebound or guarding.  No peritoneal signs.   Extremities:  No lower extremity edema noted.  Without clubbing or cyanosis.  2+ pulses x4, motor and sensation grossly intact      Labs:     Lab Results   Component Value Date     2024    K 3.1 2024     2024    CO2 24.0 2024    BUN 19 2024    CREATSERUM 0.65 2024     2024    CA 8.8 2024    ALKPHO 70 2024    ALT 27 2024    AST 59 2024    BILT 0.6 2024    ALB 2.9 2024    TP 6.3 2024     Lab Results   Component Value Date    MG 1.8 2024    PHOS 2.9 2024       Images:  No results found.    Assessment/Plan:  Patient Active Problem List   Diagnosis    Pacemaker - M. Perfecto    JAZZ on CPAP    Current use of long term anticoagulation    Gastroesophageal reflux disease without esophagitis    Asthma with COPD (chronic obstructive pulmonary disease) (HCC)    Pulmonary hypertension (HCC)-52 mg Hg     Alternating constipation and diarrhea    Other fatigue-mutifactorial    Chronic bronchitis (HCC)    CKD (chronic kidney disease) stage 3, GFR 30-59 ml/min (HCC)    PAF (paroxysmal atrial fibrillation) (HCC)    Glaucoma suspect of left eye    Pseudophakia    Trochanteric bursitis of left hip    Fall    Essential hypertension    Chronic left hip pain    CHI (closed head injury), subsequent encounter    Altered mental status, unspecified altered mental status type    Hyponatremia    Seizure disorder (HCC)    Hypertension, unspecified type    Altered mental status    Weakness    Hypotension, unspecified hypotension type    Hypotension    Branch retinal vein occlusion of left eye with macular edema (HCC)    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 (HCC)    PCO (posterior capsular opacification), left    Inguinal hernia of right side without obstruction or gangrene    Umbilical hernia without obstruction and without gangrene    Protein-calorie malnutrition, unspecified severity (HCC)    Dementia without behavioral disturbance (HCC)    Unilateral inguinal hernia with obstruction and without gangrene, recurrence not specified       89 year old female with incarcerated right inguinal hernia with small bowel obstruction, hernia now reducible    Will proceed with open right inguinal hernia repair with mesh  Risks including bleeding, pain, infection, and recurrence, and the procedure explained to the family  They acknowledged understanding and wish to proceed  Surgery will continue to follow  Rest of care per primary    Mally Fernandez MD  [facility]  4/2/2024  3:41 PM

## 2024-04-02 NOTE — PLAN OF CARE
NURSING NOTES:  0800: Pt received AOx2, forgetful. RA. At-fib. Cardizem drip. NG to LIS. NPO. Pt's son Cortes at the bedside. For surgery this afternoon.  1240: Consent signed by son Cortes. Hernia surgery by Dr. Shane.   1300: K and Mag replace per protocol.   1510: Pt went to OR.    Problem: CARDIOVASCULAR - ADULT  Goal: Maintains optimal cardiac output and hemodynamic stability  Description: INTERVENTIONS:  - Monitor vital signs, rhythm, and trends  - Monitor for bleeding, hypotension and signs of decreased cardiac output  - Evaluate effectiveness of vasoactive medications to optimize hemodynamic stability  - Monitor arterial and/or venous puncture sites for bleeding and/or hematoma  - Assess quality of pulses, skin color and temperature  - Assess for signs of decreased coronary artery perfusion - ex. Angina  - Evaluate fluid balance, assess for edema, trend weights  Outcome: Progressing     Problem: GASTROINTESTINAL - ADULT  Goal: Minimal or absence of nausea and vomiting  Description: INTERVENTIONS:  - Maintain adequate hydration with IV or PO as ordered and tolerated  - Nasogastric tube to low intermittent suction as ordered  - Evaluate effectiveness of ordered antiemetic medications  - Provide nonpharmacologic comfort measures as appropriate  - Advance diet as tolerated, if ordered  - Obtain nutritional consult as needed  - Evaluate fluid balance  Outcome: Progressing

## 2024-04-02 NOTE — PROGRESS NOTES
[facility]  General Surgery Progress Note    Ayse Maldonado Patient Status:  Inpatient    10/25/1927 MRN [id number]   Location [facility] 8NE-A Attending Terri Navarro MD   Hosp Day # 4 PCP Terri Navarro MD     Subjective:  No acute events overnight.    Objective/Physical Exam:      Intake/Output Summary (Last 24 hours) at 2024  Last data filed at 2024 1741  Gross per 24 hour   Intake --   Output 1250 ml   Net -1250 ml       Vital Signs:  Blood pressure (!) 186/81, pulse 100, temperature 97.8 °F (36.6 °C), temperature source Oral, resp. rate 26, height 67\", weight 135 lb 9.3 oz (61.5 kg), SpO2 95%.    General: Alert. Cooperative.  No apparent distress.  HEENT: Exam is unremarkable.  Without scleral icterus.  Mucous membranes are moist. Oropharynx is clear.  NG tube in place  Neck: No JVD. Supple.   Lungs: Non labored breathing, equal chest rise  Cardiac: Regular rate and rhythm. No murmur.  Abdomen:  Soft, non-distended, non-tender, with no rebound or guarding.  No peritoneal signs.  Right inguinal hernia reducible  Extremities:  No lower extremity edema noted.  Without clubbing or cyanosis.  2+ pulses x4, motor and sensation grossly intact      Labs:     Lab Results   Component Value Date    K 3.9 2024     Lab Results   Component Value Date    MG 2.1 2024       Images:  No results found.    Assessment/Plan:  Patient Active Problem List   Diagnosis    Pacemaker - M. Perfecto    JAZZ on CPAP    Current use of long term anticoagulation    Gastroesophageal reflux disease without esophagitis    Asthma with COPD (chronic obstructive pulmonary disease) ([facility])    Pulmonary hypertension (HCC)-52 mg Hg    Alternating constipation and diarrhea    Other fatigue-mutifactorial    Chronic bronchitis ([facility])    CKD (chronic kidney disease) stage 3, GFR 30-59 ml/min ([facility])    PAF (paroxysmal atrial fibrillation) ([facility])    Glaucoma suspect of left eye    Pseudophakia    Trochanteric bursitis of left  hip    Fall    Essential hypertension    Chronic left hip pain    CHI (closed head injury), subsequent encounter    Altered mental status, unspecified altered mental status type    Hyponatremia    Seizure disorder (HCC)    Hypertension, unspecified type    Altered mental status    Weakness    Hypotension, unspecified hypotension type    Hypotension    Branch retinal vein occlusion of left eye with macular edema (HCC)    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 (HCC)    PCO (posterior capsular opacification), left    Inguinal hernia of right side without obstruction or gangrene    Umbilical hernia without obstruction and without gangrene    Protein-calorie malnutrition, unspecified severity (HCC)    Dementia without behavioral disturbance (HCC)    Unilateral inguinal hernia with obstruction and without gangrene, recurrence not specified       89 year old female with incarcerated right inguinal hernia with associated small bowel obstruction, hernia now reduced    I had a long discussion with family at bedside  I believe patient is high risk and would benefit from surgical intervention when normal staff is around instead of during the night  Patient may benefit from either a spinal or MAC with local for anesthesia  Unfortunately, patient's case was pushed back after 9 PM tonight  Family is very concerned that patient is not getting nutrition and she is not on her medication  Recommend starting PPN  Patient is getting IV formulations of her hypertensive medication  Eliquis remains on hold  If hospital has a hernia belt, recommend ordering one for the patient so she can get to a chair  I believe patient needs urgent surgical intervention for her right inguinal hernia  Will try for tomorrow pending OR availability    Surgery will continue to follow rest of care per primary    Mally Fernandez MD  [facility]  4/1/2024  9:26 PM

## 2024-04-02 NOTE — ANESTHESIA PROCEDURE NOTES
Airway  Date/Time: 4/2/2024 3:57 PM  Urgency: elective      General Information and Staff    Patient location during procedure: OR  Anesthesiologist: Shar Mukherjee MD  Performed: anesthesiologist   Performed by: Shar Mukherjee MD  Authorized by: Shar Mukherjee MD      Indications and Patient Condition  Indications for airway management: anesthesia  Spontaneous Ventilation: absent  Sedation level: deep  Preoxygenated: yes  Patient position: sniffing  Mask difficulty assessment: 0 - not attempted    Final Airway Details  Final airway type: endotracheal airway      Successful airway: ETT  Cuffed: yes   Successful intubation technique: direct laryngoscopy  Facilitating devices/methods: intubating stylet  Endotracheal tube insertion site: oral  Blade: Elisha  Blade size: #3  ETT size (mm): 7.0    Cormack-Lehane Classification: grade IIA - partial view of glottis  Placement verified by: capnometry   Measured from: teeth  ETT to teeth (cm): 22  Number of attempts at approach: 1    Additional Comments  RSI performed, NG tube suctioned prior to induction, HOB elevated 30 deg, suction on standby, cricoid pressure maintained throughout.

## 2024-04-02 NOTE — PHYSICAL THERAPY NOTE
Per EMR - possible Sx intervention 4/2/2024 with General surgery/hernia repair.  Will continue to follow.

## 2024-04-02 NOTE — DIETARY NOTE
[facility]   part of [facility]   CLINICAL NUTRITION    Ayse Maldonado     Admitting diagnosis:  Unilateral inguinal hernia with obstruction and without gangrene, recurrence not specified [K40.30]    Ht: 170.2 cm (5' 7\")  Wt: 61.5 kg (135 lb 9.3 oz).   Body mass index is 21.24 kg/m².  IBW: 61kg    Wt Readings from Last 6 Encounters:   03/30/24 61.5 kg (135 lb 9.3 oz)   03/18/24 61.2 kg (135 lb)   02/09/24 61.2 kg (135 lb)   02/07/24 62.1 kg (137 lb)   01/22/24 61.6 kg (135 lb 12.9 oz)   12/28/23 59.9 kg (132 lb)     Labs/Meds reviewed    Diet:       Procedures    NPO     89 year old female with incarcerated right inguinal hernia with small bowel obstruction. Poor historian secondary to dementia.   Pt has been NPO x 5 days. With NG. Diet to advance as medically feasible. If diet is unable to advance in next 2 days, recommend nutrition support if aggressive care is pursued. RD available to make recommendations. Will continue to monitor and follow pt nutritional status.     Please consult if patient status changes or nutrition issues arise.    Belkis Arzate RD, LDN  Clinical Nutrition  [phone]

## 2024-04-02 NOTE — OPERATIVE REPORT
OPERATIVE NOTE    Ayse Maldonado Location: OR   [facility] [id number] MRN [id number]   Admission Date 3/28/2024 Operation Date 4/2/2024   Attending Physician Terri Navarro MD Operating Physician Mally Fernandez MD     PREOPERATIVE DIAGNOSIS  Right incarcerated inguinal hernia with small bowel obstruction  A-fib on Eliquis, status post pacemaker  COPD  History of dementia    POSTOPERATIVE DIAGNOSIS  Right incarcerated direct inguinal hernia with small bowel obstruction  A-fib on Eliquis, status post pacemaker  COPD  History of dementia    PROCEDURE PERFORMED  Right open inguinal hernia repair with mesh  Right inguinal nerve block    SURGEON  Mally Fernandez MD    ASSISTANTS  LORY Cochran    ANESTHESIA  General    FINDINGS   Direct inguinal hernia    INDICATIONS  This is a 89 year old female who presented to the hospital with a known right inguinal hernia.  CT imaging demonstrated small bowel obstruction with transition within the hernia.  Patient was diagnosed with an incarcerated inguinal hernia with small bowel obstruction.  Patient has a history of A-fib on Eliquis and status post pacemaker.  At the time of presentation, patient's hernia was able to be reduced at bedside.  At that time, surgery was held to hold the Eliquis.  Today, we proceeded with urgent right inguinal hernia repair.    DESCRIPTION OF PROCEDURE  After informed consent, patient was brought to the operating room and placed in supine position. Bilateral SCDs were placed and pre-operative antibiotics administered. Anesthesia was induced without any complication. Patient was prepped and draped in the usual sterile fashion. Time out was performed confirming patient, procedure, and site.    The ASIS and pubic tubercle were both identified. Proposed line of incision was marked. Inguinal nerve block was performed. Local was then infiltrated in the tissues of proposed incision. Using 15 blade, incision was made. Subcutaneous tissues, including  Ema's, were divided until the external oblique aponeurosis was identified. The external ring was palpated and found to be intact.  Using 15 blade, stab incision was made in the aponeurosis along the plane of external ring. [place] was used to divide the aponeurosis all the way through the external ring, making sure to open the canal. The lateral aspect of the inguinal canal was dissected medially until the pubic tubercle was palpated. The same was repeated for the medial aspect until both sides were free.  The round ligament was identified.  Hernia contents were able to be lifted and came easily off the round ligament.  Hernia was dissected all the way down to the internal ring.  Palpation of the internal ring revealed this to be a direct inguinal hernia with the epigastrics to the lateral aspect.  Once the hernia sac was able to be reduced within the abdominal cavity, I proceeded with repair.     The defect was palpated and found to be large. We placed an extra large plug and patch mesh. The plug was inserted into defect. The external leaflets were sutured to 3 points including the conjoint tendon medially, the inguinal ligament and pubic tubercle with o vicryl. The patch was then cut to size and placed. Patch was anchored at the pubic tubercle, shelving edge and conjoint tendon. The legs were wrapped around the round ligament and sutured together. Hemostasis achieved. External aponeurosis was identified, and closed using running 2-o vicryl. Ema's was then closed using running 3-o vicryl. Skin was closed with layers including 3-0 Vicryl deep dermal and 4-o monocryl and running subcuticular. Wound was cleaned and dressed with dermabond.    At the end of the case, all counts were correct. Patient tolerated procedure well. Patient was awakened and transferred to PACU in a hemodynamically stable condition.    SPECIMENS REMOVED  None    ESTIMATED BLOOD LOSS  5 ml    COMPLICATIONS  None     Mally Fernandez MD

## 2024-04-02 NOTE — PROGRESS NOTES
[facility]  Progress Note    Ayse Maldonado Patient Status:  Inpatient    10/25/1927 MRN [id number]   [facility] 8NE-A Attending Terri Navarro MD   Hosp Day # 5 PCP Terri Navarro MD       SUBJECTIVE:  Surgery postponed again   Family at bedside upset asking why surgery is not done yet   Patient is restless   OBJECTIVE:  Vital signs in last 24 hours:  BP (!) 161/70   Pulse 101   Temp 97.5 °F (36.4 °C) (Axillary)   Resp 23   Ht 5' 7\" (1.702 m)   Wt 135 lb 9.3 oz (61.5 kg)   SpO2 94%   BMI 21.24 kg/m²     Intake/Output:    Intake/Output Summary (Last 24 hours) at 2024 1210  Last data filed at 2024 0829  Gross per 24 hour   Intake 0 ml   Output 800 ml   Net -800 ml       Wt Readings from Last 3 Encounters:   24 135 lb 9.3 oz (61.5 kg)   24 135 lb (61.2 kg)   24 135 lb (61.2 kg)       Medications:        Exam:  Gen: No acute distress,restless no focal neurologic deficits, N/G with LIS  Pulm: Lungs clear, normal respiratory effort  CV: Heart with irregular rate and rhythm, no peripheral edema  Abd: Abdomen soft, nontender, nondistended, no organomegaly, bowel sounds present  MSK: Full range of motion in extremities, no clubbing, no cyanosis  Skin: no rashes or lesions    Data Review:     Labs:   No components found for: \"BMP\"   Recent Labs   Lab 24  1843 24  0820 24  0631   RBC 3.79* 3.85 4.05   HGB 13.2 13.2 14.0   HCT 37.7 37.8 40.7   MCV 99.5 98.2 100.5*   MCH 34.8* 34.3* 34.6*   MCHC 35.0 34.9 34.4   RDW 11.9 12.0 12.0   NEPRELIM 3.64 3.97 7.90*   WBC 5.6 6.1 9.9   .0 223.0 239.0     No results for input(s): \"PTP\", \"INR\" in the last 168 hours.  Hemoglobin A1c (%)   Date Value   2012 5.9 (H)   2011 5.8 (H)     HEMOGLOBIN A1c (% of total Hgb)   Date Value   2023 5.7 (H)     HGBA1C (%)   Date Value   2014 5.8 (H)   2014 6.0 (H)   2013 6.3 (H)     HgbA1C (%)   Date Value   2022 6.0 (H)   2021  6.3 (H)   07/27/2020 5.3     TSH   Date Value   01/21/2024 2.950 mIU/mL   07/30/2022 2.020 mIU/mL   11/03/2021 0.926 mIU/mL   07/05/2012 1.940 uIU/mL   08/11/2011 2.230 uIU/mL   05/06/2011 1.690 uIU/mL       Assessment & Plan:  1.Unilateral inguinal hernia with obstruction and without gangrene -continue n.p.o. IV fluid and intermittent NG suction.  Holding Eliquis for surgery Postponed  for today     2.  Hypertension-holding oral medications  Currently on Vasotec and metoprolol IV  3.  Chronic A-fib-holding Eliquis, started on cardizem drip   4.  Pulmonary HTN   5.  History of COPD continue inhalers  6. Dementia and cognitive dysfunction - start  soft restraints as needed   Cardiology and surgery consult Appreciated     Patient Active Problem List   Diagnosis    Pacemaker - M. Perfecto    JAZZ on CPAP    Current use of long term anticoagulation    Gastroesophageal reflux disease without esophagitis    Asthma with COPD (chronic obstructive pulmonary disease) (HCC)    Pulmonary hypertension (HCC)-52 mg Hg    Alternating constipation and diarrhea    Other fatigue-mutifactorial    Chronic bronchitis (HCC)    CKD (chronic kidney disease) stage 3, GFR 30-59 ml/min (HCC)    PAF (paroxysmal atrial fibrillation) (Formerly [facility])    Glaucoma suspect of left eye    Pseudophakia    Trochanteric bursitis of left hip    Fall    Essential hypertension    Chronic left hip pain    CHI (closed head injury), subsequent encounter    Altered mental status, unspecified altered mental status type    Hyponatremia    Seizure disorder (HCC)    Hypertension, unspecified type    Altered mental status    Weakness    Hypotension, unspecified hypotension type    Hypotension    Branch retinal vein occlusion of left eye with macular edema (HCC)    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    Terri Navarro MD  4/2//2024

## 2024-04-02 NOTE — PLAN OF CARE
Impulsive overnight. Pulled IV X2, Replaced. Cardizem drip infusing, IVF. NG tube to LIS with 50cc bile out. Fall precaution in place. NPO. 3 Bowel movements overnight. Continuous video monitor. Kept clean and dry    Problem: CARDIOVASCULAR - ADULT  Goal: Maintains optimal cardiac output and hemodynamic stability  Description: INTERVENTIONS:  - Monitor vital signs, rhythm, and trends  - Monitor for bleeding, hypotension and signs of decreased cardiac output  - Evaluate effectiveness of vasoactive medications to optimize hemodynamic stability  - Monitor arterial and/or venous puncture sites for bleeding and/or hematoma  - Assess quality of pulses, skin color and temperature  - Assess for signs of decreased coronary artery perfusion - ex. Angina  - Evaluate fluid balance, assess for edema, trend weights  Outcome: Progressing  Goal: Absence of cardiac arrhythmias or at baseline  Description: INTERVENTIONS:  - Continuous cardiac monitoring, monitor vital signs, obtain 12 lead EKG if indicated  - Evaluate effectiveness of antiarrhythmic and heart rate control medications as ordered  - Initiate emergency measures for life threatening arrhythmias  - Monitor electrolytes and administer replacement therapy as ordered  Outcome: Progressing     Problem: GASTROINTESTINAL - ADULT  Goal: Minimal or absence of nausea and vomiting  Description: INTERVENTIONS:  - Maintain adequate hydration with IV or PO as ordered and tolerated  - Nasogastric tube to low intermittent suction as ordered  - Evaluate effectiveness of ordered antiemetic medications  - Provide nonpharmacologic comfort measures as appropriate  - Advance diet as tolerated, if ordered  - Obtain nutritional consult as needed  - Evaluate fluid balance  Outcome: Progressing  Goal: Maintains or returns to baseline bowel function  Description: INTERVENTIONS:  - Assess bowel function  - Maintain adequate hydration with IV or PO as ordered and tolerated  - Evaluate effectiveness  of GI medications  - Encourage mobilization and activity  - Obtain nutritional consult as needed  - Establish a toileting routine/schedule  - Consider collaborating with pharmacy to review patient's medication profile  Outcome: Progressing

## 2024-04-03 LAB
ALBUMIN SERPL-MCNC: 2.7 G/DL (ref 3.4–5)
ALBUMIN/GLOB SERPL: 0.8 {RATIO} (ref 1–2)
ALP LIVER SERPL-CCNC: 60 U/L
ALT SERPL-CCNC: 22 U/L
ANION GAP SERPL CALC-SCNC: 9 MMOL/L (ref 0–18)
AST SERPL-CCNC: 44 U/L (ref 15–37)
BASOPHILS # BLD AUTO: 0.01 X10(3) UL (ref 0–0.2)
BASOPHILS NFR BLD AUTO: 0.1 %
BILIRUB SERPL-MCNC: 0.4 MG/DL (ref 0.1–2)
BUN BLD-MCNC: 24 MG/DL (ref 9–23)
CALCIUM BLD-MCNC: 8.9 MG/DL (ref 8.5–10.1)
CHLORIDE SERPL-SCNC: 120 MMOL/L (ref 98–112)
CO2 SERPL-SCNC: 19 MMOL/L (ref 21–32)
CREAT BLD-MCNC: 0.65 MG/DL
EGFRCR SERPLBLD CKD-EPI 2021: 81 ML/MIN/1.73M2 (ref 60–?)
EOSINOPHIL # BLD AUTO: 0 X10(3) UL (ref 0–0.7)
EOSINOPHIL NFR BLD AUTO: 0 %
ERYTHROCYTE [DISTWIDTH] IN BLOOD BY AUTOMATED COUNT: 12.6 %
GLOBULIN PLAS-MCNC: 3.4 G/DL (ref 2.8–4.4)
GLUCOSE BLD-MCNC: 132 MG/DL (ref 70–99)
HCT VFR BLD AUTO: 37 %
HGB BLD-MCNC: 12 G/DL
IMM GRANULOCYTES # BLD AUTO: 0.03 X10(3) UL (ref 0–1)
IMM GRANULOCYTES NFR BLD: 0.3 %
LYMPHOCYTES # BLD AUTO: 0.44 X10(3) UL (ref 1–4)
LYMPHOCYTES NFR BLD AUTO: 4.9 %
MAGNESIUM SERPL-MCNC: 2.8 MG/DL (ref 1.6–2.6)
MCH RBC QN AUTO: 34.2 PG (ref 26–34)
MCHC RBC AUTO-ENTMCNC: 32.4 G/DL (ref 31–37)
MCV RBC AUTO: 105.4 FL
MONOCYTES # BLD AUTO: 0.43 X10(3) UL (ref 0.1–1)
MONOCYTES NFR BLD AUTO: 4.8 %
NEUTROPHILS # BLD AUTO: 7.99 X10 (3) UL (ref 1.5–7.7)
NEUTROPHILS # BLD AUTO: 7.99 X10(3) UL (ref 1.5–7.7)
NEUTROPHILS NFR BLD AUTO: 89.9 %
OSMOLALITY SERPL CALC.SUM OF ELEC: 312 MOSM/KG (ref 275–295)
PLATELET # BLD AUTO: 215 10(3)UL (ref 150–450)
POTASSIUM SERPL-SCNC: 3.7 MMOL/L (ref 3.5–5.1)
POTASSIUM SERPL-SCNC: 3.7 MMOL/L (ref 3.5–5.1)
PROT SERPL-MCNC: 6.1 G/DL (ref 6.4–8.2)
RBC # BLD AUTO: 3.51 X10(6)UL
SODIUM SERPL-SCNC: 148 MMOL/L (ref 136–145)
WBC # BLD AUTO: 8.9 X10(3) UL (ref 4–11)

## 2024-04-03 RX ORDER — LISINOPRIL 20 MG/1
20 TABLET ORAL DAILY
Status: DISCONTINUED | OUTPATIENT
Start: 2024-04-03 | End: 2024-04-05

## 2024-04-03 RX ORDER — DILTIAZEM HYDROCHLORIDE 180 MG/1
180 CAPSULE, EXTENDED RELEASE ORAL DAILY
Status: DISCONTINUED | OUTPATIENT
Start: 2024-04-03 | End: 2024-04-05

## 2024-04-03 NOTE — PLAN OF CARE
Received pt resting in bed at 1930  Patient a&ox2, forgetful. [place], bilateral hearing aids at bedside. Was on 2L o2nc post-surgery, weaned to room air and tolerating so far. A fib on tele monitor, rates 90s-120s, cardizem gtt infusing. SCDs on. Subcutaneous heparin given as ordered for vte prophylaxis. Right lower abdomen skin glue incision is clean/dry, open to air. Briefed for incontinence. No complaints of pain. Frequent rounding and fall precautions in place.     Problem: Patient/Family Goals  Goal: Patient/Family Long Term Goal  Description: Patient's Long Term Goal: DC to home     Interventions:  - follow plan of care     - See additional Care Plan goals for specific interventions  Outcome: Progressing  Goal: Patient/Family Short Term Goal  Description: Patient's Short Term Goal: getting better, help with sore throat     Interventions:   - NGT suction, PRN blood pressure med, Cepacol for sire throat      - See additional Care Plan goals for specific interventions  Outcome: Progressing

## 2024-04-03 NOTE — PHYSICAL THERAPY NOTE
PHYSICAL THERAPY TREATMENT NOTE - INPATIENT    Room Number: 8610/8610-A       Session: 2     Number of Visits to Meet Established Goals: 4    Presenting Problem: bulding hernia and abnormal labs  Co-Morbidities : pacemaker, COPD, chronic bronchtiis, glaucoma, CKD3, PAF, ess HTN, seizures, dementia, macular degeneration    CT ABDOMEN/PELVIS:  1. Findings compatible with small bowel obstruction with a transition point in the region of a right inguinal hernia containing small bowel.      Per MD Fernandez - open right inguinal hernia repair with mesh secondary to incarcerated hernia on 4/3.  Cleared to ambulate and up to chair      ASSESSMENT     Patient demonstrates limited progress this session, goals remain in progress.    Patient continues to function near baseline with bed mobility, transfers, and gait.  Contributing factors to remaining limitations include decreased functional strength, pain, and medical status.  Next session anticipate patient to progress bed mobility, transfers, and gait.  Physical Therapy will continue to follow patient for duration of hospitalization.    Patient continues to benefit from continued skilled PT services: at discharge to promote functional independence and safety with additional support and return home with home health PT.    PLAN  PT Treatment Plan: Bed mobility;Body mechanics;Coordination;Endurance;Energy conservation;Patient education;Family education;Gait training;Strengthening;Range of motion;Neuromuscular re-educate;Transfer training;Balance training  Rehab Potential : Good  Frequency (Obs): 3-5x/week    CURRENT GOALS     Goal #1 Patient is able to demonstrate supine - sit EOB @ level: independent      Goal #2 Patient is able to demonstrate transfers Sit to/from Stand at assistance level: modified independent      Goal #3 Patient is able to ambulate 50 feet with assist device: walker - rolling at assistance level: supervision      Goal #4     Goal #5     Goal #6     Goal  Comments: Goals established on 3/29/2024    4/3/2024 all goals ongoing    SUBJECTIVE    \"Well I sleep til noon everyday... I'm going back to sleep\"    OBJECTIVE  Precautions: Bed/chair alarm;Hard of hearing;NG suction    WEIGHT BEARING RESTRICTION  Weight Bearing Restriction: None                PAIN ASSESSMENT   Rating: Unable to rate  Location: R LE  Management Techniques: Repositioning    BALANCE                                                                                                                       Static Sitting: Good  Dynamic Sitting: Fair +           Static Standing: Fair  Dynamic Standing: Fair -    ACTIVITY TOLERANCE                         O2 WALK         AM-PAC '6-Clicks' INPATIENT SHORT FORM - BASIC MOBILITY  How much difficulty does the patient currently have...  Patient Difficulty: Turning over in bed (including adjusting bedclothes, sheets and blankets)?: A Little   Patient Difficulty: Sitting down on and standing up from a chair with arms (e.g., wheelchair, bedside commode, etc.): A Little   Patient Difficulty: Moving from lying on back to sitting on the side of the bed?: A Little   How much help from another person does the patient currently need...   Help from Another: Moving to and from a bed to a chair (including a wheelchair)?: A Little   Help from Another: Need to walk in hospital room?: A Little   Help from Another: Climbing 3-5 steps with a railing?: A Little       AM-PAC Score:  Raw Score: 18   Approx Degree of Impairment: 46.58%   Standardized Score (AM-PAC Scale): 43.63   CMS Modifier (G-Code): CK    FUNCTIONAL ABILITY STATUS  Gait Assessment   Functional Mobility/Gait Assessment  Gait Assistance: Minimum assistance  Distance (ft): 3  Assistive Device:  (hand held assist)  Pattern: Shuffle (kyphotic posturing)    Skilled Therapy Provided    Bed Mobility:  Rolling: Min A   Supine<>Sit: Min A   Sit<>Supine: not tested     Transfer Mobility:  Sit<>Stand: Min A   Stand<>Sit: Min  A   Gait: Min A with HHA    Therapist's Comments: Pt only agreeable up to chair, but requesting to rest more.  Pt easily falling back asleep.     THERAPEUTIC EXERCISES  Lower Extremity Ankle pumps  Hip AB/AD  Heel slides     Upper Extremity N/a     Position Supine     Repetitions   10   Sets   1     Patient End of Session: Up in chair;Needs met;Call light within reach;RN aware of session/findings;All patient questions and concerns addressed    PT Session Time: 15 minutes    FA: 15 minutes

## 2024-04-03 NOTE — PROGRESS NOTES
Progress Note  Ayse Maldonado Patient Status:  Inpatient    10/25/1927 MRN [id number]   Location [facility] 8NE-A Attending Terri Navarro MD   Hosp Day # 6 PCP Terri Navarro MD     Subjective:  Resting in bed. Awake and alert and in a pleasant mood which is a marked change compared to prior. Denies any complaints besides for feeling cold.     Objective:  /80 (BP Location: Left arm)   Pulse 93   Temp 98.9 °F (37.2 °C) (Oral)   Resp (!) 29   Ht 5' 7\" (1.702 m)   Wt 135 lb 9.3 oz (61.5 kg)   SpO2 93%   BMI 21.24 kg/m²     Telemetry: afib      Intake/Output:    Intake/Output Summary (Last 24 hours) at 4/3/2024 1007  Last data filed at 2024 1732  Gross per 24 hour   Intake 2600 ml   Output 105 ml   Net 2495 ml       Last 3 Weights   24 0423 135 lb 9.3 oz (61.5 kg)   24 0421 135 lb 9.3 oz (61.5 kg)   24 0002 135 lb 9.6 oz (61.5 kg)   24 1825 138 lb (62.6 kg)   24 1110 135 lb (61.2 kg)   24 1006 135 lb (61.2 kg)       Labs:  Recent Labs   Lab 24  0631 24  1852 24  0551 24  0955 24  0506   *  --   --  119* 132*   BUN 11  --   --  19 24*   CREATSERUM 0.71  --   --  0.65 0.65   EGFRCR 78  --   --  81 81   CA 9.2  --   --  8.8 8.9     --   --  148* 148*   K 2.8*   < > 3.9 3.1* 3.7  3.7     --   --  115* 120*   CO2 24.0  --   --  24.0 19.0*    < > = values in this interval not displayed.     Recent Labs   Lab 24  0820 24  0631 24  0900   RBC 3.85 4.05 3.51*   HGB 13.2 14.0 12.0   HCT 37.8 40.7 37.0   MCV 98.2 100.5* 105.4*   MCH 34.3* 34.6* 34.2*   MCHC 34.9 34.4 32.4   RDW 12.0 12.0 12.6   NEPRELIM 3.97 7.90* 7.99*   WBC 6.1 9.9 8.9   .0 239.0 215.0         No results for input(s): \"TROP\", \"TROPHS\", \"CK\" in the last 168 hours.    Review of Systems   Cardiovascular:  Positive for irregular heartbeat.   Respiratory: Negative.         Physical Exam:    Gen: awake, alert, pleasant   Heent:  pupils equal, reactive. Mucous membranes moist.   Neck: no jvd, NG in place  Cardiac: irregularly irregular, normal S1,S2  Lungs: CTA  Abd: soft, NT/ND +bs  Ext: no edema  Skin: Warm, dry  Neuro: No focal deficits        Medications:     heparin  5,000 Units Subcutaneous Q8H DANK    metoprolol  5 mg Intravenous Q6H    enalaprilat  1.25 mg Intravenous Q6H    pantoprazole  40 mg Intravenous Q24H    dilTIAZem  10 mg Intravenous Once      dilTIAZem 5 mg/hr (04/03/24 0520)    lactated ringers 50 mL/hr at 04/02/24 1956           Assessment:  Incarcerated right inguinal hernia with SBO admitted with abdominal pain  S/p right open inguinal hernia repair with mesh 4/2/2024  Chronic Afib s/p SJM dual chamber PPM (2015)  On eliquis--held given above  Historically LVEF preserved.   Bb, ccb, dig  Prior hx DCCV-follows with Dr. Perfecto Barrera santy 2015  HTN-2/2 above.   Preop cardiac risk assessment  Acceptable cardiac risk to proceed with surgery.      Plan:  Cont IV rate control --> once able to tolerate po change to diltiazem 180 mg every day and start metoprolol tartrate at 25 mg bid -- wean off of diltiazem infusion   Eliquis remains on hold. Resume when okay with surgery service, likely tomorrow.   Resume lisinopril 20 mg every day       Cortez Santos DO  Cardiologist  [facility]  4/3/2024 10:09 AM      Note to the patient: The 21st Century Cures Act makes medical notes like these available to patients in the interest of transparency. However, be advised that this is a medical document. It is intended as peer to peer communication. It is written in medical language and may contain abbreviations or verbiage that are unfamiliar. It may appear blunt or direct. Medical documents are intended to carry relevant information, facts as evident, and clinical opinion of the practitioner.     Disclaimer: Components of this note were documented using voice recognition system and are subject to errors not corrected at  proofreading. Contact the author of this note for any clarifications.

## 2024-04-03 NOTE — PROGRESS NOTES
[facility]  Progress Note    Ayse Maldonado Patient Status:  Inpatient    10/25/1927 MRN [id number]   Location [facility] 8NE-A Attending Terri Navarro MD   Hosp Day # 6 PCP Terri Navarro MD     Subjective:  Patient is resting comfortably in bed.  She states she is feeling much better today.  She is tolerating clear liquid diet without any nausea or vomiting.  She has not yet passing flatus.  Denies abdominal pain.  No fevers.    Objective/Physical Exam:  General: Alert, orientated x3.  Cooperative.  No apparent distress.  Vital Signs:  Blood pressure 152/80, pulse 93, temperature 98.9 °F (37.2 °C), temperature source Oral, resp. rate (!) 29, height 67\", weight 135 lb 9.3 oz (61.5 kg), SpO2 93%.  Lungs: No respiratory distress.  Cardiac: Regular rate and rhythm.   Abdomen:  Soft, mildly distended, mild incisional tenderness, with no rebound or guarding.  No peritoneal signs.   Extremities:  No lower extremity edema noted.    Incision: Clean, dry, intact, no erythema    Labs:  Lab Results   Component Value Date    WBC 8.9 2024    HGB 12.0 2024    HCT 37.0 2024    .0 2024     Lab Results   Component Value Date     2024    K 3.7 2024    K 3.7 2024     2024    CO2 19.0 2024    BUN 24 2024    CREATSERUM 0.65 2024     2024    CA 8.9 2024    ALKPHO 60 2024    ALT 22 2024    AST 44 2024    BILT 0.4 2024    ALB 2.7 2024    TP 6.1 2024     Lab Results   Component Value Date    PT 30.8 (H) 2013    PT 16.1 (H) 2011    PT 15.3 2011    INR 1.41 (H) 2020    INR 1.49 (H) 2020    INR 1.38 (H) 2020       I/O last 3 completed shifts:  In: 2600 [I.V.:2300; IV PIGGYBACK:300]  Out: 255 [Urine:100; Emesis/NG output:150; Blood:5]  No intake/output data recorded.    Assessment  Patient Active Problem List   Diagnosis    Pacemaker - M. Perfecto    JAZZ on  CPAP    Current use of long term anticoagulation    Gastroesophageal reflux disease without esophagitis    Asthma with COPD (chronic obstructive pulmonary disease) ([facility])    Pulmonary hypertension ([facility])-52 mg Hg    Alternating constipation and diarrhea    Other fatigue-mutifactorial    Chronic bronchitis ([facility])    CKD (chronic kidney disease) stage 3, GFR 30-59 ml/min ([facility])    PAF (paroxysmal atrial fibrillation) ([facility])    Glaucoma suspect of left eye    Pseudophakia    Trochanteric bursitis of left hip    Fall    Essential hypertension    Chronic left hip pain    CHI (closed head injury), subsequent encounter    Altered mental status, unspecified altered mental status type    Hyponatremia    Seizure disorder ([facility])    Hypertension, unspecified type    Altered mental status    Weakness    Hypotension, unspecified hypotension type    Hypotension    Branch retinal vein occlusion of left eye with macular edema ([facility])    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 ([facility])    PCO (posterior capsular opacification), left    Inguinal hernia of right side without obstruction or gangrene    Umbilical hernia without obstruction and without gangrene    Protein-calorie malnutrition, unspecified severity ([facility])    Dementia without behavioral disturbance ([facility])    Unilateral inguinal hernia with obstruction and without gangrene, recurrence not specified       POD 1 open right inguinal hernia repair with mesh secondary to incarcerated hernia      Plan:  Patient is doing well postoperatively, though awaiting return of bowel function.  Continue with clear liquid diet.  May advance as tolerated once patient starts passing flatus  May resume Eliquis tomorrow from a surgical standpoint.  Appreciate cardiology recommendations.  Ambulate and up to chair  DVT prophylaxis with heparin  GI prophylaxis with Protonix  Anticipate discharge tomorrow once tolerating a diet, return of bowel function,  and cleared by primary team and cardiology      Luba De La Cruz PA-C  4/3/2024  9:59 AM     ADDENDUM:     Patient was seen and examined by me. I agree with the above note.  Patient is status post open right inguinal hernia repair with mesh.  No acute events overnight.  She denies any pain this morning.  She denies any flatus or bowel movements.  She is tolerating liquids without any nausea or vomiting.    General: Alert, orientated x3. Cooperative. No apparent distress.  Pulmonary: non labored breathing, effort normal  Abdomen: Soft, non-distended,+ incisional tenderness, with no rebound or guarding. No peritoneal signs.  Incision:  Clean, dry and intact without erythema or drainage.  Extremities: warm, no edema or cyanosis        A/P 89 year old female with incarcerated right inguinal hernia with associated small bowel status post open right inguinal hernia repair with mesh     Overall, patient is doing well  Continue clear liquids until evidence of bowel function  Okay to restart Eliquis tonight  Encourage mobilization as tolerated  Pain control as needed  Surgery will continue to follow  Rest of care per primary     This note was initiated by Luba De La Cruz PA-C.  The PA saw the patient in conjunction with me. The PA performed a history, exam, and developed the assessment and plan. I agree with the mentioned assessment and plan and have provided further documentation of my own, if necessary.       Mally Fernandez MD  [facility]  4/3/2024  3:05 PM

## 2024-04-03 NOTE — PLAN OF CARE
NURSING NOTES:  0800: Pt received AOx2-3. Tried wean O2. Room air 92%. At-fib. Cardizem drip.  1100: Pt back to bed after sitting up in the chair for one hour. Tolerated clear liq. Pt denies passing gas yet. R mid groin incision approximated/dermoband, HEATHER. No drainage.   1400: Cardizem drip off.   1700: Pt denies any pain.     Problem: GASTROINTESTINAL - ADULT  Goal: Minimal or absence of nausea and vomiting  Description: INTERVENTIONS:  - Maintain adequate hydration with IV or PO as ordered and tolerated  - Nasogastric tube to low intermittent suction as ordered  - Evaluate effectiveness of ordered antiemetic medications  - Provide nonpharmacologic comfort measures as appropriate  - Advance diet as tolerated, if ordered  - Obtain nutritional consult as needed  - Evaluate fluid balance  Outcome: Progressing  Goal: Maintains or returns to baseline bowel function  Description: INTERVENTIONS:  - Assess bowel function  - Maintain adequate hydration with IV or PO as ordered and tolerated  - Evaluate effectiveness of GI medications  - Encourage mobilization and activity  - Obtain nutritional consult as needed  - Establish a toileting routine/schedule  - Consider collaborating with pharmacy to review patient's medication profile  Outcome: Progressing

## 2024-04-03 NOTE — OCCUPATIONAL THERAPY NOTE
OCCUPATIONAL THERAPY EVALUATION - INPATIENT     Room Number: 8610/8610-A  Evaluation Date: 4/3/2024  Type of Evaluation: Initial  Presenting Problem: 4/2 hernia repair    Physician Order: IP Consult to Occupational Therapy  Reason for Therapy: ADL/IADL Dysfunction and Discharge Planning    OCCUPATIONAL THERAPY ASSESSMENT   Patient is currently functioning below baseline with toileting, upper body dressing, lower body dressing, grooming, bed mobility, transfers, stating sitting balance, dynamic sitting balance, static standing balance, dynamic standing balance, maintaining seated position, and functional standing tolerance. Prior to admission, patient's baseline is Sup to Min A for ADLs.  Patient is requiring minimal assist as a result of the following impairments: decreased functional strength, decreased functional reach, decreased endurance, and pain. Occupational Therapy will continue to follow for duration of hospitalization.    Patient will benefit from continued skilled OT Services at discharge to promote functional independence and safety with additional support and return home with home health OT      History Related to Current Admission: Patient is a 89 year old female admitted on 3/28/2024 with Presenting Problem: 4/2 hernia repair. Co-Morbidities : pacemaker, COPD, chronic bronchtiis, glaucoma, CKD3, PAF, ess HTN, seizures, dementia, macular degeneration    WEIGHT BEARING RESTRICTION  Weight Bearing Restriction: None                Recommendations for nursing staff:   Transfers: Min A  Toileting location: commode pending pain    EVALUATION SESSION:  Patient Start of Session: supine in bed for session  FUNCTIONAL TRANSFER ASSESSMENT  Sit to Stand: Edge of Bed  Edge of Bed: Contact Guard Assist    BED MOBILITY  Rolling: Contact Guard Assist  Supine to Sit : Contact Guard Assist  Sit to Supine (OT): Contact Guard Assist    BALANCE ASSESSMENT  Static Sitting: Supervision  Sitting Bilateral: Contact Guard  Assist  Static Standing: Contact Guard Assist  Standing Bilateral: Minimal Assist (to take steps to chair from bed, pt declined mfurther activity)    FUNCTIONAL ADL ASSESSMENT  Grooming Seated: Supervision (to wash face)  LB Dressing Seated: Minimal Assist (for socks 2/2 abdominal discomfort with bending, declined further ADLs)      ACTIVITY TOLERANCE: vitals stable                         O2 SATURATIONS       COGNITION  Overall Cognitive Status:  WFL - within functional limits    Upper Extremity   ROM: within functional limits   Strength: within functional limits   Coordination  Gross motor: WNl  Fine motor: WNL  Sensation: Light touch:  intact    EDUCATION PROVIDED  Patient: Role of Occupational Therapy; Plan of Care  Patient's Response to Education: Verbalized Understanding; Returned Demonstration    Equipment used: RW  Demonstrates functional use, Would benefit from additional trial      Therapist comments: Pt with education on log roll technique.  Pt educated on adaptive techniques to complete all ADLs within abdominal sparing precautions 2/2 abdominal surgery.  Pt able to provide read back or completion as listed above.    Patient End of Session: Up in chair;Needs met;Call light within reach;All patient questions and concerns addressed;SCDs in place;Alarm set;Family present    OCCUPATIONAL PROFILE    HOME SITUATION  Type of Home: Skilled nursing facility ([facility])  Home Layout: One level  Lives With: Staff 24 hours    Toilet and Equipment: Comfort height toilet  Shower/Tub and Equipment: Walk-in shower       Occupation/Status: retired  Hand Dominance: Right  Drives: No  Patient Regularly Uses: Glasses    Prior Level of Function: Prior to admission, patient's baseline is Sup to Min A for ADLs.    SUBJECTIVE   Pt stated, \"I want to go back to sleep.\"    PAIN ASSESSMENT  Ratin  Location: no pain at this time       OBJECTIVE  Precautions: Bed/chair alarm;Hard of hearing;NG suction  Fall Risk: High fall  risk      ASSESSMENTS    AM-PAC ‘6-Clicks’ Inpatient Daily Activity Short Form  -   Putting on and taking off regular lower body clothing?: A Little  -   Bathing (including washing, rinsing, drying)?: A Little  -   Toileting, which includes using toilet, bedpan or urinal? : A Little  -   Putting on and taking off regular upper body clothing?: A Little  -   Taking care of personal grooming such as brushing teeth?: A Little  -   Eating meals?: A Little    AM-PAC Score:  Score: 18  Approx Degree of Impairment: 46.65%  Standardized Score (AM-PAC Scale): 38.66    ADDITIONAL TESTS     NEUROLOGICAL FINDINGS      COGNITION ASSESSMENTS       PLAN  OT Treatment Plan: Balance activities;Energy conservation/work simplification techniques;ADL training;IADL training;Continued evaluation;Compensatory technique education;Patient/Family training;Patient/Family education;UE strengthening/ROM;Endurance training;Functional transfer training  Rehab Potential : Good  Frequency: 3-5x/week  Number of Visits to Meet Established Goals: 5    ADL Goals   Patient will perform upper body dressing:  with supervision  Patient will perform lower body dressing:  with supervision  Patient will perform toileting: with supervision    Functional Transfer Goals  Patient will transfer from supine to sit:  with supervision  Patient will transfer from sit to stand:  with supervision  Patient will transfer to toilet:  with supervision    UE Exercise Program Goal  Patient will be supervision with bilateral AROM HEP (home exercise program).    Additional Goals:  Pt will verbalize at least 3 energy conservation techniques  Pt will stand at sink for 5 minutes to complete grooming routine    Patient Evaluation Complexity Level:   Occupational Profile/Medical History MODERATE - Expanded review of history including review of medical or therapy record   Specific performance deficits impacting engagement in ADL/IADL MODERATE  3 - 5 performance deficits   Client  Assessment/Performance Deficits MODERATE - Comorbidities and min to mod modifications of tasks    Clinical Decision Making MODERATE - Analysis of occupational profile, detailed assessments, several treatment options    Overall Complexity MODERATE     OT Session Time: 20 minutes  Self-Care Home Management: 0 minutes  Therapeutic Activity: 10 minutes  Neuromuscular Re-education: 0 minutes  Therapeutic Exercise: 0 minutes  Cognitive Skills: 0 minutes  Sensory Integrative: 0 minutes  Orthotic Management and Trainin minutes  Can add/delete any of these

## 2024-04-04 LAB
ANION GAP SERPL CALC-SCNC: 6 MMOL/L (ref 0–18)
BASOPHILS # BLD AUTO: 0.02 X10(3) UL (ref 0–0.2)
BASOPHILS NFR BLD AUTO: 0.3 %
BUN BLD-MCNC: 17 MG/DL (ref 9–23)
CALCIUM BLD-MCNC: 8.3 MG/DL (ref 8.5–10.1)
CHLORIDE SERPL-SCNC: 111 MMOL/L (ref 98–112)
CO2 SERPL-SCNC: 23 MMOL/L (ref 21–32)
CREAT BLD-MCNC: 0.6 MG/DL
EGFRCR SERPLBLD CKD-EPI 2021: 82 ML/MIN/1.73M2 (ref 60–?)
EOSINOPHIL # BLD AUTO: 0.02 X10(3) UL (ref 0–0.7)
EOSINOPHIL NFR BLD AUTO: 0.3 %
ERYTHROCYTE [DISTWIDTH] IN BLOOD BY AUTOMATED COUNT: 12.1 %
GLUCOSE BLD-MCNC: 91 MG/DL (ref 70–99)
HCT VFR BLD AUTO: 33.5 %
HGB BLD-MCNC: 11.2 G/DL
IMM GRANULOCYTES # BLD AUTO: 0.03 X10(3) UL (ref 0–1)
IMM GRANULOCYTES NFR BLD: 0.4 %
LYMPHOCYTES # BLD AUTO: 0.93 X10(3) UL (ref 1–4)
LYMPHOCYTES NFR BLD AUTO: 12 %
MCH RBC QN AUTO: 34.7 PG (ref 26–34)
MCHC RBC AUTO-ENTMCNC: 33.4 G/DL (ref 31–37)
MCV RBC AUTO: 103.7 FL
MONOCYTES # BLD AUTO: 0.74 X10(3) UL (ref 0.1–1)
MONOCYTES NFR BLD AUTO: 9.5 %
NEUTROPHILS # BLD AUTO: 6.04 X10 (3) UL (ref 1.5–7.7)
NEUTROPHILS # BLD AUTO: 6.04 X10(3) UL (ref 1.5–7.7)
NEUTROPHILS NFR BLD AUTO: 77.5 %
OSMOLALITY SERPL CALC.SUM OF ELEC: 291 MOSM/KG (ref 275–295)
PLATELET # BLD AUTO: 184 10(3)UL (ref 150–450)
POTASSIUM SERPL-SCNC: 3.4 MMOL/L (ref 3.5–5.1)
RBC # BLD AUTO: 3.23 X10(6)UL
SODIUM SERPL-SCNC: 140 MMOL/L (ref 136–145)
WBC # BLD AUTO: 7.8 X10(3) UL (ref 4–11)

## 2024-04-04 RX ORDER — POLYETHYLENE GLYCOL 3350 17 G/17G
17 POWDER, FOR SOLUTION ORAL DAILY
Status: DISCONTINUED | OUTPATIENT
Start: 2024-04-04 | End: 2024-04-06

## 2024-04-04 RX ORDER — METOPROLOL TARTRATE 50 MG/1
50 TABLET, FILM COATED ORAL
Status: DISCONTINUED | OUTPATIENT
Start: 2024-04-04 | End: 2024-04-05

## 2024-04-04 RX ORDER — DIGOXIN 125 MCG
125 TABLET ORAL EVERY MORNING
Status: DISCONTINUED | OUTPATIENT
Start: 2024-04-04 | End: 2024-04-06

## 2024-04-04 NOTE — PROGRESS NOTES
Progress Note  Ayse Maldonado Patient Status:  Inpatient    10/25/1927 MRN [id number]   Location [facility] 8NE-A Attending Terri Navarro MD   Hosp Day # 7 PCP Terri Navarro MD     Primary Cardiologist: Perfecto     SUBJECTIVE:    Did not sleep well overnight due to noise. Otherwise surgical site pain is well controlled, though complains of constipation. She denies any dyspnea, chest pain, or palpitations.     VITALS:  BP (!) 176/87 (BP Location: Left arm)   Pulse 80   Temp 97.6 °F (36.4 °C) (Oral)   Resp 22   Ht 5' 7\" (1.702 m)   Wt 135 lb 9.3 oz (61.5 kg)   SpO2 96%   BMI 21.24 kg/m²     INTAKE/OUTPUT:    Intake/Output Summary (Last 24 hours) at 2024 1050  Last data filed at 2024 0357  Gross per 24 hour   Intake 120 ml   Output 675 ml   Net -555 ml     Last 3 Weights   24 0423 135 lb 9.3 oz (61.5 kg)   24 0421 135 lb 9.3 oz (61.5 kg)   24 0002 135 lb 9.6 oz (61.5 kg)   24 1825 138 lb (62.6 kg)   24 1110 135 lb (61.2 kg)   24 1006 135 lb (61.2 kg)     LABS:  Recent Labs   Lab 24  0631 24  1852 24  0551 24  0955 24  0506   *  --   --  119* 132*   BUN 11  --   --  19 24*   CREATSERUM 0.71  --   --  0.65 0.65   EGFRCR 78  --   --  81 81   CA 9.2  --   --  8.8 8.9     --   --  148* 148*   K 2.8*   < > 3.9 3.1* 3.7  3.7     --   --  115* 120*   CO2 24.0  --   --  24.0 19.0*    < > = values in this interval not displayed.     Recent Labs   Lab 24  0631 24  0900 24  0912   RBC 4.05 3.51* 3.23*   HGB 14.0 12.0 11.2*   HCT 40.7 37.0 33.5*   .5* 105.4* 103.7*   MCH 34.6* 34.2* 34.7*   MCHC 34.4 32.4 33.4   RDW 12.0 12.6 12.1   NEPRELIM 7.90* 7.99* 6.04   WBC 9.9 8.9 7.8   .0 215.0 184.0     No results for input(s): \"TROP\", \"CK\" in the last 168 hours.    DIAGNOSTICS:    TELEMETRY: AF, CVR    ECHO 2019:  Conclusions:   1. Left ventricle: The cavity size was normal. Wall thickness  was normal.      Systolic function was normal. The estimated ejection fraction was 55-60%.      No diagnostic evidence for regional wall motion abnormalities. Features      are consistent with concomitant abnormal relaxation and increased filling      pressure - grade 2 diastolic dysfunction.   2. Aortic valve: Trileaflet; mildly calcified leaflets. Trivial      regurgitation.   3. Mitral valve: Mitral valve demonstrates mildly calcified leaflets. There      was mild regurgitation.   4. Left atrium: The left atrium was markedly dilated. The left atrial volume      was moderately increased.   5. Right ventricle: Systolic function was mildly reduced.   6. Right atrium: The atrium was markedly dilated.   7. Tricuspid valve: There was moderate regurgitation.   8. Pulmonary arteries: Systolic pressure was mildly to moderately increased,      in the range of 45mm Hg to 52mm Hg.   9. Inferior vena cava: The respirophasic diameter changes were blunted (less      than 50%).     ROS: Negative unless noted above     PHYSICAL EXAM:  General: Alert and oriented x 3. No apparent distress.  HEENT: Normocephalic, sclera are nonicteric. Hearing appropriate bilaterally. Dry mucous membranes   Neck: No JVD or Carotid bruits. Trachea midline.   Cardiac: Irregular rate and rhythm. S1, S2 auscultated. No murmurs, rubs, or gallops appreciated.   Lungs: Clear without wheezes, rales, rhonchi or dullness. Chest expansion symmetrical. Regular effort.  Abdomen: Soft, non-tender, +BS. No hepatosplenomegaly or appreciable masses.   Extremities: Without clubbing, cyanosis or edema.  Peripheral pulses are 1+.  Neurologic: Motor and sensory nerves grossly intact.   Psych: Appropriate affect   Skin: Warm and dry. Pale    MEDICATIONS:   dilTIAZem ER  180 mg Oral Daily    metoprolol tartrate  25 mg Oral 2x Daily(Beta Blocker)    lisinopril  20 mg Oral Daily    heparin  5,000 Units Subcutaneous Q8H DANK    pantoprazole  40 mg Intravenous Q24H       dilTIAZem Stopped (04/03/24 1400)    lactated ringers 50 mL/hr at 04/04/24 0815     ASSESSMENT:    Incarcerated Right Inguinal Hernia w/SBO   - s/p hernia repair with general surgery on 4/2/24  - Clear liquid diet, appears partially on the dry side on exam     Permanent Afib, Hx DCCV, s/p SJM Dual-Chamber PPM (2015)  - Last ECHO 2019 with preserved ejection fraction   - A/c with Eliquis 2/2 surgery   - s/p IV rate control while she was NPO due to surgery, have now been slowly resuming her oral medications. She is on lopressor 25 mg BID & Diltizem 180 mg. Rates are reasonably controlled. Also on Digoxin 125 mcg o/p    HTN  - Elevated, on Lisinopril 20 mg daily     PLAN:  - Increase lopressor to home dose 50 mg BID, give 25 mg once now, Resume Digoxin   - Elevated blood pressure likely in part due to constipation, if pressure is still significantly elevated after addition metoprolol dose adjust medications, PRN hydralazine available   - Resume Eliquis     Plan of care discussed with patient and RN.     Blanka Pretty, APRN  4/4/2024  10:50 AM  [phone] ([place])  [phone] ([place])      Chart reviewed, agree with medication changes outlined above.     Manjula Grove MD  [facility] [place]  Cardiac Electrophysiolgy  [phone]

## 2024-04-04 NOTE — PROGRESS NOTES
[facility]  Progress Note    Ayse Maldonado Patient Status:  Inpatient    10/25/1927 MRN [id number]   Location [facility] 8NE-A Attending Terri Navarro MD   Hosp Day # 6 PCP Terri Navarro MD       SUBJECTIVE:  Awake,  Started on clear liquids   OBJECTIVE:  Vital signs in last 24 hours:  BP (!) 161/87 (BP Location: Left arm)   Pulse 76   Temp 97.8 °F (36.6 °C) (Oral)   Resp 17   Ht 5' 7\" (1.702 m)   Wt 135 lb 9.3 oz (61.5 kg)   SpO2 97%   BMI 21.24 kg/m²     Intake/Output:    Intake/Output Summary (Last 24 hours) at 4/3/2024 2220  Last data filed at 4/3/2024 1604  Gross per 24 hour   Intake 320 ml   Output 200 ml   Net 120 ml       Wt Readings from Last 3 Encounters:   24 135 lb 9.3 oz (61.5 kg)   24 135 lb (61.2 kg)   24 135 lb (61.2 kg)       Medications:        Exam:  Gen: Awake and alert   Pulm: Lungs clear, normal respiratory effort  CV: Heart with irregular rate and rhythm, no peripheral edema  Abd: Abdomen soft, nontender, nondistended, no organomegaly, bowel sounds present  MSK: Full range of motion in extremities, no clubbing, no cyanosis  Skin: no rashes or lesions    Data Review:     Labs:   No components found for: \"BMP\"   Recent Labs   Lab 24  0820 24  0631 24  0900   RBC 3.85 4.05 3.51*   HGB 13.2 14.0 12.0   HCT 37.8 40.7 37.0   MCV 98.2 100.5* 105.4*   MCH 34.3* 34.6* 34.2*   MCHC 34.9 34.4 32.4   RDW 12.0 12.0 12.6   NEPRELIM 3.97 7.90* 7.99*   WBC 6.1 9.9 8.9   .0 239.0 215.0     No results for input(s): \"PTP\", \"INR\" in the last 168 hours.  Hemoglobin A1c (%)   Date Value   2012 5.9 (H)   2011 5.8 (H)     HEMOGLOBIN A1c (% of total Hgb)   Date Value   2023 5.7 (H)     HGBA1C (%)   Date Value   2014 5.8 (H)   2014 6.0 (H)   2013 6.3 (H)     HgbA1C (%)   Date Value   2022 6.0 (H)   2021 6.3 (H)   2020 5.3     TSH   Date Value   2024 2.950 mIU/mL   2022 2.020 mIU/mL    11/03/2021 0.926 mIU/mL   07/05/2012 1.940 uIU/mL   08/11/2011 2.230 uIU/mL   05/06/2011 1.690 uIU/mL       Assessment & Plan:  1.Unilateral inguinal hernia with obstruction and without gangrene -S/p Hernia repair   Start Eliquis once cleared by surgery   Tolerating CLD , advance as tolerated   2.  Hypertension-Resumed oral medications     3.  Chronic A-fib-HR controlled   4.  Pulmonary HTN   5.  History of COPD   6. Dementia and cognitive dysfunction -   Cardiology and surgery consult Appreciated     Patient Active Problem List   Diagnosis    Pacemaker - M. Perfecto    JAZZ on CPAP    Current use of long term anticoagulation    Gastroesophageal reflux disease without esophagitis    Asthma with COPD (chronic obstructive pulmonary disease) ([facility])    Pulmonary hypertension ([facility])-52 mg Hg    Alternating constipation and diarrhea    Other fatigue-mutifactorial    Chronic bronchitis ([facility])    CKD (chronic kidney disease) stage 3, GFR 30-59 ml/min ([facility])    PAF (paroxysmal atrial fibrillation) ([facility])    Glaucoma suspect of left eye    Pseudophakia    Trochanteric bursitis of left hip    Fall    Essential hypertension    Chronic left hip pain    CHI (closed head injury), subsequent encounter    Altered mental status, unspecified altered mental status type    Hyponatremia    Seizure disorder ([facility])    Hypertension, unspecified type    Altered mental status    Weakness    Hypotension, unspecified hypotension type    Hypotension    Branch retinal vein occlusion of left eye with macular edema ([facility])    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    Terri Navarro MD  4/3/2024

## 2024-04-04 NOTE — PLAN OF CARE
Assumed care of patient at 1930  A&Ox2, forgetful, relief with re-orientation and frequent reminders. Tolerating Room air. A fib on tele monitor, rates controlled in 80s. SCDs on. Subcutaneous heparin administered as ordered for VTE prophylaxis. Left arm bruising noted, patient encouraged to elevate arm on pillow as tolerated. Purewick/brief in place. Abdomen rounded and soft, hypoactive bowel sounds noted, patient denies passing gas. Tolerating clear liquids. Denies pain. Frequent rounding and fall precautions in place.     ~0045 Patient appearing anxious and restless, c/o unable to sleep. No relief with non-pharmacologic interventions, MD notified, x1 po seroquel administered with some improvement.     Problem: Patient/Family Goals  Goal: Patient/Family Long Term Goal  Description: Patient's Long Term Goal: DC to home     Interventions:  - follow plan of care     - See additional Care Plan goals for specific interventions  Outcome: Progressing  Goal: Patient/Family Short Term Goal  Description: Patient's Short Term Goal: getting better, help with sore throat     Interventions:   - NGT suction, PRN blood pressure med, Cepacol for sire throat      - See additional Care Plan goals for specific interventions  Outcome: Progressing

## 2024-04-04 NOTE — DIETARY NOTE
[facility]   part of [facility]   CLINICAL NUTRITION    Ayse Maldonado     Admitting diagnosis:  Unilateral inguinal hernia with obstruction and without gangrene, recurrence not specified [K40.30]    Ht: 170.2 cm (5' 7\")  Wt: 61.5 kg (135 lb 9.3 oz).   Body mass index is 21.24 kg/m².  IBW: 61kg    Wt Readings from Last 6 Encounters:   03/30/24 61.5 kg (135 lb 9.3 oz)   03/18/24 61.2 kg (135 lb)   02/09/24 61.2 kg (135 lb)   02/07/24 62.1 kg (137 lb)   01/22/24 61.6 kg (135 lb 12.9 oz)   12/28/23 59.9 kg (132 lb)     Labs/Meds reviewed    Diet:       Procedures    Clear liquid diet Is Patient on Accuchecks? No         Percent Meals Eaten (last 3 days)       Date/Time Percent Meals Eaten (%)    04/01/24 0800 0 %     Percent Meals Eaten (%): NG Tube, NPO at 04/01/24 0800    04/01/24 1200 0 %     Percent Meals Eaten (%): NG tube, NPO at 04/01/24 1200    04/01/24 1700 0 %     Percent Meals Eaten (%): NG tube, NPO at 04/01/24 1700    04/02/24 0829 0 %     Percent Meals Eaten (%): NPO at 04/02/24 0829    04/03/24 0900 50 %    04/03/24 1300 40 %            4/4- Diet advancing, per Surgery PA. No need for PN. Will add appropriate ONS and  monitor for diet advancement. Minimal intake, just feeling thirsty.  RD added Ensure Clear BID to support needs. Change to Ensure +HP once advanced to fulls.   No new wt. On RA. No BM yet this admission. Skin intact.   4/2-89 year old female with incarcerated right inguinal hernia with small bowel obstruction. Poor historian secondary to dementia.   Pt has been NPO x 5 days. With NG. Diet to advance as medically feasible. If diet is unable to advance in next 2 days, recommend nutrition support if aggressive care is pursued. RD available to make recommendations. Will continue to monitor and follow pt nutritional status.     Please consult if patient status changes or nutrition issues arise.    Belkis Arzate RD, LDN  Clinical Nutrition  [id number]

## 2024-04-04 NOTE — PROGRESS NOTES
[facility]  Progress Note    Ayse Maldonado Patient Status:  Inpatient    10/25/1927 MRN [id number]   Location [facility] 8NE-A Attending Terri Navarro MD   Hosp Day # 7 PCP Terri Navarro MD       SUBJECTIVE:    Tolerating liquid diet       OBJECTIVE:  Vital signs in last 24 hours:  BP (!) 176/87 (BP Location: Left arm)   Pulse 80   Temp 97.6 °F (36.4 °C) (Oral)   Resp 22   Ht 5' 7\" (1.702 m)   Wt 135 lb 9.3 oz (61.5 kg)   SpO2 96%   BMI 21.24 kg/m²     Intake/Output:    Intake/Output Summary (Last 24 hours) at 2024 1050  Last data filed at 2024 0357  Gross per 24 hour   Intake 120 ml   Output 675 ml   Net -555 ml       Wt Readings from Last 3 Encounters:   24 135 lb 9.3 oz (61.5 kg)   24 135 lb (61.2 kg)   24 135 lb (61.2 kg)             Exam:  Gen: Awake and alert   Pulm: Lungs clear, normal respiratory effort  CV: Heart with irregular rate and rhythm, no peripheral edema  Abd: Abdomen soft, nontender, nondistended, no organomegaly, bowel sounds present  MSK: Full range of motion in extremities, no clubbing, no cyanosis  Skin: no rashes or lesions    Data Review:     Labs:   No components found for: \"BMP\"   Recent Labs   Lab 24  0631 24  0900 24  0912   RBC 4.05 3.51* 3.23*   HGB 14.0 12.0 11.2*   HCT 40.7 37.0 33.5*   .5* 105.4* 103.7*   MCH 34.6* 34.2* 34.7*   MCHC 34.4 32.4 33.4   RDW 12.0 12.6 12.1   NEPRELIM 7.90* 7.99* 6.04   WBC 9.9 8.9 7.8   .0 215.0 184.0     No results for input(s): \"PTP\", \"INR\" in the last 168 hours.  Hemoglobin A1c (%)   Date Value   2012 5.9 (H)   2011 5.8 (H)     HEMOGLOBIN A1c (% of total Hgb)   Date Value   2023 5.7 (H)     HGBA1C (%)   Date Value   2014 5.8 (H)   2014 6.0 (H)   2013 6.3 (H)     HgbA1C (%)   Date Value   2022 6.0 (H)   2021 6.3 (H)   2020 5.3     TSH   Date Value   2024 2.950 mIU/mL   2022 2.020 mIU/mL   2021 0.926  mIU/mL   07/05/2012 1.940 uIU/mL   08/11/2011 2.230 uIU/mL   05/06/2011 1.690 uIU/mL       Assessment & Plan:  1.Unilateral inguinal hernia with obstruction and without gangrene -S/p Hernia repair   Start Eliquis once cleared by surgery   Tolerating CLD , advance as tolerated   2.  Hypertension-Resumed oral medications     3.  Chronic A-fib-HR controlled   4.  Pulmonary HTN   5.  History of COPD   6. Dementia and cognitive dysfunction -   Cardiology and surgery consult Appreciated     Patient Active Problem List   Diagnosis    Pacemaker - M. Perfecto    JAZZ on CPAP    Current use of long term anticoagulation    Gastroesophageal reflux disease without esophagitis    Asthma with COPD (chronic obstructive pulmonary disease) ([facility])    Pulmonary hypertension ([facility])-52 mg Hg    Alternating constipation and diarrhea    Other fatigue-mutifactorial    Chronic bronchitis ([facility])    CKD (chronic kidney disease) stage 3, GFR 30-59 ml/min ([facility])    PAF (paroxysmal atrial fibrillation) ([facility])    Glaucoma suspect of left eye    Pseudophakia    Trochanteric bursitis of left hip    Fall    Essential hypertension    Chronic left hip pain    CHI (closed head injury), subsequent encounter    Altered mental status, unspecified altered mental status type    Hyponatremia    Seizure disorder (HCC)    Hypertension, unspecified type    Altered mental status    Weakness    Hypotension, unspecified hypotension type    Hypotension    Branch retinal vein occlusion of left eye with macular edema (HCC)    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    Terri Navarro MD  4/4/2024

## 2024-04-04 NOTE — PROGRESS NOTES
[facility] [place]  Progress Note    Ayse Maldonado Patient Status:  Inpatient    10/25/1927 MRN [id number]   Location [facility] 8NE-A Attending Terri Navarro MD   Hosp Day # 7 PCP Terri Navarro MD     Subjective:  The patient is doing well today.  She states that she is cold.  She denies fever.  She denies nausea or vomiting.  She denies abdominal pain.    Objective/Physical Exam:  General: Alert, orientated x3.  Cooperative.  No apparent distress.  Vital Signs:  Blood pressure (!) 176/87, pulse 80, temperature 97.6 °F (36.4 °C), temperature source Oral, resp. rate 22, height 67\", weight 135 lb 9.3 oz (61.5 kg), SpO2 96%.  Lungs: No respiratory distress.  Cardiac: Regular rate and rhythm.   Abdomen:  Soft, protuberant, non tender, with no rebound or guarding.  No peritoneal signs.   Extremities:  No lower extremity edema noted.    Incision: Clean, dry, intact, no erythema      Labs:  Lab Results   Component Value Date    WBC 7.8 2024    HGB 11.2 2024    HCT 33.5 2024    .0 2024        Lab Results   Component Value Date    PT 30.8 (H) 2013    PT 16.1 (H) 2011    PT 15.3 2011    INR 1.41 (H) 2020    INR 1.49 (H) 2020    INR 1.38 (H) 2020       I/O last 3 completed shifts:  In: 320 [P.O.:320]  Out: 675 [Urine:675]  No intake/output data recorded.    Assessment  Patient Active Problem List   Diagnosis    Pacemaker - M. Perfecto    JAZZ on CPAP    Current use of long term anticoagulation    Gastroesophageal reflux disease without esophagitis    Asthma with COPD (chronic obstructive pulmonary disease) ([facility])    Pulmonary hypertension (HCC)-52 mg Hg    Alternating constipation and diarrhea    Other fatigue-mutifactorial    Chronic bronchitis (HCC)    CKD (chronic kidney disease) stage 3, GFR 30-59 ml/min ([facility])    PAF (paroxysmal atrial fibrillation) ([facility])    Glaucoma suspect of left eye    Pseudophakia    Trochanteric bursitis of left hip    Fall     Essential hypertension    Chronic left hip pain    CHI (closed head injury), subsequent encounter    Altered mental status, unspecified altered mental status type    Hyponatremia    Seizure disorder (HCC)    Hypertension, unspecified type    Altered mental status    Weakness    Hypotension, unspecified hypotension type    Hypotension    Branch retinal vein occlusion of left eye with macular edema (HCC)    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 (HCC)    PCO (posterior capsular opacification), left    Inguinal hernia of right side without obstruction or gangrene    Umbilical hernia without obstruction and without gangrene    Protein-calorie malnutrition, unspecified severity (HCC)    Dementia without behavioral disturbance (HCC)    Unilateral inguinal hernia with obstruction and without gangrene, recurrence not specified     POD 2 open right inguinal hernia repair with mesh    Chronic A-fib, Eliquis on hold    Plan:  Continue clear liquid diet.  She may advance diet with return of bowel function.  Activity as tolerated.  Eliquis as per cardiology recommendations.  DVT prophylaxis with heparin  GI prophylaxis with Protonix        Tasia Baca PA-C  4/4/2024  9:51 AM    ADDENDUM:     Patient was seen and examined by me. I agree with the above note.  No acute events overnight.  Patient continues to deny any nausea, vomiting, or bowel function.  She denies any pain this morning.    General: Alert, orientated x3. Cooperative. No apparent distress.  Pulmonary: non labored breathing, effort normal  Abdomen: Soft, non-distended,+ incisional tenderness, with no rebound or guarding. No peritoneal signs.  Incision:  Clean, dry and intact without erythema or drainage.  Extremities: warm, no edema or cyanosis        A/P 89 year old female status post open right inguinal hernia repair     Patient is overall doing well  Start MiraLAX for bowel regimen  Continue clear  liquid diet, okay to advance to soft once having bowel function  Okay to restart Eliquis today  Continue care per primary and cardiology       This note was initiated by Tasia Baca PA-C.  The PA saw the patient in conjunction with me. The PA performed a history, exam, and developed the assessment and plan. I agree with the mentioned assessment and plan and have provided further documentation of my own, if necessary.       Mally Fernandez MD  [facility] General Surgery  4/4/2024  11:42 AM

## 2024-04-04 NOTE — PLAN OF CARE
Rec'd pt at 0730. A&O x 2, forgetful, frequent reminders and re-orientation needed. Tele shows a fib. O2 sats adequate on RA. Pt incontinent, purewick in place, briefed. No C/O pain or SOB. Hernia repair incision CDI, open to air. Bed locked and in low position, call light and personal items within reach. Will continue to monitor. POC - Clear liquid diet, may advance once passing flatus. LR @ 50cchr. PT/OT to see.    1200 - Eliquis restarted after verifying w/ gen surg and cardiology.    Problem: Patient/Family Goals  Goal: Patient/Family Long Term Goal  Description: Patient's Long Term Goal: DC to home     Interventions:  - follow plan of care     - See additional Care Plan goals for specific interventions  Outcome: Progressing  Goal: Patient/Family Short Term Goal  Description: Patient's Short Term Goal: getting better, help with sore throat     Interventions:   - NGT suction, PRN blood pressure med, Cepacol for sire throat      - See additional Care Plan goals for specific interventions  Outcome: Progressing     Problem: CARDIOVASCULAR - ADULT  Goal: Maintains optimal cardiac output and hemodynamic stability  Description: INTERVENTIONS:  - Monitor vital signs, rhythm, and trends  - Monitor for bleeding, hypotension and signs of decreased cardiac output  - Evaluate effectiveness of vasoactive medications to optimize hemodynamic stability  - Monitor arterial and/or venous puncture sites for bleeding and/or hematoma  - Assess quality of pulses, skin color and temperature  - Assess for signs of decreased coronary artery perfusion - ex. Angina  - Evaluate fluid balance, assess for edema, trend weights  Outcome: Progressing  Goal: Absence of cardiac arrhythmias or at baseline  Description: INTERVENTIONS:  - Continuous cardiac monitoring, monitor vital signs, obtain 12 lead EKG if indicated  - Evaluate effectiveness of antiarrhythmic and heart rate control medications as ordered  - Initiate emergency measures for life  threatening arrhythmias  - Monitor electrolytes and administer replacement therapy as ordered  Outcome: Progressing     Problem: GASTROINTESTINAL - ADULT  Goal: Minimal or absence of nausea and vomiting  Description: INTERVENTIONS:  - Maintain adequate hydration with IV or PO as ordered and tolerated  - Nasogastric tube to low intermittent suction as ordered  - Evaluate effectiveness of ordered antiemetic medications  - Provide nonpharmacologic comfort measures as appropriate  - Advance diet as tolerated, if ordered  - Obtain nutritional consult as needed  - Evaluate fluid balance  Outcome: Progressing  Goal: Maintains or returns to baseline bowel function  Description: INTERVENTIONS:  - Assess bowel function  - Maintain adequate hydration with IV or PO as ordered and tolerated  - Evaluate effectiveness of GI medications  - Encourage mobilization and activity  - Obtain nutritional consult as needed  - Establish a toileting routine/schedule  - Consider collaborating with pharmacy to review patient's medication profile  Outcome: Progressing     Problem: Safety Risk - Non-Violent Restraints  Goal: Patient will remain free from self-harm  Description: INTERVENTIONS:  - Apply the least restrictive restraint to prevent harm  - Notify patient and family of reasons restraints applied  - Assess for any contributing factors to confusion (electrolyte disturbances, delirium, medications)  - Discontinue any unnecessary medical devices as soon as possible  - Assess the patient's physical comfort, circulation, skin condition, hydration, nutrition and elimination needs   - Reorient and redirection as needed  - Assess for the need to continue restraints  Outcome: Progressing

## 2024-04-05 ENCOUNTER — APPOINTMENT (OUTPATIENT)
Dept: GENERAL RADIOLOGY | Facility: HOSPITAL | Age: 89
End: 2024-04-05
Attending: INTERNAL MEDICINE
Payer: MEDICARE

## 2024-04-05 PROBLEM — K40.30 UNILATERAL INGUINAL HERNIA WITH OBSTRUCTION AND WITHOUT GANGRENE, RECURRENCE NOT SPECIFIED: Status: RESOLVED | Noted: 2024-03-28 | Resolved: 2024-04-05

## 2024-04-05 LAB — POTASSIUM SERPL-SCNC: 3.7 MMOL/L (ref 3.5–5.1)

## 2024-04-05 PROCEDURE — 71045 X-RAY EXAM CHEST 1 VIEW: CPT | Performed by: INTERNAL MEDICINE

## 2024-04-05 RX ORDER — GABAPENTIN 300 MG/1
300 CAPSULE ORAL 2 TIMES DAILY
Status: DISCONTINUED | OUTPATIENT
Start: 2024-04-05 | End: 2024-04-06

## 2024-04-05 RX ORDER — LISINOPRIL 20 MG/1
20 TABLET ORAL 2 TIMES DAILY
Status: SHIPPED | COMMUNITY
Start: 2024-04-05 | End: 2024-04-06

## 2024-04-05 RX ORDER — DILTIAZEM HYDROCHLORIDE 180 MG/1
180 CAPSULE, EXTENDED RELEASE ORAL DAILY
Status: SHIPPED | COMMUNITY
Start: 2024-04-05

## 2024-04-05 RX ORDER — FUROSEMIDE 10 MG/ML
40 INJECTION INTRAMUSCULAR; INTRAVENOUS
Status: DISCONTINUED | OUTPATIENT
Start: 2024-04-06 | End: 2024-04-06

## 2024-04-05 RX ORDER — DILTIAZEM HYDROCHLORIDE 180 MG/1
180 CAPSULE, EXTENDED RELEASE ORAL DAILY
Status: DISCONTINUED | OUTPATIENT
Start: 2024-04-06 | End: 2024-04-06

## 2024-04-05 RX ORDER — HYDRALAZINE HYDROCHLORIDE 50 MG/1
50 TABLET, FILM COATED ORAL EVERY 8 HOURS SCHEDULED
Status: DISCONTINUED | OUTPATIENT
Start: 2024-04-05 | End: 2024-04-05

## 2024-04-05 RX ORDER — PANTOPRAZOLE SODIUM 40 MG/1
40 TABLET, DELAYED RELEASE ORAL
Status: DISCONTINUED | OUTPATIENT
Start: 2024-04-05 | End: 2024-04-06

## 2024-04-05 RX ORDER — AMLODIPINE BESYLATE 5 MG/1
5 TABLET ORAL DAILY
Status: DISCONTINUED | OUTPATIENT
Start: 2024-04-05 | End: 2024-04-05

## 2024-04-05 RX ORDER — FUROSEMIDE 10 MG/ML
60 INJECTION INTRAMUSCULAR; INTRAVENOUS ONCE
Status: COMPLETED | OUTPATIENT
Start: 2024-04-05 | End: 2024-04-05

## 2024-04-05 RX ORDER — LISINOPRIL 20 MG/1
20 TABLET ORAL 2 TIMES DAILY
Status: DISCONTINUED | OUTPATIENT
Start: 2024-04-05 | End: 2024-04-06

## 2024-04-05 RX ORDER — DILTIAZEM HYDROCHLORIDE 240 MG/1
240 CAPSULE, COATED, EXTENDED RELEASE ORAL DAILY
Status: DISCONTINUED | OUTPATIENT
Start: 2024-04-06 | End: 2024-04-05

## 2024-04-05 RX ORDER — MEMANTINE HYDROCHLORIDE 5 MG/1
5 TABLET ORAL NIGHTLY
Status: DISCONTINUED | OUTPATIENT
Start: 2024-04-05 | End: 2024-04-06

## 2024-04-05 RX ORDER — METOPROLOL TARTRATE 100 MG/1
100 TABLET ORAL
Status: DISCONTINUED | OUTPATIENT
Start: 2024-04-05 | End: 2024-04-06

## 2024-04-05 NOTE — PLAN OF CARE
Assumed care at 0730. Pt is A&Ox2-3. Pt is on RA lung sounds clear. Afib on tele, VSS. No complaints of cardiac symptoms. Incontinent of B&B, purewick in place, passes gas and had 2 bowel movements last night. Denies pain at this time. 1 person assist with walker tolerating well. Plan of care reviewed with patient, verbalizes understanding, all needs addressed at this time. Bed in lowest position and locked. Call light at bedside.    POC:   Soft diet  OK to DC per surgery, cards, hospitalist    1300: Pt stated to this RN, \"I don't feel good.\" And asked to stay one more night. Notified Hospitalist. 's, given PRN hydralazine.     1650: Pt is still saying \"I don't feel good\" but cannot describe what she is feeling. BP elevated. HR elevated. New IV placed, able to administer 1x labetalol. Notified Cardiology, see orders. Notified Hospitalist, see orders.     1700: Monitoring blood pressures. Spoke with Cardiologist about pt's blood pressures and if CTU 8 could start a pt on Cardene. Notified Cardiologist that CTU 8 cannot start a pt on Cardene or titrate per charge RN. Cardiologist understood and asked this RN to monitor BP.    1734: Pt's BP rechecked, 218/111.    1800: BP rechecked, 213/95.    1819: Notified [facility] that pt's BP recheck was 222/115. Pt stated she is now having CP. EKG ordered. Given orders to transfer pt to [facility], 660.       1830: Gave report to CNICU RN.     1841: Notified Hospitalist of pt being transferred to CNICU.     Problem: Patient/Family Goals  Goal: Patient/Family Long Term Goal  Description: Patient's Long Term Goal: DC to home     Interventions:  - follow plan of care     - See additional Care Plan goals for specific interventions  Outcome: Adequate for Discharge  Goal: Patient/Family Short Term Goal  Description: Patient's Short Term Goal: getting better, help with sore throat     Interventions:   - NGT suction, PRN blood pressure med, Cepacol for sire throat      - See additional  Care Plan goals for specific interventions  Outcome: Adequate for Discharge     Problem: CARDIOVASCULAR - ADULT  Goal: Maintains optimal cardiac output and hemodynamic stability  Description: INTERVENTIONS:  - Monitor vital signs, rhythm, and trends  - Monitor for bleeding, hypotension and signs of decreased cardiac output  - Evaluate effectiveness of vasoactive medications to optimize hemodynamic stability  - Monitor arterial and/or venous puncture sites for bleeding and/or hematoma  - Assess quality of pulses, skin color and temperature  - Assess for signs of decreased coronary artery perfusion - ex. Angina  - Evaluate fluid balance, assess for edema, trend weights  Outcome: Adequate for Discharge  Goal: Absence of cardiac arrhythmias or at baseline  Description: INTERVENTIONS:  - Continuous cardiac monitoring, monitor vital signs, obtain 12 lead EKG if indicated  - Evaluate effectiveness of antiarrhythmic and heart rate control medications as ordered  - Initiate emergency measures for life threatening arrhythmias  - Monitor electrolytes and administer replacement therapy as ordered  Outcome: Adequate for Discharge     Problem: GASTROINTESTINAL - ADULT  Goal: Minimal or absence of nausea and vomiting  Description: INTERVENTIONS:  - Maintain adequate hydration with IV or PO as ordered and tolerated  - Nasogastric tube to low intermittent suction as ordered  - Evaluate effectiveness of ordered antiemetic medications  - Provide nonpharmacologic comfort measures as appropriate  - Advance diet as tolerated, if ordered  - Obtain nutritional consult as needed  - Evaluate fluid balance  Outcome: Adequate for Discharge  Goal: Maintains or returns to baseline bowel function  Description: INTERVENTIONS:  - Assess bowel function  - Maintain adequate hydration with IV or PO as ordered and tolerated  - Evaluate effectiveness of GI medications  - Encourage mobilization and activity  - Obtain nutritional consult as needed  -  Establish a toileting routine/schedule  - Consider collaborating with pharmacy to review patient's medication profile  Outcome: Adequate for Discharge

## 2024-04-05 NOTE — PLAN OF CARE
Patient is alert & oriented x2, cooperative. Pt ambulates w/ walker, x2 assist. Breath sounds are clear bilateral. On room air. Afib rhythm, HR controlled. No pain reported. R abdominal incisional site intact. LR at 50ml/hr overnight. Bed in low position and call light within reach. Reviewed plan of care and patient verbalizes understanding.    Pt able to pass gas and x2 BM at night. Advanced to soft diet.    Hypertensive this morning. X1 IV hydralazine given. Repeat BP:156/74.          Problem: Patient/Family Goals  Goal: Patient/Family Long Term Goal  Description: Patient's Long Term Goal: DC to home     Interventions:  - follow plan of care     - See additional Care Plan goals for specific interventions  Outcome: Progressing  Goal: Patient/Family Short Term Goal  Description: Patient's Short Term Goal: getting better, help with sore throat     Interventions:   - NGT suction, PRN blood pressure med, Cepacol for sire throat      - See additional Care Plan goals for specific interventions  Outcome: Progressing     Problem: CARDIOVASCULAR - ADULT  Goal: Maintains optimal cardiac output and hemodynamic stability  Description: INTERVENTIONS:  - Monitor vital signs, rhythm, and trends  - Monitor for bleeding, hypotension and signs of decreased cardiac output  - Evaluate effectiveness of vasoactive medications to optimize hemodynamic stability  - Monitor arterial and/or venous puncture sites for bleeding and/or hematoma  - Assess quality of pulses, skin color and temperature  - Assess for signs of decreased coronary artery perfusion - ex. Angina  - Evaluate fluid balance, assess for edema, trend weights  Outcome: Progressing  Goal: Absence of cardiac arrhythmias or at baseline  Description: INTERVENTIONS:  - Continuous cardiac monitoring, monitor vital signs, obtain 12 lead EKG if indicated  - Evaluate effectiveness of antiarrhythmic and heart rate control medications as ordered  - Initiate emergency measures for life  threatening arrhythmias  - Monitor electrolytes and administer replacement therapy as ordered  Outcome: Progressing     Problem: GASTROINTESTINAL - ADULT  Goal: Minimal or absence of nausea and vomiting  Description: INTERVENTIONS:  - Maintain adequate hydration with IV or PO as ordered and tolerated  - Nasogastric tube to low intermittent suction as ordered  - Evaluate effectiveness of ordered antiemetic medications  - Provide nonpharmacologic comfort measures as appropriate  - Advance diet as tolerated, if ordered  - Obtain nutritional consult as needed  - Evaluate fluid balance  Outcome: Progressing  Goal: Maintains or returns to baseline bowel function  Description: INTERVENTIONS:  - Assess bowel function  - Maintain adequate hydration with IV or PO as ordered and tolerated  - Evaluate effectiveness of GI medications  - Encourage mobilization and activity  - Obtain nutritional consult as needed  - Establish a toileting routine/schedule  - Consider collaborating with pharmacy to review patient's medication profile  Outcome: Progressing     Problem: Safety Risk - Non-Violent Restraints  Goal: Patient will remain free from self-harm  Description: INTERVENTIONS:  - Apply the least restrictive restraint to prevent harm  - Notify patient and family of reasons restraints applied  - Assess for any contributing factors to confusion (electrolyte disturbances, delirium, medications)  - Discontinue any unnecessary medical devices as soon as possible  - Assess the patient's physical comfort, circulation, skin condition, hydration, nutrition and elimination needs   - Reorient and redirection as needed  - Assess for the need to continue restraints  Outcome: Progressing

## 2024-04-05 NOTE — PROGRESS NOTES
[facility]  Progress Note    Ayse Maldonado Patient Status:  Inpatient    10/25/1927 MRN [id number]   Location [facility] 8NE-A Attending Terri Navarro MD   Hosp Day # 8 PCP Terri Navarro MD     Subjective:  She is seen and examined resting in bed. She reports feeling tired today. She had 2 documented bowel movements overnight and was advanced to soft diet. She has not eaten any soft food yet. She denies abdominal pain.     Objective/Physical Exam:  /74 (BP Location: Left arm)   Pulse 83   Temp 98.2 °F (36.8 °C) (Oral)   Resp 23   Ht 67\"   Wt 135 lb 9.3 oz   SpO2 94%   BMI 21.24 kg/m²     Intake/Output Summary (Last 24 hours) at 2024 0752  Last data filed at 2024 0500  Gross per 24 hour   Intake --   Output 300 ml   Net -300 ml         General: Awake, Alert,Cooperative.  No apparent distress.  Pulm: no respiratory distress, no increased work of breathing  Abdomen:  Soft, non-distended,non-tender to palpation, with no rebound or guarding.  No peritoneal signs.  Incision:  Clean, dry, intact without erythema.        Labs:  Lab Results   Component Value Date    WBC 7.8 2024    HGB 11.2 2024    HCT 33.5 2024    .0 2024      Lab Results   Component Value Date    PT 30.8 (H) 2013    PT 16.1 (H) 2011    PT 15.3 2011    INR 1.41 (H) 2020    INR 1.49 (H) 2020    INR 1.38 (H) 2020     Lab Results   Component Value Date     2024    K 3.7 2024     2024    CO2 23.0 2024    BUN 17 2024    CREATSERUM 0.60 2024    GLU 91 2024    CA 8.3 2024            Assessment:  Patient Active Problem List   Diagnosis    Pacemaker - M. Perfecto    JAZZ on CPAP    Current use of long term anticoagulation    Gastroesophageal reflux disease without esophagitis    Asthma with COPD (chronic obstructive pulmonary disease) (HCC)    Pulmonary hypertension (HCC)-52 mg Hg    Alternating  constipation and diarrhea    Other fatigue-mutifactorial    Chronic bronchitis (HCC)    CKD (chronic kidney disease) stage 3, GFR 30-59 ml/min (HCC)    PAF (paroxysmal atrial fibrillation) (HCC)    Glaucoma suspect of left eye    Pseudophakia    Trochanteric bursitis of left hip    Fall    Essential hypertension    Chronic left hip pain    CHI (closed head injury), subsequent encounter    Altered mental status, unspecified altered mental status type    Hyponatremia    Seizure disorder (HCC)    Hypertension, unspecified type    Altered mental status    Weakness    Hypotension, unspecified hypotension type    Hypotension    Branch retinal vein occlusion of left eye with macular edema (HCC)    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 (HCC)    PCO (posterior capsular opacification), left    Inguinal hernia of right side without obstruction or gangrene    Umbilical hernia without obstruction and without gangrene    Protein-calorie malnutrition, unspecified severity (HCC)    Dementia without behavioral disturbance (HCC)    Unilateral inguinal hernia with obstruction and without gangrene, recurrence not specified       POD 3 open right inguinal hernia repair with mesh  Chronic A-fib    Plan:  Continue soft diet as tolerated  Encourage ambulation and up to chair as able  Analgesics and antiemetics as needed  Encourage incentive spirometry  Continue bowel regimen- Miralax   Afib management per cardiology  DVT prophylaxis- on eliquis  GI prophylaxis with protonix   If she tolerates soft diet without nausea or vomiting and she is stable for discharge home today from a general surgery standpoint.  She may follow up with [facility] general surgery office in 7-10 days, sooner if needed.     Gem Elam PA-C  4/5/2024  7:52 AM    ADDENDUM:     Patient was seen and examined by me. I agree with the above note.  Patient had a bowel movement overnight.  She denies any other  symptoms.    General: Alert, orientated x3. Cooperative. No apparent distress.  Pulmonary: non labored breathing, effort normal  Abdomen: Soft, non-distended,+ incisional tenderness, with no rebound or guarding. No peritoneal signs.  Incision:  Clean, dry and intact without erythema or drainage.  Extremities: warm, no edema or cyanosis        A/P 89 year old female s/p open right inguinal hernia repair with mesh     Overall, patient is doing well  Patient advance to soft diet and is tolerating  Incision is healing well on physical exam  Patient is cleared from a surgical standpoint for discharge home  She can follow-up with her primary care  Activity is as tolerated  Surgery will sign off at this time  Please page with any questions or concerns    Thank you for letting me participate in the care of this patient     This note was initiated by Gem Elam PA-C.  The PA saw the patient in conjunction with me. The PA performed a history, exam, and developed the assessment and plan. I agree with the mentioned assessment and plan and have provided further documentation of my own, if necessary.       Mally Fernandez MD  [facility]  4/5/2024  12:02 PM

## 2024-04-05 NOTE — PLAN OF CARE
SBPs now 200s. Patient is asymptomatic. Has received IV Labetalol and Hydralazine. Per nursing staff IV is working appropriately.     Will transfer to ICU for Cardene drip as planned earlier today with Paulino HENLEY and Dr. Santos.     ADDENDUM 2045: RN reports patient having dyspnea, crackles to lung sounds. Oxygen saturation 93-94% on 2L NC. CXR completed with small bilateral effusions. Will give IVP Lasix now. Cardene infusion just started. Titrate per orders for blood pressure control.     Blanka Pretty, APRN  04/05/24   6:59 PM  [phone] [place]  [phone] [place]

## 2024-04-05 NOTE — CM/SW NOTE
JESIKA sent updates to [facility] via [employer]. Anticipate discharge today. Discharge order entered.     TORSTEN Reed, DIANA  Discharge Planner  [id number]    BLS on will call. PCS form completed.

## 2024-04-05 NOTE — PHYSICAL THERAPY NOTE
PT attempted to see pt for therapy session today. Per discussion with RN, pt not feeling well at this time and is resting in bed. BP's have been elevated. Pt is requesting to stay another night. Discussed with SW who requested a therapy attempt for discharge planning/auth. Will hold on therapy session at this time. RN to message writer later today if pt is up for it, if not will attempt at later time/date when pt is feeling better.     SECOND ATTEMPT: RN messaged writer around 3PM about seeing pt around 4 PM. Upon entering room, pt noted to be tachypneic and HR elevated to 120s at rest. RN present in room as well- now requesting continuing to hold until medically appropriate.

## 2024-04-05 NOTE — PROGRESS NOTES
[facility]  Progress Note    Ayse Maldonado Patient Status:  Inpatient    10/25/1927 MRN [id number]   Location [facility] 8NE-A Attending Terri Navarro MD   Hosp Day # 8 PCP Terri Navarro MD       SUBJECTIVE:    Weak, not eating   BP runs high     OBJECTIVE:  Vital signs in last 24 hours:  BP (!) 175/86 (BP Location: Left arm)   Pulse 87   Temp 98 °F (36.7 °C) (Oral)   Resp (!) 27   Ht 5' 7\" (1.702 m)   Wt 135 lb 9.3 oz (61.5 kg)   SpO2 92%   BMI 21.24 kg/m²     Intake/Output:    Intake/Output Summary (Last 24 hours) at 2024 1324  Last data filed at 2024 0917  Gross per 24 hour   Intake 250 ml   Output 950 ml   Net -700 ml       Wt Readings from Last 3 Encounters:   24 135 lb 9.3 oz (61.5 kg)   24 135 lb (61.2 kg)   24 135 lb (61.2 kg)     Exam:  Gen: Sleepy and tired   Pulm: Lungs clear, normal respiratory effort  CV: Heart with irregular rate and rhythm, no peripheral edema  Abd: Abdomen soft, nontender, nondistended, no organomegaly, bowel sounds present  MSK: Full range of motion in extremities, no clubbing, no cyanosis  Skin: no rashes or lesions    Data Review:     Labs:   No components found for: \"BMP\"   Recent Labs   Lab 24  0631 24  0900 24  0912   RBC 4.05 3.51* 3.23*   HGB 14.0 12.0 11.2*   HCT 40.7 37.0 33.5*   .5* 105.4* 103.7*   MCH 34.6* 34.2* 34.7*   MCHC 34.4 32.4 33.4   RDW 12.0 12.6 12.1   NEPRELIM 7.90* 7.99* 6.04   WBC 9.9 8.9 7.8   .0 215.0 184.0     No results for input(s): \"PTP\", \"INR\" in the last 168 hours.  Hemoglobin A1c (%)   Date Value   2012 5.9 (H)   2011 5.8 (H)     HEMOGLOBIN A1c (% of total Hgb)   Date Value   2023 5.7 (H)     HGBA1C (%)   Date Value   2014 5.8 (H)   2014 6.0 (H)   2013 6.3 (H)     HgbA1C (%)   Date Value   2022 6.0 (H)   2021 6.3 (H)   2020 5.3     TSH   Date Value   2024 2.950 mIU/mL   2022 2.020 mIU/mL   2021  0.926 mIU/mL   07/05/2012 1.940 uIU/mL   08/11/2011 2.230 uIU/mL   05/06/2011 1.690 uIU/mL       Assessment & Plan:  1.Unilateral inguinal hernia with obstruction and without gangrene -S/p Hernia repair   Start Eliquis once cleared by surgery   Tolerating CLD , advance as tolerated   2.  Hypertension-Resumed oral medications   3.  Chronic A-fib-HR controlled   4.  Pulmonary HTN -   5.  History of COPD   6. Dementia and cognitive dysfunction -   Cardiology and surgery consult Appreciated   Discharge planning to rehab   D/w surgery   Patient Active Problem List   Diagnosis    Pacemaker - [place]    JAZZ on CPAP    Current use of long term anticoagulation    Gastroesophageal reflux disease without esophagitis    Asthma with COPD (chronic obstructive pulmonary disease) ([facility])    Pulmonary hypertension ([facility])-52 mg Hg    Alternating constipation and diarrhea    Other fatigue-mutifactorial    Chronic bronchitis ([facility])    CKD (chronic kidney disease) stage 3, GFR 30-59 ml/min ([facility])    PAF (paroxysmal atrial fibrillation) ([facility])    Glaucoma suspect of left eye    Pseudophakia    Trochanteric bursitis of left hip    Fall    Essential hypertension    Chronic left hip pain    CHI (closed head injury), subsequent encounter    Altered mental status, unspecified altered mental status type    Hyponatremia    Seizure disorder (HCC)    Hypertension, unspecified type    Altered mental status    Weakness    Hypotension, unspecified hypotension type    Hypotension    Branch retinal vein occlusion of left eye with macular edema (HCC)    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    Terri Navarro MD  4/5/2024

## 2024-04-05 NOTE — PLAN OF CARE
Contacted by RN with elevated HR's and BP, and states she does not feel well, but denies pain or further associated symptoms.  Poor IV access, but regained and provided PRN antihypertensive medications. Also, increased metoprolol tartrate to 100 mg BID for increased rates.  If no improvement consider transfer and Cardene IV.  Please notify us of any pain or associated symptoms. Reviewed with night team

## 2024-04-05 NOTE — PROGRESS NOTES
Progress Note  Ayse Maldonado Patient Status:  Inpatient    10/25/1927 MRN [id number]   Location [facility] 8NE-A Attending Terri Navarro MD   Hosp Day # 8 PCP Terri Navarro MD     Subjective:  Mrs. Maldonado feels weak. She requires assistance with her breakfast this morning.  Getting her BP medication now  Denies pain, palpitations, or dyspnea.    Objective:  Physical Exam:   /74 (BP Location: Left arm)   Pulse 83   Temp 98.2 °F (36.8 °C) (Oral)   Resp 23   Ht 5' 7\" (1.702 m)   Wt 135 lb 9.3 oz (61.5 kg)   SpO2 94%   BMI 21.24 kg/m²   Temp (24hrs), Av.1 °F (36.7 °C), Min:97.8 °F (36.6 °C), Max:98.2 °F (36.8 °C)       Intake/Output Summary (Last 24 hours) at 2024 0838  Last data filed at 2024 0500  Gross per 24 hour   Intake --   Output 300 ml   Net -300 ml     Wt Readings from Last 3 Encounters:   24 135 lb 9.3 oz (61.5 kg)   24 135 lb (61.2 kg)   24 135 lb (61.2 kg)     Telemetry: Atrial fibrillation in the 's  General: Elderly and thin female who is alert and in no apparent distress lying comfortably in bed.  HEENT: No focal deficits.  Neck: No JVD, carotids 2+ no bruits.  Cardiac: Irregularly irregular, S1, S2 normal, no murmur, rub or gallop.  Lungs: Clear without wheezes, rales, rhonchi or dullness.  Normal excursions and effort.  Abdomen: Soft, non-tender.   Extremities: Without clubbing, cyanosis or edema.  Peripheral pulses are 2+.  Neurologic: Alert and oriented, normal affect.  Skin: Warm and dry.        Intake/Output:    Intake/Output Summary (Last 24 hours) at 2024 0838  Last data filed at 2024 0500  Gross per 24 hour   Intake --   Output 300 ml   Net -300 ml       Last 3 Weights   24 0423 135 lb 9.3 oz (61.5 kg)   24 0421 135 lb 9.3 oz (61.5 kg)   24 0002 135 lb 9.6 oz (61.5 kg)   24 1825 138 lb (62.6 kg)   24 1110 135 lb (61.2 kg)   24 1006 135 lb (61.2 kg)       Labs:  Recent Labs   Lab 24  0955  04/03/24  0506 04/04/24  1340 04/05/24  0548   * 132* 91  --    BUN 19 24* 17  --    CREATSERUM 0.65 0.65 0.60  --    EGFRCR 81 81 82  --    CA 8.8 8.9 8.3*  --    * 148* 140  --    K 3.1* 3.7  3.7 3.4* 3.7   * 120* 111  --    CO2 24.0 19.0* 23.0  --      Recent Labs   Lab 03/31/24  0631 04/03/24  0900 04/04/24  0912   RBC 4.05 3.51* 3.23*   HGB 14.0 12.0 11.2*   HCT 40.7 37.0 33.5*   .5* 105.4* 103.7*   MCH 34.6* 34.2* 34.7*   MCHC 34.4 32.4 33.4   RDW 12.0 12.6 12.1   NEPRELIM 7.90* 7.99* 6.04   WBC 9.9 8.9 7.8   .0 215.0 184.0         No results for input(s): \"TROP\", \"TROPHS\", \"CK\" in the last 168 hours.    Diagnostics:   ECHOCARDIOGRAM 2019:  1. Left ventricle: The cavity size was normal. Wall thickness was normal.      Systolic function was normal. The estimated ejection fraction was 55-60%. No diagnostic evidence for regional wall motion abnormalities. Features are consistent with concomitant abnormal relaxation and increased filling pressure - grade 2 diastolic dysfunction.   2. Aortic valve: Trileaflet; mildly calcified leaflets. Trivial regurgitation.   3. Mitral valve: Mitral valve demonstrates mildly calcified leaflets. There      was mild regurgitation.   4. Left atrium: The left atrium was markedly dilated. The left atrial volume      was moderately increased.   5. Right ventricle: Systolic function was mildly reduced.   6. Right atrium: The atrium was markedly dilated.   7. Tricuspid valve: There was moderate regurgitation.   8. Pulmonary arteries: Systolic pressure was mildly to moderately increased,      in the range of 45mm Hg to 52mm Hg.   9. Inferior vena cava: The respirophasic diameter changes were blunted (less      than 50%).   Impressions:  This study is compared with previous dated 10/12/2017:   Compared to prior study the pulmonary artery systolic pressures are slightly   worse.       CTA Abdomen/pelvis 3/28/2024:  1. Findings compatible with small bowel  obstruction with a transition point in the region of a right inguinal hernia containing small bowel.         Medications:   pantoprazole  40 mg Oral QAM AC    lisinopril  20 mg Oral BID    metoprolol tartrate  50 mg Oral 2x Daily(Beta Blocker)    apixaban  5 mg Oral BID    polyethylene glycol (PEG 3350)  17 g Oral Daily    digoxin  125 mcg Oral QAM    dilTIAZem ER  180 mg Oral Daily      dilTIAZem Stopped (04/03/24 1400)       Assessment/Plan:  Incarcerated R inguinal hernia with SBO   POD # 3 R open inguinal hernia repair with mesh  Okay to start Eliquis 4/4 from surgical perspective  Permanent afib with RVR, Hx DCCV, s/p SJM dual-chamber ppm 2015  LVEF preserved 2019 as above  Restart eliquis as above  Rate control as tolerated - currently Ventricular rates in the 90's  Currently taking metoprolol 50 mg BID, digoxin 125 mcg daily, and diltiazem  mg  HTN  BP above goal  In addition to rate controlling medications taking lisinopril 20mg adjusted to BID today  Given her age a degree of permissive hypertension is reasonable. Would consider a goal of <150 systolic    PLAN  Continue cardiac regime as above  Continue Eliquis  Debilitated -PT/OT Etc  We will follow peripherally from a cardiology perspective. Please contact us with further questions      Paulino Birmingham PA-C  4/5/2024  8:38 AM      Chart reveiwed and decision making performed in entirety with discussion with staff. Agree with above note and assessment with the following additions made below.     General: No acute distress.   HEENT- NCAT,   CVS- normal S1, S2  Lungs- clear bilaterally.   Abdomen- soft, non-tender.  Extremities- Equal pulses, no edema.   Can titrate metoprolol for BP and rate control if needed,      Manjula Grove MD  [facility]  Cardiac Electrophysiolgy

## 2024-04-06 VITALS
RESPIRATION RATE: 24 BRPM | SYSTOLIC BLOOD PRESSURE: 104 MMHG | HEIGHT: 67 IN | DIASTOLIC BLOOD PRESSURE: 54 MMHG | HEART RATE: 77 BPM | BODY MASS INDEX: 21.04 KG/M2 | OXYGEN SATURATION: 98 % | TEMPERATURE: 99 F | WEIGHT: 134.06 LBS

## 2024-04-06 LAB
ANION GAP SERPL CALC-SCNC: 5 MMOL/L (ref 0–18)
ATRIAL RATE: 110 BPM
BASOPHILS # BLD AUTO: 0.06 X10(3) UL (ref 0–0.2)
BASOPHILS NFR BLD AUTO: 1 %
BUN BLD-MCNC: 12 MG/DL (ref 9–23)
CALCIUM BLD-MCNC: 8.2 MG/DL (ref 8.5–10.1)
CHLORIDE SERPL-SCNC: 108 MMOL/L (ref 98–112)
CO2 SERPL-SCNC: 27 MMOL/L (ref 21–32)
CREAT BLD-MCNC: 0.62 MG/DL
EGFRCR SERPLBLD CKD-EPI 2021: 81 ML/MIN/1.73M2 (ref 60–?)
EOSINOPHIL # BLD AUTO: 0.03 X10(3) UL (ref 0–0.7)
EOSINOPHIL NFR BLD AUTO: 0.5 %
ERYTHROCYTE [DISTWIDTH] IN BLOOD BY AUTOMATED COUNT: 11.9 %
GLUCOSE BLD-MCNC: 99 MG/DL (ref 70–99)
HCT VFR BLD AUTO: 38.7 %
HGB BLD-MCNC: 12.6 G/DL
IMM GRANULOCYTES # BLD AUTO: 0.09 X10(3) UL (ref 0–1)
IMM GRANULOCYTES NFR BLD: 1.4 %
LYMPHOCYTES # BLD AUTO: 0.44 X10(3) UL (ref 1–4)
LYMPHOCYTES NFR BLD AUTO: 7 %
MCH RBC QN AUTO: 34 PG (ref 26–34)
MCHC RBC AUTO-ENTMCNC: 32.6 G/DL (ref 31–37)
MCV RBC AUTO: 104.3 FL
MONOCYTES # BLD AUTO: 0.54 X10(3) UL (ref 0.1–1)
MONOCYTES NFR BLD AUTO: 8.6 %
NEUTROPHILS # BLD AUTO: 5.09 X10 (3) UL (ref 1.5–7.7)
NEUTROPHILS # BLD AUTO: 5.09 X10(3) UL (ref 1.5–7.7)
NEUTROPHILS NFR BLD AUTO: 81.5 %
OSMOLALITY SERPL CALC.SUM OF ELEC: 290 MOSM/KG (ref 275–295)
PLATELET # BLD AUTO: 193 10(3)UL (ref 150–450)
PLATELETS.RETICULATED NFR BLD AUTO: 3.1 % (ref 0–7)
POTASSIUM SERPL-SCNC: 3.2 MMOL/L (ref 3.5–5.1)
Q-T INTERVAL: 338 MS
QRS DURATION: 78 MS
QTC CALCULATION (BEZET): 457 MS
R AXIS: -12 DEGREES
RBC # BLD AUTO: 3.71 X10(6)UL
SODIUM SERPL-SCNC: 140 MMOL/L (ref 136–145)
T AXIS: -65 DEGREES
VENTRICULAR RATE: 110 BPM
WBC # BLD AUTO: 6.3 X10(3) UL (ref 4–11)

## 2024-04-06 RX ORDER — POTASSIUM CHLORIDE 20 MEQ/1
40 TABLET, EXTENDED RELEASE ORAL ONCE
Status: DISCONTINUED | OUTPATIENT
Start: 2024-04-06 | End: 2024-04-06

## 2024-04-06 RX ORDER — LISINOPRIL 20 MG/1
20 TABLET ORAL 2 TIMES DAILY
Status: SHIPPED | COMMUNITY
Start: 2024-04-06

## 2024-04-06 NOTE — CM/SW NOTE
Notified by pt's RN that pt is medically cleared for return to [facility]. Pt is LTC at facility. Facility contacted, spoke to Eliza. Pt can return today at any time.     [facility] scheduled for 2:30 pm . PCS form updated and available for RN.    Pt's HCPOA Pankaj updated re: scheduled discharge.    RN to call report to receiving RN at [facility] [phone]         CM/SW will remain available for DC planning and/or support.     BERNIE Bingham, CMSRN    [id number]

## 2024-04-06 NOTE — PLAN OF CARE
Patient's BP remains WDL, lasix given again with decreased response. On RA, wheezes diminished.   BM x1 today, Purewick in place. Minimal appetite. Pt. Refusing to get out of bed. Sons at bedside.   Report called to facility, plan for discharge today at 2:30pm.  Ivs removed, glasses and hearing aids sent with patient, purse sent with son, all other belongings with EMS    Problem: GASTROINTESTINAL - ADULT  Goal: Maintains or returns to baseline bowel function  Description: INTERVENTIONS:  - Assess bowel function  - Maintain adequate hydration with IV or PO as ordered and tolerated  - Evaluate effectiveness of GI medications  - Encourage mobilization and activity  - Obtain nutritional consult as needed  - Establish a toileting routine/schedule  - Consider collaborating with pharmacy to review patient's medication profile  Outcome: Progressing  Patient having loose stools, incontinent, which is baseline per family.  Goal: Maintains adequate nutritional intake (undernourished)  Description: INTERVENTIONS:  - Monitor percentage of each meal consumed  - Identify factors contributing to decreased intake, treat as appropriate  - Assist with meals as needed  - Monitor I&O, WT and lab values  - Obtain nutritional consult as needed  - Optimize oral hygiene and moisture  - Encourage food from home; allow for food preferences  - Enhance eating environment    Outcome: Progressing   Patient ate 25% of food, about 50% of Ensure.

## 2024-04-06 NOTE — SLP NOTE
ADULT SWALLOWING EVALUATION    ASSESSMENT    ASSESSMENT/OVERALL IMPRESSION:  Patient seen for swallowing evaluation due to patient report of food sticking. Patient admitted due to hernia. Patient underwent open inguinal hernia repair with mesh and right inguinal nerve block 4/2/24. Patient with good tolerance of CLD post operatively and was advanced to soft diet. Patient began not feeling well 4/5 pm and with elevated HR and BP. Patient developed chest pain and was transferred to ICU. Overnight, patient developed SOB and increased WOB, tachypnea as well as crackles and expiratory wheezes to auscultation. Patient diuresed with improvement in crackles/wheezing, dyspnea/WOB. This morning, patient is in bed, fatigued but arousable. Son at bedside, very supportive. He reports patient has had her esophagus dilated x2. He reports he is concerned about aspiration given patients deconditioned state. RN notes patient consumed small amount of pancake and nutrition supplement today.     Patient with generally reduced strength of oral musculature due to general deconditioned state. Patient with limited acceptance of po due to lethargy. SLP presented thin liquids by straw and soft solid trial. Patient with intact oral retrieval and containment. Oral prep and transit with liquids appeared to be consistent with reduced bolus control exacerbated by sequential swallows (which patient prefers). Mastication of solids was prolonged but functional and with complete oral clearance. Laryngeal excursion judged to be of adequate strength and timeliness but questionable coordination with sequential swallows with some throat clearing noted. Patient otherwise with good overt tolerance of po.    Discussed above with patient son (patient drifts back to sleep quickly) to feed when alert with slow rate and small bites and sips when upright. Recommend continue to feed as tolerated, soft solids and thin liquids with above precautions. SLP will  continue to follow for ongoing reassessment of swallow function. Patient son and RN in agreement.          RECOMMENDATIONS   Diet Recommendations - Solids: Soft/ Easy to chew  Diet Recommendations - Liquids: Thin Liquids                        Compensatory Strategies Recommended: Slow rate;Small bites and sips (1:1 feeding assist, feed only when alert and upright)  Aspiration Precautions: Upright position;Slow rate;Small bites and sips  Medication Administration Recommendations:  (as tolerated)  Treatment Plan/Recommendations: Dysphagia therapy    HISTORY   MEDICAL HISTORY  Reason for Referral:  (c/o food sticking)    Problem List  Active Problems:    * No active hospital problems. *      Past Medical History  Past Medical History:   Diagnosis Date    -Lumbar radicular pain-electrical stimulator 07/29/2013    Abdominal distention     Abdominal hernia     Abdominal pain     Age-related osteoporosis with current pathological fracture with routine healing 08/08/2018    Anesthesia complication     Arthritis     Atrial fibrillation (HCC)     Back pain     Basal cell cancer     colon    Bloating     Body piercing     Cellulitis of chest wall 07/11/2018    Cellulitis of left arm 07/19/2018    Cellulitis of right upper extremity     Closed bicondylar fracture of distal end of right humerus            Global exp 1-17-18 / RIGHT ELBOW / BAM  11/09/2017    Closed displaced fracture of pelvis (HCC)     Constipation     Diarrhea, unspecified     Easy bruising     Essential hypertension     Eye disease     Fatigue     Food intolerance     Frequent urination     Hearing impairment     Hearing loss     Heart palpitations     Hemorrhoids     History of depression     Hypertension     Irregular bowel habits     Leg swelling     Lumbar facet arthropathy 02/10/2018    Macular degeneration - [facility] 05/05/2011    Muscle weakness     Night sweats     Pacemaker     Reflux     Renal disorder     S/P colon resection /  Colonoscopy (10/12) recheck - [facility] 05/05/2011    S/P laminectomy 12/23/2013    Seizure disorder (HCC)     Sleep apnea     cpap    Sleep disturbance     Spinal stenosis of lumbar region without neurogenic claudication 08/08/2018    Visual impairment        Prior Living Situation: Assisted living (medical wing per son)  Diet Prior to Admission: Regular;Thin liquids  Precautions: Aspiration    Patient/Family Goals: to get better    SWALLOWING HISTORY  Current Diet Consistency: Regular;Thin liquids  Dysphagia History: as above  Imaging Results:   CXR from 4/5/24 revealed:  CONCLUSION:    1. Stable cardiomegaly with interstitial opacities likely representing mild edema.   2. Small bilateral pleural effusions with bilateral basilar atelectasis/infiltrates.   3. Hyperexpansion of the lungs.          LOCATION:  [facility]                  Dictated by (CST): Juan C Mckay MD on 4/05/2024 at 6:59 PM       Finalized by (CST): Juan C Mckay MD on 4/05/2024 at 7:00 PM       UGI/Esophagram from 2/11/24 revealed:  CONCLUSION:  Limited study due to patient's overall condition demonstrates at least moderate dysmotility of the esophagus with multiple tertiary contractions.  There is no fixed stricture or mass.  The esophagus, stomach, duodenal bulb and duodenal sweep    are otherwise unremarkable within the limits of a single contrast study.         Dictated by (CST): Enmanuel Roman MD on 2/11/2023 at 10:27 AM       Finalized by (CST): Enmanuel Roman MD on 2/11/2023 at 10:29 AM       SUBJECTIVE       OBJECTIVE   ORAL MOTOR EXAMINATION  Dentition: Functional  Symmetry: Within Functional Limits  Strength:  (generally reduced)  Tone: Within Functional Limits  Range of Motion: Within Functional Limits  Rate of Motion: Reduced    Voice Quality: Clear;Weak  Respiratory Status: Unlabored (room air)  Consistencies Trialed: Thin liquids;Soft solid  Method of Presentation: Staff/Clinician assistance;Straw;Single sips;Consecutive  swallows  Patient Positioning: Upright;Midline (in bed)    Oral Phase of Swallow: Impaired  Bolus Retrieval: Intact  Bilabial Seal: Intact  Bolus Formation: Impaired  Bolus Propulsion: Impaired  Mastication:  (prolonged but adequate)  Retention: Intact    Pharyngeal Phase of Swallow: Impaired  Laryngeal Elevation Timing: Appears intact  Laryngeal Elevation Strength: Appears intact  Laryngeal Elevation Coordination: Appears impaired  (Please note: Silent aspiration cannot be evaluated clinically. Videofluoroscopic Swallow Study is required to rule-out silent aspiration.)    Esophageal Phase of Swallow:  (known esophageal dysphagia)                GOALS  Goal #1 The patient will tolerate soft/easy to chew consistency and thin liquids without overt signs or symptoms of aspiration with 100 % accuracy over 2-3 session(s).  In Progress   Goal #2 The patient/family/caregiver will demonstrate understanding and implementation of aspiration precautions and swallow strategies independently over 2-3 session(s).    In Progress   Goal #3 The patient will tolerate trial upgrade of regular consistency and NA liquids without overt signs or symptoms of aspiration with 100 % accuracy over 1-2 session(s).  In Progress     FOLLOW UP  Treatment Plan/Recommendations: Dysphagia therapy  Number of Visits to Meet Established Goals: 3  Follow Up Needed (Documentation Required): Yes  SLP Follow-up Date: 04/07/24    Thank you for your referral.   If you have any questions, please contact Gonzalo Rocha MS CCC-SLP  Pager [phone]

## 2024-04-06 NOTE — PROGRESS NOTES
[facility] Cardiology  Progress Note    Ayse Maldonado Patient Status:  Inpatient    10/25/1927 MRN [id number]   [facility] 6NE-A Attending Terri Navarro MD   Hosp Day # 9 PCP Terri Navarro MD       Assessment and Plan:    CV -patient transferred here overnight with poorly controlled hypertension and dyspnea.  Patient was on Cardene but now weaned off.  She is likely volume overloaded as well with I suspect diastolic heart failure.  Patient's diuresed very well with Lasix and appears quite comfortable.  She is off IV meds.  She is back on her usual p.o. meds.  Heart rates appear to be well-controlled with intermittent pacing.  Will get an echo with Dopplers today.  If blood pressures are okay later today I think she can go back to the floor from our standpoint.  Status post surgical correction of incarcerated inguinal hernia  History of dementia    Subjective:  Resting comfortably    Objective:       Intake/Output:    Intake/Output Summary (Last 24 hours) at 2024 0735  Last data filed at 2024 0600  Gross per 24 hour   Intake 250 ml   Output 2350 ml   Net -2100 ml       Wt Readings from Last 3 Encounters:   24 134 lb 0.6 oz (60.8 kg)   24 135 lb (61.2 kg)   24 135 lb (61.2 kg)            Physical Exam:  Blood pressure 134/73, pulse 80, temperature 97.3 °F (36.3 °C), temperature source Temporal, resp. rate 22, height 5' 7\" (1.702 m), weight 134 lb 0.6 oz (60.8 kg), SpO2 99%.    General: In no apparent distress.  Cardiac: Irregular  Lungs: Clear   Abdomen: Soft, non-tender.   Extremities: Without clubbing, cyanosis or edema.  Neurologic: Alert and oriented, normal affect.  Skin: Warm and dry.     Laboratory/Data:    Labs:  No results for input(s): \"TROP\", \"CK\" in the last 168 hours.   Recent Labs   Lab 24  0900 24  0912 24  0400   RBC 3.51* 3.23* 3.71*   HGB 12.0 11.2* 12.6   HCT 37.0 33.5* 38.7   .4* 103.7* 104.3*   MCH 34.2* 34.7* 34.0   MCHC 32.4 33.4  32.6   RDW 12.6 12.1 11.9   NEPRELIM 7.99* 6.04 5.09   WBC 8.9 7.8 6.3   .0 184.0 193.0     Recent Labs   Lab 04/02/24  0955 04/03/24  0506 04/04/24  1340 04/05/24  0548 04/06/24  0400   * 132* 91  --  99   BUN 19 24* 17  --  12   CREATSERUM 0.65 0.65 0.60  --  0.62   CA 8.8 8.9 8.3*  --  8.2*   ALB 2.9* 2.7*  --   --   --    * 148* 140  --  140   K 3.1* 3.7  3.7 3.4* 3.7 3.2*   * 120* 111  --  108   CO2 24.0 19.0* 23.0  --  27.0   ALKPHO 70 60  --   --   --    AST 59* 44*  --   --   --    ALT 27 22  --   --   --    BILT 0.6 0.4  --   --   --    TP 6.3* 6.1*  --   --   --       Lab Results   Component Value Date    PT 30.8 (H) 11/08/2013    PT 16.1 (H) 06/28/2011    PT 15.3 06/27/2011    INR 1.41 (H) 05/07/2020    INR 1.49 (H) 04/24/2020    INR 1.38 (H) 02/21/2020        Medications:    Infusions:      niCARdipine Stopped (04/05/24 2230)    dilTIAZem Stopped (04/03/24 1400)     Scheduled:      potassium chloride  40 mEq Intravenous Once    pantoprazole  40 mg Oral QAM AC    lisinopril  20 mg Oral BID    metoprolol tartrate  100 mg Oral 2x Daily(Beta Blocker)    dilTIAZem ER  180 mg Oral Daily    memantine  5 mg Oral Nightly    gabapentin  300 mg Oral BID    furosemide  40 mg Intravenous BID (Diuretic)    apixaban  5 mg Oral BID    polyethylene glycol (PEG 3350)  17 g Oral Daily    digoxin  125 mcg Oral QAM           Lalo Manzanares MD  L3

## 2024-04-06 NOTE — PROGRESS NOTES
[facility]  Progress Note    Ayse Maldonado Patient Status:  Inpatient    10/25/1927 MRN [id number]   Location [facility] 8NE-A Attending Terri Navarro MD   Hosp Day # 9 PCP Terri Navarro MD       SUBJECTIVE:  Sleepy , does not want to eat or get out of bed   Her BP and HR was high yesterday   Had fluid overload resolved with lasix   BP is normal this AM   OBJECTIVE:  Vital signs in last 24 hours:  /66 (BP Location: Left arm)   Pulse 72   Temp 98.9 °F (37.2 °C) (Temporal)   Resp 21   Ht 5' 7\" (1.702 m)   Wt 134 lb 0.6 oz (60.8 kg)   SpO2 96%   BMI 20.99 kg/m²     Intake/Output:    Intake/Output Summary (Last 24 hours) at 2024 1406  Last data filed at 2024 1200  Gross per 24 hour   Intake 500 ml   Output 2100 ml   Net -1600 ml       Wt Readings from Last 3 Encounters:   24 134 lb 0.6 oz (60.8 kg)   24 135 lb (61.2 kg)   24 135 lb (61.2 kg)     Exam:  Gen: Sleepy and tired   Pulm: Lungs clear, normal respiratory effort  CV: Heart with irregular rate and rhythm, no peripheral edema  Abd: Abdomen soft, nontender, nondistended, no organomegaly, bowel sounds present  MSK: Full range of motion in extremities, no clubbing, no cyanosis  Skin: no rashes or lesions    Data Review:     Labs:   No components found for: \"BMP\"   Recent Labs   Lab 24  0900 24  0912 24  0400   RBC 3.51* 3.23* 3.71*   HGB 12.0 11.2* 12.6   HCT 37.0 33.5* 38.7   .4* 103.7* 104.3*   MCH 34.2* 34.7* 34.0   MCHC 32.4 33.4 32.6   RDW 12.6 12.1 11.9   NEPRELIM 7.99* 6.04 5.09   WBC 8.9 7.8 6.3   .0 184.0 193.0     No results for input(s): \"PTP\", \"INR\" in the last 168 hours.  Hemoglobin A1c (%)   Date Value   2012 5.9 (H)   2011 5.8 (H)     HEMOGLOBIN A1c (% of total Hgb)   Date Value   2023 5.7 (H)     HGBA1C (%)   Date Value   2014 5.8 (H)   2014 6.0 (H)   2013 6.3 (H)     HgbA1C (%)   Date Value   2022 6.0 (H)   2021 6.3  (H)   07/27/2020 5.3     TSH   Date Value   01/21/2024 2.950 mIU/mL   07/30/2022 2.020 mIU/mL   11/03/2021 0.926 mIU/mL   07/05/2012 1.940 uIU/mL   08/11/2011 2.230 uIU/mL   05/06/2011 1.690 uIU/mL       Assessment & Plan:  1.Unilateral inguinal hernia with obstruction and without gangrene -S/p Hernia repair   On  Eliquis   Started on regular diet   Fluid overload/Acute on chronic  diastolic HF - improved with lasix   2. Accelerated  Hypertension-improved   3.  Chronic A-fib-HR controlled   4.  Pulmonary HTN -   5.  History of COPD   6. Dementia and cognitive dysfunction -   Cardiology and surgery consult Appreciated   Discharge planning to rehab today   Discussed with sons at bedside   Patient Active Problem List   Diagnosis    Pacemaker - M. Perfecto    JAZZ on CPAP    Current use of long term anticoagulation    Gastroesophageal reflux disease without esophagitis    Asthma with COPD (chronic obstructive pulmonary disease) (HCC)    Pulmonary hypertension (HCC)-52 mg Hg    Alternating constipation and diarrhea    Other fatigue-mutifactorial    Chronic bronchitis (HCC)    CKD (chronic kidney disease) stage 3, GFR 30-59 ml/min (HCC)    PAF (paroxysmal atrial fibrillation) (HCC)    Glaucoma suspect of left eye    Pseudophakia    Trochanteric bursitis of left hip    Fall    Essential hypertension    Chronic left hip pain    CHI (closed head injury), subsequent encounter    Altered mental status, unspecified altered mental status type    Hyponatremia    Seizure disorder (HCC)    Hypertension, unspecified type    Altered mental status    Weakness    Hypotension, unspecified hypotension type    Hypotension    Branch retinal vein occlusion of left eye with macular edema (HCC)    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    Terri Navarro MD  4/6/2024

## 2024-04-06 NOTE — PLAN OF CARE
Assumed care 1930, received from CTU d/t hypertension.  SBP 170s on arrival, cardene gtt started at 2.5 mg/hr.  Pt appeared SOB with increased work of breathing, tachypneic, crackles and exp wheezes auscultated.  -110s on monitor, 02 sats 93-94% on O2 2L/NC.  Notified [facility] cardiology APN and order for lasix received.  Diuresed 800ml within 2 hrs of administration, Crackles/wheezing improved, dyspnea/work of breathing also improved.  Cardene gtt weaned off by 2230.    Son at bedside overnight, pt slept most of the night.

## 2024-04-08 ENCOUNTER — LAB REQUISITION (OUTPATIENT)
Dept: LAB | Facility: HOSPITAL | Age: 89
End: 2024-04-08
Payer: MEDICARE

## 2024-04-08 DIAGNOSIS — K40.31 UNILATERAL INGUINAL HERNIA, WITH OBSTRUCTION, WITHOUT GANGRENE, RECURRENT: ICD-10-CM

## 2024-04-08 LAB
ALBUMIN SERPL-MCNC: 2.5 G/DL (ref 3.4–5)
ALBUMIN/GLOB SERPL: 0.8 {RATIO} (ref 1–2)
ALP LIVER SERPL-CCNC: 61 U/L
ALT SERPL-CCNC: 23 U/L
ANION GAP SERPL CALC-SCNC: 7 MMOL/L (ref 0–18)
AST SERPL-CCNC: 25 U/L (ref 15–37)
BASOPHILS # BLD AUTO: 0.03 X10(3) UL (ref 0–0.2)
BASOPHILS NFR BLD AUTO: 0.5 %
BILIRUB SERPL-MCNC: 0.6 MG/DL (ref 0.1–2)
BUN BLD-MCNC: 14 MG/DL (ref 9–23)
CALCIUM BLD-MCNC: 9 MG/DL (ref 8.5–10.1)
CHLORIDE SERPL-SCNC: 106 MMOL/L (ref 98–112)
CO2 SERPL-SCNC: 26 MMOL/L (ref 21–32)
CREAT BLD-MCNC: 0.63 MG/DL
EGFRCR SERPLBLD CKD-EPI 2021: 81 ML/MIN/1.73M2 (ref 60–?)
EOSINOPHIL # BLD AUTO: 0.16 X10(3) UL (ref 0–0.7)
EOSINOPHIL NFR BLD AUTO: 2.5 %
ERYTHROCYTE [DISTWIDTH] IN BLOOD BY AUTOMATED COUNT: 11.9 %
FASTING STATUS PATIENT QL REPORTED: YES
GLOBULIN PLAS-MCNC: 3.2 G/DL (ref 2.8–4.4)
GLUCOSE BLD-MCNC: 82 MG/DL (ref 70–99)
HCT VFR BLD AUTO: 37.1 %
HGB BLD-MCNC: 12.7 G/DL
IMM GRANULOCYTES # BLD AUTO: 0.09 X10(3) UL (ref 0–1)
IMM GRANULOCYTES NFR BLD: 1.4 %
LYMPHOCYTES # BLD AUTO: 1.04 X10(3) UL (ref 1–4)
LYMPHOCYTES NFR BLD AUTO: 16.3 %
MCH RBC QN AUTO: 34.2 PG (ref 26–34)
MCHC RBC AUTO-ENTMCNC: 34.2 G/DL (ref 31–37)
MCV RBC AUTO: 100 FL
MONOCYTES # BLD AUTO: 0.65 X10(3) UL (ref 0.1–1)
MONOCYTES NFR BLD AUTO: 10.2 %
NEUTROPHILS # BLD AUTO: 4.42 X10 (3) UL (ref 1.5–7.7)
NEUTROPHILS # BLD AUTO: 4.42 X10(3) UL (ref 1.5–7.7)
NEUTROPHILS NFR BLD AUTO: 69.1 %
OSMOLALITY SERPL CALC.SUM OF ELEC: 288 MOSM/KG (ref 275–295)
PLATELET # BLD AUTO: 274 10(3)UL (ref 150–450)
POTASSIUM SERPL-SCNC: 3.1 MMOL/L (ref 3.5–5.1)
PROT SERPL-MCNC: 5.7 G/DL (ref 6.4–8.2)
RBC # BLD AUTO: 3.71 X10(6)UL
SODIUM SERPL-SCNC: 139 MMOL/L (ref 136–145)
WBC # BLD AUTO: 6.4 X10(3) UL (ref 4–11)

## 2024-04-08 PROCEDURE — 85025 COMPLETE CBC W/AUTO DIFF WBC: CPT | Performed by: INTERNAL MEDICINE

## 2024-04-08 PROCEDURE — 80053 COMPREHEN METABOLIC PANEL: CPT | Performed by: INTERNAL MEDICINE

## 2024-04-09 NOTE — PAYOR COMM NOTE
--------------  DISCHARGE REVIEW    Payor: [employer]  Subscriber #:  [id number]  Authorization Number: [id number]    Admit date: 3/28/24  Admit time:  11:46 PM  Discharge Date: 4/6/2024  2:56 PM     Admitting Physician: Terri Navarro MD  Attending Physician:  No att. providers found  Primary Care Physician: Terri Navarro MD

## 2024-04-18 ENCOUNTER — OFFICE VISIT (OUTPATIENT)
Facility: LOCATION | Age: 89
End: 2024-04-18
Payer: MEDICARE

## 2024-04-18 VITALS — TEMPERATURE: 98 F | HEART RATE: 67 BPM

## 2024-04-18 DIAGNOSIS — K40.90 RIGHT INGUINAL HERNIA: ICD-10-CM

## 2024-04-18 DIAGNOSIS — Z98.890 POSTOPERATIVE STATE: Primary | ICD-10-CM

## 2024-04-18 PROCEDURE — 99024 POSTOP FOLLOW-UP VISIT: CPT

## 2024-04-18 NOTE — PROGRESS NOTES
Follow Up Visit Note       Active Problems      1. Postoperative state    2. Right inguinal hernia          Chief Complaint   Chief Complaint   Patient presents with    Post-Op     PO 4/2 OPEN RIGHT INGUINGAL HERNIA REPAIR WITH MESH W/MOHIT          History of Present Illness  Ayse is a 89 year old female who underwent open right inguinal hernia repair with Dr. Fernandez on 4/2/2024. She presents to clinic today with her son for follow up evaluation.    The patient is currently living at [facility]. The patient's son reports that patient has not been getting out of bed much. They reports the patient's appetite continues to be poor. She is trying to drink 2 ensures a day.  The patient recently saw her cardiologist who ordered physical therapy. She reports no pain to the incision site. She denies drainage or erythema around the site. The patient reports she is tired.      Allergies  Ayse is allergic to bacitracin, ciprofloxacin, levofloxacin, sulfamethoxazole w/trimethoprim, altaryl, amiodarone, diphenhydramine, escitalopram, thiazide-type diuretics, cephalexin, clonidine, garlic, garlic, hydrocodone, and lexapro.    Past Medical / Surgical / Social / Family History    The past medical and past surgical history have been reviewed by me today.    Past Medical History:    -Lumbar radicular pain-electrical stimulator    Abdominal distention    Abdominal hernia    Abdominal pain    Age-related osteoporosis with current pathological fracture with routine healing    Anesthesia complication    Arthritis    Atrial fibrillation (HCC)    Back pain    Basal cell cancer    colon    Bloating    Body piercing    Cellulitis of chest wall    Cellulitis of left arm    Cellulitis of right upper extremity    Closed bicondylar fracture of distal end of right humerus            Global exp 1-17-18 / RIGHT ELBOW / BAM     Closed displaced fracture of pelvis (HCC)    Constipation    Diarrhea, unspecified    Easy  bruising    Essential hypertension    Eye disease    Fatigue    Food intolerance    Frequent urination    Hearing impairment    Hearing loss    Heart palpitations    Hemorrhoids    History of depression    Hypertension    Irregular bowel habits    Leg swelling    Lumbar facet arthropathy    Macular degeneration - [facility]    Muscle weakness    Night sweats    Pacemaker    Reflux    Renal disorder    S/P colon resection / Colonoscopy (10/12) recheck - ANJALI Spencer    S/P laminectomy    Seizure disorder (HCC)    Sleep apnea    cpap    Sleep disturbance    Spinal stenosis of lumbar region without neurogenic claudication    Visual impairment     Past Surgical History:   Procedure Laterality Date    Adj tiss xfer lid,nos,ear <10sqcm  03/28/2014    Procedure: DEBULKING OF NASOLABIAL FLAP TO NOSE;  Surgeon: Shar Vasquez MD;  Location: [facility]    Appendectomy      Back surgery      Bowel resection      Cholecystectomy      Colectomy      Colonoscopy      Colonoscopy      Egd      Hemorrhoidectomy,int/ext,simple      Hip replacement surgery  left 3/06 right 10/06    Other accessory      electro-stimulator for spinal stenosis    Other surgical history  10/10/2011    cysto-    Other surgical history  10/2016    back surgery    Pacemaker      [employer] pacemaker    Skin surgery  03/06/2014    BCC nod, inf to left nasal tip / MMS done    Skin surgery  10/31/2016    MMS for BCC-nod to the R upper cut lip-AB    Spine surgery procedure unlisted      Tonsillectomy      Total hip replacement         The family history and social history have been reviewed by me today.    Family History   Problem Relation Age of Onset    Cancer Father     Hypertension Father     Colon Polyps Son     Other (Other) Sister         Davina Mayes     Social History     Socioeconomic History    Marital status:    Tobacco Use    Smoking status: Former     Current packs/day: 0.00     Average packs/day: 0.1 packs/day  for 20.0 years (2.0 ttl pk-yrs)     Types: Cigarettes     Start date: 1952     Quit date: 1972     Years since quittin.3    Smokeless tobacco: Never   Vaping Use    Vaping status: Never Used   Substance and Sexual Activity    Alcohol use: Not Currently     Alcohol/week: 2.0 standard drinks of alcohol     Types: 2 Glasses of wine per week     Comment: wine occasionally    Drug use: No   Other Topics Concern    Caffeine Concern No    Exercise Yes     Comment: walks dog 2 x a day        Current Outpatient Medications:     lisinopril 20 MG Oral Tab, Take 1 tablet (20 mg total) by mouth in the morning and 1 tablet (20 mg total) before bedtime. Hold if systolic BP less than 100., Disp: , Rfl:     dilTIAZem HCl ER Beads 180 MG Oral Capsule SR 24 Hr, Take 1 capsule (180 mg total) by mouth daily. In the morning, Disp: , Rfl:     docusate sodium 100 MG Oral Cap, Take 1 capsule (100 mg total) by mouth 2 (two) times daily., Disp: , Rfl:     memantine 5 MG Oral Tab, Take 1 tablet (5 mg total) by mouth daily. Evening only, Disp: , Rfl:     metoprolol tartrate 50 MG Oral Tab, Take 1 tablet (50 mg total) by mouth 2 (two) times daily., Disp: 60 tablet, Rfl: 2    GABAPENTIN 300 MG Oral Cap, TAKE ONE CAPSULE BY MOUTH TWICE DAILY, Disp: 180 capsule, Rfl: 1    Polyethylene Glycol 3350 (MIRALAX OR), Take 17 g by mouth daily as needed (constipation)., Disp: , Rfl:     Multiple Vitamins-Minerals (CENTRUM SILVER 50+WOMEN OR), Take 1 tablet by mouth daily., Disp: , Rfl:     Multiple Vitamins-Minerals (ICAPS AREDS 2 OR), Take 1 capsule by mouth in the morning and 1 capsule before bedtime., Disp: , Rfl:     Acetaminophen  MG Oral Tab CR, Take 2 tablets (1,300 mg total) by mouth in the morning and 2 tablets (1,300 mg total) before bedtime., Disp: , Rfl:     Calcium Carb-Cholecalciferol (CALCIUM-VITAMIN D3) 600-500 MG-UNIT Oral Cap, Take 1 tablet by mouth daily., Disp: , Rfl:     apixaban 5 MG Oral Tab, Take 1 tablet (5 mg  total) by mouth 2 (two) times daily., Disp: 60 tablet, Rfl: 3    digoxin 0.125 MG Oral Tab, Take 1 tablet (125 mcg total) by mouth every morning., Disp: , Rfl:      Review of Systems  The Review of Systems has been reviewed by me during today.  Review of Systems   Constitutional:  Positive for activity change and appetite change. Negative for chills, fatigue and fever.   Gastrointestinal:  Negative for abdominal distention, abdominal pain, constipation, diarrhea, nausea and vomiting.   Genitourinary:  Negative for difficulty urinating and dysuria.   Skin:  Negative for wound.        Physical Findings   Pulse 67   Temp 97.5 °F (36.4 °C) (Temporal)   Physical Exam  Vitals reviewed.   Constitutional:       General: She is not in acute distress.     Appearance: Normal appearance. She is not ill-appearing.      Comments: Patient in wheelchair.    HENT:      Head: Normocephalic and atraumatic.   Eyes:      General: No scleral icterus.     Conjunctiva/sclera: Conjunctivae normal.   Pulmonary:      Effort: Pulmonary effort is normal. No respiratory distress.   Abdominal:      General: Abdomen is flat. There is no distension.      Palpations: Abdomen is soft.      Tenderness: There is no abdominal tenderness. There is no guarding or rebound.      Comments: Right inguinal incision is clean, dry. Dermabond in place. No surrounding erythema. No drainage.    Musculoskeletal:      Cervical back: Normal range of motion.   Skin:     General: Skin is warm and dry.      Coloration: Skin is not jaundiced.   Neurological:      Mental Status: She is alert.   Psychiatric:         Mood and Affect: Mood normal.         Behavior: Behavior normal.          Assessment   1. Postoperative state    2. Right inguinal hernia        Plan   The patient is doing well postoperatively.  Continue Diet as tolerated. Encourage ensure protein drinks if appetite is pore.   Tylenol and Ibuprofen as needed for pain control.   Continue local wound care, soap  and water to the incisions.   Avoid lifting greater than 15-20 lbs for 6 weeks post op.   I agree with starting physical therapy. Encourage ambulation and up to chair.   All the patient's and her son's questions and concerns were addressed. They voiced understanding and are in agreement with the plan      Follow Up  She may follow up with [facility] general surgery as needed  Gem Elam PA-C

## 2024-04-27 ENCOUNTER — LAB REQUISITION (OUTPATIENT)
Dept: LAB | Facility: HOSPITAL | Age: 89
End: 2024-04-27
Payer: MEDICARE

## 2024-04-27 LAB
BILIRUB UR QL STRIP.AUTO: NEGATIVE
COLOR UR AUTO: YELLOW
GLUCOSE UR STRIP.AUTO-MCNC: NORMAL MG/DL
HYALINE CASTS #/AREA URNS AUTO: PRESENT /LPF
KETONES UR STRIP.AUTO-MCNC: NEGATIVE MG/DL
LEUKOCYTE ESTERASE UR QL STRIP.AUTO: 500
NITRITE UR QL STRIP.AUTO: NEGATIVE
PH UR STRIP.AUTO: 8 [PH] (ref 5–8)
PROT UR STRIP.AUTO-MCNC: 30 MG/DL
SP GR UR STRIP.AUTO: 1.01 (ref 1–1.03)
UROBILINOGEN UR STRIP.AUTO-MCNC: NORMAL MG/DL
WBC #/AREA URNS AUTO: >50 /HPF
WBC CLUMPS UR QL AUTO: PRESENT /HPF

## 2024-04-27 PROCEDURE — 87186 SC STD MICRODIL/AGAR DIL: CPT | Performed by: INTERNAL MEDICINE

## 2024-04-27 PROCEDURE — 87077 CULTURE AEROBIC IDENTIFY: CPT | Performed by: INTERNAL MEDICINE

## 2024-04-27 PROCEDURE — 87086 URINE CULTURE/COLONY COUNT: CPT | Performed by: INTERNAL MEDICINE

## 2024-04-27 PROCEDURE — 81001 URINALYSIS AUTO W/SCOPE: CPT | Performed by: INTERNAL MEDICINE

## 2024-04-29 ENCOUNTER — TELEPHONE (OUTPATIENT)
Facility: LOCATION | Age: 89
End: 2024-04-29

## 2024-04-29 ENCOUNTER — LAB REQUISITION (OUTPATIENT)
Dept: LAB | Facility: HOSPITAL | Age: 89
End: 2024-04-29
Payer: MEDICARE

## 2024-04-29 DIAGNOSIS — N39.0 URINARY TRACT INFECTION, SITE NOT SPECIFIED: ICD-10-CM

## 2024-04-29 LAB
ALBUMIN SERPL-MCNC: 2.7 G/DL (ref 3.4–5)
ALBUMIN/GLOB SERPL: 0.8 {RATIO} (ref 1–2)
ALP LIVER SERPL-CCNC: 89 U/L
ALT SERPL-CCNC: 19 U/L
ANION GAP SERPL CALC-SCNC: 5 MMOL/L (ref 0–18)
AST SERPL-CCNC: 21 U/L (ref 15–37)
BASOPHILS # BLD AUTO: 0.07 X10(3) UL (ref 0–0.2)
BASOPHILS NFR BLD AUTO: 1.3 %
BILIRUB SERPL-MCNC: 0.4 MG/DL (ref 0.1–2)
BUN BLD-MCNC: 18 MG/DL (ref 9–23)
CALCIUM BLD-MCNC: 9.1 MG/DL (ref 8.5–10.1)
CHLORIDE SERPL-SCNC: 105 MMOL/L (ref 98–112)
CO2 SERPL-SCNC: 26 MMOL/L (ref 21–32)
CREAT BLD-MCNC: 0.81 MG/DL
EGFRCR SERPLBLD CKD-EPI 2021: 66 ML/MIN/1.73M2 (ref 60–?)
EOSINOPHIL # BLD AUTO: 0.2 X10(3) UL (ref 0–0.7)
EOSINOPHIL NFR BLD AUTO: 3.7 %
ERYTHROCYTE [DISTWIDTH] IN BLOOD BY AUTOMATED COUNT: 12.4 %
FASTING STATUS PATIENT QL REPORTED: YES
GLOBULIN PLAS-MCNC: 3.5 G/DL (ref 2.8–4.4)
GLUCOSE BLD-MCNC: 78 MG/DL (ref 70–99)
HCT VFR BLD AUTO: 35.2 %
HGB BLD-MCNC: 12 G/DL
IMM GRANULOCYTES # BLD AUTO: 0.06 X10(3) UL (ref 0–1)
IMM GRANULOCYTES NFR BLD: 1.1 %
LYMPHOCYTES # BLD AUTO: 1.24 X10(3) UL (ref 1–4)
LYMPHOCYTES NFR BLD AUTO: 23 %
MCH RBC QN AUTO: 35.3 PG (ref 26–34)
MCHC RBC AUTO-ENTMCNC: 34.1 G/DL (ref 31–37)
MCV RBC AUTO: 103.5 FL
MONOCYTES # BLD AUTO: 0.61 X10(3) UL (ref 0.1–1)
MONOCYTES NFR BLD AUTO: 11.3 %
NEUTROPHILS # BLD AUTO: 3.2 X10 (3) UL (ref 1.5–7.7)
NEUTROPHILS # BLD AUTO: 3.2 X10(3) UL (ref 1.5–7.7)
NEUTROPHILS NFR BLD AUTO: 59.6 %
OSMOLALITY SERPL CALC.SUM OF ELEC: 283 MOSM/KG (ref 275–295)
PLATELET # BLD AUTO: 234 10(3)UL (ref 150–450)
POTASSIUM SERPL-SCNC: 4.7 MMOL/L (ref 3.5–5.1)
PROT SERPL-MCNC: 6.2 G/DL (ref 6.4–8.2)
RBC # BLD AUTO: 3.4 X10(6)UL
SODIUM SERPL-SCNC: 136 MMOL/L (ref 136–145)
WBC # BLD AUTO: 5.4 X10(3) UL (ref 4–11)

## 2024-04-29 PROCEDURE — 85025 COMPLETE CBC W/AUTO DIFF WBC: CPT | Performed by: INTERNAL MEDICINE

## 2024-04-29 PROCEDURE — 80053 COMPREHEN METABOLIC PANEL: CPT | Performed by: INTERNAL MEDICINE

## 2024-04-29 NOTE — TELEPHONE ENCOUNTER
Good morning patiens son (cortes) LVM on Friday concerned about her post op visit on 4/18 with Gem HENLEY.  He stated he was told that the patient looked fine and everything was in good shape however now there is another hernia that was found right under where the previous one was removed and he was wondering why did they examine her in the exam room while she was sitting in her wheelchair he says he felt like if they would've had the patient lay down on the bed they would have caught the second hernia.      Please advise.       Cortes (SON) [phone]

## 2024-04-29 NOTE — TELEPHONE ENCOUNTER
Spoke with son Cortes, who is concerned that patient may have add'l small hernia below newly repaired hernia.  Scheduled appt w/[facility] for evaluation.    Future Appointments   Date Time Provider Department Center   5/9/2024  2:30 PM Mally Fernandez MD [id number]   4/9/2025  9:45 AM Poornima Hinton,  [facility]

## 2024-05-02 ENCOUNTER — LAB REQUISITION (OUTPATIENT)
Dept: LAB | Facility: HOSPITAL | Age: 89
End: 2024-05-02
Payer: MEDICARE

## 2024-05-02 DIAGNOSIS — N39.0 URINARY TRACT INFECTION, SITE NOT SPECIFIED: ICD-10-CM

## 2024-05-02 LAB
ALBUMIN SERPL-MCNC: 2.8 G/DL (ref 3.4–5)
ALBUMIN/GLOB SERPL: 0.8 {RATIO} (ref 1–2)
ALP LIVER SERPL-CCNC: 90 U/L
ALT SERPL-CCNC: 22 U/L
ANION GAP SERPL CALC-SCNC: 7 MMOL/L (ref 0–18)
AST SERPL-CCNC: 29 U/L (ref 15–37)
BASOPHILS # BLD AUTO: 0.05 X10(3) UL (ref 0–0.2)
BASOPHILS NFR BLD AUTO: 1 %
BILIRUB SERPL-MCNC: 0.4 MG/DL (ref 0.1–2)
BUN BLD-MCNC: 15 MG/DL (ref 9–23)
CALCIUM BLD-MCNC: 9.3 MG/DL (ref 8.5–10.1)
CHLORIDE SERPL-SCNC: 108 MMOL/L (ref 98–112)
CO2 SERPL-SCNC: 24 MMOL/L (ref 21–32)
CREAT BLD-MCNC: 0.82 MG/DL
EGFRCR SERPLBLD CKD-EPI 2021: 65 ML/MIN/1.73M2 (ref 60–?)
EOSINOPHIL # BLD AUTO: 0.23 X10(3) UL (ref 0–0.7)
EOSINOPHIL NFR BLD AUTO: 4.4 %
ERYTHROCYTE [DISTWIDTH] IN BLOOD BY AUTOMATED COUNT: 12.6 %
FASTING STATUS PATIENT QL REPORTED: YES
GLOBULIN PLAS-MCNC: 3.6 G/DL (ref 2.8–4.4)
GLUCOSE BLD-MCNC: 85 MG/DL (ref 70–99)
HCT VFR BLD AUTO: 35.5 %
HGB BLD-MCNC: 11.8 G/DL
IMM GRANULOCYTES # BLD AUTO: 0.06 X10(3) UL (ref 0–1)
IMM GRANULOCYTES NFR BLD: 1.1 %
LYMPHOCYTES # BLD AUTO: 1.25 X10(3) UL (ref 1–4)
LYMPHOCYTES NFR BLD AUTO: 23.8 %
MCH RBC QN AUTO: 34.6 PG (ref 26–34)
MCHC RBC AUTO-ENTMCNC: 33.2 G/DL (ref 31–37)
MCV RBC AUTO: 104.1 FL
MONOCYTES # BLD AUTO: 0.67 X10(3) UL (ref 0.1–1)
MONOCYTES NFR BLD AUTO: 12.7 %
MORPHOLOGY: NORMAL
NEUTROPHILS # BLD AUTO: 3 X10 (3) UL (ref 1.5–7.7)
NEUTROPHILS # BLD AUTO: 3 X10(3) UL (ref 1.5–7.7)
NEUTROPHILS NFR BLD AUTO: 57 %
OSMOLALITY SERPL CALC.SUM OF ELEC: 288 MOSM/KG (ref 275–295)
PLATELET # BLD AUTO: 245 10(3)UL (ref 150–450)
PLATELET MORPHOLOGY: NORMAL
PLATELETS.RETICULATED NFR BLD AUTO: 1.4 % (ref 0–7)
POTASSIUM SERPL-SCNC: 4.5 MMOL/L (ref 3.5–5.1)
PROT SERPL-MCNC: 6.4 G/DL (ref 6.4–8.2)
RBC # BLD AUTO: 3.41 X10(6)UL
SODIUM SERPL-SCNC: 139 MMOL/L (ref 136–145)
WBC # BLD AUTO: 5.3 X10(3) UL (ref 4–11)

## 2024-05-02 PROCEDURE — 80053 COMPREHEN METABOLIC PANEL: CPT | Performed by: NURSE PRACTITIONER

## 2024-05-02 PROCEDURE — 85025 COMPLETE CBC W/AUTO DIFF WBC: CPT | Performed by: NURSE PRACTITIONER

## 2024-05-22 ENCOUNTER — OFFICE VISIT (OUTPATIENT)
Facility: LOCATION | Age: 89
End: 2024-05-22

## 2024-05-22 VITALS — HEART RATE: 72 BPM | TEMPERATURE: 97 F

## 2024-05-22 DIAGNOSIS — R19.09 RIGHT GROIN MASS: Primary | ICD-10-CM

## 2024-05-22 PROCEDURE — 99024 POSTOP FOLLOW-UP VISIT: CPT | Performed by: STUDENT IN AN ORGANIZED HEALTH CARE EDUCATION/TRAINING PROGRAM

## 2024-05-22 NOTE — PROGRESS NOTES
Patient ID: Ayse Maldonado is a 89 year old female.    Chief Complaint   Patient presents with    Post-Op     PO - OPEN RIGHT INGUINGAL HERNIA REPAIR WITH MESH 4/2/24, Possible 2nd hernia, no symptoms, CT ABDOMEN+PELVIS (CONTRAST ONLY) 3/28/24.       HPI: Ayse Maldonado is a 89 year old female presents to clinic for follow-up.  Patient is status post open right inguinal hernia repair with mesh on 4/2/2024.  Since then, patient was discharged to a nursing facility.  She has been doing well there per her son.  She has tolerating a diet without any nausea or vomiting.  She does have decreased appetite.  Patient reports not having much flatus or bowel movements.  On exam, the nurses have noticed a bulge at the right groin site.  She presents today for evaluation.    Workup: None      Past Medical History  Past Medical History:    -Lumbar radicular pain-electrical stimulator    Abdominal distention    Abdominal hernia    Abdominal pain    Age-related osteoporosis with current pathological fracture with routine healing    Anesthesia complication    Arthritis    Atrial fibrillation (HCC)    Back pain    Basal cell cancer    colon    Bloating    Body piercing    Cellulitis of chest wall    Cellulitis of left arm    Cellulitis of right upper extremity    Closed bicondylar fracture of distal end of right humerus            Global exp 1-17-18 / RIGHT ELBOW / BAM     Closed displaced fracture of pelvis (HCC)    Constipation    Diarrhea, unspecified    Easy bruising    Essential hypertension    Eye disease    Fatigue    Food intolerance    Frequent urination    Hearing impairment    Hearing loss    Heart palpitations    Hemorrhoids    History of depression    Hypertension    Irregular bowel habits    Leg swelling    Lumbar facet arthropathy    Macular degeneration - [facility]    Muscle weakness    Night sweats    Pacemaker    Reflux    Renal disorder    S/P colon resection / Colonoscopy (10/12) recheck  - S. Spencer    S/P laminectomy    Seizure disorder (HCC)    Sleep apnea    cpap    Sleep disturbance    Spinal stenosis of lumbar region without neurogenic claudication    Visual impairment       Past Surgical History  Past Surgical History:   Procedure Laterality Date    Adj tiss xfer lid,nos,ear <10sqcm  03/28/2014    Procedure: DEBULKING OF NASOLABIAL FLAP TO NOSE;  Surgeon: Shar Vasquez MD;  [facility]    Appendectomy      Back surgery      Bowel resection      Cholecystectomy      Colectomy      Colonoscopy      Colonoscopy      Egd      Hemorrhoidectomy,int/ext,simple      Hip replacement surgery  left 3/06 right 10/06    Other accessory      electro-stimulator for spinal stenosis    Other surgical history  10/10/2011    cysto-    Other surgical history  10/2016    back surgery    Pacemaker      [employer]kit pacemaker    Skin surgery  03/06/2014    BCC nod, inf to left nasal tip / MMS done    Skin surgery  10/31/2016    MMS for BCC-nod to the R upper cut lip-AB    Spine surgery procedure unlisted      Tonsillectomy      Total hip replacement         Medications  Current Outpatient Medications   Medication Sig Dispense Refill    lisinopril 20 MG Oral Tab Take 1 tablet (20 mg total) by mouth in the morning and 1 tablet (20 mg total) before bedtime. Hold if systolic BP less than 100.      dilTIAZem HCl ER Beads 180 MG Oral Capsule SR 24 Hr Take 1 capsule (180 mg total) by mouth daily. In the morning      docusate sodium 100 MG Oral Cap Take 1 capsule (100 mg total) by mouth 2 (two) times daily.      memantine 5 MG Oral Tab Take 1 tablet (5 mg total) by mouth daily. Evening only      metoprolol tartrate 50 MG Oral Tab Take 1 tablet (50 mg total) by mouth 2 (two) times daily. 60 tablet 2    GABAPENTIN 300 MG Oral Cap TAKE ONE CAPSULE BY MOUTH TWICE DAILY 180 capsule 1    Polyethylene Glycol 3350 (MIRALAX OR) Take 17 g by mouth daily as needed (constipation).      Multiple Vitamins-Minerals  (CENTRUM SILVER 50+WOMEN OR) Take 1 tablet by mouth daily.      Multiple Vitamins-Minerals (ICAPS AREDS 2 OR) Take 1 capsule by mouth in the morning and 1 capsule before bedtime.      Acetaminophen  MG Oral Tab CR Take 2 tablets (1,300 mg total) by mouth in the morning and 2 tablets (1,300 mg total) before bedtime.      Calcium Carb-Cholecalciferol (CALCIUM-VITAMIN D3) 600-500 MG-UNIT Oral Cap Take 1 tablet by mouth daily.      apixaban 5 MG Oral Tab Take 1 tablet (5 mg total) by mouth 2 (two) times daily. 60 tablet 3    digoxin 0.125 MG Oral Tab Take 1 tablet (125 mcg total) by mouth every morning.         Allergies  Allergies   Allergen Reactions    Bacitracin SWELLING     Red and swelling    Ciprofloxacin OTHER (SEE COMMENTS)     Nausea and abdominal cramping    Levofloxacin OTHER (SEE COMMENTS)    Sulfamethoxazole W/Trimethoprim OTHER (SEE COMMENTS)     Other reaction(s): VOMITING  Oral    Altaryl     Amiodarone OTHER (SEE COMMENTS)     Elevated liver enzymes      Diphenhydramine UNKNOWN    Escitalopram     Thiazide-Type Diuretics OTHER (SEE COMMENTS)     CLASS    Cephalexin DIARRHEA     May have also caused drug rash    Clonidine RASH     Oral    Garlic DIARRHEA    Garlic DIARRHEA    Hydrocodone NAUSEA ONLY     nausea    Lexapro NAUSEA ONLY       Social History  History   Smoking Status    Former    Packs/day: 0.10    Years: 20.00    Types: Cigarettes    Quit date: 1/1/1972   Smokeless Tobacco    Never     History   Alcohol Use Not Currently     Comment: wine occasionally     History   Drug Use No       Family History  Family History   Problem Relation Age of Onset    Cancer Father     Hypertension Father     Colon Polyps Son     Other (Other) Sister         Davina Mayes       Review of Systems  Review of Systems   Constitutional: Negative.    Respiratory: Negative.     Cardiovascular: Negative.    Gastrointestinal:  Negative for abdominal distention, abdominal pain, constipation, nausea and vomiting.        Exam  Vitals:    05/22/24 1302   Pulse: 72   Temp: 97.3 °F (36.3 °C)     Physical Exam  Constitutional:       Appearance: Normal appearance.      Comments: In wheelchair   Cardiovascular:      Rate and Rhythm: Normal rate.   Pulmonary:      Effort: Pulmonary effort is normal.   Abdominal:      General: Abdomen is flat. There is no distension.      Palpations: Abdomen is soft.      Tenderness: There is no abdominal tenderness.       Skin:     General: Skin is warm and dry.   Neurological:      Mental Status: She is alert and oriented to person, place, and time.           Assessment/Plan  Assessment   Problem List Items Addressed This Visit    None  Visit Diagnoses       Right groin mass    -  Primary    Relevant Orders    US GROIN RIGHT LIMITED SH(CPT=76882)            Ayse Maldonado is a 89 year old female status post open right inguinal hernia repair with mesh    On physical exam, patient does have a bulge in the right groin  Bulge is not reducible but feels mobile and is nontender  Differential includes recurrence versus seroma  Will obtain ultrasound for evaluation  Once patient obtains the ultrasound, I can reach out to the family for further recommendations  If this is a seroma, no intervention is needed, we will continue conservative measures  If this is a recurrence, as long as there is no intestines involved, can continue to monitor    Patient and her son is to call my office for any questions or concerns, especially any changes or symptoms  If patient develops severe pain at the site with associated nausea, vomiting, and inability to have a bowel movement or passed flatus, she should go to the emergency room to be evaluated      Mally Fernandez MD  General Surgery  [facility]     CC:  Terri Navarro MD

## 2024-06-12 ENCOUNTER — HOSPITAL ENCOUNTER (OUTPATIENT)
Dept: ULTRASOUND IMAGING | Age: 89
Discharge: HOME | End: 2024-06-12
Attending: STUDENT IN AN ORGANIZED HEALTH CARE EDUCATION/TRAINING PROGRAM
Payer: MEDICARE

## 2024-06-12 DIAGNOSIS — R19.09 RIGHT GROIN MASS: ICD-10-CM

## 2024-06-12 PROCEDURE — 76882 US LMTD JT/FCL EVL NVASC XTR: CPT | Performed by: STUDENT IN AN ORGANIZED HEALTH CARE EDUCATION/TRAINING PROGRAM

## 2024-06-13 ENCOUNTER — TELEPHONE (OUTPATIENT)
Facility: LOCATION | Age: 89
End: 2024-06-13

## 2024-06-13 NOTE — TELEPHONE ENCOUNTER
Called son, Deshawn, who is the power of  with results of recent groin ultrasound  Ultrasound showed no hernia, fluid collection noted which is most likely seroma formation  I discussed with Deshawn that we will continue to watch  Eventually, the fluid collection will be absorbed and the lump will shrink  If there are any changes to his mother, he is contact my office to have her evaluated  Deshawn acknowledged understanding and agreed to the plan    US GROIN RIGHT LIMITED SH(CPT=76882)    Result Date: 6/12/2024  CONCLUSION:  Within the right groin region there is an ovoid complex fluid collection measuring 4 cm in maximal dimension query for a residual right inguinal hernia?, versus a postprocedural complex seroma or liquefying hematoma in the setting of herniorrhaphy?  Please correlate with patient's history.   LOCATION:  [place]   Dictated by (CST): Brunilda Encinas DO on 6/12/2024 at 12:01 PM     Finalized by (CST): Brunilda Encinas DO on 6/12/2024 at 12:03 PM        Mally Fernandez MD  [facility] General Surgery  6/13/2024  10:55 AM

## 2024-08-15 ENCOUNTER — TELEPHONE (OUTPATIENT)
Facility: CLINIC | Age: 89
End: 2024-08-15

## 2024-08-15 NOTE — TELEPHONE ENCOUNTER
Nurse s/w pt's son Pankaj, informed [facility] customer service states pt's cpap supplies are ready for delivery and should arrive shortly.     [phone] (home)

## 2024-08-15 NOTE — TELEPHONE ENCOUNTER
Pt's son (Pankaj) lvm, [facility] is holding pt's cpap supplies and son wants office to contact [facility] to find out why.    Nurse contacted [facility], s/w customer service, pt's cpap supply order is ready for delivery, nothing is needed from the physician's office. Although the address on file and the delivery address are not the same.  Nurse lvm for son to contact office and verify delivery address for cpap supplies.    [phone] [facility]

## 2024-08-23 ENCOUNTER — TELEPHONE (OUTPATIENT)
Facility: CLINIC | Age: 89
End: 2024-08-23

## 2024-09-05 ENCOUNTER — TELEPHONE (OUTPATIENT)
Facility: CLINIC | Age: 89
End: 2024-09-05

## 2024-09-05 NOTE — TELEPHONE ENCOUNTER
Cpap supplies and mask refitting sent to [facility], son Cortes notified by Teresita

## 2024-09-08 ENCOUNTER — LAB REQUISITION (OUTPATIENT)
Dept: LAB | Facility: HOSPITAL | Age: 89
End: 2024-09-08
Payer: MEDICARE

## 2024-09-08 DIAGNOSIS — Z00.00 ENCOUNTER FOR GENERAL ADULT MEDICAL EXAMINATION WITHOUT ABNORMAL FINDINGS: ICD-10-CM

## 2024-09-08 LAB
BASOPHILS # BLD AUTO: 0.03 X10(3) UL (ref 0–0.2)
BASOPHILS NFR BLD AUTO: 0.4 %
EOSINOPHIL # BLD AUTO: 0.1 X10(3) UL (ref 0–0.7)
EOSINOPHIL NFR BLD AUTO: 1.5 %
ERYTHROCYTE [DISTWIDTH] IN BLOOD BY AUTOMATED COUNT: 12 %
HCT VFR BLD AUTO: 38 %
HGB BLD-MCNC: 12.7 G/DL
IMM GRANULOCYTES # BLD AUTO: 0.03 X10(3) UL (ref 0–1)
IMM GRANULOCYTES NFR BLD: 0.4 %
LYMPHOCYTES # BLD AUTO: 1.14 X10(3) UL (ref 1–4)
LYMPHOCYTES NFR BLD AUTO: 16.7 %
MCH RBC QN AUTO: 33.6 PG (ref 26–34)
MCHC RBC AUTO-ENTMCNC: 33.4 G/DL (ref 31–37)
MCV RBC AUTO: 100.5 FL
MONOCYTES # BLD AUTO: 0.92 X10(3) UL (ref 0.1–1)
MONOCYTES NFR BLD AUTO: 13.5 %
NEUTROPHILS # BLD AUTO: 4.59 X10 (3) UL (ref 1.5–7.7)
NEUTROPHILS # BLD AUTO: 4.59 X10(3) UL (ref 1.5–7.7)
NEUTROPHILS NFR BLD AUTO: 67.5 %
PLATELET # BLD AUTO: 210 10(3)UL (ref 150–450)
RBC # BLD AUTO: 3.78 X10(6)UL
WBC # BLD AUTO: 6.8 X10(3) UL (ref 4–11)

## 2024-09-08 PROCEDURE — 85025 COMPLETE CBC W/AUTO DIFF WBC: CPT | Performed by: INTERNAL MEDICINE

## 2024-09-20 ENCOUNTER — TELEPHONE (OUTPATIENT)
Facility: CLINIC | Age: 89
End: 2024-09-20

## 2024-09-20 NOTE — TELEPHONE ENCOUNTER
Call from son Cortes Cortez, he wanted to know when his mother was going to receive her cpap supplies and mask fitting. States he called [facility] and E would not give him any updates. Nurse explained to (Cortes), because he is not the POA, medical updates cannot be released to him regarding his mom.  Pt's son Cortes was very upset.  States he will contact administration of [facility], [facility], and Medicare to complain and he wants the manager from the office to contact him.      Nurse recommended Cortes to contact his brother (Pankaj Cortez - POA) and have his name added to the list of family members the office can speak too on his mother's behalf. Son Cortes verbalized understanding, but does not agree with policy.    Pankaj Cortez - [phone] (home)

## 2024-09-23 ENCOUNTER — LAB REQUISITION (OUTPATIENT)
Dept: LAB | Facility: HOSPITAL | Age: 89
End: 2024-09-23
Payer: MEDICARE

## 2024-09-23 DIAGNOSIS — Z00.00 ENCOUNTER FOR GENERAL ADULT MEDICAL EXAMINATION WITHOUT ABNORMAL FINDINGS: ICD-10-CM

## 2024-09-23 LAB
BILIRUB UR QL STRIP.AUTO: NEGATIVE
CLARITY UR REFRACT.AUTO: CLEAR
COLOR UR AUTO: YELLOW
GLUCOSE UR STRIP.AUTO-MCNC: NORMAL MG/DL
HYALINE CASTS #/AREA URNS AUTO: PRESENT /LPF
KETONES UR STRIP.AUTO-MCNC: NEGATIVE MG/DL
LEUKOCYTE ESTERASE UR QL STRIP.AUTO: 25
NITRITE UR QL STRIP.AUTO: NEGATIVE
PH UR STRIP.AUTO: 6.5 [PH] (ref 5–8)
RBC UR QL AUTO: NEGATIVE
SP GR UR STRIP.AUTO: 1.02 (ref 1–1.03)
UROBILINOGEN UR STRIP.AUTO-MCNC: NORMAL MG/DL

## 2024-09-23 PROCEDURE — 87086 URINE CULTURE/COLONY COUNT: CPT | Performed by: INTERNAL MEDICINE

## 2024-09-23 PROCEDURE — 81001 URINALYSIS AUTO W/SCOPE: CPT | Performed by: INTERNAL MEDICINE

## 2024-09-24 ENCOUNTER — OFFICE VISIT (OUTPATIENT)
Dept: FAMILY MEDICINE CLINIC | Facility: CLINIC | Age: 89
End: 2024-09-24
Payer: MEDICARE

## 2024-09-24 VITALS
BODY MASS INDEX: 23.9 KG/M2 | HEIGHT: 64 IN | TEMPERATURE: 99 F | SYSTOLIC BLOOD PRESSURE: 130 MMHG | DIASTOLIC BLOOD PRESSURE: 70 MMHG | WEIGHT: 140 LBS | HEART RATE: 70 BPM | OXYGEN SATURATION: 98 % | RESPIRATION RATE: 20 BRPM

## 2024-09-24 DIAGNOSIS — H65.90 FLUID COLLECTION OF MIDDLE EAR: ICD-10-CM

## 2024-09-24 DIAGNOSIS — H61.22 EXCESSIVE CERUMEN IN EAR CANAL, LEFT: Primary | ICD-10-CM

## 2024-09-24 PROCEDURE — 99213 OFFICE O/P EST LOW 20 MIN: CPT | Performed by: NURSE PRACTITIONER

## 2024-09-24 NOTE — PROGRESS NOTES
CHIEF COMPLAINT:     Chief Complaint   Patient presents with    Ear Problem       HPI:   Ayse Maldonado is a 89 year old female who presents to clinic today with complaints of possible wax to left ear.  Denies pain. Reports: decreased hearing.  Denies ear drainage.  Just got new hearing aides.     Son said that the audiologist wants to make molds of her ear canals but there is ear wax.   Associated symptoms: none.     Home treatment includes none.  No COVID exposure     Current Outpatient Medications   Medication Sig Dispense Refill    lisinopril 20 MG Oral Tab Take 1 tablet (20 mg total) by mouth in the morning and 1 tablet (20 mg total) before bedtime. Hold if systolic BP less than 100.      dilTIAZem HCl ER Beads 180 MG Oral Capsule SR 24 Hr Take 1 capsule (180 mg total) by mouth daily. In the morning      docusate sodium 100 MG Oral Cap Take 1 capsule (100 mg total) by mouth 2 (two) times daily.      memantine 5 MG Oral Tab Take 1 tablet (5 mg total) by mouth daily. Evening only      metoprolol tartrate 50 MG Oral Tab Take 1 tablet (50 mg total) by mouth 2 (two) times daily. 60 tablet 2    GABAPENTIN 300 MG Oral Cap TAKE ONE CAPSULE BY MOUTH TWICE DAILY 180 capsule 1    Polyethylene Glycol 3350 (MIRALAX OR) Take 17 g by mouth daily as needed (constipation).      Multiple Vitamins-Minerals (CENTRUM SILVER 50+WOMEN OR) Take 1 tablet by mouth daily.      Multiple Vitamins-Minerals (ICAPS AREDS 2 OR) Take 1 capsule by mouth in the morning and 1 capsule before bedtime.      Acetaminophen  MG Oral Tab CR Take 2 tablets (1,300 mg total) by mouth in the morning and 2 tablets (1,300 mg total) before bedtime.      Calcium Carb-Cholecalciferol (CALCIUM-VITAMIN D3) 600-500 MG-UNIT Oral Cap Take 1 tablet by mouth daily.      apixaban 5 MG Oral Tab Take 1 tablet (5 mg total) by mouth 2 (two) times daily. 60 tablet 3    digoxin 0.125 MG Oral Tab Take 1 tablet (125 mcg total) by mouth every morning.        Past  Medical History:    -Lumbar radicular pain-electrical stimulator    Abdominal distention    Abdominal hernia    Abdominal pain    Age-related osteoporosis with current pathological fracture with routine healing    Anesthesia complication    Arthritis    Atrial fibrillation (HCC)    Back pain    Basal cell cancer    colon    Bloating    Body piercing    Cellulitis of chest wall    Cellulitis of left arm    Cellulitis of right upper extremity    Closed bicondylar fracture of distal end of right humerus            Global exp 18 / RIGHT ELBOW / BAM     Closed displaced fracture of pelvis (HCC)    Constipation    Diarrhea, unspecified    Easy bruising    Essential hypertension    Eye disease    Fatigue    Food intolerance    Frequent urination    Hearing impairment    Hearing loss    Heart palpitations    Hemorrhoids    History of depression    Hypertension    Irregular bowel habits    Leg swelling    Lumbar facet arthropathy    Macular degeneration - [facility]    Muscle weakness    Night sweats    Pacemaker    Reflux    Renal disorder    S/P colon resection / Colonoscopy (10/12) recheck - ANJALI Spencer    S/P laminectomy    Seizure disorder ([facility])    Sleep apnea    cpap    Sleep disturbance    Spinal stenosis of lumbar region without neurogenic claudication    Visual impairment      Social History:  Social History     Socioeconomic History    Marital status:    Tobacco Use    Smoking status: Former     Current packs/day: 0.00     Average packs/day: 0.1 packs/day for 20.0 years (2.0 ttl pk-yrs)     Types: Cigarettes     Start date: 1952     Quit date: 1972     Years since quittin.7    Smokeless tobacco: Never   Vaping Use    Vaping status: Never Used   Substance and Sexual Activity    Alcohol use: Not Currently     Alcohol/week: 2.0 standard drinks of alcohol     Types: 2 Glasses of wine per week     Comment: wine occasionally    Drug use: No   Other Topics Concern    Caffeine  Concern No    Exercise Yes     Comment: walks dog 2 x a day     Social Determinants of Health     Food Insecurity: No Food Insecurity (3/29/2024)    Food Insecurity     Food Insecurity: Never true   Transportation Needs: No Transportation Needs (3/29/2024)    Transportation Needs     Lack of Transportation: No   Housing Stability: Low Risk  (3/29/2024)    Housing Stability     Housing Instability: No        REVIEW OF SYSTEMS:   GENERAL: Feeling well otherwise.    SKIN: no unusual skin lesions or rashes  HEENT: See HPI  LUNGS: No cough, shortness of breath, or wheezing.  CARDIOVASCULAR: No chest pain, palpitations  GI: No N/V/C/D.  NEURO: denies headaches or dizziness    EXAM:   /70   Pulse 70   Temp 98.5 °F (36.9 °C) (Temporal)   Resp 20   Ht 5' 4\" (1.626 m)   Wt 140 lb (63.5 kg)   SpO2 98%   BMI 24.03 kg/m²   GENERAL: well developed, well nourished, in no apparent distress  SKIN: no rashes, no suspicious lesions  HEAD: atraumatic, normocephalic  EYES: conjunctiva clear  EARS: Tragus non tender on palpation bilaterally. External auditory canals healthy.      Right TM: clear, no bulging, no retraction, no fluid, bony landmarks visualized.     Left TM: clear, no bulging, no retraction, + fluid,  bony landmarks visualized. 2 areas with dried crusted cerumen.   NOSE: nostrils patent, no nasal discharge, nasal mucosa pink and noninflamed  NECK: supple, non-tender  CARDIO: RRR without murmur  EXTREMITIES: no cyanosis, clubbing or edema        ASSESSMENT AND PLAN:   Ayse Maldonado is a 89 year old female who presents with:    ASSESSMENT:  Encounter Diagnoses   Name Primary?    Excessive cerumen in ear canal, left Yes    Fluid collection of middle ear        PLAN:   Discussed with son that his mom has minimal wax in her left ear.  He stated that the ear canal needs to be completely clear of wax.  Patient was very sensitive to the flushing and doesn't want to continue. Softened the 2 areas of hard cerumen but  using debrox maybe a better choice.   OTC Meds as listed in handout.     Risks, benefits, and side effects of medication explained and discussed.  Comfort measures as described in Patient Instructions.   It also was discussed that if not softening then follow-up with audiologist.          Requested Prescriptions      No prescriptions requested or ordered in this encounter           Patient Instructions   Recommended using debrox in left ear to loosen the 2 small wax chunks        Patient voiced understanding and is in agreement with treatment plan

## 2024-10-18 ENCOUNTER — LAB ENCOUNTER (OUTPATIENT)
Dept: LAB | Facility: HOSPITAL | Age: 89
End: 2024-10-18
Attending: INTERNAL MEDICINE
Payer: MEDICARE

## 2024-10-18 DIAGNOSIS — E55.9 VITAMIN D DEFICIENCY: ICD-10-CM

## 2024-10-18 DIAGNOSIS — R73.9 HYPERGLYCEMIA: ICD-10-CM

## 2024-10-18 DIAGNOSIS — E78.2 MIXED HYPERLIPIDEMIA: ICD-10-CM

## 2024-10-18 DIAGNOSIS — D64.9 ANEMIA, UNSPECIFIED TYPE: ICD-10-CM

## 2024-10-18 DIAGNOSIS — E03.9 PRIMARY HYPOTHYROIDISM: ICD-10-CM

## 2024-10-18 LAB
ALBUMIN SERPL-MCNC: 4.2 G/DL (ref 3.2–4.8)
ALBUMIN/GLOB SERPL: 1.4 {RATIO} (ref 1–2)
ALP LIVER SERPL-CCNC: 121 U/L
ALT SERPL-CCNC: 16 U/L
ANION GAP SERPL CALC-SCNC: 7 MMOL/L (ref 0–18)
AST SERPL-CCNC: 28 U/L (ref ?–34)
BASOPHILS # BLD AUTO: 0.05 X10(3) UL (ref 0–0.2)
BASOPHILS NFR BLD AUTO: 1 %
BILIRUB SERPL-MCNC: 0.6 MG/DL (ref 0.2–0.9)
BUN BLD-MCNC: 12 MG/DL (ref 9–23)
CALCIUM BLD-MCNC: 9.6 MG/DL (ref 8.7–10.4)
CHLORIDE SERPL-SCNC: 104 MMOL/L (ref 98–112)
CHOLEST SERPL-MCNC: 173 MG/DL (ref ?–200)
CO2 SERPL-SCNC: 26 MMOL/L (ref 21–32)
CREAT BLD-MCNC: 0.85 MG/DL
EGFRCR SERPLBLD CKD-EPI 2021: 63 ML/MIN/1.73M2 (ref 60–?)
EOSINOPHIL # BLD AUTO: 0.3 X10(3) UL (ref 0–0.7)
EOSINOPHIL NFR BLD AUTO: 5.9 %
ERYTHROCYTE [DISTWIDTH] IN BLOOD BY AUTOMATED COUNT: 12.4 %
EST. AVERAGE GLUCOSE BLD GHB EST-MCNC: 126 MG/DL (ref 68–126)
FASTING PATIENT LIPID ANSWER: YES
FASTING STATUS PATIENT QL REPORTED: YES
GLOBULIN PLAS-MCNC: 2.9 G/DL (ref 2–3.5)
GLUCOSE BLD-MCNC: 97 MG/DL (ref 70–99)
HBA1C MFR BLD: 6 % (ref ?–5.7)
HCT VFR BLD AUTO: 39 %
HDLC SERPL-MCNC: 42 MG/DL (ref 40–59)
HGB BLD-MCNC: 12.8 G/DL
IMM GRANULOCYTES # BLD AUTO: 0.03 X10(3) UL (ref 0–1)
IMM GRANULOCYTES NFR BLD: 0.6 %
LDLC SERPL CALC-MCNC: 111 MG/DL (ref ?–100)
LYMPHOCYTES # BLD AUTO: 0.85 X10(3) UL (ref 1–4)
LYMPHOCYTES NFR BLD AUTO: 16.7 %
MCH RBC QN AUTO: 33 PG (ref 26–34)
MCHC RBC AUTO-ENTMCNC: 32.8 G/DL (ref 31–37)
MCV RBC AUTO: 100.5 FL
MONOCYTES # BLD AUTO: 0.55 X10(3) UL (ref 0.1–1)
MONOCYTES NFR BLD AUTO: 10.8 %
NEUTROPHILS # BLD AUTO: 3.32 X10 (3) UL (ref 1.5–7.7)
NEUTROPHILS # BLD AUTO: 3.32 X10(3) UL (ref 1.5–7.7)
NEUTROPHILS NFR BLD AUTO: 65 %
NONHDLC SERPL-MCNC: 131 MG/DL (ref ?–130)
OSMOLALITY SERPL CALC.SUM OF ELEC: 284 MOSM/KG (ref 275–295)
PLATELET # BLD AUTO: 217 10(3)UL (ref 150–450)
POTASSIUM SERPL-SCNC: 4.4 MMOL/L (ref 3.5–5.1)
PROT SERPL-MCNC: 7.1 G/DL (ref 5.7–8.2)
RBC # BLD AUTO: 3.88 X10(6)UL
SODIUM SERPL-SCNC: 137 MMOL/L (ref 136–145)
TRIGL SERPL-MCNC: 111 MG/DL (ref 30–149)
TSI SER-ACNC: 1.95 MIU/ML (ref 0.55–4.78)
VIT D+METAB SERPL-MCNC: 44.4 NG/ML (ref 30–100)
VLDLC SERPL CALC-MCNC: 19 MG/DL (ref 0–30)
WBC # BLD AUTO: 5.1 X10(3) UL (ref 4–11)

## 2024-10-18 PROCEDURE — 83036 HEMOGLOBIN GLYCOSYLATED A1C: CPT

## 2024-10-18 PROCEDURE — 36415 COLL VENOUS BLD VENIPUNCTURE: CPT

## 2024-10-18 PROCEDURE — 80061 LIPID PANEL: CPT

## 2024-10-18 PROCEDURE — 80053 COMPREHEN METABOLIC PANEL: CPT

## 2024-10-18 PROCEDURE — 84443 ASSAY THYROID STIM HORMONE: CPT

## 2024-10-18 PROCEDURE — 82306 VITAMIN D 25 HYDROXY: CPT

## 2024-10-18 PROCEDURE — 85025 COMPLETE CBC W/AUTO DIFF WBC: CPT

## 2024-11-05 ENCOUNTER — LAB REQUISITION (OUTPATIENT)
Dept: LAB | Facility: HOSPITAL | Age: 89
End: 2024-11-05
Payer: MEDICARE

## 2024-11-05 DIAGNOSIS — Z00.00 ENCOUNTER FOR GENERAL ADULT MEDICAL EXAMINATION WITHOUT ABNORMAL FINDINGS: ICD-10-CM

## 2024-11-05 LAB
FLUAV + FLUBV RNA SPEC NAA+PROBE: NEGATIVE
FLUAV + FLUBV RNA SPEC NAA+PROBE: NEGATIVE
RSV RNA SPEC NAA+PROBE: NEGATIVE
SARS-COV-2 RNA RESP QL NAA+PROBE: NOT DETECTED

## 2024-11-05 PROCEDURE — 0241U SARS-COV-2/FLU A AND B/RSV BY PCR (GENEXPERT): CPT | Performed by: INTERNAL MEDICINE

## 2024-11-12 NOTE — OCCUPATIONAL THERAPY NOTE
Attempted to see patient for OT treatment session, however patient w/ elevated HR at rest.  Will hold for now and reattempt as schedule permits.   No

## 2024-12-18 ENCOUNTER — LAB REQUISITION (OUTPATIENT)
Dept: LAB | Facility: HOSPITAL | Age: 89
End: 2024-12-18
Payer: MEDICARE

## 2024-12-18 DIAGNOSIS — M62.81 MUSCLE WEAKNESS (GENERALIZED): ICD-10-CM

## 2024-12-18 LAB
BILIRUB UR QL STRIP.AUTO: NEGATIVE
GLUCOSE UR STRIP.AUTO-MCNC: NORMAL MG/DL
HYALINE CASTS #/AREA URNS AUTO: PRESENT /LPF
KETONES UR STRIP.AUTO-MCNC: NEGATIVE MG/DL
LEUKOCYTE ESTERASE UR QL STRIP.AUTO: 500
NITRITE UR QL STRIP.AUTO: NEGATIVE
PH UR STRIP.AUTO: 6.5 [PH] (ref 5–8)
PROT UR STRIP.AUTO-MCNC: 20 MG/DL
RBC UR QL AUTO: NEGATIVE
SP GR UR STRIP.AUTO: 1.01 (ref 1–1.03)
UROBILINOGEN UR STRIP.AUTO-MCNC: NORMAL MG/DL
WBC #/AREA URNS AUTO: >50 /HPF

## 2024-12-18 PROCEDURE — 81001 URINALYSIS AUTO W/SCOPE: CPT | Performed by: NURSE PRACTITIONER

## 2024-12-18 PROCEDURE — 87086 URINE CULTURE/COLONY COUNT: CPT | Performed by: NURSE PRACTITIONER

## 2024-12-18 PROCEDURE — 87077 CULTURE AEROBIC IDENTIFY: CPT | Performed by: NURSE PRACTITIONER

## 2024-12-18 PROCEDURE — 87186 SC STD MICRODIL/AGAR DIL: CPT | Performed by: NURSE PRACTITIONER

## 2024-12-26 ENCOUNTER — LAB REQUISITION (OUTPATIENT)
Dept: LAB | Facility: HOSPITAL | Age: 89
End: 2024-12-26
Payer: MEDICARE

## 2024-12-26 DIAGNOSIS — U07.1 COVID-19: ICD-10-CM

## 2024-12-26 LAB
ALBUMIN SERPL-MCNC: 3.6 G/DL (ref 3.2–4.8)
ALBUMIN/GLOB SERPL: 1.4 {RATIO} (ref 1–2)
ALP LIVER SERPL-CCNC: 87 U/L
ALT SERPL-CCNC: 16 U/L
ANION GAP SERPL CALC-SCNC: 7 MMOL/L (ref 0–18)
AST SERPL-CCNC: 30 U/L (ref ?–34)
BASOPHILS # BLD AUTO: 0.03 X10(3) UL (ref 0–0.2)
BASOPHILS NFR BLD AUTO: 0.8 %
BILIRUB SERPL-MCNC: 0.3 MG/DL (ref 0.2–0.9)
BUN BLD-MCNC: 13 MG/DL (ref 9–23)
CALCIUM BLD-MCNC: 8.8 MG/DL (ref 8.7–10.4)
CHLORIDE SERPL-SCNC: 104 MMOL/L (ref 98–112)
CO2 SERPL-SCNC: 26 MMOL/L (ref 21–32)
CREAT BLD-MCNC: 0.82 MG/DL
EGFRCR SERPLBLD CKD-EPI 2021: 65 ML/MIN/1.73M2 (ref 60–?)
EOSINOPHIL # BLD AUTO: 0.07 X10(3) UL (ref 0–0.7)
EOSINOPHIL NFR BLD AUTO: 1.9 %
ERYTHROCYTE [DISTWIDTH] IN BLOOD BY AUTOMATED COUNT: 13.1 %
FASTING STATUS PATIENT QL REPORTED: YES
GLOBULIN PLAS-MCNC: 2.5 G/DL (ref 2–3.5)
GLUCOSE BLD-MCNC: 92 MG/DL (ref 70–99)
HCT VFR BLD AUTO: 35.2 %
HGB BLD-MCNC: 11.7 G/DL
IMM GRANULOCYTES # BLD AUTO: 0.02 X10(3) UL (ref 0–1)
IMM GRANULOCYTES NFR BLD: 0.5 %
LYMPHOCYTES # BLD AUTO: 0.86 X10(3) UL (ref 1–4)
LYMPHOCYTES NFR BLD AUTO: 23.2 %
MCH RBC QN AUTO: 33.1 PG (ref 26–34)
MCHC RBC AUTO-ENTMCNC: 33.2 G/DL (ref 31–37)
MCV RBC AUTO: 99.7 FL
MONOCYTES # BLD AUTO: 0.77 X10(3) UL (ref 0.1–1)
MONOCYTES NFR BLD AUTO: 20.8 %
NEUTROPHILS # BLD AUTO: 1.96 X10 (3) UL (ref 1.5–7.7)
NEUTROPHILS # BLD AUTO: 1.96 X10(3) UL (ref 1.5–7.7)
NEUTROPHILS NFR BLD AUTO: 52.8 %
OSMOLALITY SERPL CALC.SUM OF ELEC: 284 MOSM/KG (ref 275–295)
PLATELET # BLD AUTO: 176 10(3)UL (ref 150–450)
POTASSIUM SERPL-SCNC: 4.2 MMOL/L (ref 3.5–5.1)
PROT SERPL-MCNC: 6.1 G/DL (ref 5.7–8.2)
RBC # BLD AUTO: 3.53 X10(6)UL
SODIUM SERPL-SCNC: 137 MMOL/L (ref 136–145)
WBC # BLD AUTO: 3.7 X10(3) UL (ref 4–11)

## 2024-12-26 PROCEDURE — 85025 COMPLETE CBC W/AUTO DIFF WBC: CPT | Performed by: FAMILY MEDICINE

## 2024-12-26 PROCEDURE — 80053 COMPREHEN METABOLIC PANEL: CPT | Performed by: FAMILY MEDICINE

## 2025-01-09 PROBLEM — M15.0 PRIMARY OSTEOARTHRITIS INVOLVING MULTIPLE JOINTS: Status: ACTIVE | Noted: 2025-01-09

## 2025-01-12 ENCOUNTER — LAB REQUISITION (OUTPATIENT)
Dept: LAB | Facility: HOSPITAL | Age: OVER 89
End: 2025-01-12
Payer: MEDICARE

## 2025-01-12 DIAGNOSIS — R53.1 WEAKNESS: ICD-10-CM

## 2025-01-12 LAB
ALBUMIN SERPL-MCNC: 3.5 G/DL (ref 3.2–4.8)
ALBUMIN/GLOB SERPL: 1.3 {RATIO} (ref 1–2)
ALP LIVER SERPL-CCNC: 75 U/L
ALT SERPL-CCNC: 16 U/L
ANION GAP SERPL CALC-SCNC: 7 MMOL/L (ref 0–18)
AST SERPL-CCNC: 25 U/L (ref ?–34)
BASOPHILS # BLD AUTO: 0.05 X10(3) UL (ref 0–0.2)
BASOPHILS NFR BLD AUTO: 0.8 %
BILIRUB SERPL-MCNC: 0.3 MG/DL (ref 0.2–0.9)
BUN BLD-MCNC: 30 MG/DL (ref 9–23)
CALCIUM BLD-MCNC: 9.4 MG/DL (ref 8.7–10.4)
CHLORIDE SERPL-SCNC: 105 MMOL/L (ref 98–112)
CO2 SERPL-SCNC: 27 MMOL/L (ref 21–32)
CREAT BLD-MCNC: 1.01 MG/DL
EGFRCR SERPLBLD CKD-EPI 2021: 51 ML/MIN/1.73M2 (ref 60–?)
EOSINOPHIL # BLD AUTO: 0.18 X10(3) UL (ref 0–0.7)
EOSINOPHIL NFR BLD AUTO: 2.8 %
ERYTHROCYTE [DISTWIDTH] IN BLOOD BY AUTOMATED COUNT: 13.2 %
FASTING STATUS PATIENT QL REPORTED: YES
GLOBULIN PLAS-MCNC: 2.8 G/DL (ref 2–3.5)
GLUCOSE BLD-MCNC: 93 MG/DL (ref 70–99)
HCT VFR BLD AUTO: 35.1 %
HGB BLD-MCNC: 11.8 G/DL
IMM GRANULOCYTES # BLD AUTO: 0.03 X10(3) UL (ref 0–1)
IMM GRANULOCYTES NFR BLD: 0.5 %
LYMPHOCYTES # BLD AUTO: 1.44 X10(3) UL (ref 1–4)
LYMPHOCYTES NFR BLD AUTO: 22.7 %
MCH RBC QN AUTO: 33.1 PG (ref 26–34)
MCHC RBC AUTO-ENTMCNC: 33.6 G/DL (ref 31–37)
MCV RBC AUTO: 98.6 FL
MONOCYTES # BLD AUTO: 0.73 X10(3) UL (ref 0.1–1)
MONOCYTES NFR BLD AUTO: 11.5 %
NEUTROPHILS # BLD AUTO: 3.91 X10 (3) UL (ref 1.5–7.7)
NEUTROPHILS # BLD AUTO: 3.91 X10(3) UL (ref 1.5–7.7)
NEUTROPHILS NFR BLD AUTO: 61.7 %
OSMOLALITY SERPL CALC.SUM OF ELEC: 294 MOSM/KG (ref 275–295)
PLATELET # BLD AUTO: 245 10(3)UL (ref 150–450)
POTASSIUM SERPL-SCNC: 4.3 MMOL/L (ref 3.5–5.1)
PROT SERPL-MCNC: 6.3 G/DL (ref 5.7–8.2)
RBC # BLD AUTO: 3.56 X10(6)UL
SODIUM SERPL-SCNC: 139 MMOL/L (ref 136–145)
WBC # BLD AUTO: 6.3 X10(3) UL (ref 4–11)

## 2025-01-12 PROCEDURE — 80053 COMPREHEN METABOLIC PANEL: CPT | Performed by: INTERNAL MEDICINE

## 2025-01-12 PROCEDURE — 85025 COMPLETE CBC W/AUTO DIFF WBC: CPT | Performed by: INTERNAL MEDICINE

## 2025-03-31 ENCOUNTER — LAB REQUISITION (OUTPATIENT)
Dept: LAB | Facility: HOSPITAL | Age: OVER 89
End: 2025-03-31
Payer: MEDICARE

## 2025-03-31 DIAGNOSIS — M11.831: ICD-10-CM

## 2025-03-31 LAB
ALBUMIN SERPL-MCNC: 3.7 G/DL (ref 3.2–4.8)
ALBUMIN/GLOB SERPL: 1.5 {RATIO} (ref 1–2)
ALP LIVER SERPL-CCNC: 76 U/L
ALT SERPL-CCNC: 12 U/L
ANION GAP SERPL CALC-SCNC: 9 MMOL/L (ref 0–18)
AST SERPL-CCNC: 22 U/L (ref ?–34)
BASOPHILS # BLD AUTO: 0.04 X10(3) UL (ref 0–0.2)
BASOPHILS NFR BLD AUTO: 0.8 %
BILIRUB SERPL-MCNC: 0.5 MG/DL (ref 0.2–0.9)
BUN BLD-MCNC: 17 MG/DL (ref 9–23)
CALCIUM BLD-MCNC: 9.2 MG/DL (ref 8.7–10.6)
CHLORIDE SERPL-SCNC: 102 MMOL/L (ref 98–112)
CO2 SERPL-SCNC: 25 MMOL/L (ref 21–32)
CREAT BLD-MCNC: 0.86 MG/DL
EGFRCR SERPLBLD CKD-EPI 2021: 61 ML/MIN/1.73M2 (ref 60–?)
EOSINOPHIL # BLD AUTO: 0.26 X10(3) UL (ref 0–0.7)
EOSINOPHIL NFR BLD AUTO: 5 %
ERYTHROCYTE [DISTWIDTH] IN BLOOD BY AUTOMATED COUNT: 12.4 %
FASTING STATUS PATIENT QL REPORTED: YES
GLOBULIN PLAS-MCNC: 2.5 G/DL (ref 2–3.5)
GLUCOSE BLD-MCNC: 90 MG/DL (ref 70–99)
HCT VFR BLD AUTO: 33 %
HGB BLD-MCNC: 11.2 G/DL
IMM GRANULOCYTES # BLD AUTO: 0.04 X10(3) UL (ref 0–1)
IMM GRANULOCYTES NFR BLD: 0.8 %
LYMPHOCYTES # BLD AUTO: 1.31 X10(3) UL (ref 1–4)
LYMPHOCYTES NFR BLD AUTO: 25 %
MCH RBC QN AUTO: 34.1 PG (ref 26–34)
MCHC RBC AUTO-ENTMCNC: 33.9 G/DL (ref 31–37)
MCV RBC AUTO: 100.6 FL
MONOCYTES # BLD AUTO: 0.61 X10(3) UL (ref 0.1–1)
MONOCYTES NFR BLD AUTO: 11.6 %
NEUTROPHILS # BLD AUTO: 2.99 X10 (3) UL (ref 1.5–7.7)
NEUTROPHILS # BLD AUTO: 2.99 X10(3) UL (ref 1.5–7.7)
NEUTROPHILS NFR BLD AUTO: 56.8 %
OSMOLALITY SERPL CALC.SUM OF ELEC: 283 MOSM/KG (ref 275–295)
PLATELET # BLD AUTO: 199 10(3)UL (ref 150–450)
POTASSIUM SERPL-SCNC: 4.5 MMOL/L (ref 3.5–5.1)
PROT SERPL-MCNC: 6.2 G/DL (ref 5.7–8.2)
RBC # BLD AUTO: 3.28 X10(6)UL
SODIUM SERPL-SCNC: 136 MMOL/L (ref 136–145)
WBC # BLD AUTO: 5.3 X10(3) UL (ref 4–11)

## 2025-03-31 PROCEDURE — 85025 COMPLETE CBC W/AUTO DIFF WBC: CPT | Performed by: INTERNAL MEDICINE

## 2025-03-31 PROCEDURE — 80053 COMPREHEN METABOLIC PANEL: CPT | Performed by: INTERNAL MEDICINE

## 2025-04-14 NOTE — PROGRESS NOTES
[facility] PULMONARY  SLEEP PROGRESS NOTE        HPI:   This is a 89 year old female coming in for   Chief Complaint   Patient presents with    Obstructive Sleep Apnea (JAZZ)     Pt here for follow up,        HPI: using nasal mask    Ayse Maldonado  03/15/2025 - 2025  Patient ID: [id number]  : 10/25/1927  Age: 89 years  [facility]  [facility] [place]  Compliance Report  Compliance  Payor Standard  Usage 03/15/2025 - 2025  Usage days 25/30 days (83%)  >= 4 hours 12 days (40%)  < 4 hours 13 days (43%)  Usage hours 108 hours 1 minutes  Average usage (total days) 3 hours 36 minutes  Average usage (days used) 4 hours 19 minutes  Median usage (days used) 3 hours 14 minutes  AirSense 10 AutoSet  Serial number [id number]  Mode AutoSet  Min Pressure 5 cmH2O  Max Pressure 15 cmH2O  EPR Off  Response Standard  Therapy  Pressure - cmH2O Median: 8.5 95th percentile: 10.8 Maximum: 11.5  Leaks - L/min Median: 18.2 95th percentile: 70.1 Maximum: 117.4  Events per hour AI: 11.4 HI: 0.1 AHI: 11.5  Apnea Index Central: 0.2 Obstructive: 0.2 Unknown: 11.0  RERA Index 1.3  Cheyne-Bettencourt respiration (average duration per night) 0 minutes (0%)    Patient: Sleep review of systems today: see form.      Pt  PCP:  Terri Navarro MD  No referring provider defined for this encounter.           No data to display                    Past Medical History[1]  Past Surgical History[2]  Social History:  Social History     Social History Narrative    Not on file     Short Social Hx on File[3]  Family History:  Family History[4]  Allergies:  Allergies[5]  Current Meds:  Current Medications[6]   Counseling given: Not Answered         Problem List:  Problem List[7]    REVIEW OF SYSTEMS:   Review of Systems    EXAM:   /56 (BP Location: Left arm, Patient Position: Sitting, Cuff Size: adult)   Pulse 66   Resp 16   Ht 5' 4\" (1.626 m)   Wt 135 lb (61.2 kg)   SpO2 95%   BMI 23.17 kg/m²  Estimated body mass index is 23.17  kg/m² as calculated from the following:    Height as of this encounter: 5' 4\" (1.626 m).    Weight as of this encounter: 135 lb (61.2 kg).   Neck in inches:      Wt Readings from Last 6 Encounters:   04/15/25 135 lb (61.2 kg)   09/24/24 140 lb (63.5 kg)   04/06/24 134 lb 0.6 oz (60.8 kg)   03/18/24 135 lb (61.2 kg)   02/09/24 135 lb (61.2 kg)   02/07/24 137 lb (62.1 kg)     BP Readings from Last 3 Encounters:   04/15/25 108/56   10/18/24 134/70   09/24/24 130/70     Pulse Readings from Last 3 Encounters:   04/15/25 66   10/18/24 77   09/24/24 70     SpO2 Readings from Last 3 Encounters:   04/15/25 95%   10/18/24 95%   09/24/24 98%      Ideal body weight: 54.7 kg (120 lb 9.5 oz)  Adjusted ideal body weight: 57.3 kg (126 lb 5.7 oz)    Vital signs reviewed.  Physical Exam    ASSESSMENT AND PLAN:   1. Obstructive sleep apnea  Needs mask refit, leakage is an issue  Will have mask refit  Having memory issues  Patient is using and benefiting from regular cpap use.  Patient was instructed to clean equipment on a weekly basis.  Patient was instructed to keep up to date with supplies.  Patient was informed to avoid drowsy driving, or using heavy machinery.  Patient was instructed to followup in 4 months    2. JAZZ on CPAP    3. Pacemaker - M. Perfecto    There are no Patient Instructions on file for this visit.    Independent interpretation of Sleep Download as defined above.  Continue with Rx management of Sleep apnea with PAP therapy.    COMPLIANCE is required by insurance for 4 hours a night most nights of the week.    Advised if still with sleep apnea and not using CPAP has a 7 fold increase in risk of heart attack, stroke, abnormal heart rhythm  and death,  increased risk of driving accidents.     Advised to refrain from driving when sleepy.      Recommend weight loss, and maintain and optimal BMI with Exercise 30 minutes most days to target heart rate .     Advised patient to change filters,masks,hoses  and tubes and  equiptment on a  regular schedule.    Filters and seals shall be changed every 1 month,  Hoses every 3 months,   CPAP mask and humidifier chamber changed every 6 month  with the durable medical equipment provider.         Meds & Refills for this Visit:  Requested Prescriptions      No prescriptions requested or ordered in this encounter       Outcome: Parent verbalizes understanding. Parent is notified to call with any questions, complications, allergies, or worsening or changing symptoms.  Parent is to call with any side effects or complications from the treatments as a result of today.     \" This note was created utilizing Dragon speech recognition software.  Please excuse any grammatical errors. Call my office if you have any questions regarding this note. \"     Poornima Hinton, DO  4/15/2025  2:08 PM         [1]   Past Medical History:   -Lumbar radicular pain-electrical stimulator    Abdominal distention    Abdominal hernia    Abdominal pain    Age-related osteoporosis with current pathological fracture with routine healing    Anesthesia complication    Arthritis    Atrial fibrillation (HCC)    Back pain    Basal cell cancer    colon    Bloating    Body piercing    Cellulitis of chest wall    Cellulitis of left arm    Cellulitis of right upper extremity    Closed bicondylar fracture of distal end of right humerus            Global exp 1-17-18 / RIGHT ELBOW / BAM     Closed displaced fracture of pelvis (HCC)    Constipation    Diarrhea, unspecified    Easy bruising    Essential hypertension    Eye disease    Fatigue    Food intolerance    Frequent urination    Hearing impairment    Hearing loss    Heart palpitations    Hemorrhoids    History of depression    Hypertension    Irregular bowel habits    Leg swelling    Lumbar facet arthropathy    Macular degeneration - [facility]    Muscle weakness    Night sweats    Pacemaker    Reflux    Renal disorder    S/P colon resection / Colonoscopy (10/12)  reshma Spencer    S/P laminectomy    Seizure disorder (HCC)    Sleep apnea    cpap    Sleep disturbance    Spinal stenosis of lumbar region without neurogenic claudication    Visual impairment   [2]   Past Surgical History:  Procedure Laterality Date    Adj tiss xfer lid,nos,ear <10sqcm  2014    Procedure: DEBULKING OF NASOLABIAL FLAP TO NOSE;  Surgeon: Shar Vasquez MD;  [facility] [facility]    Appendectomy      Back surgery      Bowel resection      Cholecystectomy      Colectomy      Colonoscopy      Colonoscopy      Egd      Hemorrhoidectomy,int/ext,simple      Hip replacement surgery  left 3/06 right 10/06    Other accessory      electro-stimulator for spinal stenosis    Other surgical history  10/10/2011    cysto-    Other surgical history  10/2016    back surgery    Pacemaker      [employer] pacemaker    Skin surgery  2014    BCC nod, inf to left nasal tip / MMS done    Skin surgery  10/31/2016    MMS for BCC-nod to the R upper cut lip-AB    Spine surgery procedure unlisted      Tonsillectomy      Total hip replacement     [3]   Social History  Socioeconomic History    Marital status:    Tobacco Use    Smoking status: Former     Current packs/day: 0.00     Average packs/day: 0.1 packs/day for 20.0 years (2.0 ttl pk-yrs)     Types: Cigarettes     Start date: 1952     Quit date: 1972     Years since quittin.3    Smokeless tobacco: Never   Vaping Use    Vaping status: Never Used   Substance and Sexual Activity    Alcohol use: Not Currently     Alcohol/week: 2.0 standard drinks of alcohol     Types: 2 Glasses of wine per week     Comment: wine occasionally    Drug use: No   Other Topics Concern    Caffeine Concern No    Exercise Yes     Comment: walks dog 2 x a day     Social Drivers of Health     Food Insecurity: No Food Insecurity (3/29/2024)    Food Insecurity     Food Insecurity: Never true   Transportation Needs: No Transportation Needs (3/29/2024)     Transportation Needs     Lack of Transportation: No   Housing Stability: Low Risk  (3/29/2024)    Housing Stability     Housing Instability: No   [4]   Family History  Problem Relation Age of Onset    Cancer Father     Hypertension Father     Colon Polyps Son     Other (Other) Sister         Davina Mayes   [5]   Allergies  Allergen Reactions    Bacitracin SWELLING     Red and swelling    Ciprofloxacin OTHER (SEE COMMENTS)     Nausea and abdominal cramping    Levofloxacin OTHER (SEE COMMENTS)    Sulfamethoxazole W/Trimethoprim OTHER (SEE COMMENTS)     Other reaction(s): VOMITING  Oral    Altaryl     Amiodarone OTHER (SEE COMMENTS)     Elevated liver enzymes      Diphenhydramine UNKNOWN    Escitalopram     Thiazide-Type Diuretics OTHER (SEE COMMENTS)     CLASS    Cephalexin DIARRHEA     May have also caused drug rash    Clonidine RASH     Oral    Garlic DIARRHEA    Garlic DIARRHEA    Hydrocodone NAUSEA ONLY     nausea    Lexapro NAUSEA ONLY   [6]   Current Outpatient Medications   Medication Sig Dispense Refill    dilTIAZem HCl ER Coated Beads 180 MG Oral Capsule SR 24 Hr       mirtazapine 7.5 MG Oral Tab Take 7.5 mg by mouth nightly.      hydrocortisone 25 MG Rectal Suppos       lisinopril 20 MG Oral Tab Take 1 tablet (20 mg total) by mouth in the morning and 1 tablet (20 mg total) before bedtime. Hold if systolic BP less than 100.      docusate sodium 100 MG Oral Cap Take 1 capsule (100 mg total) by mouth 2 (two) times daily.      memantine 5 MG Oral Tab Take 1 tablet (5 mg total) by mouth daily. Evening only      metoprolol tartrate 50 MG Oral Tab Take 1 tablet (50 mg total) by mouth 2 (two) times daily. 60 tablet 2    GABAPENTIN 300 MG Oral Cap TAKE ONE CAPSULE BY MOUTH TWICE DAILY 180 capsule 1    Multiple Vitamins-Minerals (CENTRUM SILVER 50+WOMEN OR) Take 1 tablet by mouth daily.      Calcium Carb-Cholecalciferol (CALCIUM-VITAMIN D3) 600-500 MG-UNIT Oral Cap Take 1 tablet by mouth daily.      apixaban 5 MG Oral  Tab Take 1 tablet (5 mg total) by mouth 2 (two) times daily. 60 tablet 3    digoxin 0.125 MG Oral Tab Take 1 tablet (125 mcg total) by mouth every morning.     [7]   Patient Active Problem List  Diagnosis    Pacemaker - [place]    JAZZ on CPAP    Current use of long term anticoagulation    Gastroesophageal reflux disease without esophagitis    Asthma with COPD (chronic obstructive pulmonary disease) ([facility])    Pulmonary hypertension ([facility])-52 mg Hg    Alternating constipation and diarrhea    Other fatigue-mutifactorial    Chronic bronchitis ([facility])    CKD (chronic kidney disease) stage 3, GFR 30-59 ml/min ([facility])    PAF (paroxysmal atrial fibrillation) ([facility])    Glaucoma suspect of left eye    Pseudophakia    Trochanteric bursitis of left hip    Fall    Essential hypertension    Chronic left hip pain    CHI (closed head injury), subsequent encounter    Altered mental status, unspecified altered mental status type    Hyponatremia    Seizure disorder ([facility])    Hypertension, unspecified type    Altered mental status    Weakness    Hypotension, unspecified hypotension type    Hypotension    Branch retinal vein occlusion of left eye with macular edema ([facility])    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

## 2025-04-15 ENCOUNTER — TELEPHONE (OUTPATIENT)
Facility: CLINIC | Age: OVER 89
End: 2025-04-15

## 2025-04-15 ENCOUNTER — OFFICE VISIT (OUTPATIENT)
Facility: CLINIC | Age: OVER 89
End: 2025-04-15
Payer: MEDICARE

## 2025-04-15 VITALS
BODY MASS INDEX: 23.05 KG/M2 | HEIGHT: 64 IN | DIASTOLIC BLOOD PRESSURE: 56 MMHG | SYSTOLIC BLOOD PRESSURE: 108 MMHG | RESPIRATION RATE: 16 BRPM | HEART RATE: 66 BPM | OXYGEN SATURATION: 95 % | WEIGHT: 135 LBS

## 2025-04-15 DIAGNOSIS — G47.33 OBSTRUCTIVE SLEEP APNEA: Primary | ICD-10-CM

## 2025-04-15 DIAGNOSIS — Z95.0 PACEMAKER: ICD-10-CM

## 2025-04-15 DIAGNOSIS — G47.33 OSA ON CPAP: ICD-10-CM

## 2025-04-15 PROCEDURE — 99214 OFFICE O/P EST MOD 30 MIN: CPT | Performed by: OTHER

## 2025-04-15 PROCEDURE — 1160F RVW MEDS BY RX/DR IN RCRD: CPT | Performed by: OTHER

## 2025-04-15 PROCEDURE — 1159F MED LIST DOCD IN RCRD: CPT | Performed by: OTHER

## 2025-04-23 ENCOUNTER — LAB REQUISITION (OUTPATIENT)
Dept: LAB | Facility: HOSPITAL | Age: OVER 89
End: 2025-04-23
Payer: MEDICARE

## 2025-04-23 DIAGNOSIS — I48.91 UNSPECIFIED ATRIAL FIBRILLATION (HCC): ICD-10-CM

## 2025-04-23 LAB
ANION GAP SERPL CALC-SCNC: 10 MMOL/L (ref 0–18)
BUN BLD-MCNC: 16 MG/DL (ref 9–23)
CALCIUM BLD-MCNC: 9.4 MG/DL (ref 8.7–10.6)
CHLORIDE SERPL-SCNC: 104 MMOL/L (ref 98–112)
CO2 SERPL-SCNC: 25 MMOL/L (ref 21–32)
CREAT BLD-MCNC: 0.88 MG/DL (ref 0.55–1.02)
DIGOXIN SERPL-MCNC: 0.63 NG/ML (ref 0.8–2)
EGFRCR SERPLBLD CKD-EPI 2021: 60 ML/MIN/1.73M2 (ref 60–?)
FASTING STATUS PATIENT QL REPORTED: YES
GLUCOSE BLD-MCNC: 88 MG/DL (ref 70–99)
OSMOLALITY SERPL CALC.SUM OF ELEC: 289 MOSM/KG (ref 275–295)
POTASSIUM SERPL-SCNC: 4 MMOL/L (ref 3.5–5.1)
SODIUM SERPL-SCNC: 139 MMOL/L (ref 136–145)

## 2025-04-23 PROCEDURE — 80162 ASSAY OF DIGOXIN TOTAL: CPT | Performed by: INTERNAL MEDICINE

## 2025-04-23 PROCEDURE — 80048 BASIC METABOLIC PNL TOTAL CA: CPT | Performed by: INTERNAL MEDICINE

## 2025-05-22 ENCOUNTER — APPOINTMENT (OUTPATIENT)
Dept: GENERAL RADIOLOGY | Facility: HOSPITAL | Age: OVER 89
End: 2025-05-22
Attending: EMERGENCY MEDICINE
Payer: MEDICARE

## 2025-05-22 ENCOUNTER — HOSPITAL ENCOUNTER (EMERGENCY)
Facility: HOSPITAL | Age: OVER 89
Discharge: HOME | End: 2025-05-22
Attending: EMERGENCY MEDICINE
Payer: MEDICARE

## 2025-05-22 VITALS
WEIGHT: 160 LBS | SYSTOLIC BLOOD PRESSURE: 136 MMHG | DIASTOLIC BLOOD PRESSURE: 63 MMHG | BODY MASS INDEX: 27 KG/M2 | OXYGEN SATURATION: 94 % | HEART RATE: 69 BPM | TEMPERATURE: 98 F | RESPIRATION RATE: 22 BRPM

## 2025-05-22 DIAGNOSIS — S80.00XA CONTUSION OF KNEE, UNSPECIFIED LATERALITY, INITIAL ENCOUNTER: ICD-10-CM

## 2025-05-22 DIAGNOSIS — R29.6 FALLING: Primary | ICD-10-CM

## 2025-05-22 LAB
ALBUMIN SERPL-MCNC: 3.9 G/DL (ref 3.2–4.8)
ALBUMIN/GLOB SERPL: 1.5 {RATIO} (ref 1–2)
ALP LIVER SERPL-CCNC: 69 U/L (ref 55–142)
ALT SERPL-CCNC: 21 U/L (ref 10–49)
ANION GAP SERPL CALC-SCNC: 8 MMOL/L (ref 0–18)
AST SERPL-CCNC: 26 U/L (ref ?–34)
BASOPHILS # BLD AUTO: 0.05 X10(3) UL (ref 0–0.2)
BASOPHILS NFR BLD AUTO: 1 %
BILIRUB SERPL-MCNC: 0.4 MG/DL (ref 0.2–0.9)
BILIRUB UR QL STRIP.AUTO: NEGATIVE
BUN BLD-MCNC: 26 MG/DL (ref 9–23)
CALCIUM BLD-MCNC: 9.5 MG/DL (ref 8.7–10.6)
CHLORIDE SERPL-SCNC: 104 MMOL/L (ref 98–112)
CLARITY UR REFRACT.AUTO: CLEAR
CO2 SERPL-SCNC: 28 MMOL/L (ref 21–32)
CREAT BLD-MCNC: 0.92 MG/DL (ref 0.55–1.02)
DIGOXIN SERPL-MCNC: 0.59 NG/ML (ref 0.8–2)
EGFRCR SERPLBLD CKD-EPI 2021: 57 ML/MIN/1.73M2 (ref 60–?)
EOSINOPHIL # BLD AUTO: 0.25 X10(3) UL (ref 0–0.7)
EOSINOPHIL NFR BLD AUTO: 4.8 %
ERYTHROCYTE [DISTWIDTH] IN BLOOD BY AUTOMATED COUNT: 12.1 %
GLOBULIN PLAS-MCNC: 2.6 G/DL (ref 2–3.5)
GLUCOSE BLD-MCNC: 93 MG/DL (ref 70–99)
GLUCOSE UR STRIP.AUTO-MCNC: NORMAL MG/DL
HCT VFR BLD AUTO: 37.3 % (ref 35–48)
HGB BLD-MCNC: 12.7 G/DL (ref 12–16)
IMM GRANULOCYTES # BLD AUTO: 0.02 X10(3) UL (ref 0–1)
IMM GRANULOCYTES NFR BLD: 0.4 %
KETONES UR STRIP.AUTO-MCNC: NEGATIVE MG/DL
LEUKOCYTE ESTERASE UR QL STRIP.AUTO: 25
LYMPHOCYTES # BLD AUTO: 1.43 X10(3) UL (ref 1–4)
LYMPHOCYTES NFR BLD AUTO: 27.3 %
MCH RBC QN AUTO: 34.1 PG (ref 26–34)
MCHC RBC AUTO-ENTMCNC: 34 G/DL (ref 31–37)
MCV RBC AUTO: 100.3 FL (ref 80–100)
MONOCYTES # BLD AUTO: 0.72 X10(3) UL (ref 0.1–1)
MONOCYTES NFR BLD AUTO: 13.7 %
NEUTROPHILS # BLD AUTO: 2.77 X10 (3) UL (ref 1.5–7.7)
NEUTROPHILS # BLD AUTO: 2.77 X10(3) UL (ref 1.5–7.7)
NEUTROPHILS NFR BLD AUTO: 52.8 %
NITRITE UR QL STRIP.AUTO: NEGATIVE
OSMOLALITY SERPL CALC.SUM OF ELEC: 294 MOSM/KG (ref 275–295)
PH UR STRIP.AUTO: 7 [PH] (ref 5–8)
PLATELET # BLD AUTO: 198 10(3)UL (ref 150–450)
POTASSIUM SERPL-SCNC: 4.2 MMOL/L (ref 3.5–5.1)
PROT SERPL-MCNC: 6.5 G/DL (ref 5.7–8.2)
PROT UR STRIP.AUTO-MCNC: NEGATIVE MG/DL
RBC # BLD AUTO: 3.72 X10(6)UL (ref 3.8–5.3)
RBC UR QL AUTO: NEGATIVE
SODIUM SERPL-SCNC: 140 MMOL/L (ref 136–145)
SP GR UR STRIP.AUTO: 1.01 (ref 1–1.03)
UROBILINOGEN UR STRIP.AUTO-MCNC: NORMAL MG/DL
WBC # BLD AUTO: 5.2 X10(3) UL (ref 4–11)

## 2025-05-22 PROCEDURE — 36415 COLL VENOUS BLD VENIPUNCTURE: CPT

## 2025-05-22 PROCEDURE — 87086 URINE CULTURE/COLONY COUNT: CPT | Performed by: EMERGENCY MEDICINE

## 2025-05-22 PROCEDURE — 85025 COMPLETE CBC W/AUTO DIFF WBC: CPT | Performed by: EMERGENCY MEDICINE

## 2025-05-22 PROCEDURE — 80162 ASSAY OF DIGOXIN TOTAL: CPT | Performed by: EMERGENCY MEDICINE

## 2025-05-22 PROCEDURE — 73562 X-RAY EXAM OF KNEE 3: CPT | Performed by: EMERGENCY MEDICINE

## 2025-05-22 PROCEDURE — 99284 EMERGENCY DEPT VISIT MOD MDM: CPT

## 2025-05-22 PROCEDURE — 73502 X-RAY EXAM HIP UNI 2-3 VIEWS: CPT | Performed by: EMERGENCY MEDICINE

## 2025-05-22 PROCEDURE — 80053 COMPREHEN METABOLIC PANEL: CPT | Performed by: EMERGENCY MEDICINE

## 2025-05-22 PROCEDURE — 81001 URINALYSIS AUTO W/SCOPE: CPT | Performed by: EMERGENCY MEDICINE

## 2025-05-22 NOTE — CM/SW NOTE
LATE ENTRY:    EDCM asked to speak with patient's son at bedside.  Per son, patient lives on the medical floor at [facility].  Patient was assisted to bathroom.  Employee briefly stepped away and patient tried to get up on her own.  Son feels patient has the support she needs at [facility] and feels comfortable taking patient back.

## 2025-05-22 NOTE — ED QUICK NOTES
Rounding completed. Patient returned from imaging, this writer noted patient has both hearing aids in ears. Patient placed on cardiac monitoring and pulse ox, patient did not tolerate BP cuff, patient began to scream. Primary RN updated with vitals and care provided. Patient requesting bathroom. CARLA Knowles inserting pure wick at this time. Call light within reach.

## 2025-05-22 NOTE — ED QUICK NOTES
Received pt lying on cart with eyes closed, easily aroused to verbal stimuli. Pt is aware of a fall, c/o generalized body aches and pains but states mostly normal for her.     Pt noted to have both legs bent up at the knee, bruising to both knees, left greater than right with very minor skin tear/lac to left knee, no active bleeding noted.     Pt to xray at this time.     Pt reports she does not wish to have any family called at this time. Will discuss again upon return.

## 2025-05-22 NOTE — ED QUICK NOTES
Pt back from xray without incident. Offered to reposition pt on cart but refusing.     Pt reports she has to void so discussed external catheter and pt in agreement.

## 2025-05-22 NOTE — DISCHARGE INSTRUCTIONS
Take 1000 mg acetaminophen (2 Tylenol tablets) every 6 hours as needed    Use precautions to rule out falling

## 2025-05-22 NOTE — ED INITIAL ASSESSMENT (HPI)
Pt reports from [place] [facility] after an unwitnessed fall in the bathroom. Pt was found on the ground by staff and was moved back to her bed. Pt not complaining of head or neck pain. Pt complaining of pain to L leg due to skin tear. LOC unknown, on thinners.   Hx of dementia.   A/o x2

## 2025-05-22 NOTE — ED PROVIDER NOTES
Patient Seen in: [facility]      History     Chief Complaint   Patient presents with    Fall     Stated Complaint: fall    Subjective:     HPI    89-year-old woman with dementia, atrial fibrillation (diltiazem, Plavix, Xarelto, digoxin), hypertension (lisinopril, metoprololBarbara experienced a fall earlier today after she got up at [employer]. She was found in the bathroom and subsequently lifted and placed back in bed. She reports bruising on her knees, particularly the left one, and pain in her left hip. She resides in a nursing care facility, [facility], and it is noted that she cannot live independently.    Objective:   Past Medical History:    -Lumbar radicular pain-electrical stimulator    Abdominal distention    Abdominal hernia    Abdominal pain    Age-related osteoporosis with current pathological fracture with routine healing    Anesthesia complication    Arthritis    Atrial fibrillation (HCC)    Back pain    Basal cell cancer    colon    Bloating    Body piercing    Cellulitis of chest wall    Cellulitis of left arm    Cellulitis of right upper extremity    Closed bicondylar fracture of distal end of right humerus            Global exp 1-17-18 / RIGHT ELBOW / BAM     Closed displaced fracture of pelvis (HCC)    Constipation    Diarrhea, unspecified    Easy bruising    Essential hypertension    Eye disease    Fatigue    Food intolerance    Frequent urination    Hearing impairment    Hearing loss    Heart palpitations    Hemorrhoids    History of depression    Hypertension    Irregular bowel habits    Leg swelling    Lumbar facet arthropathy    Macular degeneration - [facility]    Muscle weakness    Night sweats    Pacemaker    Reflux    Renal disorder    S/P colon resection / Colonoscopy (10/12) recheck - ANJALI Spencer    S/P laminectomy    Seizure disorder (HCC)    Sleep apnea    cpap    Sleep disturbance    Spinal stenosis of lumbar region without  neurogenic claudication    Visual impairment              Past Surgical History:   Procedure Laterality Date    Adj tiss xfer lid,nos,ear <10sqcm  2014    Procedure: DEBULKING OF NASOLABIAL FLAP TO NOSE;  Surgeon: Shar Vasquez MD;  Location: [facility]    Appendectomy      Back surgery      Bowel resection      Cholecystectomy      Colectomy      Colonoscopy      Colonoscopy      Egd      Hemorrhoidectomy,int/ext,simple      Hip replacement surgery  left 3/06 right 10/06    Other accessory      electro-stimulator for spinal stenosis    Other surgical history  10/10/2011    cysto-    Other surgical history  10/2016    back surgery    Pacemaker      St Ankit pacemaker    Skin surgery  2014    BCC nod, inf to left nasal tip / MMS done    Skin surgery  10/31/2016    MMS for BCC-nod to the R upper cut lip-AB    Spine surgery procedure unlisted      Tonsillectomy      Total hip replacement                  Social History     Socioeconomic History    Marital status:    Tobacco Use    Smoking status: Former     Current packs/day: 0.00     Average packs/day: 0.1 packs/day for 20.0 years (2.0 ttl pk-yrs)     Types: Cigarettes     Start date: 1952     Quit date: 1972     Years since quittin.4    Smokeless tobacco: Never   Vaping Use    Vaping status: Never Used   Substance and Sexual Activity    Alcohol use: Not Currently     Alcohol/week: 2.0 standard drinks of alcohol     Types: 2 Glasses of wine per week     Comment: wine occasionally    Drug use: No   Other Topics Concern    Caffeine Concern No    Exercise Yes     Comment: walks dog 2 x a day     Social Drivers of Health     Food Insecurity: No Food Insecurity (3/29/2024)    Food Insecurity     Food Insecurity: Never true   Transportation Needs: No Transportation Needs (3/29/2024)    Transportation Needs     Lack of Transportation: No   Housing Stability: Low Risk  (3/29/2024)    Housing Stability     Housing Instability: No               Review of Systems    Positive for stated complaint: fall  Other systems are as noted in HPI.  Constitutional and vital signs reviewed.      All other systems reviewed and negative except as noted above.    Physical Exam     ED Triage Vitals [05/22/25 0629]   /77   Pulse 67   Resp 22   Temp 97.8 °F (36.6 °C)   Temp src Temporal   SpO2 96 %   O2 Device None (Room air)       Current:/63   Pulse 69   Temp 97.6 °F (36.4 °C) (Oral)   Resp 22   Wt 72.6 kg   SpO2 94%   BMI 27.46 kg/m²     Head: Atraumatic  Neck: Nontender  General:  Vitals as listed.  No acute distress   HEENT: Sclerae anicteric.  Conjunctivae show no pallor.  Oropharynx clear, mucous membranes moist   Lungs: good air exchange  Chest wall: Nontender  Abdomen: Soft and nontender.  No abdominal masses.  No peritoneal signs   Extremities: Bilateral knee swelling, more on the left with ecchymosis.  Tenderness over the greater trochanter of the left hip.  No edema, normal peripheral pulses   Neuro: Alert, chronically disoriented and nonfocal      ED Course     Labs Reviewed   COMP METABOLIC PANEL (14) - Abnormal; Notable for the following components:       Result Value    BUN 26 (*)     eGFR-Cr 57 (*)     All other components within normal limits   URINALYSIS WITH CULTURE REFLEX - Abnormal; Notable for the following components:    Leukocyte Esterase Urine 25 (*)     Bacteria Urine Rare (*)     Squamous Epi. Cells Few (*)     All other components within normal limits   CBC WITH DIFFERENTIAL WITH PLATELET - Abnormal; Notable for the following components:    RBC 3.72 (*)     .3 (*)     MCH 34.1 (*)     All other components within normal limits   DIGOXIN (LANOXIN) - Abnormal; Notable for the following components:    Digoxin 0.59 (*)     All other components within normal limits   URINE CULTURE, ROUTINE     XR HIP W OR WO PELVIS 2 OR 3 VIEWS, LEFT (CPT=73502)  Result Date: 5/22/2025  CONCLUSION:  No acute fracture.  Intact  bilateral hip prostheses.   LOCATION:  [facility]   Dictated by (CST): Parker Bingham MD on 5/22/2025 at 7:54 AM     Finalized by (CST): Parker Bingham MD on 5/22/2025 at 7:56 AM       XR KNEE (3 VIEWS), RIGHT (CPT=73562)  Result Date: 5/22/2025  CONCLUSION:  Patellofemoral osteoarthritic changes.  No fracture.   LOCATION:  [facility]   Dictated by (CST): Parker Bingham MD on 5/22/2025 at 7:53 AM     Finalized by (CST): Parker Bingham MD on 5/22/2025 at 7:53 AM       XR KNEE (3 VIEWS), LEFT (CPT=73562)  Result Date: 5/22/2025  CONCLUSION:  Osteoarthritic changes.  No fracture.   LOCATION:  [facility]   Dictated by (CST): Parker Bingham MD on 5/22/2025 at 7:52 AM     Finalized by (CST): Parker Bingham MD on 5/22/2025 at 7:53 AM       ED COURSE and MDM     Sources of the medical history included the patient and her son    I reviewed prior external notes including CT of the abdomen/pelvis done 3/28/2024 that showed a small bowel obstruction    I have discussed with the patient the results of testing, differential diagnosis, and treatment plan. They expressed clear understanding of these instructions and agrees to the plan provided.    Disposition and Plan     Clinical Impression:  1. Falling    2. Contusion of knee, unspecified laterality, initial encounter         Disposition:  Discharge  5/22/2025  9:37 am    Follow-up:  Terri Navarro MD  [address] [place] [id number]  [phone]    Schedule an appointment as soon as possible for a visit in 1 week(s)  As needed        Medications Prescribed:  Discharge Medication List as of 5/22/2025  9:38 AM

## 2025-05-22 NOTE — ED QUICK NOTES
Pt' son Cortes here and updated on pt status. Pt assisted up to wheelchair in order to void. Pt lives in assisted living and is normally by herself in her apartment at the [place].     Pt did require assistance up to wheelchair and transfer to toilet. Pt did seem somewhat unsteady and was unable to take more than 1 or 2 steps with use of walker. This RN voiced concern to son at bedside regarding safety of pt being alone. Will contact  to come speak with son.    Pt reported that she wants to stay in wheelchair at this time.     Pt also c/o bilateral toe pain. Pt with pain to all toes with palpation but with dressing to left great toe removed at this time. Will have Dr. Wilkes assess.

## 2025-05-29 ENCOUNTER — LAB REQUISITION (OUTPATIENT)
Dept: LAB | Facility: HOSPITAL | Age: OVER 89
End: 2025-05-29
Payer: MEDICARE

## 2025-05-29 DIAGNOSIS — M62.81 MUSCLE WEAKNESS (GENERALIZED): ICD-10-CM

## 2025-05-29 LAB
ALBUMIN SERPL-MCNC: 4.2 G/DL (ref 3.2–4.8)
ALBUMIN/GLOB SERPL: 1.6 {RATIO} (ref 1–2)
ALP LIVER SERPL-CCNC: 85 U/L (ref 55–142)
ALT SERPL-CCNC: 23 U/L (ref 10–49)
ANION GAP SERPL CALC-SCNC: 9 MMOL/L (ref 0–18)
AST SERPL-CCNC: 32 U/L (ref ?–34)
BASOPHILS # BLD AUTO: 0.07 X10(3) UL (ref 0–0.2)
BASOPHILS NFR BLD AUTO: 1.2 %
BILIRUB SERPL-MCNC: 0.4 MG/DL (ref 0.2–0.9)
BUN BLD-MCNC: 26 MG/DL (ref 9–23)
CALCIUM BLD-MCNC: 9.3 MG/DL (ref 8.7–10.6)
CHLORIDE SERPL-SCNC: 105 MMOL/L (ref 98–112)
CO2 SERPL-SCNC: 26 MMOL/L (ref 21–32)
CREAT BLD-MCNC: 1.07 MG/DL (ref 0.55–1.02)
EGFRCR SERPLBLD CKD-EPI 2021: 47 ML/MIN/1.73M2 (ref 60–?)
EOSINOPHIL # BLD AUTO: 0.21 X10(3) UL (ref 0–0.7)
EOSINOPHIL NFR BLD AUTO: 3.6 %
ERYTHROCYTE [DISTWIDTH] IN BLOOD BY AUTOMATED COUNT: 12 %
FASTING STATUS PATIENT QL REPORTED: YES
GLOBULIN PLAS-MCNC: 2.7 G/DL (ref 2–3.5)
GLUCOSE BLD-MCNC: 90 MG/DL (ref 70–99)
HCT VFR BLD AUTO: 36 % (ref 35–48)
HGB BLD-MCNC: 12.1 G/DL (ref 12–16)
IMM GRANULOCYTES # BLD AUTO: 0.03 X10(3) UL (ref 0–1)
IMM GRANULOCYTES NFR BLD: 0.5 %
LYMPHOCYTES # BLD AUTO: 1.68 X10(3) UL (ref 1–4)
LYMPHOCYTES NFR BLD AUTO: 29 %
MCH RBC QN AUTO: 34 PG (ref 26–34)
MCHC RBC AUTO-ENTMCNC: 33.6 G/DL (ref 31–37)
MCV RBC AUTO: 101.1 FL (ref 80–100)
MONOCYTES # BLD AUTO: 0.59 X10(3) UL (ref 0.1–1)
MONOCYTES NFR BLD AUTO: 10.2 %
NEUTROPHILS # BLD AUTO: 3.21 X10 (3) UL (ref 1.5–7.7)
NEUTROPHILS # BLD AUTO: 3.21 X10(3) UL (ref 1.5–7.7)
NEUTROPHILS NFR BLD AUTO: 55.5 %
OSMOLALITY SERPL CALC.SUM OF ELEC: 294 MOSM/KG (ref 275–295)
PLATELET # BLD AUTO: 242 10(3)UL (ref 150–450)
POTASSIUM SERPL-SCNC: 4.1 MMOL/L (ref 3.5–5.1)
PROT SERPL-MCNC: 6.9 G/DL (ref 5.7–8.2)
RBC # BLD AUTO: 3.56 X10(6)UL (ref 3.8–5.3)
SODIUM SERPL-SCNC: 140 MMOL/L (ref 136–145)
WBC # BLD AUTO: 5.8 X10(3) UL (ref 4–11)

## 2025-05-29 PROCEDURE — 85025 COMPLETE CBC W/AUTO DIFF WBC: CPT | Performed by: INTERNAL MEDICINE

## 2025-05-29 PROCEDURE — 80053 COMPREHEN METABOLIC PANEL: CPT | Performed by: INTERNAL MEDICINE

## 2025-06-30 NOTE — PROGRESS NOTES
Ayse Maldonado  2025 - 2025  Patient ID: [id number]  : 10/25/1927  Age: 89 years  [address] [place]  [address]  [address]  [facility], [address]  Compliance Report  Usage 2025 - 2025  Usage days 37/47 days (79%)  >= 4 hours 28 days (60%)  < 4 hours 9 days (19%)  Usage hours 220 hours 49 minutes  Average usage (total days) 4 hours 42 minutes  Average usage (days used) 5 hours 58 minutes  Median usage (days used) 5 hours 9 minutes  Total used hours (value since last reset - 2025) 220 hours  AirSense 11 AutoSet  Serial number [id number]  Mode AutoSet  Min Pressure 5 cmH2O  Max Pressure 15 cmH2O  EPR Fulltime  EPR level 2  Response Standard  Therapy  Pressure - cmH2O Median: 7.1 95th percentile: 10.3 Maximum: 11.6  Leaks - L/min Median: 10.3 95th percentile: 56.1 Maximum: 113.2  Events per hour AI: 2.8 HI: 0.2 AHI: 3.0  Apnea Index Central: 0.1 Obstructive: 0.4 Unknown: 2.2  RERA Index 0.4  Cheyne-Bettencourt respiration (average duration per night) 0 minutes (0%)

## 2025-07-01 ENCOUNTER — OFFICE VISIT (OUTPATIENT)
Facility: CLINIC | Age: OVER 89
End: 2025-07-01
Payer: MEDICARE

## 2025-07-01 VITALS
SYSTOLIC BLOOD PRESSURE: 102 MMHG | HEART RATE: 74 BPM | OXYGEN SATURATION: 96 % | DIASTOLIC BLOOD PRESSURE: 56 MMHG | RESPIRATION RATE: 16 BRPM | BODY MASS INDEX: 27 KG/M2 | HEIGHT: 64 IN

## 2025-07-01 DIAGNOSIS — Z95.0 PACEMAKER: ICD-10-CM

## 2025-07-01 DIAGNOSIS — I48.0 PAF (PAROXYSMAL ATRIAL FIBRILLATION) (HCC): ICD-10-CM

## 2025-07-01 DIAGNOSIS — F03.90 DEMENTIA WITHOUT BEHAVIORAL DISTURBANCE (HCC): ICD-10-CM

## 2025-07-01 DIAGNOSIS — G47.33 OSA ON CPAP: ICD-10-CM

## 2025-07-01 DIAGNOSIS — G47.33 OBSTRUCTIVE SLEEP APNEA: Primary | ICD-10-CM

## 2025-07-01 PROCEDURE — 1160F RVW MEDS BY RX/DR IN RCRD: CPT | Performed by: OTHER

## 2025-07-01 PROCEDURE — 99214 OFFICE O/P EST MOD 30 MIN: CPT | Performed by: OTHER

## 2025-07-01 PROCEDURE — 1159F MED LIST DOCD IN RCRD: CPT | Performed by: OTHER

## 2025-07-01 NOTE — PATIENT INSTRUCTIONS
[facility]:      Regular nightly checks to make sure applied cpap properly.  Please re-apply cpap mask and ensure no leak while patient is sleeping.

## 2025-07-01 NOTE — PROGRESS NOTES
[facility] PULMONARY  SLEEP PROGRESS NOTE        HPI:   This is a 89 year old female coming in for   Chief Complaint   Patient presents with    Obstructive Sleep Apnea (JAZZ)     Pt here for check up and states no issues at this time.  Current machine is over two months old. Sleep questionnaire:        HPI:   Ayse Maldonado  2025 - 2025  Patient ID: [id number]  : 10/25/1927  Age: 89 years  [address] [place]  [address]  [address]  [facility], [address]  Compliance Report  Usage 2025 - 2025  Usage days 37/47 days (79%)  >= 4 hours 28 days (60%)  < 4 hours 9 days (19%)  Usage hours 220 hours 49 minutes  Average usage (total days) 4 hours 42 minutes  Average usage (days used) 5 hours 58 minutes  Median usage (days used) 5 hours 9 minutes  Total used hours (value since last reset - 2025) 220 hours  AirSense 11 AutoSet  Serial number [id number]  Mode AutoSet  Min Pressure 5 cmH2O  Max Pressure 15 cmH2O  EPR Fulltime  EPR level 2  Response Standard  Therapy  Pressure - cmH2O Median: 7.1 95th percentile: 10.3 Maximum: 11.6  Leaks - L/min Median: 10.3 95th percentile: 56.1 Maximum: 113.2  Events per hour AI: 2.8 HI: 0.2 AHI: 3.0  Apnea Index Central: 0.1 Obstructive: 0.4 Unknown: 2.2  RERA Index 0.4  Cheyne-Bettencourt respiration (average duration per night) 0 minutes (0%)       Patient: Sleep review of systems today: see form.      Pt  PCP:  Terri Navarro MD  No referring provider defined for this encounter.           No data to display                    Past Medical History[1]  Past Surgical History[2]  Social History:  Social History     Social History Narrative    Not on file     Short Social Hx on File[3]  Family History:  Family History[4]  Allergies:  Allergies[5]  Current Meds:  Current Medications[6]   Counseling given: Not Answered         Problem List:  Problem List[7]    REVIEW OF SYSTEMS:   Review of Systems    EXAM:   /56   Pulse 74   Resp 16   Ht 5' 4\" (1.626 m)    SpO2 96%   BMI 27.46 kg/m²  Estimated body mass index is 27.46 kg/m² as calculated from the following:    Height as of this encounter: 5' 4\" (1.626 m).    Weight as of 5/22/25: 160 lb (72.6 kg).   Neck in inches:      Wt Readings from Last 6 Encounters:   05/22/25 160 lb (72.6 kg)   04/15/25 135 lb (61.2 kg)   09/24/24 140 lb (63.5 kg)   04/06/24 134 lb 0.6 oz (60.8 kg)   03/18/24 135 lb (61.2 kg)   02/09/24 135 lb (61.2 kg)     BP Readings from Last 3 Encounters:   07/01/25 102/56   05/22/25 136/63   04/15/25 108/56     Pulse Readings from Last 3 Encounters:   07/01/25 74   05/22/25 69   04/15/25 66     SpO2 Readings from Last 3 Encounters:   07/01/25 96%   05/22/25 94%   04/15/25 95%      Patient weight not recorded    Vital signs reviewed.  Physical Exam    ASSESSMENT AND PLAN:   1. Obstructive sleep apnea    2. JAZZ on CPAP  Much improved , working on compliance, advised staff re-applying  Instructions provided  Weight 128  Using nasal mask  Up to date with supplies  Patient is using and benefiting from regular cpap use.  Patient was instructed to clean equipment on a weekly basis.  Patient was instructed to keep up to date with supplies.  Patient was informed to avoid drowsy driving, or using heavy machinery.  Patient was instructed to followup on a annual basis.   3. Pacemaker - M. Perfecto    4. PAF (paroxysmal atrial fibrillation) (HCC)    5. Dementia without behavioral disturbance (HCC)    There are no Patient Instructions on file for this visit.    Independent interpretation of Sleep Download as defined above.  Continue with Rx management of Sleep apnea with PAP therapy.    COMPLIANCE is required by insurance for 4 hours a night most nights of the week.    Advised if still with sleep apnea and not using CPAP has a 7 fold increase in risk of heart attack, stroke, abnormal heart rhythm  and death,  increased risk of driving accidents.     Advised to refrain from driving when sleepy.      Recommend weight loss,  and maintain and optimal BMI with Exercise 30 minutes most days to target heart rate .     Advised patient to change filters,masks,hoses  and tubes and equiptment on a  regular schedule.    Filters and seals shall be changed every 1 month,  Hoses every 3 months,   CPAP mask and humidifier chamber changed every 6 month  with the durable medical equipment provider.         Meds & Refills for this Visit:  Requested Prescriptions      No prescriptions requested or ordered in this encounter       Outcome: Parent verbalizes understanding. Parent is notified to call with any questions, complications, allergies, or worsening or changing symptoms.  Parent is to call with any side effects or complications from the treatments as a result of today.     \" This note was created utilizing Dragon speech recognition software.  Please excuse any grammatical errors. Call my office if you have any questions regarding this note. \"     Poornima Hinton,   7/1/2025  1:20 PM         [1]   Past Medical History:   -Lumbar radicular pain-electrical stimulator    Abdominal distention    Abdominal hernia    Abdominal pain    Age-related osteoporosis with current pathological fracture with routine healing    Anesthesia complication    Arthritis    Atrial fibrillation (HCC)    Back pain    Basal cell cancer    colon    Bloating    Body piercing    Cellulitis of chest wall    Cellulitis of left arm    Cellulitis of right upper extremity    Closed bicondylar fracture of distal end of right humerus            Global exp 1-17-18 / RIGHT ELBOW / BAM     Closed displaced fracture of pelvis (HCC)    Constipation    Diarrhea, unspecified    Easy bruising    Essential hypertension    Eye disease    Fatigue    Food intolerance    Frequent urination    Hearing impairment    Hearing loss    Heart palpitations    Hemorrhoids    History of depression    Hypertension    Irregular bowel habits    Leg swelling    Lumbar facet arthropathy    Macular degeneration -  [facility]    Muscle weakness    Night sweats    Pacemaker    Reflux    Renal disorder    S/P colon resection / Colonoscopy (10/12) recheck - ANJALI Spencer    S/P laminectomy    Seizure disorder (HCC)    Sleep apnea    cpap    Sleep disturbance    Spinal stenosis of lumbar region without neurogenic claudication    Visual impairment   [2]   Past Surgical History:  Procedure Laterality Date    Adj tiss xfer lid,nos,ear <10sqcm  2014    Procedure: DEBULKING OF NASOLABIAL FLAP TO NOSE;  Surgeon: Shar Vasquez MD;  Location: [facility]    Appendectomy      Back surgery      Bowel resection      Cholecystectomy      Colectomy      Colonoscopy      Colonoscopy      Egd      Hemorrhoidectomy,int/ext,simple      Hip replacement surgery  left 3/06 right 10/06    Other accessory      electro-stimulator for spinal stenosis    Other surgical history  10/10/2011    cysto-    Other surgical history  10/2016    back surgery    Pacemaker      St Ankit pacemaker    Skin surgery  2014    BCC nod, inf to left nasal tip / MMS done    Skin surgery  10/31/2016    MMS for BCC-nod to the R upper cut lip-AB    Spine surgery procedure unlisted      Tonsillectomy      Total hip replacement     [3]   Social History  Socioeconomic History    Marital status:    Tobacco Use    Smoking status: Former     Current packs/day: 0.00     Average packs/day: 0.1 packs/day for 20.0 years (2.0 ttl pk-yrs)     Types: Cigarettes     Start date: 1952     Quit date: 1972     Years since quittin.5    Smokeless tobacco: Never   Vaping Use    Vaping status: Never Used   Substance and Sexual Activity    Alcohol use: Not Currently     Alcohol/week: 2.0 standard drinks of alcohol     Types: 2 Glasses of wine per week     Comment: wine occasionally    Drug use: No   Other Topics Concern    Caffeine Concern No    Exercise Yes     Comment: walks dog 2 x a day     Social Drivers of Health     Food Insecurity:  No Food Insecurity (3/29/2024)    Food Insecurity     Food Insecurity: Never true   Transportation Needs: No Transportation Needs (3/29/2024)    Transportation Needs     Lack of Transportation: No   Housing Stability: Low Risk  (3/29/2024)    Housing Stability     Housing Instability: No   [4]   Family History  Problem Relation Age of Onset    Cancer Father     Hypertension Father     Colon Polyps Son     Other (Other) Sister         Davina Mayes   [5]   Allergies  Allergen Reactions    Bacitracin SWELLING     Red and swelling    Ciprofloxacin OTHER (SEE COMMENTS)     Nausea and abdominal cramping    Levofloxacin OTHER (SEE COMMENTS)    Sulfamethoxazole W/Trimethoprim OTHER (SEE COMMENTS)     Other reaction(s): VOMITING  Oral    Altaryl     Amiodarone OTHER (SEE COMMENTS)     Elevated liver enzymes      Diphenhydramine UNKNOWN    Escitalopram     Thiazide-Type Diuretics OTHER (SEE COMMENTS)     CLASS    Cephalexin DIARRHEA     May have also caused drug rash    Clonidine RASH     Oral    Garlic DIARRHEA    Garlic DIARRHEA    Hydrocodone NAUSEA ONLY     nausea    Lexapro NAUSEA ONLY   [6]   Current Outpatient Medications   Medication Sig Dispense Refill    dilTIAZem HCl ER Coated Beads 180 MG Oral Capsule SR 24 Hr       mirtazapine 7.5 MG Oral Tab Take 7.5 mg by mouth nightly.      hydrocortisone 25 MG Rectal Suppos       lisinopril 20 MG Oral Tab Take 1 tablet (20 mg total) by mouth in the morning and 1 tablet (20 mg total) before bedtime. Hold if systolic BP less than 100.      docusate sodium 100 MG Oral Cap Take 1 capsule (100 mg total) by mouth 2 (two) times daily.      memantine 5 MG Oral Tab Take 1 tablet (5 mg total) by mouth daily. Evening only      metoprolol tartrate 50 MG Oral Tab Take 1 tablet (50 mg total) by mouth 2 (two) times daily. 60 tablet 2    GABAPENTIN 300 MG Oral Cap TAKE ONE CAPSULE BY MOUTH TWICE DAILY 180 capsule 1    Multiple Vitamins-Minerals (CENTRUM SILVER 50+WOMEN OR) Take 1 tablet by  mouth daily.      Calcium Carb-Cholecalciferol (CALCIUM-VITAMIN D3) 600-500 MG-UNIT Oral Cap Take 1 tablet by mouth in the morning.      apixaban 5 MG Oral Tab Take 1 tablet (5 mg total) by mouth 2 (two) times daily. 60 tablet 3    digoxin 0.125 MG Oral Tab Take 1 tablet (125 mcg total) by mouth every morning.     [7]   Patient Active Problem List  Diagnosis    Pacemaker - M. Perfecto    JAZZ on CPAP    Current use of long term anticoagulation    Gastroesophageal reflux disease without esophagitis    Asthma with COPD (chronic obstructive pulmonary disease) ([facility])    Pulmonary hypertension ([facility])-52 mg Hg    Alternating constipation and diarrhea    Other fatigue-mutifactorial    Chronic bronchitis ([facility])    CKD (chronic kidney disease) stage 3, GFR 30-59 ml/min ([facility])    PAF (paroxysmal atrial fibrillation) ([facility])    Glaucoma suspect of left eye    Pseudophakia    Trochanteric bursitis of left hip    Fall    Essential hypertension    Chronic left hip pain    CHI (closed head injury), subsequent encounter    Altered mental status, unspecified altered mental status type    Hyponatremia    Seizure disorder ([facility])    Hypertension, unspecified type    Altered mental status    Weakness    Hypotension, unspecified hypotension type    Hypotension    Branch retinal vein occlusion of left eye with macular edema (HCC)    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 (HCC)    PCO (posterior capsular opacification), left    Inguinal hernia of right side without obstruction or gangrene    Umbilical hernia without obstruction and without gangrene    Protein-calorie malnutrition, unspecified severity ([facility])    Dementia without behavioral disturbance (HCC)    Primary osteoarthritis involving multiple joints

## (undated) DEVICE — CONVERTORS STOCKINETTE: Brand: CONVERTORS

## (undated) DEVICE — DRAPE C-ARM UNIVERSAL

## (undated) DEVICE — GLOVE SUR 6.5 SENSICARE PI PIP CRM PWD F

## (undated) DEVICE — 3M™ STERI-STRIP™ COMPOUND BENZOIN TINCTURE 40 BAGS/CARTON 4 CARTONS/CASE C1544: Brand: 3M™ STERI-STRIP™

## (undated) DEVICE — ZIMMER® STERILE DISPOSABLE TOURNIQUET CUFF WITH PLC, DUAL PORT, SINGLE BLADDER, 18 IN. (46 CM)

## (undated) DEVICE — UNDYED BRAIDED (POLYGLACTIN 910), SYNTHETIC ABSORBABLE SUTURE: Brand: COATED VICRYL

## (undated) DEVICE — FAN SPRAY KIT: Brand: PULSAVAC®

## (undated) DEVICE — GLOVE SUR 6.5 SENSICARE PI PIP GRN PWD F

## (undated) DEVICE — GLOVE SURG SENSICARE SZ 8

## (undated) DEVICE — SPONGE: SPECIALTY PEANUT XR 100/CS: Brand: MEDICAL ACTION INDUSTRIES

## (undated) DEVICE — POLYURETHANE FEEDING TUBE,RADIOPAQUE LINE, SAFE ENTERAL CONNECTIONS: Brand: KANGAROO

## (undated) DEVICE — DRAIN RELIAVAC W/DRN MED 1/8

## (undated) DEVICE — STRL PENROSE DRAIN 18" X 1/4": Brand: CARDINAL HEALTH

## (undated) DEVICE — SUTURE VICRYL 0 CT-1

## (undated) DEVICE — 2.8MM QUICK RELEASE DRILL: Brand: ACUMED

## (undated) DEVICE — SPLINT PRECUT SYNTH 4X30

## (undated) DEVICE — SOL  .9 1000ML BTL

## (undated) DEVICE — OLECRANON PLT PROX TARGETING GUIDE: Brand: ACUMED

## (undated) DEVICE — ANTIBACTERIAL UNDYED BRAIDED (POLYGLACTIN 910), SYNTHETIC ABSORBABLE SUTURE: Brand: COATED VICRYL

## (undated) DEVICE — 1010 S-DRAPE TOWEL DRAPE 10/BX: Brand: STERI-DRAPE™

## (undated) DEVICE — SUT MCRYL 4-0 18IN PS-2 ABSRB UD 19MM 3/8 CIR

## (undated) DEVICE — PADDING CAST SOFT ROLL 4\"

## (undated) DEVICE — PADDING CAST SOFT ROLL 3\" STER

## (undated) DEVICE — DRAIN RELIAVAC 100CC

## (undated) DEVICE — OCCLUSIVE GAUZE STRIP OVERWRAP,3% BISMUTH TRIBROMOPHENATE IN PETROLATUM BLEND: Brand: XEROFORM

## (undated) DEVICE — GLOVE SURG TRIUMPH SZ 71/2

## (undated) DEVICE — SOLUTION IRRIG 1000ML 0.9% NACL USP BTL

## (undated) DEVICE — GAUZE SPONGES,12 PLY: Brand: CURITY

## (undated) DEVICE — UPPER EXTREMITY CDS-LF: Brand: MEDLINE INDUSTRIES, INC.

## (undated) DEVICE — 2.0MM QUICK RELEASE DRILL: Brand: ACUMED

## (undated) DEVICE — .062" X 6" GUIDE WIRE: Brand: ACUMED

## (undated) DEVICE — CO/CA COUNTERSINK: Brand: ACUMED

## (undated) DEVICE — ADHESIVE SKIN TOP FOR WND CLSR DERMBND ADV

## (undated) DEVICE — SLEEVE COMPR MD KNEE LEN SGL USE KENDALL SCD

## (undated) DEVICE — BREAST-HERNIA-PORT CDS-LF: Brand: MEDLINE INDUSTRIES, INC.

## (undated) DEVICE — SUTURE MONOCRYL 4-0 PS-2

## (undated) DEVICE — SKN PREP SPNG STKS PVP PNT STR: Brand: MEDLINE INDUSTRIES, INC.

## (undated) DEVICE — HOOK LOCK LATEX FREE ELASTIC BANDAGE 4INX5YD

## (undated) DEVICE — SUT COAT VCRL+ 0 27IN UR-6 ABSRB VLT ANTIBACT

## (undated) DEVICE — COVER LT HNDL RIG FOR SUR CAM DISP

## (undated) DEVICE — GOWN SURG AERO CHROME XXL

## (undated) DEVICE — 3M™ COBAN™ NL STERILE NON-LATEX SELF-ADHERENT WRAP, 2084S, 4 IN X 5 YD (10 CM X 4,5 M), 18 ROLLS/CASE: Brand: 3M™ COBAN™

## (undated) DEVICE — KENDALL SCD EXPRESS SLEEVES, KNEE LENGTH, MEDIUM: Brand: KENDALL SCD

## (undated) DEVICE — VIOLET BRAIDED (POLYGLACTIN 910), SYNTHETIC ABSORBABLE SUTURE: Brand: COATED VICRYL

## (undated) NOTE — LETTER
[address]  [address]  Authorization for Surgical Operation and Procedure     Date:_04/02/2024__________                                                                                                         Time:__1230_______  I hereby authorize Surgeon(s):  Mally Fernandez MD, my physician and his/her assistants (if applicable), which may include medical students, residents, and/or fellows, to perform the following surgical operation/ procedure and administer such anesthesia as may be determined necessary by my physician:  Operation/Procedure name (s) Procedure(s):  OPEN RIGHT INGUINAL HERNIA REPAIR on Ayse Maldonado   2.   I recognize that during the surgical operation/procedure, unforeseen conditions may necessitate additional or different procedures than those listed above.  I, therefore, further authorize and request that the above-named surgeon, assistants, or designees perform such procedures as are, in their judgment, necessary and desirable.    3.   My surgeon/physician has discussed prior to my surgery the potential benefits, risks and side effects of this procedure; the likelihood of achieving goals; and potential problems that might occur during recuperation.  They also discussed reasonable alternatives to the procedure, including risks, benefits, and side effects related to the alternatives and risks related to not receiving this procedure.  I have had all my questions answered and I acknowledge that no guarantee has been made as to the result that may be obtained.    4.   Should the need arise during my operation/procedure, which includes change of level of care prior to discharge, I also consent to the administration of blood and/or blood products.  Further, I understand that despite careful testing and screening of blood or blood products by collecting agencies, I may still be subject to ill effects as a result of receiving a blood transfusion and/or  blood products.  The following are some, but not all, of the potential risks that can occur: fever and allergic reactions, hemolytic reactions, transmission of diseases such as Hepatitis, AIDS and Cytomegalovirus (CMV) and fluid overload.  In the event that I wish to have an autologous transfusion of my own blood, or a directed donor transfusion, I will discuss this with my physician.  Check only if Refusing Blood or Blood Products  I understand refusal of blood or blood products as deemed necessary by my physician may have serious consequences to my condition to include possible death. I hereby assume responsibility for my refusal and release the hospital, its personnel, and my physicians from any responsibility for the consequences of my refusal.          o  Refuse      5.   I authorize the use of any specimen, organs, tissues, body parts or foreign objects that may be removed from my body during the operation/procedure for diagnosis, research or teaching purposes and their subsequent disposal by hospital authorities.  I also authorize the release of specimen test results and/or written reports to my treating physician on the hospital medical staff or other referring or consulting physicians involved in my care, at the discretion of the Pathologist or my treating physician.    6.   I consent to the photographing or videotaping of the operations or procedures to be performed, including appropriate portions of my body for medical, scientific, or educational purposes, provided my identity is not revealed by the pictures or by descriptive texts accompanying them.  If the procedure has been photographed/videotaped, the surgeon will obtain the original picture, image, videotape or CD.  The hospital will not be responsible for storage, release or maintenance of the picture, image, tape or CD.    7.   I consent to the presence of a  or observers in the operating room as deemed necessary by my physician  or their designees.    8.   I recognize that in the event my procedure results in extended X-Ray/fluoroscopy time, I may develop a skin reaction.    9. If I have a Do Not Attempt Resuscitation (DNAR) order in place, that status will be suspended while in the operating room, procedural suite, and during the recovery period unless otherwise explicitly stated by me (or a person authorized to consent on my behalf). The surgeon or my attending physician will determine when the applicable recovery period ends for purposes of reinstating the DNAR order.  10. Patients having a sterilization procedure: I understand that if the procedure is successful the results will be permanent and it will therefore be impossible for me to inseminate, conceive, or bear children.  I also understand that the procedure is intended to result in sterility, although the result has not been guaranteed.   11. I acknowledge that my physician has explained sedation/analgesia administration to me including the risk and benefits I consent to the administration of sedation/analgesia as may be necessary or desirable in the judgment of my physician.    I CERTIFY THAT I HAVE READ AND FULLY UNDERSTAND THE ABOVE CONSENT TO OPERATION and/or OTHER PROCEDURE.    _________________________________________  __________________________________  Signature of Patient     Signature of Responsible Person         ___________________________________         Printed Name of Responsible Person           _________________________________                 Relationship to Patient  _________________________________________  ______________________________  Signature of Witness          Date  Time      Patient Name: Ayse Maldonado     : 10/25/1927                 Printed: 2024     Medical Record #: [id number]                     Page [address] of [address] [place] [address]  [id number]    Consent for Anesthesia    I,  Ayse Maldonado agree to be cared for by an anesthesiologist, who is specially trained to monitor me and give me medicine to put me to sleep or keep me comfortable during my procedure    I understand that my anesthesiologist is not an employee or agent of [facility] [employer]. He or she works for [email or web address].    As the patient asking for anesthesia services, I agree to:  Allow the anesthesiologist (anesthesia doctor) to give me medicine and do additional procedures as necessary. Some examples are: Starting or using an “IV” to give me medicine, fluids or blood during my procedure, and having a breathing tube placed to help me breathe when I’m asleep (intubation). In the event that my heart stops working properly, I understand that my anesthesiologist will make every effort to sustain my life, unless otherwise directed by [facility] Do Not Resuscitate documents.  Tell my anesthesia doctor before my procedure:  If I am pregnant.  The last time that I ate or drank.  All of the medicines I take (including prescriptions, herbal supplements, and pills I can buy without a prescription (including street drugs/illegal medications). Failure to inform my anesthesiologist about these medicines may increase my risk of anesthetic complications.  If I am allergic to anything or have had a reaction to anesthesia before.  I understand how the anesthesia medicine will help me (benefits).  I understand that with any type of anesthesia medicine there are risks:  The most common risks are: nausea, vomiting, sore throat, muscle soreness, damage to my eyes, mouth, or teeth (from breathing tube placement).  Rare risks include: remembering what happened during my procedure, allergic reactions to medications, injury to my airway, heart, lungs, vision, nerves, or muscles and in extremely rare instances death.  My doctor has explained to me other choices available to me for my care  (alternatives).  Pregnant Patients (“epidural”):  I understand that the risks of having an epidural (medicine given into my back to help control pain during labor), include itching, low blood pressure, difficulty urinating, headache or slowing of the baby’s heart. Very rare risks include infection, bleeding, seizure, irregular heart rhythms and nerve injury.  Regional Anesthesia (“spinal”, “epidural”, & “nerve blocks”):  I understand that rare but potential complications include headache, bleeding, infection, seizure, irregular heart rhythms, and nerve injury.    I can change my mind about having anesthesia services at any time before I get the medicine.    _____________________________________________________________________________  Patient (or Representative) Signature/Relationship to Patient  Date   Time    _____________________________________________________________________________   Name (if used)    Language/Organization   Time    _____________________________________________________________________________  Anesthesiologist Signature     Date   Time  I have discussed the procedure and information above with the patient (or patient’s representative) and answered their questions. The patient or their representative has agreed to have anesthesia services.    _____________________________________________________________________________  Witness        Date   Time  I have verified that the signature is that of the patient or patient’s representative, and that it was signed before the procedure  Patient Name: Ayse Maldonado     : 10/25/1927                 Printed: 2024     Medical Record #: [id number]                     Page 2 of 2

## (undated) NOTE — LETTER
October 15, 2019    Rikki Maldonado  [address] [facility] [facility] [address]      Dear Porfirio Flores: The following are the results of your recent tests. Please review the list of test results.   Your result is the value on the left; we have also suppl

## (undated) NOTE — LETTER
March 13, 2019    Lizett Maldonado  [address] [facility] [facility] [address]      Dear Fausto Mancera: The following are the results of your recent tests. Please review the list of test results.   Your result is the value on the left; we have also supplie

## (undated) NOTE — LETTER
[facility] [address], [address]  Consent for Procedure/Sedation    Date: 2/3/20    Time: 1536      1.  I authorize the performance upon Kailey Lara the following:cardiac catheterization, left ventricular cineangiography, bi period, the physician will determine when the applicable recovery period ends for purposes of reinstating the Do Not Resuscitate (DNR) order.     Signature of Patient: ____________________________________________________    Signature of person authorized

## (undated) NOTE — LETTER
03/15/23    Patient: Miya Lock  : 10/25/1927 Visit date: 3/13/2023    Dear  Sammie Rueda MD    Thank you for referring Miya Lock to my practice. Please find my assessment and plan below. Assessment   Right inguinal hernia  (primary encounter diagnosis)  Umbilical hernia without obstruction and without gangrene    Plan   This patient presents for further care and treatment regarding her very large right inguinal hernia and her smaller but chronic umbilical hernia. Her presenting history as listed below: This patient has 2 chronic hernias. One at the umbilicus, one in the right groin region. We are monitoring her every 6 months to make sure that they are uncomplicated. This patient has significant comorbidities and advanced age that would make surgery for her extremely risky. She has atrial fibrillation, pacemaker, seizure disorder, obstructive sleep apnea, and spinal stenosis. She has impaired ambulation. She is marginally ambulatory with a walker. The patient states her right inguinal hernia has been popping out more often. She states this happens on an almost daily basis, and requires manual reduction. She denies any strangulation events. She states it does not hurt hurt, but is occasionally uncomfortable. She denies an increase in size of her hernia. She denies associated nausea or vomiting. She denies a change in bowel habits. The patient states there are no changes in her hernia since her last visit. She has no abdominal pain. She does feel the lump. It does go in and out in the groin. She has some chronic constipation. She has had no history of ever having nausea or vomiting, abdominal pain, or other issues with either of the umbilical or the inguinal hernia present. Current exam reveals the right inguinal hernia to remain fairly stable. It may be just slightly bigger than previous exam.  It is nontender. It is easily reducible.   It reduces when she lays recumbent. It is approximately 8 cm in greatest dimension within the sac. The umbilical hernia is approximately 12 mm in greatest dimension. It is also nontender, it is nonreducible. Bowel sounds have normal activity normal pitch. There is no guarding or rebound. There is no evidence of ascites. There is no left inguinal hernia present. Both hernias remained fairly stable and asymptomatic other than the bulge in the right groin. We will continue to follow her clinically. I would only recommend operation if she presents urgently with an obstruction or incarceration event. She has multiple medical problems as listed above that preclude her as a good candidate for elective surgery that could further impair her ambulation and require general anesthesia.        Sincerely,       Antonio Balbuena MD   CC: No Recipients

## (undated) NOTE — LETTER
July 23, 2018    Aditya Canales [address] [address]      Dear Thenahomy Glatter: The following are the results of your recent tests. Please review the list of test results.   Your result is the value on the left; we have also suppl

## (undated) NOTE — Clinical Note
SHAWN, TCM call made, see notes. NCM confirmed with patient that she has a HFU on 11/8/19, NCM changed visit type to TCM HFU.

## (undated) NOTE — MR AVS SNAPSHOT
[place] [address] Selena Raines [address] [place] [id number] [phone]               Thank you for choosing us for your health care visit with Alfredo Lakhani MD.  We are glad to serve you and happy to provide you with this This list is accurate as of: 5/30/17  2:50 PM.  Always use your most recent med list.                acetaminophen 325 MG Tabs   Take 325 mg by mouth.  Take two tablets at bedtime   Commonly known as:  TYLENOL           AmLODIPine Besylate 2.5 MG Tabs   Yarelis Rase medications prescribed for you. Read the directions carefully, and ask your doctor or other care provider to review them with you.             Results of Recent Testing     PROTHROMBIN TIME      Component Value Standard Range & Units    INR 1.3     Test Str

## (undated) NOTE — Clinical Note
February 22, 2017    Ritchie Canales [address] [place] [address]      Dear Joyce Tovar: The following are the results of your recent tests. Please review the list of test results.   Your result is the value on the left; we have also s

## (undated) NOTE — LETTER
10/27/22    Patient: Peyton Billings  : 10/25/1927 Visit date: 10/27/2022    Dear  Saida Tatum MD    Thank you for referring Peyton Billings to my practice. Please find my assessment and plan below. Assessment   Right inguinal hernia  (primary encounter diagnosis)  Umbilical hernia without obstruction and without gangrene  Pulmonary hypertension (HCC)-52 mg Hg  PAF (paroxysmal atrial fibrillation) (HCC)  Asthma with COPD (chronic obstructive pulmonary disease) (HCC)  JAZZ on CPAP  Stage 3 chronic kidney disease, unspecified whether stage 3a or 3b CKD ([facility] [address]  Current use of long term anticoagulation  Seizure disorder [facility])    Plan   This patient has 2 chronic hernias. One at the umbilicus, one in the right groin region. We are monitoring her every 6 months to make sure that they are uncomplicated. This patient has significant comorbidities and advanced age that would make surgery for her extremely risky. She has atrial fibrillation, pacemaker, seizure disorder, obstructive sleep apnea, and spinal stenosis. She has impaired ambulation. She is marginally ambulatory with a walker. The patient states her right inguinal hernia has been popping out more often. She states this happens on an almost daily basis, and requires manual reduction. She denies any strangulation events. She states it does not hurt hurt, but is occasionally uncomfortable. She denies an increase in size of her hernia. She denies associated nausea or vomiting. She denies a change in bowel habits. Clinical exam reveals a large right inguinal hernia. It has increased in size since the time of her previous exam.  I am able to feel small intestine within the hernia sac. The hernia is easily reducible. She has a stable umbilical hernia. The patient would like to avoid surgery at this time. I explained to the patient that if her hernia ever becomes incarcerated, she will need to notify us immediately.   Her hernia will need to be reduced within 6 hours. I recommend whenever the patient lays down, she reduces her hernia, to help avoid any strangulation events. The patient and her son expressed understanding with the above plan. All questions and concerns were addressed. Patient will follow up with me in March 2023.        Sincerely,       Edd Livingston MD   CC: No Recipients

## (undated) NOTE — LETTER
[facility]  Emily [address] [facility], [address] [place] [address]    Consent for Operation    Date: __________________    Time: _______________    1.  I authorize the performance upon Westley Etienne the following operation:    Procedure(s):  Open reduction int procedure has been videotaped, the surgeon will obtain the original videotape. The hospital will not be responsible for storage or maintenance of this tape.     6. For the purpose of advancing medical education, I consent to the admittance of observers to t STATEMENTS REQUIRING INSERTION OR COMPLETION WERE FILLED IN.     Signature of Patient:   ___________________________    When the patient is a minor or mentally incompetent to give consent:  Signature of person authorized to consent for patient: ____________ supplements, and pills I can buy without a prescription (including street drugs/illegal medications). Failure to inform my anesthesiologist about these medicines may increase my risk of anesthetic complications.   · If I am allergic to anything or have had Anesthesiologist Signature     Date   Time  I have discussed the procedure and information above with the patient (or patient’s representative) and answered their questions. The patient or their representative has agreed to have anesthesia services.     ___

## (undated) NOTE — LETTER
December 5, 2018    Sohan Ulysses Joel  [address] [facility] [address]      Dear Yanira Raya: The following are the results of your recent tests. Please review the list of test results.   Your result is the value on the left; we have also supplied t

## (undated) NOTE — ED AVS SNAPSHOT
[place]   MRN: [id number]    Department:  [facility] Emergency Department   Date of Visit:  10/27/2019           Disclosure     Insurance plans vary and the physician(s) referred by the ER may not be covered by your plan.  Please contact tell this physician (or your personal doctor if your instructions are to return to your personal doctor) about any new or lasting problems. The primary care or specialist physician will see patients referred from the [facility] Emergency Department.  Huseyin Love

## (undated) NOTE — LETTER
Randa Joy [address] [place] [address] E  kar[place], [address] [facility]    Consent for Operation  Date: _2/3/20_                               Time: ___1536___    1. I authorize the performance upon Parker Simon the following operation:  Cardioversion.   2. I au videotape. The [facility] will not be responsible for storage or maintenance of this tape. 6. For the purpose of advancing medical education, I consent to the admittance of observers to the Operating Room.   7. I authorize the use of any specimen, organs, ti When the patient is a minor or mentally incompetent to give consent:  Signature of person authorized to consent for patient: ___________________________   Relationship to patient: ____________________________________________________    Signature of Witness these medicines may increase my risk of anesthetic complications. iv. If I am allergic to anything or have had a reaction to anesthesia before. 3. I understand how the anesthesia medicine will help me (benefits).   4. I understand that with any type of an patient’s representative) and answered their questions. The patient or their representative has agreed to have anesthesia services.     _____________________________________________________________________________  Witness        Date   Time  I have violeta

## (undated) NOTE — IP AVS SNAPSHOT
Patient Demographics     Address  [address]  [facility] Phone  [phone] [facility])  [phone] (Mobile) *Preferred* E-mail Address  [email or web address]      Emergency Contact(s)     Name Relation Home Work Mobile    Mathieu Valadez 7 Specialties:  Cardiovascular Diseases, CARDIOLOGY  Why:  For evaluation of BP  Contact information:  [address]                  Your medication list      TAKE these medications       Instructions Author Take 20 mg by mouth daily. Shawn Dale MD         lidocaine-menthol 4-1 % Ptch      Place 1 patch onto the skin daily. Lani Hess MD         lisinopril 40 MG Tabs  Next dose due: Tomorrow 5/11      Take 40 mg by mouth daily.           metoprol [id number] hydrALAzine HCl (APRESOLINE) tab 100 mg 05/10/20 0828 Given      [id number] hydrALAzine HCl (APRESOLINE) tab 100 mg 05/10/20 1652 Given      [id number] lisinopril tab 40 mg 05/10/20 0828 Given              Recent Vital Signs       Most Recent Valu WITH PLATELET.   Procedure                               Abnormality         Status                     ---------                               -----------         ------                     CBC W/ DIFFERENTIAL[[id number]]          Abnormal            Final Author:  Juan Ku MD Service:  Hospitalist Author Type:  Physician    Filed:  5/7/2020  3:59 PM Date of Service:  5/7/2020  3:46 PM Status:  Signed    :  Juan Ku MD (Physician)         [facility]  History and Physical     Justyna Enter CT head was done emergency room with no evidence of bleeding. Patient is admitted under seizure precautions and to avoid sedatives. EEG is ordered for morning.     Past Medical History:  Past Medical History:   Diagnosis Date   • -Lumbar radicular pain-el • EXCISION OF LESION/LIPOMA N/A 12/2/2015    Performed by Ilia Banks MD at [facility] MAIN OR   • HEMORRHOIDECTOMY,INT/EXT,SIMPLE     • HIP REPLACEMENT SURGERY  left 3/06 right 10/06   • OTHER ACCESSORY      electro-stimulator for spinal stenosis   • OTHER JORGE ALBERTO Comment:Red and swelling  Escitalopram              Garlic                  DIARRHEA  Hydrocodone                 Comment:nausea  Lexapro                 NAUSEA ONLY  Thiazide-Type Diure*    OTHER (SEE COMMENTS)    Comment:CLASS  Cephalexin Multiple Vitamins-Minerals (TAB-A-MARA MAXIMUM) Oral Tab, Take 1 tablet by mouth daily. , Disp: , Rfl:   acetaminophen 325 MG Oral Tab, Take 650 mg by mouth every 6 (six) hours as needed for Pain., Disp: , Rfl:         Review of Systems:   A comprehensive 1 PTP 17.7*   INR 1.41*       Recent Labs   Lab 05/07/20  1010   TROP <0.045       Imaging: Imaging data reviewed in Epic. ASSESSMENT / PLAN:     1.  Acute encephalopathy  -Possibly due to hypertension urgency  -Possibly due to hyponatremia  -Possibly me History of Present Illness:  Ramiro Vega is a a(n) 80year old female was sent from [facility] due to with alteration in mental status. She was found in her wealchair unresponsive. No fever reported.  On arrival BP was 204/105, and temp 98.2, 02 Performed by Kamron Black MD at [facility]   • EGD     • 2011 [facility] Right 10/29/2017    Performed by Justyna Andrews MD at [facility]   • EXCISION OF LESION/LIPOMA N/A 12/2/2015    Performed by Crystal Elizondo MD Amiodarone              OTHER (SEE COMMENTS)    Comment:Elevated liver enzymes  Bacitracin                  Comment:Red and swelling  Escitalopram              Garlic                  DIARRHEA  Hydrocodone                 Comment:nausea  Lexapro omega-3 fatty acids 1000 MG Oral Cap, Take 1,000 mg by mouth daily. , Disp: , Rfl:   gabapentin 300 MG Oral Cap, Take 300 mg by mouth 3 (three) times daily. , Disp: , Rfl:   apixaban 5 MG Oral Tab, Take 1 tablet (5 mg total) by mouth 2 (two) times daily. , BILT  --  0.4 0.6   TP  --  6.1* 7.1         Lab Results   Component Value Date    PTT 38.8 05/07/2020    INR 1.41 05/07/2020    PTP 17.7 05/07/2020    TROP <0.045 05/07/2020    PGLU 101 05/07/2020     COVID-19 Lab Results    COVID-19  Lab Results   Compon From [facility], no reported COVID cases  -was tested negative by rapid, 2nd test sent          Jolie Butt MD  [address] [place] [address]  [phone][KEYLA.1]    Electronically signed by William Costa MD on 5/7/2020  5:26 PM 1. No acute intracranial abnormality identified. Stable chronic ischemic changes as above. 2. Findings concerning for acute left maxillary sinusitis. Clinical correlation recommended.      3. Fluid in the left mastoid air cells      *SARS COV2 on 5/8/ Procedure Laterality Date   • APPENDECTOMY     • BACK SURGERY     • BOWEL RESECTION     • CHOLECYSTECTOMY     • COLECTOMY     • COLONOSCOPY     • COLONOSCOPY     • DEBULKING OF NASOLABIAL FLAP TO NOSE Left 3/28/2014    Performed by Tristin Aly MD at [facility] SUBJECTIVE  \"What do you want to do? \"    Patient self-stated goal is n/a-she didn't verbalize anything specific    OBJECTIVE  Precautions: Hard of hearing;Bed/chair alarm;Seizure  Fall Risk: High fall risk    WEIGHT BEARING RESTRICTION  Weight Bearing Re -   Moving from lying on back to sitting on the side of the bed?: A Little   How much help from another person does the patient currently need. ..   -   Moving to and from a bed to a chair (including a wheelchair)?: A Little   -   Need to walk in hospital r therapy include afib, basal cell cancer, depression, essential HTN, macular degeneration and ppm.  In this PT evaluation, the patient presents with the following impairments[EB. 1] posture, endurance, strength, gait and balance[EB.2].   Functional outcome me Goal #6    Goal Comments: Goals established on 5/9/2020[EB. 1]     Attribution Key    EB. Heladio 1540, PT on 5/9/2020  2:37 PM  EB. 2 - Andrew Rogers, PT on 5/9/2020  2:59 PM                        Occupational Therapy Notes (last 72 hours) (N Altered mental status, unspecified altered mental status type  Active Problems:    Hyponatremia    Seizure disorder (HCC)    Hypertension, unspecified type    Altered mental status      Past Medical History  Past Medical History:   Diagnosis Date   • -Evelin Denney Performed by Ray Ramos MD at [address] [place] [address] N/A 12/2/2015    Performed by Julius Birmingham MD at [facility] MAIN OR   • HEMORRHOIDECTOMY,INT/EXT,SIMPLE     • HIP REPLACEMENT SURGERY  left 3/06 right 10/06   • OTHER ACCESSORY      elec assistance with ADL. Per  note on 5/8, pt was just about to be [facility] from [facility] to return to independent living facility. SUBJECTIVE   \"Tell me, I will do what you want me to do. \"     OBJECTIVE  Precautions: Seizure;Bed/chair alarm;Hard of h -   Taking care of personal grooming such as brushing teeth?: A Little  -   Eating meals?: None    AM-PAC Score:  Score: 16  Approx Degree of Impairment: 53.32%  Standardized Score (AM-PAC Scale): 35.96  CMS Modifier (G-Code): CK    FUNCTIONAL TRANSFER ASS Occupational Profile/Medical History MODERATE - Expanded review of history including review of medical or therapy record   Specific performance deficits impacting engagement in ADL/IADL MODERATE  3 - 5 performance deficits   Client Assessment/Performance D Future Appointments        Provider Nishi Martin    12/1/2020 2:00 PM SHANNEN Parr PULMONARY MEDICINE [address] [place]      Multidisciplinary Problems     Active Goals        Problem: Patient/Family Goals    Goal Priority Disciplines Outcome

## (undated) NOTE — ED AVS SNAPSHOT
Daphnie Chacon   MRN: [id number]    Department:  [facility] Emergency Department   Date of Visit:  7/21/2019           Disclosure     Insurance plans vary and the physician(s) referred by the ER may not be covered by your plan.  Please contact y tell this physician (or your personal doctor if your instructions are to return to your personal doctor) about any new or lasting problems. The primary care or specialist physician will see patients referred from the [facility] Emergency Department.  Kim Goncalves

## (undated) NOTE — LETTER
February 7, 2019    Deniece Halsted Critton  [address] [place], [place] [address] [facility] [address]      Dear Kira Duque: The following are the results of your recent tests. Please review the list of test results.   Your result is the value on the left; we have also suppl

## (undated) NOTE — LETTER
October 9, 2018    [facility] [place] [address] [address] [facility] [address]      Dear [facility]: The following are the results of your recent tests. Please review the list of test results.   Your result is the value on the left; we have also supplied th

## (undated) NOTE — LETTER
January 11, 2018    Ben Canales [facility] [address]      Dear Rogelio Hannah: The following are the results of your recent tests. Please review the list of test results.   Your result is the value on the left; we have also hope

## (undated) NOTE — Clinical Note
May 30, 2017    Francoise [facility]ales [address] [facility] [address]      Dear Dina Gomez: The following are the results of your recent tests. Please review the list of test results.   Your result is the value on the left; we have also suppli

## (undated) NOTE — LETTER
October 24, 2018    Sohan Antunezving Joel  [address] [facility] [address]      Dear Yanira Raya: The following are the results of your recent tests. Please review the list of test results.   Your result is the value on the left; we have also supplied t

## (undated) NOTE — MR AVS SNAPSHOT
[employer] [facility]  [address] [place] Reggie Raines [address] [place] [id number] [phone]               Thank you for choosing us for your health care visit with Israel Gutierrez NP.   We are glad to serve you and happy to provide you with this s container to measure your medicine. Ask your pharmacist if you do not have one. Household spoons are not accurate. You can take it with or without food. If it upsets your stomach, take it with food. Take your medicine at regular intervals.  Do not take it m · phenothiazines like chlorpromazine, mesoridazine, prochlorperazine, thioridazine  This medicine may also interact with the following medications:  · antiviral medicines for HIV or AIDS  · atropine  · certain antibiotics like erythromycin and clarithromyc · liver disease  · lung disease like asthma or emphysema  · recent surgery or injury  · thyroid disease  · an allergic or unusual reaction to codeine, hydromorphone, hydrocodone, oxycodone, morphine, guaifenesin, other medicines, foods, dyes, or preservati Children may be at higher risk for side effects. If your child has slow breathing, noisy breathing, confusion, or unusual sleepiness, stop giving this medicine and get medical help right away. The medicine may cause constipation.  Try to have a bowel movem Generic drug:  gabapentin   Take 300 mg by mouth 3 (three) times daily. OCUVITE OR   2 times daily           Omeprazole 40 MG Cpdr   Take 1 capsule by mouth 2 (two) times daily. [address] Caps   Take  by mouth.  1 capsule irvin

## (undated) NOTE — LETTER
August 12, 2019    Isidra Maldonado  [address] [place] [address]      Dear Wendi Beltran: The following are the results of your recent tests. Please review the list of test results.   Your result is the value on the left; we have also suppli

## (undated) NOTE — LETTER
[facility]  [address] [facility] [address]  [phone]  AUTHORIZATION FOR SURGICAL OPERATION OR PROCEDURE                                                           I hereby authorize Dr. Maza, my physician and his/her assistants (if applicable), which may include medical students, residents, and/or fellows, to perform the following surgical operation/ procedure and administer such anesthesia as may be determined necessary by my physician:  Operation/Procedure name (s) open right inguinal hernia repair with mesh   On Ayse Maldonado.  2.   I recognize that during the surgical operation/procedure, unforeseen conditions may necessitate additional or different procedures than those listed above.  I, therefore, further authorize and request that the above-named surgeon, assistants, or designees perform such procedures as are, in their judgment, necessary and desirable.    3.   My surgeon/physician has discussed prior to my surgery the potential benefits, risks and side effects of this procedure; the likelihood of achieving goals; and potential problems that might occur during recuperation.  They also discussed reasonable alternatives to the procedure, including risks, benefits, and side effects related to the alternatives and risks related to not receiving this procedure.  I have had all my questions answered and I acknowledge that no guarantee has been made as to the result that may be obtained.    4.   Should the need arise during my operation/procedure, which includes change of level of care prior to discharge, I also consent to the administration of blood and/or blood products.  Further, I understand that despite careful testing and screening of blood or blood products by collecting agencies, I may still be subject to ill effects as a result of receiving a blood transfusion and/or blood products.  The following are some, but not all, of the potential risks that can occur:  fever and allergic reactions, hemolytic reactions, transmission of diseases such as Hepatitis, AIDS and Cytomegalovirus (CMV) and fluid overload.  In the event that I wish to have an autologous transfusion of my own blood, or a directed donor transfusion, I will discuss this with my physician.  Check only if Refusing Blood or Blood Products  I understand refusal of blood or blood products as deemed necessary by my physician may have serious consequences to my condition to include possible death. I hereby assume responsibility for my refusal and release the hospital, its personnel, and my physicians from any responsibility for the consequences of my refusal.          o  Refuse   5.   I authorize the use of any specimen, organs, tissues, body parts or foreign objects that may be removed from my body during the operation/procedure for diagnosis, research or teaching purposes and their subsequent disposal by hospital authorities.  I also authorize the release of specimen test results and/or written reports to my treating physician on the hospital medical staff or other referring or consulting physicians involved in my care, at the discretion of the Pathologist or my treating physician.    6.   I consent to the photographing or videotaping of the operations or procedures to be performed, including appropriate portions of my body for medical, scientific, or educational purposes, provided my identity is not revealed by the pictures or by descriptive texts accompanying them.  If the procedure has been photographed/videotaped, the surgeon will obtain the original picture, image, videotape or CD.  The hospital will not be responsible for storage, release or maintenance of the picture, image, tape or CD.    7.   I consent to the presence of a  or observers in the operating room as deemed necessary by my physician or their designees.    8.   I recognize that in the event my procedure results in extended  X-Ray/fluoroscopy time, I may develop a skin reaction.    9. If I have a Do Not Attempt Resuscitation (DNAR) order in place, that status will be suspended while in the operating room, procedural suite, and during the recovery period unless otherwise explicitly stated by me (or a person authorized to consent on my behalf). The surgeon or my attending physician will determine when the applicable recovery period ends for purposes of reinstating the DNAR order.  10. Patients having a sterilization procedure: I understand that if the procedure is successful the results will be permanent and it will therefore be impossible for me to inseminate, conceive, or bear children.  I also understand that the procedure is intended to result in sterility, although the result has not been guaranteed.   11. I acknowledge that my physician has explained sedation/analgesia administration to me including the risk and benefits I consent to the administration of sedation/analgesia as may be necessary or desirable in the judgment of my physician.    I CERTIFY THAT I HAVE READ AND FULLY UNDERSTAND THE ABOVE CONSENT TO OPERATION and/or OTHER PROCEDURE.     _________________________________________ _________________________________     ___________________________________  Signature of Patient     Signature of Responsible Person                   Printed Name of Responsible Person                              _________________________________________ ______________________________        ___________________________________  Signature of Witness         Date  Time         Relationship to Patient    Patient Name: Ayse TRIPP Joel    : 10/25/1927   Printed: 3/31/2024      Medical Record #: [id number]                                              Page 1 of 1

## (undated) NOTE — IP AVS SNAPSHOT
Patient Demographics     Address  [address] [place] [address] Phone  [phone] [facility]) *Preferred*  [phone] [facility]) E-mail Address  [email or web address]      Emergency Contact(s)     Name Relation Home Work Mobile    Kike Instructions Authorizing Provider Morning Afternoon Evening As Needed   acetaminophen 325 MG Tabs  Commonly known as:  TYLENOL      Take 650 mg by mouth every 6 (six) hours as needed for Pain.           Acidophilus/Pectin Caps  Commonly known as:  PROBIOTI Alysha [facility] [phone], [phone]  [address] [facility] [address]    Phone:  [phone]   lidocaine-menthol 4-1 % Ptch           [id number]-A - MAR ACTION REPORT  (last 24 hrs)    ** SITE UNKNOWN **     Order ID Medica Filed:  2020  4:25 AM Date of Service:  2020  3:08 AM Status:  Signed    :  Derek Gonsalez MD (Physician)         [facility]  [facility] and Physical     Angelica Maldonado Patient Status:  Emergency    10/25/1927 MRN [id number]   Lo • Closed displaced fracture of pelvis ([facility] [address]    • Constipation    • Diarrhea, unspecified    • Essential hypertension    • Fatigue    • Food intolerance    • Frequent urination    • Hearing impairment    • Hearing loss    • Hemorrhoids    • History of depre Performed by Nichole Cazares MD at [facility]   • TRANSFORAMINAL LUMBAR EPIDURAL STEROID INJECTION MULTIPLE LEVEL Left 6/29/2015    Performed by Nichole Cazares MD at [facility] MAIN OR   • TRANSFORAMINAL LUMBAR EPIDURAL STEROID INJECTION MULTIPLE LEVEL Left gabapentin 300 MG Oral Cap, Take 300 mg by mouth 3 (three) times daily. , Disp: , Rfl:   apixaban 5 MG Oral Tab, Take 1 tablet (5 mg total) by mouth 2 (two) times daily. , Disp: 60 tablet, Rfl: 3  lisinopril 40 MG Oral Tab, Take 40 mg by mouth daily.   , Disp Neurologic: No focal neurological deficits. CNII-XII grossly intact. Musculoskeletal: Moves all extremities. [RS.1]  Left hip tenderness[RS.2]  Extremities: No edema or cyanosis. Integument: No rashes or lesions.    Psychiatric: Appropriate mood and affect Author:  Kathryn Ponce MD Service:  Hospitalist Author Type:  Physician    Filed:  2/22/2020  2:42 PM Date of Service:  2/22/2020  2:38 PM Status:  Signed    :  Kathryn Ponce MD (Physician)       [facility] HOSPITALIST  DISCHARGE SUMMARY     Jayla Hood 59-90 High Risk  29-58 Medium Risk  0-28   Low Risk         TCM Follow-Up Recommendation:  LACE > 58: High Risk of readmission after discharge from the hospital.    Procedures during hospitalization:   ?  Xray hip, Xray pelvis    Incidental or significant f Take 1 tablet by mouth daily. Refills:  0     OCUVITE OR      1 tablet two times daily   Refills:  0     traMADol HCl 50 MG Tabs  Commonly known as:  ULTRAM      Take  mg by mouth 2 (two) times daily as needed for Pain.  1-4 pain 1 tablet   5-10 pa -----------------------------------------------------------------------------------------------  PATIENT DISCHARGE INSTRUCTIONS: See electronic chart    Puma Antoine MD    Time spent:  > 30 minutes[GS.1]      Electronically signed by Brianna Suazo MD on Unable to ambulate    Closed head injury, initial encounter    Contusion of face, initial encounter      Past Medical History  Past Medical History:   Diagnosis Date   • -Lumbar radicular pain-electrical stimulator 7/29/2013   • Abdominal pain    • Age-r Performed by Mary Kay De Anda MD at [facility] MAIN OR   • HEMORRHOIDECTOMY,INT/EXT,SIMPLE     • HIP REPLACEMENT SURGERY  left 3/06 right 10/06   • OTHER ACCESSORY      electro-stimulator for spinal stenosis   • OTHER SURGICAL HISTORY  10-10-11    cysto-   • OTHER · Overall Cognitive Status:  WFL - within functional limits  · Arousal/Alertness:  appropriate responses to stimuli  · Orientation Level:  oriented x4  · Following Commands:  follows one step commands with repetition and follows one step commands consisten flat;Comment(mild kyphosis)  Stoop/Curb Assistance: Not tested       Skilled Therapy Provided: Pt presents to PT lying supine in bed. Pt is eager to participate, however apprehensive to incr pn. Supine/sit c min A to the EOB.  Pt able to scoot and repositio function. Subacute rehab is recommended for 12-14 days. After this period of rehabilitation patient should achieve mod indep level in bed mobility, t/f and amb using AD.     DISCHARGE RECOMMENDATIONS  PT Discharge Recommendations: Sub-acute rehabilitation; admitted on 2/20/2020 from[LD.1] [facility] at [facility] s/p fall with L hip pain. Pt got out of bed to go to the  (shortly after being toileted by aides and put back to bed). She had an unwitnessed fall. She has an abrasion on her forehead.  Pt has had chroni [employer] exp 1-17-18 / RIGHT [facility]  11/9/2017   • [facility]    • Constipation    • Diarrhea, unspecified    • Essential hypertension    • Fatigue    • Food intolerance    • Frequent urination    • Hearing impairment    • • TRANSFORAMINAL LUMBAR EPIDURAL STEROID INJECTION MULTIPLE LEVEL Left 7/13/2015    Performed by Aloysius Klinefelter, MD at [facility]   • TRANSFORAMINAL LUMBAR EPIDURAL STEROID INJECTION MULTIPLE LEVEL Left 6/29/2015    Performed by Aloysius Klinefelter, MD at [facility] and disoriented to situation  Memory:  decreased recall of recent events and decreased short term memory  Safety Judgement:  decreased awareness of need for assistance and decreased awareness of need for safety  Problem Solving:  assistance required assistance with[LD.1] min[LD.2] verbal/visual/tactile cues for safety with RW use and hand placement. Pt performed functional mobility with[LD.1] mod[LD.2] assistance and RW x approx[LD.1] 20[LD.2]ft>[LD. 1]chair[LD.2].  Pt performed standing>sit in[LD.1] he Client Assessment/Performance Deficits MODERATE - Comorbidities and min to mod modifications of tasks    Clinical Decision Making MODERATE - Analysis of occupational profile, detailed assessments, several treatment options    Overall Complexity MODERATE[LD Pneumococcal (Prevnar 13) 05/09/18     Pneumococcal (Prevnar 13) 05/23/16     Pneumovax 23 11/06/19       Future Appointments        Provider Nishi Martin    2/27/2020 1:00 PM Martín Guerrero PA DMG DERMATOLOGY -- [facility]

## (undated) NOTE — LETTER
21    Patient: Luis Cabrales  : 10/25/1927 Visit date: 2021    Dear  Maria G Lee MD    Thank you for referring Luis Cabrales to my practice. Please find my assessment and plan below.     Assessment   Right inguinal hernia  (primary e No guarding or rebound. No masses. No ascites. Liver is normal, spleen is not palpable. BMI is 26.43. There is a 3.5 cm umbilical hernia that is reducible. There is a 7 cm right inguinal hernia that is reducible.   There is a right paramedian incision

## (undated) NOTE — Clinical Note
JOHNI, TCM call made, see notes. NCM confirmed with patient that she has a HFU on 2/12/2020, NCM changed visit type to TCM HFU.

## (undated) NOTE — Clinical Note
May 24, 2017    Valerie Canales [address] [place] [address]      Dear Uriel Ledesma: The following are the results of your recent tests. Please review the list of test results.   Your result is the value on the left; we have also suppli

## (undated) NOTE — Clinical Note
Pt is coming for hospital follow up on 7/11/18. Pt stated she is doing okay since d/c. Pt stated she feels tired today. Pt stated her B/P was elevated yesterday but that today it pbm592/69. Pt denies s/s of hypertension at this time.  Pt is aware to monitor

## (undated) NOTE — LETTER
[facility] [address], [address]  Consent for Procedure/Sedation    Date:     Time:       1. I authorize the performance upon Bakari Montez the following:  CARDIOVERSION     2.  I authorize Dr. Wendy Sy (and whomever is designat Witness: _________________________      Date: ___________________    Printed: 2019   10:38 AM  Patient Name: Daphnie Chacon        : 10/25/1927       Medical Record #: [id number]

## (undated) NOTE — IP AVS SNAPSHOT
[address]            (For Outpatient Use Only) Initial Admit Date: 10/27/2017   Inpt/Obs Admit Date: Inpt: 10/28/17 / Obs: N/A   Discharge Date:    Skip Claudia:  [de-identified]   MRN: [de-identified]   CSN: [id number]        [facility] Subscriber ID:  Pt Rel to Subscriber:    Hospital Account Financial Class: Medicare Advantage    November 1, 2017

## (undated) NOTE — IP AVS SNAPSHOT
Patient Demographics     Address  [address] [place] [address] Phone  [phone] [facility]) *Preferred*  [phone] [facility]) E-mail Address  [email or web address]      Emergency Contact(s)     Name Relation Home Work [address]  Your medication list      TAKE these medications       Instructions Authorizing Provider Morning Afternoon Evening As Needed   acetaminophen 325 MG Tabs  Commonly known as:  TYLENOL      Take 325 mg by mouth.  Take two tablets at bedtime [id number] Sotalol HCl (BETAPACE) tab 40 mg 10/31/17 2100 Given      [id number] Sotalol HCl (BETAPACE) tab 40 mg 11/01/17 0656 Given      [id number] TraMADol HCl (ULTRAM) tab 50 mg (Or Linked Group #1) 10/31/17 1700 Given      [id number] TraMADol HCl (ULTRAM) Mode  Spontaneous   Interface  Full face mask   Mask size  Medium   Flow rate  3 lpm         Lab Results Last 24 Hours      BASIC METABOLIC PANEL (8) [[id number]] (Abnormal)  Resulted: 11/01/17 1336, Result status: Final result   Ordering provider:  Marce Olivas :  Marysol Jimenez MD (Physician)         [facility]  [facility] and Physical     Rebelius Nerissa Maldonado Patient Status:  Emergency    10/25/1927 MRN [id number]   Location [address] [facility] Attending Yonathan Abbott MD   [facility] Da Cephalexin              Diarrhea  Garlic                  Diarrhea  Levaquin                Nausea only  Lexapro                 Nausea only    Medications:    No current facility-administered medications on file prior to encounter.    Current Outpatient Pr 180 lb 12.4 oz (82 kg)   SpO2 96%   BMI 30.08 kg/m²   General: No acute distress. Alert and oriented x 3. HEENT: Normocephalic atraumatic. Moist mucous membranes. EOM-I. PERRLA. Anicteric. Neck: No lymphadenopathy. No JVD. No carotid bruits.   Respiratory Attribution Dahl    AS. 1 - Sp Llanos MD on 10/27/2017 11:46 PM                        Consults - MD Consult Notes      Consults signed by Amita Marie MD at 10/28/2017 11:17 AM     Author:  Amita Marie MD Service:  Orthopedics Author Type:  Physi 10/16:  OTHER SURGICAL HISTORY      Comment: back surgery  No date: PACEMAKER      Comment: [employer] pacemaker  3-6-14: SKIN SURGERY      Comment: BCC nod, inf to left nasal tip / MMS done  10/31/16: SKIN SURGERY      Comment: MMS for BCC-nod to the R upper •  Nalbuphine HCl (NUBAIN) injection 2.5 mg, 2.5 mg, Intravenous, Q4H PRN  •  HYDROmorphone HCl PF (DILAUDID) 1 MG/ML injection 0.4 mg, 0.4 mg, Intravenous, Q30 Min PRN  •  HYDROmorphone (DILAUDID) 0.2 mg/ml PCA infusion, , Intravenous, Continuous[BM. 2] Min displaced L sided pelvic fx of sup/inf rami    Impression and Plan:[BM.1]  Patient Active Problem List:     Pacemaker - [place]     S/P colon resection / Colonoscopy (10/12) recheck - ANJALI Nuñez degeneration - [address] :  Sharon Fontana PTA (Physical Therapy Assistant)        PHYSICAL THERAPY TREATMENT NOTE - INPATIENT    Room Number: 375/375-A     Session: 2   Number of Visits to Meet Established Goals: 6    Presenting Problem: s/p right distal humerus ORIF, 3-6-14: SKIN SURGERY      Comment: BCC nod, inf to left nasal tip / MMS done  10/31/16: SKIN SURGERY      Comment: MMS for BCC-nod to the R upper cut lip-AB  No date: TOTAL HIP REPLACEMENT    SUBJECTIVE  \" I am very sensitive on my legs so don't touch my Skilled Therapy Provided: The pt received in supine, initially declined therapy but agreeable very easily after discussing risk factors associated with prolong immobility.  Noted to have increased swelling on right hand and fingers, pt was educated on frequ S/P right distal humerus ORIF, left pelvic fracture. Results of the AM-PAC \"6 clicks\" Inpatient Daily Mobility Short Form for the patient is 72.57% degree of basic mobility impairment.   The patient is below their baseline and would benefit from continu PHYSICAL THERAPY TREATMENT NOTE - INPATIENT    Room Number: 375/375-A     Session: 1   Number of Visits to Meet Established Goals: 6    Presenting Problem: s/p right distal humerus ORIF, left pelvic fracture  History related to current admission: pt admit Comment: MMS for BCC-nod to the R upper cut lip-AB  No date: TOTAL HIP REPLACEMENT    SUBJECTIVE  \" I am very sensitive on my legs so don't touch my legs. \"    Patient’s self-stated goal is to walk.     OBJECTIVE  Precautions: None    WEIGHT BEARING RE with prolong immobility. Noted to have increased swelling on right hand and fingers, pt was educated on frequent  and wrist movements, and elevated positioning. Pt needs cues to perform hand close/open, forgetful.  Pt performed le ex with assist, pt narinder Pt seen QD in PT for pre gait training, standing tolerance activity, bed mobility training, transfer training, hand ex and yesi BLE strengthening: S/P right distal humerus ORIF, left pelvic fracture.    Results of the AM-PAC \"6 clicks\" Inpatient Daily Mobi Filed:  10/30/2017  4:29 PM Date of Service:  10/30/2017  4:05 PM Status:  Addendum    :  Paul Cole PT (Physical Therapist)    Related Notes:  Original Note by Paul Cole PT (Physical Therapist) filed at 10/30/2017  4:29 PM           [facility] left 3/06 right 10/06: HIP REPLACEMENT SURGERY  10-10-11: OTHER SURGICAL HISTORY      Comment: cysto-  10/16: OTHER SURGICAL HISTORY      Comment: back surgery  No date: PACEMAKER      Comment: St Ankit pacemaker  3-6-14: SKIN SURGERY      Comment: BCC nod, ue, not tested, casted, fingers edematous    Lower extremity ROM is within functional limits except for the following:   Right Hip flexion dec due to pain  Left Hip flexion dec due to pain  Right Knee flexion dec due to pain  Left Knee flexion dec due to p Skilled Therapy Provided:  Pt recd in supine, ok to see per Adam Castañeda, son present. Pt educated in role of PT, goals for session.   Pt reluctant to work with PT/OT, required encouragement, education about negative effects of bedrest.  Pt instructed in use copd, pal with use of cpap, afib, mac degeneration, spinal stenosis[SP. 1] with h/o back surgery[SP.2].   In this PT evaluation, the patient presents with the following impairments right ue pain, pelvic/LE pain, dec rom, strength, sitting balance, activity t SP.1 - Kaleb Mckinley PT on 10/30/2017  4:05 PM  SP.2 - Kaleb Mckinley PT on 10/30/2017  4:29 PM               Physical Therapy Note signed by Kaleb Mckinley PT at 10/30/2017  4:29 PM  Version 1 of 2    Author:  Kaleb Mckinley PT Service:  (none) A Past Surgical History  Past Surgical History:  3/28/2014: ADJ TISS Valentino Able LID,NOS,EAR <10SQCM      Comment: Procedure: DEBULKING OF NASOLABIAL FLAP TO                NOSE;  Surgeon: Vciky Quijano MD;  Location:                [address] [place] [address] follows one step commands with repetition  · Perseveration: perseverates during conversation  · Safety Judgement:  decreased awareness of need for assistance and decreased awareness of need for safety    RANGE OF MOTION AND STRENGTH ASSESSMENT  Upper extre -   Climbing 3-5 steps with a railing?: Total       AM-PAC Score:  Raw Score: 8   PT Approx Degree of Impairment Score: 86.62%   Standardized Score (AM-PAC Scale): 28.58   CMS Modifier (G-Code): CM    FUNCTIONAL ABILITY STATUS  Gait Assessment   Gait Toni Patient is a 80year old female admitted on 10/27/2017 for fall resulting in right distal humeral fracture, left pubic rami fracture, pt underwent right humerus ORIF 10/29/17, now appropriate for PT eval.  Pertinent comorbidities and personal factors impac Goal #3 Patient is able to ambulate 5 feet with assist device: walker - ashtyn at assistance level: moderate assistance     Goal #4 Able to tolerate a/sonia rodriguez le's   Goal #5    Goal #6    Goal Comments: Goals established on 10/30/2017[SP.1]     Beryluti degeneration, afib, pacemaker, spinal stenosis with history of back surgery and inserted medtronic device for back pain control per son. [MM.3]    UPDATE 11/1:  R distal humerus fx, R pelvic fx     S/P ORIF R elbow  Cont dry gauze dressings to elbow  Sling WEIGHT BEARING RESTRICTION  Weight Bearing Restriction: R upper extremity;L lower extremity  R Upper Extremity: Weight Bearing as Tolerated        L Lower Extremity: Weight Bearing as Tolerated    PAIN ASSESSMENT  Rating: Unable to rate  Location:  (back, Patient progressing toward goals; limited by pain issues, needs extended time for all tasks ; well below baseline and needs sub acute rehab to reach  supervision/mod I level . [MM. 2]     OT Discharge Recommendations: Sub-acute rehabilitation (elos 18-21 day History related to current admission:[BW.1] Patient is 80year old female admitted on 10/27/2017 from [facility] independent living following a fall, found to have R distal humeral fracture and L superior rami fracture, currently s/p R distal humeral OCCUPATIONAL PROFILE    HOME SITUATION[BW.1]  Type of Home: Independent living facility [facility])  Home Layout: One level  Lives With: Alone    Toilet and Equipment: Comfort height toilet (safety frame)  Shower/Tub and Equipment: Walk-in shower; Sarina Right Wrist flexion limited due to bandage  Right Wrist extension limited due to bandage  Right  limited due to edema[BW.5]    Upper extremity strength is within functional limits[BW.1] except for the following;  RUE due to pain/bandage/edema[BW.5] supported and increased time due to increased pain with movement > sitting EOB approximately 15 minutes with initially poor plus balance improving to fair minus and then fair balance, with intermittent cues for upright posture; patient able to perform feed benefit from continued skilled OT to address the activity limitations identified by the AM-PAC \"6 clicks\". Subacute rehab is recommended for[BW.3] 18-21[BW.5] days. After this period of rehabilitation patient should achieve[BW.3] supervision to Mod I[BW. Patient will follow NWB status for RUE[BW.4] with min cueing[BW.5]       Attribution Dahl    BW.1 - Precious Arechiga OT on 10/30/2017  2:23 PM  BW.2 - Precious Arechiga OT on 10/30/2017  2:24 PM  BW.3 - Precious Arechiga OT on 10/30/2017  5:42 PM  BW

## (undated) NOTE — LETTER
[facility] [address], [address]  Consent for Procedure/Sedation    Date:     Time:       1. I authorize the performance upon Parker Simon the following: ELECTIVE CARDIOVERSION    2.  I authorize Dr. Daron Lee (and whomever is d ________________________________    ___________________    Witness: _________________________      Date: ___________________    Printed: 2020   10:03 AM  Patient Name: Juvenal Andrews        : 10/25/1927       Medical Record #: [id number]

## (undated) NOTE — LETTER
[facility] [address], [address]    Consent for Operation    Date: October 28, 2017    Time: _______________    1.  I authorize the performance upon Nydia Rios the following operation:    Procedure(s):  Open reduction inter procedure has been videotaped, the surgeon will obtain the original videotape. The hospital will not be responsible for storage or maintenance of this tape.     6. For the purpose of advancing medical education, I consent to the admittance of observers to t STATEMENTS REQUIRING INSERTION OR COMPLETION WERE FILLED IN.     Signature of Patient:   ___________________________    When the patient is a minor or mentally incompetent to give consent:  Signature of person authorized to consent for patient: ____________ supplements, and pills I can buy without a prescription (including street drugs/illegal medications). Failure to inform my anesthesiologist about these medicines may increase my risk of anesthetic complications.   · If I am allergic to anything or have had Anesthesiologist Signature     Date   Time  I have discussed the procedure and information above with the patient (or patient’s representative) and answered their questions. The patient or their representative has agreed to have anesthesia services.     ___

## (undated) NOTE — LETTER
August 6, 2018    Jaxon Canales [address] [address]      Dear Juvenal Mcneil: The following are the results of your recent tests. Please review the list of test results.   Your result is the value on the left; we have also supp

## (undated) NOTE — MR AVS SNAPSHOT
[facility]  [address] Selena Raines [address] [place] [id number] [phone]               Thank you for choosing us for your health care visit with Marjorie Fajardo MD.  We are glad to serve you and happy to provide you with this 07/18/2014 105        EKG - w/ Initial Preventative Physical Exam only, or if medically necessary Per cardiology    Colorectal Cancer Screening      Colonoscopy Screen every 10 years Colonoscopy,10 Years due on 10/25/1977 Update Health Maintenance if appli BP Pulse Temp Height Weight BMI    120/54 mmHg 60 97.7 °F (36.5 °C) (Oral) 65\" 177 lb 8 oz 29.54 kg/m2         Current Medications          This list is accurate as of: 2/21/17 11:25 AM.  Always use your most recent med list.                acetaminophen medications prescribed for you. Read the directions carefully, and ask your doctor or other care provider to review them with you.             Results of Recent Testing       Gissell                  Visit [facility] Hospital[facility]tleData online at  [email or web address]

## (undated) NOTE — IP AVS SNAPSHOT
Patient Demographics     Address  [address]  [facility] [address] Phone  [phone] (Home)  [phone] (Mobile) *Preferred* E-mail Address  [email or web address]      Patient Contacts     Name Relation Home Work Mobile    Pankaj Cortez Power of  [phone]    Cortes Cortez [phone]    Jewel Cortez [phone]    Cipriano Cortez   [phone]      Allergies as of 4/6/2024  Review status set to In Progress on 4/2/2024       Noted Reaction Type Reactions    Bacitracin 05/06/2014    SWELLING    Red and swelling    Ciprofloxacin 01/05/2011    OTHER (SEE COMMENTS)    Nausea and abdominal cramping    Levofloxacin 05/01/2008    OTHER (SEE COMMENTS)    Sulfamethoxazole W/trimethoprim 06/03/2009    OTHER (SEE COMMENTS)    Other reaction(s): VOMITING  Oral    Altaryl 02/23/2007        Amiodarone 01/31/2020    OTHER (SEE COMMENTS)    Elevated liver enzymes    DELETED: Bactrim [sulfamethoxazole W/trimethoprim, Co-trimoxazole] 05/04/2011    NAUSEA ONLY    DELETED: Bjmutffu-thltake-bdmdll [fluocinolone Acetonide] 05/04/2011    NAUSEA ONLY    Diphenhydramine 02/23/2007    UNKNOWN    Escitalopram 10/31/2014        DELETED: Levaquin 01/05/2011        Thiazide-type Diuretics 06/03/2009    OTHER (SEE COMMENTS)    CLASS    DELETED: Bactrim 01/05/2011    NAUSEA ONLY    Cephalexin 05/04/2011    DIARRHEA    May have also caused drug rash    Clonidine 06/03/2009    RASH    Oral    Garlic 03/22/2013   Intolerance DIARRHEA    Garlic 07/05/2018    DIARRHEA    Hydrocodone 12/07/2017    NAUSEA ONLY    nausea    DELETED: Levaquin 05/04/2011    NAUSEA ONLY    Lexapro 01/05/2011    NAUSEA ONLY      Code Status Information     Code Status    Full Code        Patient Instructions       Hernia Repair  Dr. Mally Fernandez    MEDICATIONS  For post-operative pain control, the medications are usually tramadol.  This is a narcotic and is best taken  by starting with one tablet and repeating every four to six hours as needed.  If the patient does not feel they need the narcotics they shouldn’t take them.  If the pain is severe the patient may take up to two pills every six hours.  The patient can take tylenol 1g three times a day and ibuprofen 400-600mg in between up to 4 times a day as needed for pain as well.  The patient can take zofran 4mg every 6 hours as needed for nausea. The patient can also take miralax or colace as needed for constipation. Please ask your surgeon before resuming blood thinners such as aspirin, Plavix or Coumadin.  All other home medications may be resumed as scheduled.     DIET  The patient may resume a general diet immediately.  This is not a good time to eat excessively.  The patient should eat in moderation and stick with foods the patient feels are easy to digest. There should be no alcohol consumption in the immediate recovery time period or within six hours of taking narcotics.    WOUND CARE  The dressing is usually a dissolvable glue which protects the wound. The patient can take a shower starting the night of surgery, but please, no baths or soaking. Please do not put any creams or ointments on the surgical incisions. If the patient does have a top dressing with clear tape and gauze, this dressing may be removed in 2 days. Do not remove the steri-strips or butterfly tapes that are white and adherent to the skin.  The steri-strips will eventually peel up at the ends and at this point they may be removed.  This is usually seven to ten days after surgery.  When showering, soap can get on the wounds but do not scrub over the wounds.  No hair dye or chemicals of any kind should get in the wounds.  Avoid tub baths, swimming or sitting in a hot tub for two weeks.  If visible sutures or staples are present they will be removed in the office by the surgeon or nurse.  Most wounds will be closed with dissolving suture underneath the skin.   These sutures will dissolve on their own.  If a drain is present make sure the patient receives drain care instructions from the nurse prior to discharge.  Most patients will not have a drain.    ACTIVITY  Every day the patient should be up, showered and dressed.  Each day the patient should be up and around the house.  The patient should not lie in bed and should not stay in pajamas.  We count on the patient being up, coughing, walking and deep breathing to avoid pneumonia and blood clots in the legs.  Once a day the patient should get out of the house and go shopping, go to the mall, the [employer] store, the [employer] or a restaurant.  The patient may ride in a car but should not drive the car for at least one week.  Patients should be off narcotics for at least 8 hours prior to being a .  The average time off work is 10 to 14 days; most adults will be seeing the surgeon prior to returning to work.  Students will return to school within 1-5 days after discharge from the hospital but will be off gym, sports, and indoors for recess for one month.  Patients may go up and down stairs and lift up to five pounds but no bending, pushing or pulling.  Nothing called work or exercise until the follow up visit.  No ‘stair-master’, power walking, jogging or workouts until the follow up visit.  Patients should seek further activity limits at the time of their appointment.    APPOINTMENT  Please call our office for an appointment within five to ten days of discharge.  Any fever greater than 100.5, chills, nausea, vomiting, or severe diarrhea please call our office.  If the wound turns red, hot, swollen, becomes increasingly painful, or drains pus call us immediately at [phone].  For life threatening emergencies call [phone].  For non-emergent care please call our office after 8:30 a.m. Monday through Friday.  The number listed above is our office number; our phone automatically switches to our answering service if we are  not there.  Please do not use the emergency room for non-urgent care.    Thank you for entrusting us with your care.  [facility]--General Surgery      Follow-up Information     [facility]. Schedule an appointment as soon as possible for a visit in 2 week(s).    Why: in 7-10 days, sooner if needed.  Contact information:  [address]  [facility] [address] [phone]             Manjula Grove MD Follow up in 2 week(s).    Specialties: Cardiovascular Diseases, CARDIOLOGY  Why: Our office will contact you with a follow up  Contact information:  [address] [facility]  [address] [facility] [address] [phone]             Terri Navarro MD. Schedule an appointment as soon as possible for a visit in 1 week(s).    Specialties: Internal Medicine, IP Consult to Primary Care  Contact information:  [address] [facility]  [address]  [facility] [id number] [phone]                        Your Home Meds List      TAKE these medications       Instructions Authorizing Provider Morning Afternoon Evening As Needed   Acetaminophen  MG Tbcr  Commonly known as: TYLENOL  Next dose due: Tonight, 4/5/2024       Take 2 tablets (1,300 mg total) by mouth in the morning and 2 tablets (1,300 mg total) before bedtime.          apixaban 5 MG Tabs  Commonly known as: Eliquis  Next dose due: Tonight, 4/5/2024       Take 1 tablet (5 mg total) by mouth 2 (two) times daily.   Manjula Grove         Calcium-Vitamin D3 600-500 MG-UNIT Caps  Next dose due: Saturday, 4/6/2024       Take 1 tablet by mouth daily.          CENTRUM SILVER 50+WOMEN OR  Next dose due: Saturday, 4/6/2024       Take 1 tablet by mouth daily.          ICAPS AREDS 2 OR  Next dose due: Tonight, 4/5/2024       Take 1 capsule by mouth in the morning and 1 capsule before bedtime.          digoxin 0.125 MG Tabs  Commonly known as: Lanoxin  Next dose due: Saturday, 4/6/2024       Take 1 tablet (125 mcg total) by mouth every morning.          dilTIAZem HCl ER Beads 180 MG  Cp24  Commonly known as: TIAZAC  Next dose due: Saturday, 4/6/2024       Take 1 capsule (180 mg total) by mouth daily. In the morning   Terri Navarro         docusate sodium 100 MG Caps  Commonly known as: Colace  Next dose due: Tonight, 4/5/2024       Take 1 capsule (100 mg total) by mouth 2 (two) times daily.          gabapentin 300 MG Caps  Commonly known as: Neurontin  Next dose due: Tonight, 4/5/2024       TAKE ONE CAPSULE BY MOUTH TWICE DAILY   Terri Navarro         lisinopril 20 MG Tabs  Commonly known as: Prinivil; Zestril      Take 1 tablet (20 mg total) by mouth in the morning and 1 tablet (20 mg total) before bedtime. Hold if systolic BP less than 100.   Terri Navarro         memantine 5 MG Tabs  Commonly known as: Namenda  Next dose due: This evening, 4/5/2024       Take 1 tablet (5 mg total) by mouth daily. Evening only   Sumgriseldaa BOBBY Castro         metoprolol tartrate 50 MG Tabs  Commonly known as: Lopressor  Next dose due: This evening, 4/5/2024       Take 1 tablet (50 mg total) by mouth 2 (two) times daily.   Pankaj Strong         MIRALAX OR      Take 17 g by mouth daily as needed (constipation).                   [id number] - MAR ACTION REPORT  (last 48 hrs)    ** SITE UNKNOWN **     Order ID Medication Name Action Time Action Reason Comments    [id number] acetaminophen (Tylenol) tab 650 mg 04/05/24 2150 Given      [id number] apixaban (Eliquis) tab 5 mg 04/04/24 2007 Given      [id number] apixaban (Eliquis) tab 5 mg 04/05/24 0941 Given      [id number] apixaban (Eliquis) tab 5 mg 04/05/24 2149 Given      [id number] apixaban (Eliquis) tab 5 mg 04/06/24 0808 Given      [id number] digoxin (Lanoxin) tab 125 mcg 04/04/24 1432 Given      [id number] digoxin (Lanoxin) tab 125 mcg 04/05/24 0941 Given      [id number] digoxin (Lanoxin) tab 125 mcg 04/06/24 0808 Given      [id number] dilTIAZem ER (CardIZEM CD) 24 hr cap 180 mg 04/06/24 0808 Given      [id number] furosemide (Lasix) 10 mg/mL injection 40 mg 04/06/24 0808  Given      [id number] furosemide (Lasix) 10 mg/mL injection 60 mg 04/05/24 2048 Given      [id number] gabapentin (Neurontin) cap 300 mg 04/05/24 2151 Given      [id number] gabapentin (Neurontin) cap 300 mg 04/06/24 0808 Given      [id number] hydrALAzine (Apresoline) 20 mg/mL injection 10 mg 04/05/24 0613 Given      [id number] hydrALAzine (Apresoline) 20 mg/mL injection 10 mg 04/05/24 1252 Given      [id number] labetalol (Trandate) 5 mg/mL injection 10 mg 04/04/24 1637 Given      [id number] labetalol (Trandate) 5 mg/mL injection 10 mg 04/05/24 1614 Given      [id number] lisinopril (Prinivil; Zestril) tab 20 mg 04/05/24 0941 Given      [id number] lisinopril (Prinivil; Zestril) tab 20 mg 04/05/24 2151 Given      [id number] lisinopril (Prinivil; Zestril) tab 20 mg 04/06/24 0808 Given      [id number] memantine (Namenda) tab 5 mg 04/05/24 2151 Given      [id number] metoprolol tartrate (Lopressor) tab 100 mg 04/05/24 1723 Given      [id number] metoprolol tartrate (Lopressor) tab 100 mg 04/06/24 0638 Given      [id number] niCARdipine in sodium chloride 0.86% (carDENE) 20 mg/200mL infusion premix 04/05/24 1955 New Bag      [id number] pantoprazole (Protonix) DR tab 40 mg 04/05/24 0941 Given      [id number] pantoprazole (Protonix) DR tab 40 mg 04/06/24 0638 Given      [id number] polyethylene glycol (PEG 3350) (Miralax) 17 g oral packet 17 g 04/05/24 0941 Given      [id number] potassium chloride 40 mEq in 250mL sodium chloride 0.9% IVPB premix 04/04/24 1636 New Bag      [id number] potassium chloride 40 mEq in 250mL sodium chloride 0.9% IVPB premix 04/06/24 0659 New Bag              Recent Vital Signs    Flowsheet Row Most Recent Value   /66 Filed at 04/06/2024 1200   Pulse 72 Filed at 04/06/2024 1200   Resp 18 Filed at 04/06/2024 1200   Temp 98.9 °F (37.2 °C) Filed at 04/06/2024 1200   SpO2 95 % Filed at 04/06/2024 1200      Patient's Most Recent Weight    Flowsheet Row Most Recent Value   Patient Weight 60.8 kg (134 lb 0.6 oz)          Lab Results Last 24 Hours      Basic Metabolic Panel (8) [[id number]] (Abnormal)  Resulted: 04/06/24 0452, Result status: Final result   Ordering provider: Terri Navarro MD  04/05/24 2300 Resulting lab: [facility] ([facility])    Specimen Information    Type Source Collected On   Blood — 04/06/24 0400          Components    Component Value Reference Range Flag Lab   Glucose 99 70 - 99 mg/dL — [facility] ([facility])   Sodium 140 136 - 145 mmol/L — [facility] ([facility])   Potassium 3.2 3.5 - 5.1 mmol/L L [facility] ([facility])   Chloride 108 98 - 112 mmol/L — [facility] ([facility])   CO2 27.0 21.0 - 32.0 mmol/L — [facility] ([facility])   Anion Gap 5 0 - 18 mmol/L — [facility] ([facility])   BUN 12 9 - 23 mg/dL — [facility] ([facility])   Creatinine 0.62 0.55 - 1.02 mg/dL — [facility] ([facility])   Calcium, Total 8.2 8.5 - 10.1 mg/dL L [facility] ([facility])   Calculated Osmolality 290 275 - 295 mOsm/kg — [facility] ([facility])   eGFR-Cr 81 >=60 mL/min/1.73m2 — [facility] ([facility])            CBC With Differential With Platelet [[id number]] (Abnormal)  Resulted: 04/06/24 0439, Result status: Final result   Ordering provider: Terri Navarro MD  04/05/24 2300 Resulting lab: [facility] ([facility])   Narrative:  The following orders were created for panel order CBC With Differential With Platelet.  Procedure                               Abnormality         Status                     ---------                               -----------         ------                     CBC W/ DIFFERENTIAL[[id number]]          Abnormal            Final result                 Please view results for these tests on the individual orders.    Specimen Information    Type Source Collected On   Blood — 04/06/24 0400            Testing Performed By     Lab - Abbreviation Name Director Address Valid Date Range    139 - [facility] ([facility]) [facility] ([facility]) Goldberg, Cathryn A. MD [address] [place] [address] 03/19/20 1441 - Present             Microbiology Results (All)     Procedure Component Value Units Date/Time    Emergency MRSA Screen by PCR [[id number]]     Order Status: Sent Lab Status: No result     Specimen: Other from [facility]          H&P - H&P Note      H&P signed by Terri Navarro MD at 3/30/2024  1:51 PM  Version 2 of 2    Author: Terri Navarro MD Service: Internal Medicine Author Type: Physician    Filed: 3/30/2024  1:51 PM Date of Service: 3/29/2024  8:32 AM Status: Addendum    : Terri Navarro MD (Physician)    Related Notes: Original Note by Terri Navarro MD (Physician) filed at 3/30/2024  1:45 PM       [facility]  History & Physical    Ayse Maldonado Patient Status:  Inpatient    10/25/1927 MRN [id number]   Location [facility] 8NE-A Attending Terri Navarro MD   Hosp Day # 2 PCP Terri Navarro MD     History of Present Illness:  Ayse Maldonado is a(n) 89 year old female with dementia, hypertension, CHF, PAF, status post pacemaker and history of right inguinal hernia came to the emergency room because of the abdominal pain and right inguinal swelling.  She she did not vomit had a bowel movement before coming to the ER.  Her emergency room workup showed small bowel obstruction with a transition point in the region of a right inguinal hernia  She is admitted for further management.  She is on Eliquis for anticoagulation due to atrial fibrillation.    She lives at [facility], walks with a walker, has poor vision due to macular degeneration  She is already seen by surgery planning for hernia repair in few days  Her son is at bedside, she denies any discomfort, has NG in place draining bloody fluid.  History:  Past Medical History:   Diagnosis Date    -Lumbar radicular pain-electrical stimulator 2013    Abdominal distention     Abdominal hernia     Abdominal pain     Age-related osteoporosis with current pathological fracture with routine healing 2018    Anesthesia complication     Arthritis      Atrial fibrillation (HCC)     Back pain     Basal cell cancer     colon    Bloating     Body piercing     Cellulitis of chest wall 07/11/2018    Cellulitis of left arm 07/19/2018    Cellulitis of right upper extremity     Closed bicondylar fracture of distal end of right humerus            Global exp 1-17-18 / RIGHT ELBOW / BAM  11/09/2017    Closed displaced fracture of pelvis (HCC)     Constipation     Diarrhea, unspecified     Easy bruising     Essential hypertension     Eye disease     Fatigue     Food intolerance     Frequent urination     Hearing impairment     Hearing loss     Heart palpitations     Hemorrhoids     History of depression     Hypertension     Irregular bowel habits     Leg swelling     Lumbar facet arthropathy 02/10/2018    Macular degeneration - [facility] 05/05/2011    Muscle weakness     Night sweats     Pacemaker     Reflux     Renal disorder     S/P colon resection / Colonoscopy (10/12) recheck - ANJALI Spencer 05/05/2011    S/P laminectomy 12/23/2013    Seizure disorder (HCC)     Sleep apnea     cpap    Sleep disturbance     Spinal stenosis of lumbar region without neurogenic claudication 08/08/2018    Visual impairment      Past Surgical History:   Procedure Laterality Date    ADJ TISS XFER LID,NOS,EAR <10SQCM  03/28/2014    Procedure: DEBULKING OF NASOLABIAL FLAP TO NOSE;  Surgeon: Shar Vasquez MD;  [facility]    APPENDECTOMY      BACK SURGERY      BOWEL RESECTION      CHOLECYSTECTOMY      COLECTOMY      COLONOSCOPY      COLONOSCOPY      EGD      HEMORRHOIDECTOMY,INT/EXT,SIMPLE      HIP REPLACEMENT SURGERY  left 3/06 right 10/06    OTHER ACCESSORY      electro-stimulator for spinal stenosis    OTHER SURGICAL HISTORY  10/10/2011    cysto-    OTHER SURGICAL HISTORY  10/2016    back surgery    PACEMAKER      [employer] pacemaker    SKIN SURGERY  03/06/2014    BCC nod, inf to left nasal tip / MMS done    SKIN SURGERY  10/31/2016    MMS for BCC-nod to  the R upper cut lip-AB    SPINE SURGERY PROCEDURE UNLISTED      TONSILLECTOMY      TOTAL HIP REPLACEMENT       Family History   Problem Relation Age of Onset    Cancer Father     Hypertension Father     Colon Polyps Son     Other (Other) Sister         Davina Mayes      reports that she quit smoking about 52 years ago. Her smoking use included cigarettes. She has a 2 pack-year smoking history. She has never used smokeless tobacco. She reports that she does not currently use alcohol after a past usage of about 2.0 standard drinks of alcohol per week. She reports that she does not use drugs.    Allergies:  Allergies   Allergen Reactions    Bacitracin SWELLING     Red and swelling    Ciprofloxacin OTHER (SEE COMMENTS)     Nausea and abdominal cramping    Levofloxacin OTHER (SEE COMMENTS)    Sulfamethoxazole W/Trimethoprim OTHER (SEE COMMENTS)     Other reaction(s): VOMITING  Oral    Altaryl     Amiodarone OTHER (SEE COMMENTS)     Elevated liver enzymes      Diphenhydramine UNKNOWN    Escitalopram     Thiazide-Type Diuretics OTHER (SEE COMMENTS)     CLASS    Cephalexin DIARRHEA     May have also caused drug rash    Clonidine RASH     Oral    Garlic DIARRHEA    Garlic DIARRHEA    Hydrocodone NAUSEA ONLY     nausea    Lexapro NAUSEA ONLY       Home Medications:  Medications Prior to Admission   Medication Sig Dispense Refill Last Dose    docusate sodium 100 MG Oral Cap Take 1 capsule (100 mg total) by mouth 2 (two) times daily.   3/29/2024 at 0900    memantine 5 MG Oral Tab Take 1 tablet (5 mg total) by mouth daily. Evening only   3/28/2024    metoprolol tartrate 50 MG Oral Tab Take 1 tablet (50 mg total) by mouth 2 (two) times daily. 60 tablet 2 3/29/2024 at 1715    dilTIAZem HCl ER Beads 180 MG Oral Capsule SR 24 Hr Take 1 capsule (180 mg total) by mouth daily. (Patient taking differently: Take 1 capsule (180 mg total) by mouth daily. In the morning) 30 capsule 0 3/29/2024 at 0900    LISINOPRIL 20 MG Oral Tab TAKE ONE  TABLET BY MOUTH DAILY 90 tablet 0 3/29/2024 at 0900    GABAPENTIN 300 MG Oral Cap TAKE ONE CAPSULE BY MOUTH TWICE DAILY 180 capsule 1 3/29/2024 at 0900    Polyethylene Glycol 3350 (MIRALAX OR) Take 17 g by mouth daily as needed (constipation).   3/29/2024    Multiple Vitamins-Minerals (CENTRUM SILVER 50+WOMEN OR) Take 1 tablet by mouth daily.   3/29/2024    Multiple Vitamins-Minerals (ICAPS AREDS 2 OR) Take 1 capsule by mouth in the morning and 1 capsule before bedtime.   3/28/2024    Acetaminophen  MG Oral Tab CR Take 2 tablets (1,300 mg total) by mouth in the morning and 2 tablets (1,300 mg total) before bedtime.   3/29/2024 at 1715    lidocaine 4 % External Patch Place 1 patch onto the skin daily as needed (pain).   Taking    Calcium Carb-Cholecalciferol (CALCIUM-VITAMIN D3) 600-500 MG-UNIT Oral Cap Take 1 tablet by mouth daily.   3/29/2024    apixaban 5 MG Oral Tab Take 1 tablet (5 mg total) by mouth 2 (two) times daily. 60 tablet 3 3/29/2024 at 1715    digoxin 0.125 MG Oral Tab Take 1 tablet (125 mcg total) by mouth every morning.   3/29/2024 at 0900       Review of Systems:  A comprehensive 10 point review of systems was completed.  Pertinent positives and negatives noted in the the HPI.    Physical Exam:  Vital signs: Blood pressure (!) 162/60, pulse 98, temperature 98.4 °F (36.9 °C), temperature source Oral, resp. rate 26, height 5' 7\" (1.702 m), weight 135 lb 9.3 oz (61.5 kg), SpO2 94%.  General: No acute distress. Alert and oriented x 2  HEENT: Moist mucous membranes. EOM-I. PERRL  Neck: No lymphadenopathy.  No JVD. No carotid bruits.  Respiratory: Clear to auscultation bilaterally.  No wheezes. No rhonchi.  Cardiovascular: S1, S2.  IRRegular rate and rhythm.  No murmurs. Equal pulses   Abdomen: Soft, nontender, nondistended.  Hypoactive bowel sounds. No rebound tenderness  Neurologic: No focal neurological deficits.  Musculoskeletal: Full range of motion of all extremities.  No swelling  noted.  Integument: No lesions. No erythema.  Psychiatric: Appropriate mood and affect.    Laboratory Data:   Lab Results   Component Value Date    PGLU 110 03/30/2024       Imaging:  XR CHEST AP PORTABLE  (CPT=71045)    Result Date: 3/28/2024  PROCEDURE:  XR CHEST AP PORTABLE  (CPT=71045)  TECHNIQUE:  AP chest radiograph was obtained.  COMPARISON:  [facility] , XR, XR CHEST AP PORTABLE  (CPT=71045), 1/21/2024, 9:12 PM.  INDICATIONS:  Verify correct tube placement  PATIENT STATED HISTORY: (As transcribed by Technologist)  Patient has a history of a hernia. Imaging is being done for NG tube.    FINDINGS:  Cardiac silhouette is mildly enlarged, stable.  Left pacer is stable.  No consolidation, effusion, or pneumothorax.  Chronic interstitial changes are noted in the lungs.  Gastric tube terminates in the body of the stomach in appropriate position.            CONCLUSION:  Gastric tube in appropriate position.   LOCATION:  [facility]      Dictated by (CST): Tom Clinton MD on 3/28/2024 at 10:30 PM     Finalized by (CST): Tom Clinton MD on 3/28/2024 at 10:31 PM       CT ABDOMEN+PELVIS(CONTRAST ONLY)(CPT=74177)    Result Date: 3/28/2024  PROCEDURE:  CT ABDOMEN+PELVIS (CONTRAST ONLY) (CPT=74177)  COMPARISON:  [facility] , CT, CT ABDOMEN+PELVIS(CPT=74176), 2/01/2023, 6:29 PM.  INDICATIONS:  abd pain w/ hx of hernia  TECHNIQUE:  CT scanning was performed from the dome of the diaphragm to the pubic symphysis with non-ionic intravenous contrast material. Post contrast coronal MPR imaging was performed.  Dose reduction techniques were used. Dose information is transmitted to the ACR (American College of Radiology) NRDR (National Radiology Data Registry) which includes the Dose Index Registry.  PATIENT STATED HISTORY:(As transcribed by Technologist)  Right lower quadrant pain with bulge to area.   CONTRAST USED:  100cc of Isovue 370  FINDINGS:  LIVER:  Multiple hepatic cysts are noted with the largest cyst in the left hepatic lobe  measuring up to 3.6 x 3.0 cm (image 26), not significantly changed from the prior exam. BILIARY:  No visible dilatation or calcification.  PANCREAS:  No lesion, fluid collection, ductal dilatation, or atrophy.  SPLEEN:  No enlargement or focal lesion.  KIDNEYS:  Incidental fat containing lesion in the upper pole of the right kidney measures up to 1.3 cm (image 32) compatible with an AML.  Additional left hepatic cyst measures up to 2.1 cm (image 27).  There is no hydronephrosis. ADRENALS:  No mass or enlargement.  AORTA/VASCULAR:  No aneurysm or dissection.  RETROPERITONEUM:  No mass or adenopathy.  BOWEL/MESENTERY:  Dilated loops of small bowel are noted with a transition point in the region of a right inguinal hernia containing small bowel.  There is a small amount of free fluid in the right inguinal hernia.  The small bowel measures up to 3.9 cm with fluid levels.  The colon is decompressed. ABDOMINAL WALL:  No mass or hernia.  URINARY BLADDER:  No visible focal wall thickening, lesion, or calculus.  PELVIC NODES:  No adenopathy.  PELVIC ORGANS:  No visible mass.  Pelvic organs appropriate for patient age.  BONES:  Severe multilevel degenerative changes throughout the lumbar spine noted.  Pain stimulator device is noted.  LUNG BASES:  No visible pulmonary or pleural disease.  OTHER:  Negative.             CONCLUSION:  1. Findings compatible with small bowel obstruction with a transition point in the region of a right inguinal hernia containing small bowel.   LOCATION:  [facility]   Dictated by (CST): Tom Clinton MD on 3/28/2024 at 8:47 PM     Finalized by (CST): Tom Clinton MD on 3/28/2024 at 8:57 PM          Assessment & Plan:  1.Unilateral inguinal hernia with obstruction and without gangrene -continue n.p.o. IV fluid and intermittent NG suction.  Holding Eliquis for surgery in 2 days  Consult cardiology for cardiac clearance for surgery  2.  Hypertension-holding oral medications  Currently on Vasotec and  metoprolol IV  3.  Chronic A-fib-holding Eliquis, continue cardiac monitoring  4.   Pulmonary HTN    5.  History of COPD continue inhalers  PT /OT, increase activity as tolerated  Patient Active Problem List   Diagnosis    Pacemaker - M. Perfecto    JAZZ on CPAP    Current use of long term anticoagulation    Gastroesophageal reflux disease without esophagitis    Asthma with COPD (chronic obstructive pulmonary disease)    Pulmonary hypertension (HCC)-52 mg Hg    Alternating constipation and diarrhea    Other fatigue-mutifactorial    Chronic bronchitis (HCC)    CKD (chronic kidney disease) stage 3, GFR 30-59 ml/min (HCC)    PAF (paroxysmal atrial fibrillation) (HCC)    Glaucoma suspect of left eye    Pseudophakia    Trochanteric bursitis of left hip    Fall    Essential hypertension    Chronic left hip pain    CHI (closed head injury), subsequent encounter    Altered mental status, unspecified altered mental status type    Hyponatremia    Seizure disorder (HCC)    Hypertension, unspecified type    Altered mental status    Weakness    Hypotension, unspecified hypotension type    Hypotension    Branch retinal vein occlusion of left eye with macular edema (HCC)    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 (HCC)    PCO (posterior capsular opacification), left    Inguinal hernia of right side without obstruction or gangrene    Umbilical hernia without obstruction and without gangrene    Protein-calorie malnutrition, unspecified severity (HCC)    Dementia without behavioral disturbance (HCC)    Unilateral inguinal hernia with obstruction and without gangrene, recurrence not specified           Terri Navarro MD  3/30/2024  1:33 PM    Electronically signed by Terri Navarro MD on 3/30/2024  1:51 PM     H&P signed by Terri Navarro MD at 3/30/2024  1:45 PM  Version 1 of 2    Author: Terri Navarro MD Service: Internal Medicine Author Type: Physician    Filed: 3/30/2024   1:45 PM Date of Service: 3/29/2024  8:32 AM Status: Signed    : Terri Navarro MD (Physician)    Related Notes: Addendum by Terri Navarro MD (Physician) filed at 3/30/2024  1:51 PM       [facility]  History & Physical    Ayse Maldonado Patient Status:  Inpatient    10/25/1927 MRN [id number]   Location [facility] 8NE-A Attending Terri Navarro MD   Hosp Day # 2 PCP Terri Navarro MD     History of Present Illness:  Ayse Maldonado is a(n) 89 year old female with dementia, hypertension, CHF, PAF, status post pacemaker and history of right inguinal hernia came to the emergency room because of the abdominal pain and right inguinal swelling.  She she did not vomit had a bowel movement before coming to the ER.  Her emergency room workup showed small bowel obstruction with a transition point in the region of a right inguinal hernia  She is admitted for further management.  She is on Eliquis for anticoagulation due to atrial fibrillation.    She lives at [facility], walks with a walker, has poor vision due to macular degeneration  She is already seen by surgery planning for hernia repair in few days  Her son is at bedside, she denies any discomfort, has NG in place draining bloody fluid.  History:  Past Medical History:   Diagnosis Date    -Lumbar radicular pain-electrical stimulator 2013    Abdominal distention     Abdominal hernia     Abdominal pain     Age-related osteoporosis with current pathological fracture with routine healing 2018    Anesthesia complication     Arthritis     Atrial fibrillation (HCC)     Back pain     Basal cell cancer     colon    Bloating     Body piercing     Cellulitis of chest wall 2018    Cellulitis of left arm 2018    Cellulitis of right upper extremity     Closed bicondylar fracture of distal end of right humerus            Global exp 18 / RIGHT ELBOW / BAM  2017    Closed displaced fracture of pelvis (HCC)     Constipation      Diarrhea, unspecified     Easy bruising     Essential hypertension     Eye disease     Fatigue     Food intolerance     Frequent urination     Hearing impairment     Hearing loss     Heart palpitations     Hemorrhoids     History of depression     Hypertension     Irregular bowel habits     Leg swelling     Lumbar facet arthropathy 02/10/2018    Macular degeneration - [facility] 05/05/2011    Muscle weakness     Night sweats     Pacemaker     Reflux     Renal disorder     S/P colon resection / Colonoscopy (10/12) recheck - ANJALI Spencer 05/05/2011    S/P laminectomy 12/23/2013    Seizure disorder (HCC)     Sleep apnea     cpap    Sleep disturbance     Spinal stenosis of lumbar region without neurogenic claudication 08/08/2018    Visual impairment      Past Surgical History:   Procedure Laterality Date    ADJ TISS XFER LID,NOS,EAR <10SQCM  03/28/2014    Procedure: DEBULKING OF NASOLABIAL FLAP TO NOSE;  Surgeon: Shar Vasquez MD;  Location: [facility], [facility]    APPENDECTOMY      BACK SURGERY      BOWEL RESECTION      CHOLECYSTECTOMY      COLECTOMY      COLONOSCOPY      COLONOSCOPY      EGD      HEMORRHOIDECTOMY,INT/EXT,SIMPLE      HIP REPLACEMENT SURGERY  left 3/06 right 10/06    OTHER ACCESSORY      electro-stimulator for spinal stenosis    OTHER SURGICAL HISTORY  10/10/2011    cysto-    OTHER SURGICAL HISTORY  10/2016    back surgery    PACEMAKER      [employer] pacemaker    SKIN SURGERY  03/06/2014    BCC nod, inf to left nasal tip / MMS done    SKIN SURGERY  10/31/2016    MMS for BCC-nod to the R upper cut lip-AB    SPINE SURGERY PROCEDURE UNLISTED      TONSILLECTOMY      TOTAL HIP REPLACEMENT       Family History   Problem Relation Age of Onset    Cancer Father     Hypertension Father     Colon Polyps Son     Other (Other) Sister         Davina Mayes      reports that she quit smoking about 52 years ago. Her smoking use included cigarettes. She has a 2 pack-year smoking history. She has never  used smokeless tobacco. She reports that she does not currently use alcohol after a past usage of about 2.0 standard drinks of alcohol per week. She reports that she does not use drugs.    Allergies:  Allergies   Allergen Reactions    Bacitracin SWELLING     Red and swelling    Ciprofloxacin OTHER (SEE COMMENTS)     Nausea and abdominal cramping    Levofloxacin OTHER (SEE COMMENTS)    Sulfamethoxazole W/Trimethoprim OTHER (SEE COMMENTS)     Other reaction(s): VOMITING  Oral    Altaryl     Amiodarone OTHER (SEE COMMENTS)     Elevated liver enzymes      Diphenhydramine UNKNOWN    Escitalopram     Thiazide-Type Diuretics OTHER (SEE COMMENTS)     CLASS    Cephalexin DIARRHEA     May have also caused drug rash    Clonidine RASH     Oral    Garlic DIARRHEA    Garlic DIARRHEA    Hydrocodone NAUSEA ONLY     nausea    Lexapro NAUSEA ONLY       Home Medications:  Medications Prior to Admission   Medication Sig Dispense Refill Last Dose    docusate sodium 100 MG Oral Cap Take 1 capsule (100 mg total) by mouth 2 (two) times daily.   3/29/2024 at 0900    memantine 5 MG Oral Tab Take 1 tablet (5 mg total) by mouth daily. Evening only   3/28/2024    metoprolol tartrate 50 MG Oral Tab Take 1 tablet (50 mg total) by mouth 2 (two) times daily. 60 tablet 2 3/29/2024 at 1715    dilTIAZem HCl ER Beads 180 MG Oral Capsule SR 24 Hr Take 1 capsule (180 mg total) by mouth daily. (Patient taking differently: Take 1 capsule (180 mg total) by mouth daily. In the morning) 30 capsule 0 3/29/2024 at 0900    LISINOPRIL 20 MG Oral Tab TAKE ONE TABLET BY MOUTH DAILY 90 tablet 0 3/29/2024 at 0900    GABAPENTIN 300 MG Oral Cap TAKE ONE CAPSULE BY MOUTH TWICE DAILY 180 capsule 1 3/29/2024 at 0900    Polyethylene Glycol 3350 (MIRALAX OR) Take 17 g by mouth daily as needed (constipation).   3/29/2024    Multiple Vitamins-Minerals (CENTRUM SILVER 50+WOMEN OR) Take 1 tablet by mouth daily.   3/29/2024    Multiple Vitamins-Minerals (ICAPS AREDS 2 OR) Take 1  capsule by mouth in the morning and 1 capsule before bedtime.   3/28/2024    Acetaminophen  MG Oral Tab CR Take 2 tablets (1,300 mg total) by mouth in the morning and 2 tablets (1,300 mg total) before bedtime.   3/29/2024 at 1715    lidocaine 4 % External Patch Place 1 patch onto the skin daily as needed (pain).   Taking    Calcium Carb-Cholecalciferol (CALCIUM-VITAMIN D3) 600-500 MG-UNIT Oral Cap Take 1 tablet by mouth daily.   3/29/2024    apixaban 5 MG Oral Tab Take 1 tablet (5 mg total) by mouth 2 (two) times daily. 60 tablet 3 3/29/2024 at 1715    digoxin 0.125 MG Oral Tab Take 1 tablet (125 mcg total) by mouth every morning.   3/29/2024 at 0900       Review of Systems:  A comprehensive 10 point review of systems was completed.  Pertinent positives and negatives noted in the the HPI.    Physical Exam:  Vital signs: Blood pressure (!) 162/60, pulse 98, temperature 98.4 °F (36.9 °C), temperature source Oral, resp. rate 26, height 5' 7\" (1.702 m), weight 135 lb 9.3 oz (61.5 kg), SpO2 94%.  General: No acute distress. Alert and oriented x 2  HEENT: Moist mucous membranes. EOM-I. PERRL  Neck: No lymphadenopathy.  No JVD. No carotid bruits.  Respiratory: Clear to auscultation bilaterally.  No wheezes. No rhonchi.  Cardiovascular: S1, S2.  IRRegular rate and rhythm.  No murmurs. Equal pulses   Abdomen: Soft, nontender, nondistended.  Hypoactive bowel sounds. No rebound tenderness  Neurologic: No focal neurological deficits.  Musculoskeletal: Full range of motion of all extremities.  No swelling noted.  Integument: No lesions. No erythema.  Psychiatric: Appropriate mood and affect.    Laboratory Data:   Lab Results   Component Value Date    PGLU 110 03/30/2024       Imaging:  XR CHEST AP PORTABLE  (CPT=71045)    Result Date: 3/28/2024  PROCEDURE:  XR CHEST AP PORTABLE  (CPT=71045)  TECHNIQUE:  AP chest radiograph was obtained.  COMPARISON:  [facility] , XR, XR CHEST AP PORTABLE  (CPT=71045), 1/21/2024, 9:12 PM.   INDICATIONS:  Verify correct tube placement  PATIENT STATED HISTORY: (As transcribed by Technologist)  Patient has a history of a hernia. Imaging is being done for NG tube.    FINDINGS:  Cardiac silhouette is mildly enlarged, stable.  Left pacer is stable.  No consolidation, effusion, or pneumothorax.  Chronic interstitial changes are noted in the lungs.  Gastric tube terminates in the body of the stomach in appropriate position.            CONCLUSION:  Gastric tube in appropriate position.   LOCATION:  [facility]      Dictated by (CST): Tom Clinton MD on 3/28/2024 at 10:30 PM     Finalized by (CST): Tom Clinton MD on 3/28/2024 at 10:31 PM       CT ABDOMEN+PELVIS(CONTRAST ONLY)(CPT=74177)    Result Date: 3/28/2024  PROCEDURE:  CT ABDOMEN+PELVIS (CONTRAST ONLY) (CPT=74177)  COMPARISON:  [facility] , CT, CT ABDOMEN+PELVIS(CPT=74176), 2/01/2023, 6:29 PM.  INDICATIONS:  abd pain w/ hx of hernia  TECHNIQUE:  CT scanning was performed from the dome of the diaphragm to the pubic symphysis with non-ionic intravenous contrast material. Post contrast coronal MPR imaging was performed.  Dose reduction techniques were used. Dose information is transmitted to the ACR (American College of Radiology) NRDR (National Radiology Data Registry) which includes the Dose Index Registry.  PATIENT STATED HISTORY:(As transcribed by Technologist)  Right lower quadrant pain with bulge to area.   CONTRAST USED:  100cc of Isovue 370  FINDINGS:  LIVER:  Multiple hepatic cysts are noted with the largest cyst in the left hepatic lobe measuring up to 3.6 x 3.0 cm (image 26), not significantly changed from the prior exam. BILIARY:  No visible dilatation or calcification.  PANCREAS:  No lesion, fluid collection, ductal dilatation, or atrophy.  SPLEEN:  No enlargement or focal lesion.  KIDNEYS:  Incidental fat containing lesion in the upper pole of the right kidney measures up to 1.3 cm (image 32) compatible with an AML.  Additional left hepatic cyst  measures up to 2.1 cm (image 27).  There is no hydronephrosis. ADRENALS:  No mass or enlargement.  AORTA/VASCULAR:  No aneurysm or dissection.  RETROPERITONEUM:  No mass or adenopathy.  BOWEL/MESENTERY:  Dilated loops of small bowel are noted with a transition point in the region of a right inguinal hernia containing small bowel.  There is a small amount of free fluid in the right inguinal hernia.  The small bowel measures up to 3.9 cm with fluid levels.  The colon is decompressed. ABDOMINAL WALL:  No mass or hernia.  URINARY BLADDER:  No visible focal wall thickening, lesion, or calculus.  PELVIC NODES:  No adenopathy.  PELVIC ORGANS:  No visible mass.  Pelvic organs appropriate for patient age.  BONES:  Severe multilevel degenerative changes throughout the lumbar spine noted.  Pain stimulator device is noted.  LUNG BASES:  No visible pulmonary or pleural disease.  OTHER:  Negative.             CONCLUSION:  1. Findings compatible with small bowel obstruction with a transition point in the region of a right inguinal hernia containing small bowel.   LOCATION:  [facility]   Dictated by (CST): Tom Clinton MD on 3/28/2024 at 8:47 PM     Finalized by (CST): Tom Clinton MD on 3/28/2024 at 8:57 PM          Assessment & Plan:  1.Unilateral inguinal hernia with obstruction and without gangrene -continue n.p.o. IV fluid and intermittent NG suction.  Holding Eliquis for surgery in 2 days  Consult cardiology for cardiac clearance for surgery  2.  Hypertension-holding oral medications  Currently on Vasotec and metoprolol IV  3.  Chronic A-fib-holding Eliquis, continue cardiac monitoring  4.  History of CHF will monitor fluid closely  5.  History of COPD continue inhalers  PT /OT, increase activity as tolerated  Patient Active Problem List   Diagnosis    Pacemaker - M. Perfecto    JAZZ on CPAP    Current use of long term anticoagulation    Gastroesophageal reflux disease without esophagitis    Asthma with COPD (chronic obstructive  pulmonary disease)    Pulmonary hypertension (HCC)-52 mg Hg    Alternating constipation and diarrhea    Other fatigue-mutifactorial    Chronic bronchitis (HCC)    CKD (chronic kidney disease) stage 3, GFR 30-59 ml/min (HCC)    PAF (paroxysmal atrial fibrillation) (HCC)    Glaucoma suspect of left eye    Pseudophakia    Trochanteric bursitis of left hip    Fall    Essential hypertension    Chronic left hip pain    CHI (closed head injury), subsequent encounter    Altered mental status, unspecified altered mental status type    Hyponatremia    Seizure disorder (HCC)    Hypertension, unspecified type    Altered mental status    Weakness    Hypotension, unspecified hypotension type    Hypotension    Branch retinal vein occlusion of left eye with macular edema (HCC)    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 (HCC)    PCO (posterior capsular opacification), left    Inguinal hernia of right side without obstruction or gangrene    Umbilical hernia without obstruction and without gangrene    Protein-calorie malnutrition, unspecified severity (HCC)    Dementia without behavioral disturbance (HCC)    Unilateral inguinal hernia with obstruction and without gangrene, recurrence not specified           Terri Navarro MD  3/30/2024  1:33 PM    Electronically signed by Terri Navarro MD on 3/30/2024  1:45 PM              Consults - MD Consult Notes      Consults signed by Kayode Rubio MD at 3/29/2024  9:32 AM     Author: Kayode Rubio MD Service: Cardiology Author Type: Physician    Filed: 3/29/2024  9:32 AM Date of Service: 3/29/2024  9:19 AM Status: Signed    : Kayode Rubio MD (Physician)     Consult Orders    1. Consult to Cardiology [[id number]] ordered by Terri Navarro MD at [address] [place] [address]   part of [facility]    Report of Consultation    Ayse Maldonado Patient Status:  Inpatient    10/25/1927 MRN [id number]    Location [facility] 4NW-A Attending Terri Navarro MD   Hosp Day # 1 PCP Terri Navarro MD     Date of Admission:  3/28/2024  Date of Consult:  3/29/24    Reason for Consultation:  Right inguinal hernia and SBO    History of Present Illness:  Ayse Maldonado is a a(n) 89 year old female. Admitted last evening with abdominal discomfort. Evaluation revealed right inguinal hernia which was reducible yet associated with CT evidence of bowel obstruction.    NG in place - throat sore but not c/o abdominal pain    Chronic atrial fibrillation with PPM on Eliquis    Historically preserved LV function    Blood pressures elevated which would be expected - no need to be overaggressive here    History:  Past Medical History:   Diagnosis Date    -Lumbar radicular pain-electrical stimulator 07/29/2013    Abdominal distention     Abdominal hernia     Abdominal pain     Age-related osteoporosis with current pathological fracture with routine healing 08/08/2018    Anesthesia complication     Arthritis     Atrial fibrillation (HCC)     Back pain     Basal cell cancer     colon    Bloating     Body piercing     Cellulitis of chest wall 07/11/2018    Cellulitis of left arm 07/19/2018    Cellulitis of right upper extremity     Closed bicondylar fracture of distal end of right humerus            Global exp 1-17-18 / RIGHT ELBOW / BAM  11/09/2017    Closed displaced fracture of pelvis (HCC)     Constipation     Diarrhea, unspecified     Easy bruising     Essential hypertension     Eye disease     Fatigue     Food intolerance     Frequent urination     Hearing impairment     Hearing loss     Heart palpitations     Hemorrhoids     History of depression     Hypertension     Irregular bowel habits     Leg swelling     Lumbar facet arthropathy 02/10/2018    Macular degeneration - [facility] 05/05/2011    Muscle weakness     Night sweats     Pacemaker     Reflux     Renal disorder     S/P colon resection / Colonoscopy  (10/12) reshma Spencer 05/05/2011    S/P laminectomy 12/23/2013    Seizure disorder (HCC)     Sleep apnea     cpap    Sleep disturbance     Spinal stenosis of lumbar region without neurogenic claudication 08/08/2018    Visual impairment      Past Surgical History:   Procedure Laterality Date    ADJ TISS XFER LID,NOS,EAR <10SQCM  03/28/2014    Procedure: DEBULKING OF NASOLABIAL FLAP TO NOSE;  Surgeon: Shar Vasquez MD;  Location: [facility], [facility]    APPENDECTOMY      BACK SURGERY      BOWEL RESECTION      CHOLECYSTECTOMY      COLECTOMY      COLONOSCOPY      COLONOSCOPY      EGD      HEMORRHOIDECTOMY,INT/EXT,SIMPLE      HIP REPLACEMENT SURGERY  left 3/06 right 10/06    OTHER ACCESSORY      electro-stimulator for spinal stenosis    OTHER SURGICAL HISTORY  10/10/2011    cysto-    OTHER SURGICAL HISTORY  10/2016    back surgery    PACEMAKER      [employer] pacemaker    SKIN SURGERY  03/06/2014    BCC nod, inf to left nasal tip / MMS done    SKIN SURGERY  10/31/2016    MMS for BCC-nod to the R upper cut lip-AB    SPINE SURGERY PROCEDURE UNLISTED      TONSILLECTOMY      TOTAL HIP REPLACEMENT       Family History   Problem Relation Age of Onset    Cancer Father     Hypertension Father     Colon Polyps Son     Other (Other) Sister         Davina Mayes      reports that she quit smoking about 52 years ago. Her smoking use included cigarettes. She has a 2 pack-year smoking history. She has never used smokeless tobacco. She reports that she does not currently use alcohol after a past usage of about 2.0 standard drinks of alcohol per week. She reports that she does not use drugs.    Allergies:  Allergies   Allergen Reactions    Bacitracin SWELLING     Red and swelling    Ciprofloxacin OTHER (SEE COMMENTS)     Nausea and abdominal cramping    Levofloxacin OTHER (SEE COMMENTS)    Sulfamethoxazole W/Trimethoprim OTHER (SEE COMMENTS)     Other reaction(s): VOMITING  Oral    Altaryl     Amiodarone OTHER (SEE COMMENTS)      Elevated liver enzymes      Diphenhydramine UNKNOWN    Escitalopram     Thiazide-Type Diuretics OTHER (SEE COMMENTS)     CLASS    Cephalexin DIARRHEA     May have also caused drug rash    Clonidine RASH     Oral    Garlic DIARRHEA    Garlic DIARRHEA    Hydrocodone NAUSEA ONLY     nausea    Lexapro NAUSEA ONLY       Medications:    Current Facility-Administered Medications:     hydrALAzine (Apresoline) 20 mg/mL injection 10 mg, 10 mg, Intravenous, Q6H PRN    morphINE PF 2 MG/ML injection 1 mg, 1 mg, Intravenous, Q4H PRN    ondansetron (Zofran) 4 MG/2ML injection 4 mg, 4 mg, Intravenous, Q4H PRN    metoprolol (Lopressor) 5 mg/5mL injection 5 mg, 5 mg, Intravenous, Q6H    enalaprilat (Vasotec) 1.25 mg/mL injection 1.25 mg, 1.25 mg, Intravenous, Q6H    lactated ringers infusion, , Intravenous, Continuous    pantoprazole (Protonix) 40 mg in sodium chloride 0.9% PF 10 mL IV push, 40 mg, Intravenous, Q24H    benzocaine-menthol (Cepacol) lozenge 1 lozenge, 1 lozenge, Oral, PRN    Review of Systems:  All systems were reviewed and are negative except as described above in HPI.    Physical Exam:  Blood pressure (!) 173/76, pulse 69, temperature 98.1 °F (36.7 °C), temperature source Oral, resp. rate 20, height 170.2 cm (5' 7\"), weight 135 lb 9.6 oz (61.5 kg), SpO2 94%.  Temp (24hrs), Av.1 °F (36.7 °C), Min:98 °F (36.7 °C), Max:98.1 °F (36.7 °C)    Wt Readings from Last 3 Encounters:   24 135 lb 9.6 oz (61.5 kg)   24 135 lb (61.2 kg)   24 135 lb (61.2 kg)       Telemetry: atrial fibrillation - rates controlled  General: Weak  HEENT: NG in place  Neck: No JVD  Cardiac: irregular - no s/o significant aortic stenosis  Lungs: Clear without wheezes anteriorly  Abdomen: Soft, non-tender.   Extremities: Without clubbing, cyanosis or edema.   Neurologic: Alert and oriented  Skin: Warm and dry.     Laboratories and Data:  Diagnostics:  EKG: no ECG performed  Echo:   Stress Test:   Cath:   CTA Chest:   CXR:  clear    Labs:   Lab Results   Component Value Date    WBC 6.1 03/29/2024    RBC 3.85 03/29/2024    HGB 13.2 03/29/2024    HCT 37.8 03/29/2024    MCV 98.2 03/29/2024    MCH 34.3 03/29/2024    MCHC 34.9 03/29/2024    RDW 12.0 03/29/2024    .0 03/29/2024     Lab Results   Component Value Date    PT 30.8 (H) 11/08/2013    PT 16.1 (H) 06/28/2011    PT 15.3 06/27/2011    INR 1.41 (H) 05/07/2020    INR 1.49 (H) 04/24/2020    INR 1.38 (H) 02/21/2020       Assessment/Plan:  Patient Active Problem List   Diagnosis    Pacemaker - M. Perfecto    JAZZ on CPAP    Current use of long term anticoagulation    Gastroesophageal reflux disease without esophagitis    Asthma with COPD (chronic obstructive pulmonary disease)    Pulmonary hypertension (HCC)-52 mg Hg    Alternating constipation and diarrhea    Other fatigue-mutifactorial    Chronic bronchitis (HCC)    CKD (chronic kidney disease) stage 3, GFR 30-59 ml/min (HCC)    PAF (paroxysmal atrial fibrillation) (HCC)    Glaucoma suspect of left eye    Pseudophakia    Trochanteric bursitis of left hip    Fall    Essential hypertension    Chronic left hip pain    CHI (closed head injury), subsequent encounter    Altered mental status, unspecified altered mental status type    Hyponatremia    Seizure disorder (HCC)    Hypertension, unspecified type    Altered mental status    Weakness    Hypotension, unspecified hypotension type    Hypotension    Branch retinal vein occlusion of left eye with macular edema (HCC)    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 Incarcerated right inguinal hernia with SBO.    Hold eliquis    Rate control agents via IV route     Continue telemetry    Acceptable cardiac risk to proceed with surgery as needed    Avoid excess crystalloid administration    Kayode Rubio MD  3/29/2024  9:20 AM    Electronically signed by Kayode Rubio MD on 3/29/2024  9:32 AM           D/C Summary    No notes of this type exist for this encounter.        Physical Therapy Notes (last 72 hours)      Physical Therapy Note signed by Bettina Huddleston PT at 4/5/2024  4:22 PM  Version 2 of 2    Author: Bettina Huddleston PT Service: Rehab Author Type: Physical Therapist    Filed: 4/5/2024  4:22 PM Date of Service: 4/5/2024  1:46 PM Status: Addendum    : Bettina Huddleston PT (Physical Therapist)    Related Notes: Original Note by Bettina Huddleston PT (Physical Therapist) filed at 4/5/2024  1:47 PM       PT attempted to see pt for therapy session today. Per discussion with RN, pt not feeling well at this time and is resting in bed. BP's have been elevated. Pt is requesting to stay another night. Discussed with SW who requested a therapy attempt for discharge planning/auth. Will hold on therapy session at this time. RN to message writer later today if pt is up for it, if not will attempt at later time/date when pt is feeling better.     SECOND ATTEMPT: RN messaged writer around 3PM about seeing pt around 4 PM. Upon entering room, pt noted to be tachypneic and HR elevated to 120s at rest. RN present in room as well- now requesting continuing to hold until medically appropriate.       Physical Therapy Note signed by Bettina Huddleston PT at 4/5/2024  1:47 PM  Version 1 of 2    Author: Bettina Huddleston PT Service: Rehab Author Type: Physical Therapist    Filed: 4/5/2024  1:47 PM Date of Service: 4/5/2024  1:46 PM Status: Signed    : Bettina Huddleston PT (Physical Therapist)    Related Notes: Addendum by Bettina Huddleston  PT (Physical Therapist) filed at 4/5/2024  4:22 PM       PT attempted to see pt for therapy session today. Per discussion with RN, pt not feeling well at this time and is resting in bed. BP's have been elevated. Pt is requesting to stay another night. Discussed with SW who requested a therapy attempt for discharge planning/auth. Will hold on therapy session at this time. RN to message writer later today if pt is up for it, if not will attempt at later time/date when pt is feeling better.                 Occupational Therapy Notes (last 72 hours)      Occupational Therapy Note signed by Kimberli Carter OT at 4/5/2024  4:24 PM  Version 1 of 1    Author: Kimberli Carter OT Service: Rehab Author Type: Occupational Therapist    Filed: 4/5/2024  4:24 PM Date of Service: 4/5/2024  4:23 PM Status: Signed    : Kimberli Carter OT (Occupational Therapist)       Attempted to see patient for OT treatment session, however patient w/ elevated HR at rest.  Will hold for now and reattempt as schedule permits.       Occupational Therapy Note signed by Jenny Mcnulty OT at 4/3/2024  4:57 PM  Version 1 of 1    Author: Jenny Mcnulty OT Service: Rehab Author Type: Occupational Therapist    Filed: 4/3/2024  4:57 PM Date of Service: 4/3/2024  9:20 AM Status: Signed    : Jenny Mcnulty OT (Occupational Therapist)       OCCUPATIONAL THERAPY EVALUATION - INPATIENT     Room Number: 8610/8610-A  Evaluation Date: 4/3/2024  Type of Evaluation: Initial  Presenting Problem: 4/2 hernia repair    Physician Order: IP Consult to Occupational Therapy  Reason for Therapy: ADL/IADL Dysfunction and Discharge Planning    OCCUPATIONAL THERAPY ASSESSMENT   Patient is currently functioning below baseline with toileting, upper body dressing, lower body dressing, grooming, bed mobility, transfers, stating sitting balance, dynamic sitting balance, static standing balance, dynamic standing balance, maintaining seated position, and  functional standing tolerance. Prior to admission, patient's baseline is Sup to Min A for ADLs.  Patient is requiring minimal assist as a result of the following impairments: decreased functional strength, decreased functional reach, decreased endurance, and pain. Occupational Therapy will continue to follow for duration of hospitalization.    Patient will benefit from continued skilled OT Services at discharge to promote functional independence and safety with additional support and return home with home health OT      History Related to Current Admission: Patient is a 89 year old female admitted on 3/28/2024 with Presenting Problem: 4/2 hernia repair. Co-Morbidities : pacemaker, COPD, chronic bronchtiis, glaucoma, CKD3, PAF, ess HTN, seizures, dementia, macular degeneration    WEIGHT BEARING RESTRICTION  Weight Bearing Restriction: None                Recommendations for nursing staff:   Transfers: Min A  Toileting location: commode pending pain    EVALUATION SESSION:  Patient Start of Session: supine in bed for session  FUNCTIONAL TRANSFER ASSESSMENT  Sit to Stand: Edge of Bed  Edge of Bed: Contact Guard Assist    BED MOBILITY  Rolling: Contact Guard Assist  Supine to Sit : Contact Guard Assist  Sit to Supine (OT): Contact Guard Assist    BALANCE ASSESSMENT  Static Sitting: Supervision  Sitting Bilateral: Contact Guard Assist  Static Standing: Contact Guard Assist  Standing Bilateral: Minimal Assist (to take steps to chair from bed, pt declined mfurther activity)    FUNCTIONAL ADL ASSESSMENT  Grooming Seated: Supervision (to wash face)  LB Dressing Seated: Minimal Assist (for socks 2/2 abdominal discomfort with bending, declined further ADLs)      ACTIVITY TOLERANCE: vitals stable                         O2 SATURATIONS       COGNITION  Overall Cognitive Status:  WFL - within functional limits    Upper Extremity   ROM: within functional limits   Strength: within functional limits   Coordination  Gross motor:  WNl  Fine motor: WNL  Sensation: Light touch:  intact    EDUCATION PROVIDED  Patient: Role of Occupational Therapy; Plan of Care  Patient's Response to Education: Verbalized Understanding; Returned Demonstration    Equipment used: RW  Demonstrates functional use, Would benefit from additional trial      Therapist comments: Pt with education on log roll technique.  Pt educated on adaptive techniques to complete all ADLs within abdominal sparing precautions 2/2 abdominal surgery.  Pt able to provide read back or completion as listed above.    Patient End of Session: Up in chair;Needs met;Call light within reach;All patient questions and concerns addressed;SCDs in place;Alarm set;Family present    OCCUPATIONAL PROFILE    HOME SITUATION  Type of Home: Skilled nursing facility ([facility])  Home Layout: One level  Lives With: Staff 24 hours    Toilet and Equipment: Comfort height toilet  Shower/Tub and Equipment: Walk-in shower       Occupation/Status: retired  Hand Dominance: Right  Drives: No  Patient Regularly Uses: Glasses    Prior Level of Function: Prior to admission, patient's baseline is Sup to Min A for ADLs.    SUBJECTIVE   Pt stated, \"I want to go back to sleep.\"    PAIN ASSESSMENT  Ratin  Location: no pain at this time       OBJECTIVE  Precautions: Bed/chair alarm;Hard of hearing;NG suction  Fall Risk: High fall risk      ASSESSMENTS    AM-PAC ‘6-Clicks’ Inpatient Daily Activity Short Form  -   Putting on and taking off regular lower body clothing?: A Little  -   Bathing (including washing, rinsing, drying)?: A Little  -   Toileting, which includes using toilet, bedpan or urinal? : A Little  -   Putting on and taking off regular upper body clothing?: A Little  -   Taking care of personal grooming such as brushing teeth?: A Little  -   Eating meals?: A Little    AM-PAC Score:  Score: 18  Approx Degree of Impairment: 46.65%  Standardized Score (AM-PAC Scale): 38.66    ADDITIONAL TESTS     NEUROLOGICAL  FINDINGS      COGNITION ASSESSMENTS       PLAN  OT Treatment Plan: Balance activities;Energy conservation/work simplification techniques;ADL training;IADL training;Continued evaluation;Compensatory technique education;Patient/Family training;Patient/Family education;UE strengthening/ROM;Endurance training;Functional transfer training  Rehab Potential : Good  Frequency: 3-5x/week  Number of Visits to Meet Established Goals: 5    ADL Goals   Patient will perform upper body dressing:  with supervision  Patient will perform lower body dressing:  with supervision  Patient will perform toileting: with supervision    Functional Transfer Goals  Patient will transfer from supine to sit:  with supervision  Patient will transfer from sit to stand:  with supervision  Patient will transfer to toilet:  with supervision    UE Exercise Program Goal  Patient will be supervision with bilateral AROM HEP (home exercise program).    Additional Goals:  Pt will verbalize at least 3 energy conservation techniques  Pt will stand at sink for 5 minutes to complete grooming routine    Patient Evaluation Complexity Level:   Occupational Profile/Medical History MODERATE - Expanded review of history including review of medical or therapy record   Specific performance deficits impacting engagement in ADL/IADL MODERATE  3 - 5 performance deficits   Client Assessment/Performance Deficits MODERATE - Comorbidities and min to mod modifications of tasks    Clinical Decision Making MODERATE - Analysis of occupational profile, detailed assessments, several treatment options    Overall Complexity MODERATE     OT Session Time: 20 minutes  Self-Care Home Management: 0 minutes  Therapeutic Activity: 10 minutes  Neuromuscular Re-education: 0 minutes  Therapeutic Exercise: 0 minutes  Cognitive Skills: 0 minutes  Sensory Integrative: 0 minutes  Orthotic Management and Trainin minutes  Can add/delete any of these                                                          Video Swallow Study Notes    No notes of this type exist for this encounter.        SLP Note - SLP Notes      SLP Note signed by Gonzalo Rocha at 4/6/2024 12:06 PM  Version 1 of 1    Author: Gonzalo Rocha Service: Rehab Author Type: Speech and Language Pathologist    Filed: 4/6/2024 12:06 PM Date of Service: 4/6/2024 11:47 AM Status: Signed    : Gonzalo Rocha (Speech and Language Pathologist)       ADULT SWALLOWING EVALUATION    ASSESSMENT    ASSESSMENT/OVERALL IMPRESSION:  Patient seen for swallowing evaluation due to patient report of food sticking. Patient admitted due to hernia. Patient underwent open inguinal hernia repair with mesh and right inguinal nerve block 4/2/24. Patient with good tolerance of CLD post operatively and was advanced to soft diet. Patient began not feeling well 4/5 pm and with elevated HR and BP. Patient developed chest pain and was transferred to ICU. Overnight, patient developed SOB and increased WOB, tachypnea as well as crackles and expiratory wheezes to auscultation. Patient diuresed with improvement in crackles/wheezing, dyspnea/WOB. This morning, patient is in bed, fatigued but arousable. Son at bedside, very supportive. He reports patient has had her esophagus dilated x2. He reports he is concerned about aspiration given patients deconditioned state. RN notes patient consumed small amount of pancake and nutrition supplement today.     Patient with generally reduced strength of oral musculature due to general deconditioned state. Patient with limited acceptance of po due to lethargy. SLP presented thin liquids by straw and soft solid trial. Patient with intact oral retrieval and containment. Oral prep and transit with liquids appeared to be consistent with reduced bolus control exacerbated by sequential swallows (which patient prefers). Mastication of solids was prolonged but functional and with complete oral  clearance. Laryngeal excursion judged to be of adequate strength and timeliness but questionable coordination with sequential swallows with some throat clearing noted. Patient otherwise with good overt tolerance of po.    Discussed above with patient son (patient drifts back to sleep quickly) to feed when alert with slow rate and small bites and sips when upright. Recommend continue to feed as tolerated, soft solids and thin liquids with above precautions. SLP will continue to follow for ongoing reassessment of swallow function. Patient son and RN in agreement.          RECOMMENDATIONS   Diet Recommendations - Solids: Soft/ Easy to chew  Diet Recommendations - Liquids: Thin Liquids                        Compensatory Strategies Recommended: Slow rate;Small bites and sips (1:1 feeding assist, feed only when alert and upright)  Aspiration Precautions: Upright position;Slow rate;Small bites and sips  Medication Administration Recommendations:  (as tolerated)  Treatment Plan/Recommendations: Dysphagia therapy    HISTORY   MEDICAL HISTORY  Reason for Referral:  (c/o food sticking)    Problem List  Active Problems:    * No active hospital problems. *      Past Medical History  Past Medical History:   Diagnosis Date    -Lumbar radicular pain-electrical stimulator 07/29/2013    Abdominal distention     Abdominal hernia     Abdominal pain     Age-related osteoporosis with current pathological fracture with routine healing 08/08/2018    Anesthesia complication     Arthritis     Atrial fibrillation (HCC)     Back pain     Basal cell cancer     colon    Bloating     Body piercing     Cellulitis of chest wall 07/11/2018    Cellulitis of left arm 07/19/2018    Cellulitis of right upper extremity     Closed bicondylar fracture of distal end of right humerus            Global exp 1-17-18 / RIGHT ELBOW / BAM  11/09/2017    Closed displaced fracture of pelvis (HCC)     Constipation     Diarrhea, unspecified     Easy bruising      Essential hypertension     Eye disease     Fatigue     Food intolerance     Frequent urination     Hearing impairment     Hearing loss     Heart palpitations     Hemorrhoids     History of depression     Hypertension     Irregular bowel habits     Leg swelling     Lumbar facet arthropathy 02/10/2018    Macular degeneration - [facility] 05/05/2011    Muscle weakness     Night sweats     Pacemaker     Reflux     Renal disorder     S/P colon resection / Colonoscopy (10/12) recheck - ANJALI Spencer 05/05/2011    S/P laminectomy 12/23/2013    Seizure disorder (HCC)     Sleep apnea     cpap    Sleep disturbance     Spinal stenosis of lumbar region without neurogenic claudication 08/08/2018    Visual impairment        Prior Living Situation: Assisted living (medical wing per son)  Diet Prior to Admission: Regular;Thin liquids  Precautions: Aspiration    Patient/Family Goals: to get better    SWALLOWING HISTORY  Current Diet Consistency: Regular;Thin liquids  Dysphagia History: as above  Imaging Results:   CXR from 4/5/24 revealed:  CONCLUSION:    1. Stable cardiomegaly with interstitial opacities likely representing mild edema.   2. Small bilateral pleural effusions with bilateral basilar atelectasis/infiltrates.   3. Hyperexpansion of the lungs.          LOCATION:  [facility]                  Dictated by (CST): Juan C Mckay MD on 4/05/2024 at 6:59 PM       Finalized by (CST): Juan C Mckay MD on 4/05/2024 at 7:00 PM       UGI/Esophagram from 2/11/24 revealed:  CONCLUSION:  Limited study due to patient's overall condition demonstrates at least moderate dysmotility of the esophagus with multiple tertiary contractions.  There is no fixed stricture or mass.  The esophagus, stomach, duodenal bulb and duodenal sweep    are otherwise unremarkable within the limits of a single contrast study.         Dictated by (CST): Enmanuel Roman MD on 2/11/2023 at 10:27 AM       Finalized by (CST): Enmanuel Roman MD on  2/11/2023 at 10:29 AM       SUBJECTIVE       OBJECTIVE   ORAL MOTOR EXAMINATION  Dentition: Functional  Symmetry: Within Functional Limits  Strength:  (generally reduced)  Tone: Within Functional Limits  Range of Motion: Within Functional Limits  Rate of Motion: Reduced    Voice Quality: Clear;Weak  Respiratory Status: Unlabored (room air)  Consistencies Trialed: Thin liquids;Soft solid  Method of Presentation: Staff/Clinician assistance;Straw;Single sips;Consecutive swallows  Patient Positioning: Upright;Midline (in bed)    Oral Phase of Swallow: Impaired  Bolus Retrieval: Intact  Bilabial Seal: Intact  Bolus Formation: Impaired  Bolus Propulsion: Impaired  Mastication:  (prolonged but adequate)  Retention: Intact    Pharyngeal Phase of Swallow: Impaired  Laryngeal Elevation Timing: Appears intact  Laryngeal Elevation Strength: Appears intact  Laryngeal Elevation Coordination: Appears impaired  (Please note: Silent aspiration cannot be evaluated clinically. Videofluoroscopic Swallow Study is required to rule-out silent aspiration.)    Esophageal Phase of Swallow:  (known esophageal dysphagia)                GOALS  Goal #1 The patient will tolerate soft/easy to chew consistency and thin liquids without overt signs or symptoms of aspiration with 100 % accuracy over 2-3 session(s).  In Progress   Goal #2 The patient/family/caregiver will demonstrate understanding and implementation of aspiration precautions and swallow strategies independently over 2-3 session(s).    In Progress   Goal #3 The patient will tolerate trial upgrade of regular consistency and NA liquids without overt signs or symptoms of aspiration with 100 % accuracy over 1-2 session(s).  In Progress     FOLLOW UP  Treatment Plan/Recommendations: Dysphagia therapy  Number of Visits to Meet Established Goals: 3  Follow Up Needed (Documentation Required): Yes  SLP Follow-up Date: 04/07/24    Thank you for your referral.   If you have any questions, please  contact Gonzalo Rocha MS CCC-SLP  Pager [phone]      Electronically signed by Gonzalo Rocha on 4/6/2024 12:06 PM           Immunizations     Name Date      Covid-19 Pfizer 10/13/22     Covid-19 Pfizer 04/26/22     Covid-19 Pfizer 10/20/21     Covid-19 Pfizer 03/04/21     Covid-19 Pfizer 02/11/21     Depo-Medrol 40mg Inj 11/20/20     Fluad 0.5ml 10/09/18     Fluad 0.5ml 10/13/17     INFLUENZA 09/14/23     INFLUENZA 10/27/22     INFLUENZA 09/28/21     INFLUENZA 10/13/20     INFLUENZA 10/10/19     INFLUENZA 11/11/16     INFLUENZA 11/11/16     INFLUENZA 10/13/15     INFLUENZA 10/13/15     INFLUENZA 10/21/14     INFLUENZA 10/21/14     INFLUENZA 10/16/13     INFLUENZA 10/05/12     Pfizer Covid-19 Vaccine 30mcg/0.3mL 12yrs+ 12/06/23     Pneumococcal (Prevnar 13) 05/09/18     Pneumococcal (Prevnar 13) 05/23/16     Pneumovax 23 11/06/19       Future Appointments        Provider Department Center    4/9/2025 9:45 AM Poornima Hinton DO [facility], [facility] [place]      Multidisciplinary Problems     Active Goals        Problem: Patient/Family Goals    Goal Priority Disciplines Outcome Interventions   Patient/Family Long Term Goal     Interdisciplinary Progressing    Description: Patient's Long Term Goal: Walk in halls with walker    Interventions:  PT/OT consult   Increase activity as tolerated  Up in chair three times per day/for all meals  Ambulate in halls three times per day    - See additional Care Plan goals for specific interventions   Patient/Family Short Term Goal     Interdisciplinary Progressing    Description: Patient's Short Term Goal: Remain comfortable    Interventions:   Assess and reassess pain at appropriate intervals  Utilize appropriate tool to assess pain  Administer analgesics as needed  Utilize non-pharmacological modalities for pain relief   - See additional Care Plan goals for specific interventions             Resolved Goals        Problem:  Patient/Family Goals    Goal Priority Disciplines Outcome Interventions   Patient/Family Long Term Goal   (Resolved)     Interdisciplinary Adequate for Discharge    Description: Patient's Long Term Goal: DC to home     Interventions:  - follow plan of care     - See additional Care Plan goals for specific interventions   Patient/Family Short Term Goal   (Resolved)     Interdisciplinary Adequate for Discharge    Description: Patient's Short Term Goal: getting better, help with sore throat     Interventions:   - NGT suction, PRN blood pressure med, Cepacol for sire throat      - See additional Care Plan goals for specific interventions

## (undated) NOTE — MR AVS SNAPSHOT
After Visit Summary   5/29/2020    Daphnie Chacon    MRN: [id number]           Visit Information     Date & Time  5/29/2020  5:00 AM Provider  REF [facility] Department  Ref [facility] Dept.  Phone        Allergies as of 5/29/2020  DIVINE omega-3 fatty acids 1000 MG Oral Cap Take 1,000 mg by mouth daily. gabapentin 300 MG Oral Cap Take 300 mg by mouth 2 (two) times daily. apixaban 5 MG Oral Tab Take 1 tablet (5 mg total) by mouth 2 (two) times daily.     lisinopril 40 MG Oral Tab cannot be changed, so think of one that is secure and easy to remember. 6. Create a Viewglass password. You can change your password at any time. 7. Choose a Security Question and enter your Answer and click Next.  This can be used at a later time if you fo SAME DAY APPOINTMENTS   Available at primary care offices    AFTER HOURS CARE  [place]  OFFICE VISIT   Primary Care Providers  Treatment for mild illness or injury that does not require immediate attention.  Average cost  $70*   [facility]  Shai –

## (undated) NOTE — LETTER
July 9, 2019    Francoise Maldonado  [address] [facility] [address] [id number]      Dear Dina Gomez: The following are the results of your recent tests. Please review the list of test results.   Your result is the value on the left; we have also supplied

## (undated) NOTE — IP AVS SNAPSHOT
[address]            (For Outpatient Use Only) Initial Admit Date: 5/7/2020   Inpt/Obs Admit Date: Inpt: 5/7/20 / Obs: N/A   Discharge Date:    Karina Ennis:  [de-identified]   MRN: [de-identified]   CSN: [id number]   CEID: [id number] Subscriber ID:  Pt Rel to Subscriber:    Hospital Account Financial Class: Medicare Advantage    May 10, 2020

## (undated) NOTE — LETTER
February 7, 2019    Megan Maldonado  [address] [facility] [facility] [address]      Dear Dionna Gillis: The following are the results of your recent tests. Please review the list of test results.   Your result is the value on the left; we have also suppl

## (undated) NOTE — LETTER
December 8, 2017    Rita Canales [address] [place] [address]      Dear Adia: The following are the results of your recent tests. Please review the list of test results.   Your result is the value on the left; we have also hope HGB 9.2 (L) 12.0 - 16.0 g/dL   HCT 27.6 (L) 34.0 - 50.0 %   .0 150.0 - 450.0 10(3)uL   MCV 97.2 81.0 - 100.0 fL   MCH 32.4 27.0 - 33.2 pg   MCHC 33.3 31.0 - 37.0 g/dL   RDW 14.1 11.5 - 16.0 %   RDW-SD 50.4 (H) 35.1 - 46.3 fL   Neutrophil Absolute Pr

## (undated) NOTE — LETTER
21    Patient: Leoncio Guajardo  : 10/25/1927 Visit date: 2021    Dear  Jael Ramirez MD    Thank you for referring Leoncio Guajardo to my practice. Please find my assessment and plan below.        Assessment   Right inguinal hernia  (prim The patient has a past medical history significant for atrial fibrillation for which she has a pacemaker placed and she is on Eliquis. She follows with Dr. Ji Garcia as her cardiologist.  The patient also has hypertension, COPD, and sleep apnea.     The basil I did discuss the option of surgical intervention with her son as well as the patient at this time. After detailed discussion it was decided that we we will have the patient return in 6 months to see if there is any change in the status of her hernia.   Ginna Moore

## (undated) NOTE — LETTER
22    Patient: Miya Lock  : 10/25/1927 Visit date: 2022    Dear  Sammie Rueda MD    Thank you for referring Miya Lock to my practice. Please find my assessment and plan below. Assessment   Right inguinal hernia  (primary encounter diagnosis)  Umbilical hernia without obstruction and without gangrene    Plan   This patient has 2 chronic hernias. One at the umbilicus, one in the right groin region. We are monitoring her every 6 months to make sure that they are uncomplicated. This patient has significant comorbidities and advanced age that would make surgery for her extremely risky. She has atrial fibrillation, pacemaker, seizure disorder, obstructive sleep apnea, and spinal stenosis. She has impaired ambulation. She is marginally ambulatory with a walker. She has no nausea or vomiting. She has no bowel disturbances from the hernias themselves. She feels the hernias are stable. Currently she has no symptoms from either hernia. She has not had any evidence of incarceration events at either site of the right groin or umbilicus. Clinical exam reveals her abdomen to be soft, nontender, nondistended, good bowel sounds. No guarding or rebound. No masses. No ascites. She has a small 2 cm hernia at the umbilicus, she has a large 12 cm hernia in the right groin. The right groin hernia is reducible. The umbilical hernia is reducible. Neither 1 is tender. The remaining abdominal exam is unremarkable. We will continue to monitor this patient and pursue nonoperative expectant therapy. This patient should report to me urgently for any incarceration events, advancing pain or symptoms, or sudden advancement in the size of either of the 2 hernias.          Sincerely,       Annmarie Solis MD   CC: No Recipients

## (undated) NOTE — IP AVS SNAPSHOT
[address]            (For Outpatient Use Only) Initial Admit Date: 2/20/2020   Inpt/Obs Admit Date: Inpt: N/A / Obs: 02/21/20   Discharge Date:    Link Pattee:  [de-identified]   MRN: [de-identified]   CSN: [id number]   CEID: [id number] Hospital Account Financial Class: Medicare Advantage    February 22, 2020

## (undated) NOTE — MR AVS SNAPSHOT
[facility]  [address] Selena Raines [address] [place] [id number] [phone]               Thank you for choosing us for your health care visit with Jacqueline Loya MD.  We are glad to serve you and happy to provide you with this Medical Issues Discussed Today     Asthma with COPD (chronic obstructive pulmonary disease)    Cough          Instructions and Information about [address] during the 5 days you are taking the antibiotic I want she did take half a Coumadin tabl Take  by mouth.  1 capsule daily           Polyethylene Glycol 3350 Pack   Commonly known as:  MIRALAX           Sotalol HCl 80 MG Tabs   1/2 tablet 3 times daily   Commonly known as:  BETAPACE           * TraMADol HCl  MG Tb24   Take 1 tablet (100 mg

## (undated) NOTE — LETTER
October 30, 2019    Justyna Maldonado  [id number] [facility] [id number]      Dear Roger Blanca: The following are the results of your recent tests. Please review the list of test results.   Your result is the value on the left; we have also suppl

## (undated) NOTE — LETTER
Date & Time: 2/21/2020, 4:16 AM  Encounter Provider(s):    Ash Mcmahon,        To Whom It May Concern:    Gloria Perales was in our department on 2/21/2020. He will not be able to return to work until 2/22/2020.      If you have any questions

## (undated) NOTE — LETTER
August 13, 2018    Megan Canales [address]      Dear Dionna Gillis: The following are the results of your recent tests. Please review the list of test results.   Your result is the value on the left; we have also sup